# Patient Record
Sex: FEMALE | Race: WHITE | NOT HISPANIC OR LATINO | Employment: OTHER | URBAN - METROPOLITAN AREA
[De-identification: names, ages, dates, MRNs, and addresses within clinical notes are randomized per-mention and may not be internally consistent; named-entity substitution may affect disease eponyms.]

---

## 2017-01-09 ENCOUNTER — OFFICE VISIT (OUTPATIENT)
Dept: CARDIOLOGY | Facility: CLINIC | Age: 67
End: 2017-01-09

## 2017-01-09 VITALS
WEIGHT: 176.8 LBS | DIASTOLIC BLOOD PRESSURE: 72 MMHG | HEART RATE: 48 BPM | BODY MASS INDEX: 27.75 KG/M2 | SYSTOLIC BLOOD PRESSURE: 128 MMHG | HEIGHT: 67 IN

## 2017-01-09 DIAGNOSIS — I45.6 WPW (WOLFF-PARKINSON-WHITE SYNDROME): Primary | ICD-10-CM

## 2017-01-09 DIAGNOSIS — I48.0 PAROXYSMAL ATRIAL FIBRILLATION (HCC): ICD-10-CM

## 2017-01-09 DIAGNOSIS — R00.1 BRADYCARDIA: ICD-10-CM

## 2017-01-09 DIAGNOSIS — R07.9 CHEST PAIN, UNSPECIFIED TYPE: ICD-10-CM

## 2017-01-09 PROCEDURE — 99213 OFFICE O/P EST LOW 20 MIN: CPT | Performed by: INTERNAL MEDICINE

## 2017-01-09 PROCEDURE — 93000 ELECTROCARDIOGRAM COMPLETE: CPT | Performed by: INTERNAL MEDICINE

## 2017-01-09 PROCEDURE — 93270 REMOTE 30 DAY ECG REV/REPORT: CPT | Performed by: INTERNAL MEDICINE

## 2017-01-09 NOTE — MR AVS SNAPSHOT
Aleena KELI Nathaniel   1/9/2017 3:30 PM   Office Visit    Dept Phone:  [phone]   Encounter #:  [id number]    Provider:  Edvin Cook DO   Department:  [facility] [place] [facility]                Your Full Care Plan              Your Updated Medication List          This list is accurate as of: 1/9/17  4:44 PM.  Always use your most recent med list.                apixaban 5 MG tablet tablet   Commonly known as:  ELIQUIS   Take 1 tablet by mouth Every 12 (Twelve) Hours.       flecainide 50 MG tablet   Commonly known as:  TAMBOCOR   Take 1 tablet by mouth 2 (Two) Times a Day.       FLUoxetine 40 MG capsule   Commonly known as:  PROzac       hydroxychloroquine 200 MG tablet   Commonly known as:  PLAQUENIL       metoprolol tartrate 25 MG tablet   Commonly known as:  LOPRESSOR   Take 1 tablet by mouth Every 12 (Twelve) Hours.       omeprazole-sodium bicarbonate  MG per capsule   Commonly known as:  ZEGERID   Take 1 capsule by mouth Every Morning Before Breakfast.       timolol 0.25 % ophthalmic solution   Commonly known as:  BETIMOL       traZODone 50 MG tablet   Commonly known as:  DESYREL       vitamin D 38034 UNITS capsule capsule   Commonly known as:  ERGOCALCIFEROL               We Performed the Following     ECG 12 Lead       You Were Diagnosed With        Codes Comments    WPW (Elodia-Parkinson-White syndrome)    -  Primary ICD-10-CM: I45.6  ICD-9-CM: 426.7     Paroxysmal atrial fibrillation     ICD-10-CM: I48.0  ICD-9-CM: 427.31     Bradycardia     ICD-10-CM: R00.1  ICD-9-CM: 427.89     Chest pain, unspecified type     ICD-10-CM: R07.9  ICD-9-CM: 786.50       Instructions     None    Patient Instructions History      Upcoming Appointments     Visit Type Date Time Department    FOLLOW UP 1/9/2017  3:30 PM [facility]    FOLLOW UP 5/24/2017  9:45 AM [facility] Signup     [facility] allows you to send messages to your  "doctor, view your test results, renew your prescriptions, schedule appointments, and more. To sign up, go to [employer] and click on the Sign Up Now link in the New User? box. Enter your [employer] Activation Code exactly as it appears below along with the last four digits of your Social Security Number and your Date of Birth () to complete the sign-up process. If you do not sign up before the expiration date, you must request a new code.    [employer] Activation Code: [email or web address]  Expires: 2017  4:44 PM    If you have questions, you can email [email or web address] or call [phone] to talk to our [employer] staff. Remember, [employer] is NOT to be used for urgent needs. For medical emergencies, dial 911.               Other Info from Your Visit           Your Appointments     May 24, 2017  9:45 AM EDT   Follow Up with Dominic Wolff MD   [facility] [place] [facility] (--)    [address] [place] [address] Dr Esqueda [address] [place] [address]   [phone]           Arrive 15 minutes prior to appointment.              Allergies     Bactrim [Sulfamethoxazole-trimethoprim]      Erythromycin      Percocet [Oxycodone-acetaminophen]      Statins      Sulfa Antibiotics        Vital Signs     Blood Pressure Pulse Height Weight Last Menstrual Period Body Mass Index    128/72 (BP Location: Left arm, Patient Position: Sitting) 48 67\" (170.2 cm) 176 lb 12.8 oz (80.2 kg) (LMP Unknown) 27.69 kg/m2    Smoking Status                   Former Smoker           Problems and Diagnoses Noted     Atrial fibrillation (irregular heartbeat)    Bradycardia    Chest pain    WPW (Elodia-Parkinson-White syndrome)      Results     ECG 12 Lead               "

## 2017-01-09 NOTE — PROGRESS NOTES
Subjective:   Aleena Armstrong  1950  [phone]      01/09/2017    [facility] CARDIOLOGY    Lidia Mast MD  [address] [address]    REFERRING DOCTOR: Dr Dominic Wolff      Patient ID: Aleena Armstrong is a 66 y.o. female.    Chief Complaint:  AFIB/Flutter    Problem List:    1. Atrial flutter.  A) [facility] ED presentation, 10/23/2016, with rapid atrial  flutter converted after IV diltiazem.    B) Normal LVEF by catheterization, 10/24/2016.    C) Echocardiogram, 10/23/2016, revealing LVEF of 60%, mild MR, mild TR,  LV diastolic dysfunction, Dominic Wolff MD.    D)Recurrent symptomatic Afib.  E)Chadsvasc=3,No anticoagulation per Dr. Wolff secondary to history of  PUD  2. Chest pain.  A) Symptoms of chest discomfort with radiation to the neck and jaw,  10/23/2016.  B) Negative troponin x2.    C) Cardiac catheterization, 10/24/2016, Rubens Morejon MD, revealing  nonobstructive plaque involving a large obtuse marginal and the apical LAD  without evidence of hemodynamically significant CAD, acceptable FFR of  the OM (0.95) and of apical LAD (0.83). LVEF of 60%.    D) Medical therapy and risk factor modification.    3. History of WPW and remote RFA  4. Dyslipidemia- lipophilic statin intolerant  5. Morbid obesity -- Prior gastric bypass  6. Hypertension.    7. Gastric ulcer, diagnosed 08/2016, per EGD secondary to Plaquenil therapy for RA    Allergies   Allergen Reactions   • Bactrim [Sulfamethoxazole-Trimethoprim]    • Erythromycin    • Percocet [Oxycodone-Acetaminophen]    • Statins    • Sulfa Antibiotics        Current Outpatient Prescriptions:   •  apixaban (ELIQUIS) 5 MG tablet tablet, Take 1 tablet by mouth Every 12 (Twelve) Hours., Disp: 60 tablet, Rfl: 1  •  flecainide (TAMBOCOR) 50 MG tablet, Take 1 tablet by mouth 2 (Two) Times a Day., Disp: 60 tablet, Rfl: 3  •  FLUoxetine (PROzac) 40 MG capsule, Take 40 mg by mouth Daily., Disp: , Rfl:    •  hydroxychloroquine (PLAQUENIL) 200 MG tablet, Take 200 mg by mouth 2 (Two) Times a Day., Disp: , Rfl:   •  metoprolol tartrate (LOPRESSOR) 25 MG tablet, Take 1 tablet by mouth Every 12 (Twelve) Hours., Disp: 90 tablet, Rfl: 3  •  omeprazole-sodium bicarbonate (ZEGERID)  MG per capsule, Take 1 capsule by mouth Every Morning Before Breakfast., Disp: 30 capsule, Rfl: 3  •  timolol (BETIMOL) 0.25 % ophthalmic solution, Administer 1-2 drops into the left eye Daily., Disp: , Rfl:   •  traZODone (DESYREL) 50 MG tablet, Take 50 mg by mouth Every Night., Disp: , Rfl:   •  vitamin D (ERGOCALCIFEROL) 27775 UNITS capsule capsule, Take 50,000 Units by mouth 1 (One) Time Per Week. On Sundays, Disp: , Rfl:     History of Present Illness  Patient here today for follow-up visit after hospitalization for atrial fibrillation with rapid ventricular rates.  She was started on flecainide and metoprolol and blood thinners at that time.  She states she's been doing okay since that time in regards to no recurrence of any atrial arrhythmias as she can tell.  She does states she does get palpitations and feels very fatigued.  She also feels skipped beats.  She states skipped beats feels similar to when she had her WPW prior to her ablation.  Overall she states she's not feeling great special regards of having some chest pressure, feeling very fatigued and also occasional palpitations.  She states chest pressure similar to what she's had in the past however did have a heart catheterization October 2016 by Dr. Taylor and at that time had a large obtuse marginal and apical LAD without evidence of hemodynamically significant coronary disease with acceptable FFR R of the obtuse marginal with a normal ejection fraction.    No issues with shortness of breath, fevers, chills, night sweats, PND, orthopnea or palpitations. No recent ER visits or hospital stays.      The following portions of the patient's history were reviewed and updated as  "appropriate: allergies, current medications, past family history, past medical history, past social history, past surgical history and problem list.    ROS   14 point ROS negative except as outlined in problem list, HPI and other parts of the note.      ECG 12 Lead  Date/Time: 1/9/2017 4:22 PM  Performed by: SHAWANDA GOMEZ  Authorized by: SHAWANDA GOMEZ   Rhythm: sinus bradycardia  Ectopy: atrial premature contractions  Comments: SB HR 48 bpm with PACs noted. QTc and QRS ok 76 msec               Objective:       Vitals:    01/09/17 1538   BP: 128/72   BP Location: Left arm   Patient Position: Sitting   Pulse: (!) 48   Weight: 176 lb 12.8 oz (80.2 kg)   Height: 67\" (170.2 cm)       GENERAL: Well-developed, well-nourished patient in no acute distress.  HEENT: Normocephalic, atraumatic, PERRLA. Moist mucous membranes.  NECK: No JVD present at 30°. No carotid bruits auscultated.  LUNGS: Clear to auscultation.  CARDIOVASCULAR: Heart has a regular rate and rhythm. No murmurs, gallops or rubs noted.   ABDOMEN: Soft, nontender. Positive bowel sounds.  MUSCULOSKELETAL: No gross deformities. No clubbing, cyanosis, or lower extremity edema.  SKIN: Pink, warm  Neuro: Nonfocal exam. Gait intact  Ext: No edema or bruising    The patient's old records including ambulatory rhythm recordings (ECGs, Holter/event monitor) were reviewed and discussed.      Lab Review:   Results for orders placed or performed during the hospital encounter of 12/11/16   Comprehensive Metabolic Panel   Result Value Ref Range    Glucose 97 70 - 100 mg/dL    BUN 17 9 - 23 mg/dL    Creatinine 1.20 0.60 - 1.30 mg/dL    Sodium 138 132 - 146 mmol/L    Potassium 4.0 3.5 - 5.5 mmol/L    Chloride 106 99 - 109 mmol/L    CO2 26.0 20.0 - 31.0 mmol/L    Calcium 9.5 8.7 - 10.4 mg/dL    Total Protein 7.2 5.7 - 8.2 g/dL    Albumin 4.30 3.20 - 4.80 g/dL    ALT (SGPT) 13 7 - 40 U/L    AST (SGOT) 26 0 - 33 U/L    Alkaline Phosphatase 66 25 - 100 U/L    Total Bilirubin 0.3 " 0.3 - 1.2 mg/dL    eGFR Non African Amer 45 (L) >60 mL/min/1.73    Globulin 2.9 gm/dL    A/G Ratio 1.5 g/dL    BUN/Creatinine Ratio 14.2 7.0 - 25.0    Anion Gap 6.0 3.0 - 11.0 mmol/L   Magnesium   Result Value Ref Range    Magnesium 2.2 1.3 - 2.7 mg/dL   CBC Auto Differential   Result Value Ref Range    WBC 6.68 3.50 - 10.80 10*3/mm3    RBC 3.69 (L) 3.89 - 5.14 10*6/mm3    Hemoglobin 11.8 11.5 - 15.5 g/dL    Hematocrit 35.9 34.5 - 44.0 %    MCV 97.3 80.0 - 99.0 fL    MCH 32.0 (H) 27.0 - 31.0 pg    MCHC 32.9 32.0 - 36.0 g/dL    RDW 12.8 11.3 - 14.5 %    RDW-SD 45.6 37.0 - 54.0 fl    MPV 10.4 6.0 - 12.0 fL    Platelets 205 150 - 450 10*3/mm3    Neutrophil % 47.6 41.0 - 71.0 %    Lymphocyte % 40.4 24.0 - 44.0 %    Monocyte % 9.1 0.0 - 12.0 %    Eosinophil % 2.2 0.0 - 3.0 %    Basophil % 0.7 0.0 - 1.0 %    Immature Grans % 0.0 0.0 - 0.6 %    Neutrophils, Absolute 3.17 1.50 - 8.30 10*3/mm3    Lymphocytes, Absolute 2.70 0.60 - 4.80 10*3/mm3    Monocytes, Absolute 0.61 0.00 - 1.00 10*3/mm3    Eosinophils, Absolute 0.15 0.10 - 0.30 10*3/mm3    Basophils, Absolute 0.05 0.00 - 0.20 10*3/mm3    Immature Grans, Absolute 0.00 0.00 - 0.03 10*3/mm3   Protime-INR   Result Value Ref Range    Protime 10.6 9.6 - 11.5 Seconds    INR 0.97    aPTT   Result Value Ref Range    PTT <24.0 (L) 24.0 - 31.0 seconds   Basic Metabolic Panel   Result Value Ref Range    Glucose 94 70 - 100 mg/dL    BUN 15 9 - 23 mg/dL    Creatinine 1.10 0.60 - 1.30 mg/dL    Sodium 140 132 - 146 mmol/L    Potassium 3.8 3.5 - 5.5 mmol/L    Chloride 105 99 - 109 mmol/L    CO2 28.0 20.0 - 31.0 mmol/L    Calcium 9.4 8.7 - 10.4 mg/dL    eGFR Non African Amer 49 (L) >60 mL/min/1.73    BUN/Creatinine Ratio 13.6 7.0 - 25.0    Anion Gap 7.0 3.0 - 11.0 mmol/L   CBC Auto Differential   Result Value Ref Range    WBC 5.85 3.50 - 10.80 10*3/mm3    RBC 3.44 (L) 3.89 - 5.14 10*6/mm3    Hemoglobin 11.0 (L) 11.5 - 15.5 g/dL    Hematocrit 33.5 (L) 34.5 - 44.0 %    MCV 97.4 80.0 - 99.0  fL    MCH 32.0 (H) 27.0 - 31.0 pg    MCHC 32.8 32.0 - 36.0 g/dL    RDW 12.9 11.3 - 14.5 %    RDW-SD 45.3 37.0 - 54.0 fl    MPV 10.6 6.0 - 12.0 fL    Platelets 198 150 - 450 10*3/mm3    Neutrophil % 42.1 41.0 - 71.0 %    Lymphocyte % 47.4 (H) 24.0 - 44.0 %    Monocyte % 7.7 0.0 - 12.0 %    Eosinophil % 2.1 0.0 - 3.0 %    Basophil % 0.5 0.0 - 1.0 %    Immature Grans % 0.2 0.0 - 0.6 %    Neutrophils, Absolute 2.47 1.50 - 8.30 10*3/mm3    Lymphocytes, Absolute 2.77 0.60 - 4.80 10*3/mm3    Monocytes, Absolute 0.45 0.00 - 1.00 10*3/mm3    Eosinophils, Absolute 0.12 0.10 - 0.30 10*3/mm3    Basophils, Absolute 0.03 0.00 - 0.20 10*3/mm3    Immature Grans, Absolute 0.01 0.00 - 0.03 10*3/mm3   Protime-INR   Result Value Ref Range    Protime 11.2 9.6 - 11.5 Seconds    INR 1.03    Troponin   Result Value Ref Range    Troponin I <0.006 <=0.040 ng/mL   aPTT   Result Value Ref Range    PTT 38.5 (H) 24.0 - 31.0 seconds   TSH   Result Value Ref Range    TSH 3.744 0.350 - 5.350 mIU/mL   Lipid Panel   Result Value Ref Range    Total Cholesterol 204 (H) 0 - 200 mg/dL    Triglycerides 68 0 - 150 mg/dL    HDL Cholesterol 98 (H) 40 - 60 mg/dL    LDL Cholesterol  84 0 - 130 mg/dL   Hemoglobin A1c   Result Value Ref Range    Hemoglobin A1C 5.50 4.80 - 5.60 %   aPTT   Result Value Ref Range    PTT 51.8 (H) 24.0 - 31.0 seconds   POC Troponin, Rapid   Result Value Ref Range    Troponin I 0.00 0.00 - 0.60 ng/mL   Light Blue Top   Result Value Ref Range    Extra Tube hold for add-on    Green Top (Gel)   Result Value Ref Range    Extra Tube Hold for add-ons.    Lavender Top   Result Value Ref Range    Extra Tube hold for add-on    Gold Top - SST   Result Value Ref Range    Extra Tube Hold for add-ons.            Diagnosis:   1. WPW (Elodia-Parkinson-White syndrome) --> s/p ablation in the past  2. Paroxysmal atrial fibrillation  3. Bradycardia  4. Chest pain, unspecified type, CAD      Assessment & Plan:   1.  Paroxysmal atrial fibrillation /  Aflutter with sick sinus syndrome. She was hospitalized in December 2016 for recurrent atrial fibrillation with rapid ventricular rates.  Did on flecainide 50 mg twice a day Lopressor 25 mg twice a day and anticoagulation with eliquis 5 mg twice a day.  2.  Chronic symptomatic sinus bradycardia  3.  Recurrent chest pain with left heart catheterization in October 2016 by Dr. Morejon  4.  History of WPW status post ablation.   5. Morbid Obesity s/p gastric bypass surgery  6. Chronic anemia stable    Plan:  1.  Need to better determine if the atrial fibrillation or any atrial arrhythmias versus bradycardias to cause the patient continued symptoms of fatigue and palpitations.  We'll check a 30 day event monitor.  2.  Metoprolol to be stopped  3.  Important for the patient to follow-up with Dr. Wolff in the near future for recurrent chest pain and with recent left heart catheterization October 2016.  Normal ejection fraction at that time and no intervention done.  4.  Patient possibly may need a pacemaker to help with some bradycardia issues and to allow for better treatment of her paroxysmal atrial fibrillation.  However we'll wait for now and get a 30 day event monitor prior to final decision.  5.  Long term treatment of atrial fibrillation may need to be a pulmonary vein ablation/atrial flutter ablation however we'll continue with medical management for now.    CC: Dr Dominic Cook DO  01/09/17  4:16 PM      EMR Dragon/Transcription disclaimer:  Much of this encounter note is an electronic transcription/translation of spoken language to printed text. Electronic translation of spoken language may permit erroneous, or at times, nonsensical words or phrases to be inadvertently transcribed. Although I have reviewed the note for such errors, some may still exist.

## 2017-01-25 ENCOUNTER — OFFICE VISIT (OUTPATIENT)
Dept: CARDIOLOGY | Facility: CLINIC | Age: 67
End: 2017-01-25

## 2017-01-25 VITALS
HEIGHT: 67 IN | SYSTOLIC BLOOD PRESSURE: 122 MMHG | HEART RATE: 58 BPM | BODY MASS INDEX: 27.62 KG/M2 | DIASTOLIC BLOOD PRESSURE: 78 MMHG | WEIGHT: 176 LBS

## 2017-01-25 DIAGNOSIS — I25.10 CORONARY ARTERY DISEASE INVOLVING NATIVE CORONARY ARTERY OF NATIVE HEART WITHOUT ANGINA PECTORIS: Primary | ICD-10-CM

## 2017-01-25 DIAGNOSIS — I10 ESSENTIAL HYPERTENSION: ICD-10-CM

## 2017-01-25 DIAGNOSIS — E78.2 MIXED HYPERLIPIDEMIA: ICD-10-CM

## 2017-01-25 DIAGNOSIS — I48.0 PAROXYSMAL ATRIAL FIBRILLATION (HCC): ICD-10-CM

## 2017-01-25 PROCEDURE — 99213 OFFICE O/P EST LOW 20 MIN: CPT | Performed by: INTERNAL MEDICINE

## 2017-01-25 RX ORDER — FLECAINIDE ACETATE 50 MG/1
50 TABLET ORAL 2 TIMES DAILY
Qty: 180 TABLET | Refills: 3 | Status: SHIPPED | OUTPATIENT
Start: 2017-01-25 | End: 2017-05-04 | Stop reason: SDUPTHER

## 2017-01-25 NOTE — PROGRESS NOTES
[facility] at [facility]  Office Progress Note  Aleena Armstrong  1950  [phone]      Visit Date: 01/25/2017    PCP: Lidia Mast MD  [address] [facility] [address]  [facility] [address]    IDENTIFICATION: A 66 y.o. female     Chief Complaint   Patient presents with   • Follow-up     WPW/HTN       1. Atrial flutter.  A) [facility] ED presentation, 10/23/2016, with rapid atrial flutter converted after IV diltiazem.   B) Nl LVEF catheterization, 10/24/2016.   C) Echocardiogram, 10/23/2016, revealing LVEF of 60%, mild MR, mild TR, LV diastolic dysfunction, Dominic Wolff MD.   2. Chest pain.  A) Symptoms of chest discomfort with radiation to the neck and jaw, 10/23/2016.  B) Negative troponin x2.   C) Cardiac catheterization, 10/24/2016, Rubens Morejon MD, revealing nonobstructive plaque involving a large obtuse marginal and the apical LAD without evidence of hemodynamically significant CAD, acceptable FFR of the OM (0.95) and of apical LAD (0.83). LVEF of 60%.   D) Medical therapy and risk factor modification.   3. History of WPW.   4. Dyslipidemia- lipophilic statin intolerant  5. Morbid obesity.  Prior gastric bypass  6. Hypertension.   7. Gastric ulcer, diagnosed 08/2016, per EGD secondary to Plaquenil therapy.     Allergies  Allergies   Allergen Reactions   • Bactrim [Sulfamethoxazole-Trimethoprim]    • Erythromycin    • Percocet [Oxycodone-Acetaminophen]    • Statins    • Sulfa Antibiotics        Current Medications    Current Outpatient Prescriptions:   •  apixaban (ELIQUIS) 5 MG tablet tablet, Take 1 tablet by mouth Every 12 (Twelve) Hours., Disp: 60 tablet, Rfl: 1  •  flecainide (TAMBOCOR) 50 MG tablet, Take 1 tablet by mouth 2 (Two) Times a Day., Disp: 60 tablet, Rfl: 3  •  FLUoxetine (PROzac) 40 MG capsule, Take 40 mg by mouth Daily., Disp: , Rfl:   •  hydroxychloroquine (PLAQUENIL) 200 MG tablet, Take 200 mg by mouth 2 (Two) Times a Day., Disp: , Rfl:   •  omeprazole-sodium  "bicarbonate (ZEGERID)  MG per capsule, Take 1 capsule by mouth Every Morning Before Breakfast., Disp: 30 capsule, Rfl: 3  •  timolol (BETIMOL) 0.25 % ophthalmic solution, Administer 1-2 drops into the left eye Daily., Disp: , Rfl:   •  traZODone (DESYREL) 50 MG tablet, Take 50 mg by mouth Every Night., Disp: , Rfl:   •  vitamin D (ERGOCALCIFEROL) 39398 UNITS capsule capsule, Take 50,000 Units by mouth 1 (One) Time Per Week. On Sundays, Disp: , Rfl:       History of Present Illness     Pt denies any new chest pain, dyspnea, dyspnea on exertion, orthopnea, PND, palpitations, lower extremity edema.  Was intolerant to beta blockade w jody/hypotension.  Now wearing holter to delineate need for ppm w element of tachy/jody.    ROS:  All systems have been reviewed and are negative with the exception of those mentioned in the HPI.    OBJECTIVE:  Vitals:    01/25/17 1140   BP: 122/78   BP Location: Left arm   Patient Position: Sitting   Pulse: 58   Weight: 176 lb (79.8 kg)   Height: 67\" (170.2 cm)     Physical Exam   Constitutional: She appears well-developed and well-nourished.   Neck: Normal range of motion. Neck supple. No hepatojugular reflux and no JVD present. Carotid bruit is not present. No tracheal deviation present. No thyromegaly present.   Cardiovascular: Normal rate, regular rhythm, S1 normal, S2 normal, intact distal pulses and normal pulses.  PMI is not displaced.  Exam reveals no gallop, no distant heart sounds, no friction rub, no midsystolic click and no opening snap.    No murmur heard.  Pulses:       Radial pulses are 2+ on the right side, and 2+ on the left side.        Dorsalis pedis pulses are 2+ on the right side, and 2+ on the left side.        Posterior tibial pulses are 2+ on the right side, and 2+ on the left side.   Pulmonary/Chest: Effort normal and breath sounds normal. She has no wheezes. She has no rales.   Abdominal: Soft. Bowel sounds are normal. She exhibits no mass. There is no " tenderness. There is no guarding.       Diagnostic Data:  Procedures      ASSESSMENT:   Diagnosis Plan   1. Coronary artery disease involving native coronary artery of native heart without angina pectoris     2. Paroxysmal atrial fibrillation     3. Essential hypertension     4. Mixed hyperlipidemia         PLAN:  CAD - moderate nonobst w recent LHC.  Cont current RX    HL  Statin intolerant.  Rediscussed pcsk9 option    Paf/palps/ jody- ultimately may need ppm, per Joyce    Fu 6 month    Lidia Mast MD, thank you for referring Ms. Armstrong for evaluation.  I have forwarded my electronically generated recommendations to you for review.  Please do not hesitate to call with any questions.      Dominic Wolff MD, [facility]

## 2017-02-13 ENCOUNTER — OFFICE VISIT (OUTPATIENT)
Dept: CARDIOLOGY | Facility: CLINIC | Age: 67
End: 2017-02-13

## 2017-02-13 DIAGNOSIS — R00.2 PALPITATIONS: ICD-10-CM

## 2017-02-13 PROCEDURE — 93272 ECG/REVIEW INTERPRET ONLY: CPT | Performed by: INTERNAL MEDICINE

## 2017-03-07 ENCOUNTER — OFFICE VISIT (OUTPATIENT)
Dept: CARDIOLOGY | Facility: CLINIC | Age: 67
End: 2017-03-07

## 2017-03-07 DIAGNOSIS — I48.0 PAROXYSMAL ATRIAL FIBRILLATION (HCC): ICD-10-CM

## 2017-05-04 ENCOUNTER — OFFICE VISIT (OUTPATIENT)
Dept: CARDIOLOGY | Facility: CLINIC | Age: 67
End: 2017-05-04

## 2017-05-04 VITALS
BODY MASS INDEX: 28.03 KG/M2 | HEIGHT: 67 IN | WEIGHT: 178.6 LBS | SYSTOLIC BLOOD PRESSURE: 116 MMHG | HEART RATE: 61 BPM | DIASTOLIC BLOOD PRESSURE: 70 MMHG

## 2017-05-04 DIAGNOSIS — R00.1 BRADYCARDIA: ICD-10-CM

## 2017-05-04 DIAGNOSIS — I48.92 ATRIAL FLUTTER WITH RAPID VENTRICULAR RESPONSE (HCC): ICD-10-CM

## 2017-05-04 DIAGNOSIS — I48.0 PAROXYSMAL ATRIAL FIBRILLATION (HCC): ICD-10-CM

## 2017-05-04 DIAGNOSIS — R07.89 OTHER CHEST PAIN: ICD-10-CM

## 2017-05-04 DIAGNOSIS — I45.6 WPW (WOLFF-PARKINSON-WHITE SYNDROME): Primary | ICD-10-CM

## 2017-05-04 PROCEDURE — 99213 OFFICE O/P EST LOW 20 MIN: CPT | Performed by: INTERNAL MEDICINE

## 2017-05-04 PROCEDURE — 93000 ELECTROCARDIOGRAM COMPLETE: CPT | Performed by: INTERNAL MEDICINE

## 2017-05-04 RX ORDER — FLECAINIDE ACETATE 50 MG/1
50 TABLET ORAL 2 TIMES DAILY
Qty: 180 TABLET | Refills: 2 | Status: SHIPPED | OUTPATIENT
Start: 2017-05-04 | End: 2017-05-04 | Stop reason: SINTOL

## 2017-05-15 PROCEDURE — 93005 ELECTROCARDIOGRAM TRACING: CPT | Performed by: PHYSICIAN ASSISTANT

## 2017-05-18 ENCOUNTER — PREP FOR SURGERY (OUTPATIENT)
Dept: CARDIOLOGY | Facility: CLINIC | Age: 67
End: 2017-05-18

## 2017-05-18 DIAGNOSIS — I48.0 PAROXYSMAL ATRIAL FIBRILLATION (HCC): Primary | ICD-10-CM

## 2017-05-18 RX ORDER — ONDANSETRON 2 MG/ML
4 INJECTION INTRAMUSCULAR; INTRAVENOUS EVERY 6 HOURS PRN
Status: CANCELLED | OUTPATIENT
Start: 2017-05-18

## 2017-05-18 RX ORDER — SODIUM CHLORIDE 0.9 % (FLUSH) 0.9 %
1-10 SYRINGE (ML) INJECTION AS NEEDED
Status: CANCELLED | OUTPATIENT
Start: 2017-05-18

## 2017-05-23 ENCOUNTER — RESULTS ENCOUNTER (OUTPATIENT)
Dept: CARDIOLOGY | Facility: CLINIC | Age: 67
End: 2017-05-23

## 2017-05-23 DIAGNOSIS — I48.0 PAROXYSMAL ATRIAL FIBRILLATION (HCC): ICD-10-CM

## 2017-06-14 NOTE — NURSING NOTE
"PRE-PVA ASSESSMENT  Aleena Armstrong 1950   [address] [facility] [address]   [phone] (home)     Referring: Dr. Wolff  Information obtained from: [x] Medical record review  [x] Patient report  Scheduled for: PVA on 6/20/17 with Dr Cook    AFib Specific History:  AFib Type: paroxysmal  [id number] Score: 3  Contributing Factors: HTN, AGE >65 and Female   Anticoagulation:  Eliquis 5mg BID since Dec 2016  Cardioversion x 1 on 12/11/16,  [facility]  Failed AAD(s): flecainide  Prior Ablation: WPW ablation in 1995 at [facility]    Is Ms. Armstrong aware of her AFib? Yes   Onset: Oct 2016     Frequency: occasional short episodes, no sustained episodes since Dec 2016       Duration: few seconds up to 2 days   Exacerbations: none   Alleviations: none      Symptoms:   [x] Palpitations:     [] Chest Discomfort:    [] Dizziness:    [] Presyncope:    [] Lightheadedness:   [] Syncope:    [] Fatigue:    [x] Other: \"unbalanced\"    [] Short of Breath:     Last Echo(s):  [x] TTE Date: 10/23/16         [] DEEPAK Date: scheduled for 6/20/17       EF: 60%      EF:         LA: dilated, vol index 47.4   LA:         VHD? Mild MR & TR   VHD?        Diastolic dysfunction     Past medical History:   [] Diabetes  -NO      Hemoglobin A1C   Date Value Ref Range Status   12/11/2016 5.50 4.80 - 5.60 % Final   10/23/2016 5.50 4.00 - 6.00 % Final       [] HYPOthyroidism  [] HYPERthyroidism  -NO         TSH   Date Value Ref Range Status   12/11/2016 3.744 0.350 - 5.350 mIU/mL Final   10/23/2016 3.303 0.350 - 5.350 mIU/mL Final     [x] HTN        [] Tx? None currently        [] Controlled? Yes    [] Heart Failure -NO    [] CVA -NO                               [] TIA  -NO        [] Ischemic         [] Hemorrhagic         [] Nonischemic         [] Embolic        [] Diastolic    [] CAD -NO        [] MI  -NO          [x] Dyslipidemia    [x] Ischemic Evaluation       [] Stress Test:        [x] Heart Cath: 10/24/16: nonobstructive plaque involving of OM " and apical LAD, acceptable FFR of the OM (0.95) and of apical LAD (0.83). LVEF of 60%    [] Sleep Apnea Diagnosed -NO    [x] Sleep Apnea Suspected -YES, AM dry mouth & headaches & excessive daytime sleepiness        [x] Discussed with Ms. Armstrong - she is open to evaluation        [x] Referral to [facility] placed - they will call with appt        [] Declined by Ms. Armstrong     [x] Obesity, s/p gastric bypass 2002. BMI 27.8 5/7/17     [] Urologic History - incontinence       [] Urologic cancer surgery         [] Severe decrease in flow of urine stream        [] Recent unexplained gross hematuria       [] Hx urethral stricture     Other Pertinent PMH: atypical flutter, SSS, tachy/jody, anxiety & depression, peptic ulcer    Social / Lifestyle History:   [x]   Tobacco: Quit 1992                      [x] Former: 1 PPD x 27 yrs    [x]   Alcohol:          [x] Current: 5 glasses of wine per week(s)   [x]   Caffeine: 1 cups per day   []   Recreational Drugs: None   []   Stress Issues: denies   [x]   Exercise: walking, gardening    Summary of Patient Contact:  I spoke with Ms. Armstrong about her upcoming PVA.  She was well informed about the procedure from prior discussion with Dr Cook and from reading the provided literature.  I answered a few remaining questions.  We did discuss risks, anesthesia, bedrest, sheath removal, discharge criteria, and normal post-procedure expectations.  Ms. Armstrong verbalized understanding and she is ready to proceed.      Rosamaria Briseno, ELIJAHN, RN

## 2017-06-15 ENCOUNTER — TELEPHONE (OUTPATIENT)
Dept: CARDIOLOGY | Facility: CLINIC | Age: 67
End: 2017-06-15

## 2017-06-15 DIAGNOSIS — G47.19 EXCESSIVE DAYTIME SLEEPINESS: Primary | ICD-10-CM

## 2017-06-15 NOTE — TELEPHONE ENCOUNTER
I called Mrs. Armstorng about her upcoming PVA.  She reports symptoms suspicious for JESSICA, including waking up with a dry mouth and headache & excessive daytime sleepiness.  She also has a history of morbid obesity, s/p gastric bypass in 2002.  She is receptive to sleep apnea eval.  Referral placed.

## 2017-06-16 ENCOUNTER — HOSPITAL ENCOUNTER (EMERGENCY)
Facility: HOSPITAL | Age: 67
Discharge: HOME | End: 2017-06-16
Attending: EMERGENCY MEDICINE | Admitting: EMERGENCY MEDICINE

## 2017-06-16 ENCOUNTER — APPOINTMENT (OUTPATIENT)
Dept: GENERAL RADIOLOGY | Facility: HOSPITAL | Age: 67
End: 2017-06-16

## 2017-06-16 VITALS
OXYGEN SATURATION: 98 % | BODY MASS INDEX: 26.53 KG/M2 | TEMPERATURE: 98.2 F | WEIGHT: 169 LBS | DIASTOLIC BLOOD PRESSURE: 62 MMHG | RESPIRATION RATE: 16 BRPM | HEART RATE: 60 BPM | SYSTOLIC BLOOD PRESSURE: 131 MMHG | HEIGHT: 67 IN

## 2017-06-16 DIAGNOSIS — S90.31XA CONTUSION OF RIGHT FOOT, INITIAL ENCOUNTER: Primary | ICD-10-CM

## 2017-06-16 PROCEDURE — 73630 X-RAY EXAM OF FOOT: CPT

## 2017-06-16 PROCEDURE — 99283 EMERGENCY DEPT VISIT LOW MDM: CPT

## 2017-06-16 RX ORDER — GABAPENTIN 300 MG/1
300 CAPSULE ORAL 2 TIMES DAILY
COMMUNITY
End: 2017-07-26

## 2017-06-16 NOTE — ED PROVIDER NOTES
Subjective   HPI Comments: 66-year-old female complains of right foot pain and bruising.  The patient states that she stepped down yesterday when she got up and had pain in the lateral aspect of her right foot.  She recalls no injury.  She noted that it was bruised.  She continues to have some discomfort as she walks.  The patient has a history of A. fib and is on Eliquis.  PCP is Lidia Mast.  She is a nonsmoker.  No alcohol or drug use.    Patient is a 66 y.o. female presenting with lower extremity pain.   History provided by:  Patient  Lower Extremity Issue   Location:  Foot  Injury: no    Foot location:  R foot  Pain details:     Quality:  Dull    Radiates to:  Does not radiate    Severity:  Moderate    Onset quality:  Sudden    Duration: Onset yesterday.    Progression:  Unchanged  Chronicity:  New  Prior injury to area:  No  Relieved by:  Nothing  Worsened by:  Bearing weight  Associated symptoms: no back pain and no fever        Review of Systems   Constitutional: Negative for chills and fever.   HENT: Negative for congestion, ear pain, nosebleeds, rhinorrhea and sore throat.    Eyes: Negative for pain, discharge and visual disturbance.   Respiratory: Negative for shortness of breath and wheezing.    Cardiovascular: Negative for chest pain, palpitations and leg swelling.   Gastrointestinal: Negative for abdominal pain, blood in stool, diarrhea, nausea and vomiting.   Endocrine: Negative.    Genitourinary: Negative for dysuria, hematuria and urgency.   Musculoskeletal: Negative for back pain.        Right foot pain   Skin: Negative for pallor and rash.   Allergic/Immunologic: Negative for immunocompromised state.   Neurological: Negative for dizziness, speech difficulty, weakness and headaches.   Hematological: Negative for adenopathy. Bruises/bleeds easily (on Eliquis).   Psychiatric/Behavioral: Negative.        Past Medical History:   Diagnosis Date   • Anxiety and depression 12/11/2016   • Glaucoma of  left eye 12/11/2016   • Hyperlipidemia    • Hypertension    • Insomnia 12/11/2016   • Kidney disease    • Peptic ulcer - s/p gastric bypass surgery (7-8 years ago) 12/11/2016   • Rheumatoid arthritis 12/11/2016   • Vitamin D deficiency 12/11/2016   • Elodia-Parkinson-White (WPW) syndrome        Allergies   Allergen Reactions   • Bactrim [Sulfamethoxazole-Trimethoprim]    • Erythromycin    • Percocet [Oxycodone-Acetaminophen]    • Statins    • Sulfa Antibiotics        Past Surgical History:   Procedure Laterality Date   • CARDIAC CATHETERIZATION N/A 10/24/2016    Procedure: Left Heart Cath;  Surgeon: Rubens Morejon MD;  Location: [facility] CATH INVASIVE LOCATION;  Service:    • CHOLECYSTECTOMY     • GASTRIC BYPASS     • HYSTERECTOMY         Family History   Problem Relation Age of Onset   • Arrhythmia Mother    • Hypertension Mother    • Stroke Mother    • Lung cancer Father        Social History     Social History   • Marital status:      Spouse name: N/A   • Number of children: N/A   • Years of education: N/A     Social History Main Topics   • Smoking status: Former Smoker     Quit date: 1992   • Smokeless tobacco: Never Used   • Alcohol use 3.0 oz/week     5 Glasses of wine per week   • Drug use: No   • Sexual activity: Defer     Other Topics Concern   • None     Social History Narrative           Objective   Physical Exam   Constitutional: She is oriented to person, place, and time. She appears well-developed and well-nourished. No distress.   HENT:   Head: Normocephalic and atraumatic.   Nose: Nose normal.   Mouth/Throat: Oropharynx is clear and moist.   Eyes: EOM are normal. Pupils are equal, round, and reactive to light. Left eye exhibits no discharge. No scleral icterus.   Neck: Normal range of motion. Neck supple.   Cardiovascular: Normal rate, regular rhythm, normal heart sounds and intact distal pulses.    No murmur heard.  Pulmonary/Chest: Effort normal and breath sounds normal. No respiratory distress.  She has no wheezes. She has no rales. She exhibits no tenderness.   Abdominal: Soft. Bowel sounds are normal. There is no tenderness.   Musculoskeletal: Normal range of motion. She exhibits no edema or tenderness.   Mild to moderate tenderness on palpation over the dorsal lateral aspect of the right foot.  There is a small area of bruising in this region.  No hematoma.  No deformity.  No infection.  Normal pulses.   Neurological: She is alert and oriented to person, place, and time.   Skin: Skin is warm and dry. No rash noted. She is not diaphoretic.   Psychiatric: She has a normal mood and affect.   Nursing note and vitals reviewed.      Procedures         ED Course  ED Course    X-rays of the right foot show no evidence of acute fracture.  The patient appears to have a contusion to the right foot, likely exacerbated by Eliquis.  I will discharge her home in an orthopedic boot and encourage her to elevate the extremity and apply an ice bag off and on.              MDM    Final diagnoses:   Contusion of right foot, initial encounter            GENARO Curry  06/16/17 1627       GENARO Curry  06/16/17 1627

## 2017-06-19 ENCOUNTER — APPOINTMENT (OUTPATIENT)
Dept: PREADMISSION TESTING | Facility: HOSPITAL | Age: 67
End: 2017-06-19

## 2017-06-19 ENCOUNTER — HOSPITAL ENCOUNTER (OUTPATIENT)
Dept: CT IMAGING | Facility: HOSPITAL | Age: 67
Discharge: HOME | End: 2017-06-19
Attending: INTERNAL MEDICINE | Admitting: INTERNAL MEDICINE

## 2017-06-19 DIAGNOSIS — I48.0 PAROXYSMAL ATRIAL FIBRILLATION (HCC): ICD-10-CM

## 2017-06-19 LAB
ALBUMIN SERPL-MCNC: 3.8 G/DL (ref 3.2–4.8)
ALBUMIN/GLOB SERPL: 1.7 G/DL (ref 1.5–2.5)
ALP SERPL-CCNC: 64 U/L (ref 25–100)
ALT SERPL W P-5'-P-CCNC: 18 U/L (ref 7–40)
ANION GAP SERPL CALCULATED.3IONS-SCNC: 8 MMOL/L (ref 3–11)
AST SERPL-CCNC: 24 U/L (ref 0–33)
BILIRUB SERPL-MCNC: 0.3 MG/DL (ref 0.3–1.2)
BUN BLD-MCNC: 12 MG/DL (ref 9–23)
BUN/CREAT SERPL: 12 (ref 7–25)
CALCIUM SPEC-SCNC: 9.4 MG/DL (ref 8.7–10.4)
CHLORIDE SERPL-SCNC: 108 MMOL/L (ref 99–109)
CO2 SERPL-SCNC: 27 MMOL/L (ref 20–31)
CREAT BLD-MCNC: 1 MG/DL (ref 0.6–1.3)
DEPRECATED RDW RBC AUTO: 44.1 FL (ref 37–54)
ERYTHROCYTE [DISTWIDTH] IN BLOOD BY AUTOMATED COUNT: 12 % (ref 11.3–14.5)
GFR SERPL CREATININE-BSD FRML MDRD: 55 ML/MIN/1.73
GLOBULIN UR ELPH-MCNC: 2.3 GM/DL
GLUCOSE BLD-MCNC: 86 MG/DL (ref 70–100)
HCT VFR BLD AUTO: 32.5 % (ref 34.5–44)
HGB BLD-MCNC: 10.5 G/DL (ref 11.5–15.5)
INR PPP: 1.01
MAGNESIUM SERPL-MCNC: 2 MG/DL (ref 1.3–2.7)
MCH RBC QN AUTO: 32.6 PG (ref 27–31)
MCHC RBC AUTO-ENTMCNC: 32.3 G/DL (ref 32–36)
MCV RBC AUTO: 100.9 FL (ref 80–99)
PLATELET # BLD AUTO: 197 10*3/MM3 (ref 150–450)
PMV BLD AUTO: 9.9 FL (ref 6–12)
POTASSIUM BLD-SCNC: 4 MMOL/L (ref 3.5–5.5)
PROT SERPL-MCNC: 6.1 G/DL (ref 5.7–8.2)
PROTHROMBIN TIME: 11 SECONDS (ref 9.6–11.5)
RBC # BLD AUTO: 3.22 10*6/MM3 (ref 3.89–5.14)
SODIUM BLD-SCNC: 143 MMOL/L (ref 132–146)
WBC NRBC COR # BLD: 4.04 10*3/MM3 (ref 3.5–10.8)

## 2017-06-19 PROCEDURE — 85610 PROTHROMBIN TIME: CPT | Performed by: PHYSICIAN ASSISTANT

## 2017-06-19 PROCEDURE — 36415 COLL VENOUS BLD VENIPUNCTURE: CPT | Performed by: PHYSICIAN ASSISTANT

## 2017-06-19 PROCEDURE — 71275 CT ANGIOGRAPHY CHEST: CPT

## 2017-06-19 PROCEDURE — 85027 COMPLETE CBC AUTOMATED: CPT | Performed by: PHYSICIAN ASSISTANT

## 2017-06-19 PROCEDURE — 83735 ASSAY OF MAGNESIUM: CPT | Performed by: PHYSICIAN ASSISTANT

## 2017-06-19 PROCEDURE — 0 IOPAMIDOL PER 1 ML: Performed by: INTERNAL MEDICINE

## 2017-06-19 PROCEDURE — 80053 COMPREHEN METABOLIC PANEL: CPT | Performed by: PHYSICIAN ASSISTANT

## 2017-06-19 RX ORDER — OMEPRAZOLE 20 MG/1
20 CAPSULE, DELAYED RELEASE ORAL DAILY
COMMUNITY
End: 2018-02-19

## 2017-06-19 RX ORDER — CHOLECALCIFEROL (VITAMIN D3) 125 MCG
1 CAPSULE ORAL DAILY
COMMUNITY
End: 2019-02-04

## 2017-06-19 RX ORDER — LEVOCETIRIZINE DIHYDROCHLORIDE 5 MG/1
5 TABLET, FILM COATED ORAL EVERY EVENING
COMMUNITY
End: 2017-07-07

## 2017-06-19 RX ADMIN — IOPAMIDOL 80 ML: 755 INJECTION, SOLUTION INTRAVENOUS at 10:23

## 2017-06-19 NOTE — DISCHARGE INSTRUCTIONS
The following instructions given during Pre Admission Testing visit:    Do not eat or drink anything after MN except for sips of water with your a.m. Prescription meds unless otherwise instructed by your physician.    Glasses and jewelry may be worn, but dentures must be removed prior to cath/procedure.    Leave any items you consider valuable at home.    Family members may wait in [facility] waiting area during procedure.    Bring all medications in their original containers the day of procedure.    Bring photo ID and insurance cards on the day of procedure.    Need to make arrangements for transportation prior to discharge.    The following handouts were given:     Heart Cath pathway (if applicable)   Cardiac Cath booklet published by [facility]    OR appropriate [facility] procedure booklet    If applicable, pt instructed to bring CPAP mask and tubing the day of procedure.

## 2017-06-20 ENCOUNTER — HOSPITAL ENCOUNTER (OUTPATIENT)
Facility: HOSPITAL | Age: 67
Setting detail: OBSERVATION
Discharge: HOME | End: 2017-06-23
Attending: INTERNAL MEDICINE | Admitting: INTERNAL MEDICINE

## 2017-06-20 ENCOUNTER — ANESTHESIA EVENT (OUTPATIENT)
Dept: CARDIOLOGY | Facility: HOSPITAL | Age: 67
End: 2017-06-20

## 2017-06-20 ENCOUNTER — ANESTHESIA (OUTPATIENT)
Dept: CARDIOLOGY | Facility: HOSPITAL | Age: 67
End: 2017-06-20

## 2017-06-20 ENCOUNTER — APPOINTMENT (OUTPATIENT)
Dept: CARDIOLOGY | Facility: HOSPITAL | Age: 67
End: 2017-06-20

## 2017-06-20 DIAGNOSIS — I48.0 PAROXYSMAL ATRIAL FIBRILLATION (HCC): ICD-10-CM

## 2017-06-20 LAB
ACT BLD: 120 SECONDS (ref 82–152)
ACT BLD: 213 SECONDS (ref 82–152)
ACT BLD: 263 SECONDS (ref 82–152)
ACT BLD: 351 SECONDS (ref 82–152)
ACT BLD: 389 SECONDS (ref 82–152)
ACT BLD: 406 SECONDS (ref 82–152)
ACT BLD: 417 SECONDS (ref 82–152)
BH CV VAS BP RIGHT ARM: NORMAL MMHG
DEPRECATED RDW RBC AUTO: 45.3 FL (ref 37–54)
ERYTHROCYTE [DISTWIDTH] IN BLOOD BY AUTOMATED COUNT: 12.2 % (ref 11.3–14.5)
HCT VFR BLD AUTO: 27.3 % (ref 34.5–44)
HGB BLD-MCNC: 8.7 G/DL (ref 11.5–15.5)
LV EF 2D ECHO EST: 60 %
MCH RBC QN AUTO: 32.7 PG (ref 27–31)
MCHC RBC AUTO-ENTMCNC: 31.9 G/DL (ref 32–36)
MCV RBC AUTO: 102.6 FL (ref 80–99)
PLATELET # BLD AUTO: 165 10*3/MM3 (ref 150–450)
PMV BLD AUTO: 9.5 FL (ref 6–12)
RBC # BLD AUTO: 2.66 10*6/MM3 (ref 3.89–5.14)
WBC NRBC COR # BLD: 7.85 10*3/MM3 (ref 3.5–10.8)

## 2017-06-20 PROCEDURE — 93657 TX L/R ATRIAL FIB ADDL: CPT | Performed by: INTERNAL MEDICINE

## 2017-06-20 PROCEDURE — 85347 COAGULATION TIME ACTIVATED: CPT

## 2017-06-20 PROCEDURE — 25010000002 FUROSEMIDE PER 20 MG: Performed by: INTERNAL MEDICINE

## 2017-06-20 PROCEDURE — 25010000002 HEPARIN (PORCINE) PER 1000 UNITS: Performed by: INTERNAL MEDICINE

## 2017-06-20 PROCEDURE — 99152 MOD SED SAME PHYS/QHP 5/>YRS: CPT

## 2017-06-20 PROCEDURE — 25010000002 PROPOFOL 10 MG/ML EMULSION: Performed by: NURSE ANESTHETIST, CERTIFIED REGISTERED

## 2017-06-20 PROCEDURE — 93656 COMPRE EP EVAL ABLTJ ATR FIB: CPT | Performed by: INTERNAL MEDICINE

## 2017-06-20 PROCEDURE — G0378 HOSPITAL OBSERVATION PER HR: HCPCS

## 2017-06-20 PROCEDURE — 25010000002 KETOROLAC TROMETHAMINE PER 15 MG: Performed by: INTERNAL MEDICINE

## 2017-06-20 PROCEDURE — 93312 ECHO TRANSESOPHAGEAL: CPT | Performed by: INTERNAL MEDICINE

## 2017-06-20 PROCEDURE — 0 IOPAMIDOL PER 1 ML: Performed by: INTERNAL MEDICINE

## 2017-06-20 PROCEDURE — 25010000002 ONDANSETRON PER 1 MG: Performed by: PHYSICIAN ASSISTANT

## 2017-06-20 PROCEDURE — 63710000001 PROPAFENONE 150 MG TABLET: Performed by: INTERNAL MEDICINE

## 2017-06-20 PROCEDURE — 25010000002 MIDAZOLAM PER 1 MG: Performed by: INTERNAL MEDICINE

## 2017-06-20 PROCEDURE — 93613 INTRACARDIAC EPHYS 3D MAPG: CPT | Performed by: INTERNAL MEDICINE

## 2017-06-20 PROCEDURE — C1732 CATH, EP, DIAG/ABL, 3D/VECT: HCPCS | Performed by: INTERNAL MEDICINE

## 2017-06-20 PROCEDURE — C1730 CATH, EP, 19 OR FEW ELECT: HCPCS | Performed by: INTERNAL MEDICINE

## 2017-06-20 PROCEDURE — 93325 DOPPLER ECHO COLOR FLOW MAPG: CPT

## 2017-06-20 PROCEDURE — 93662 INTRACARDIAC ECG (ICE): CPT | Performed by: INTERNAL MEDICINE

## 2017-06-20 PROCEDURE — C1893 INTRO/SHEATH, FIXED,NON-PEEL: HCPCS | Performed by: INTERNAL MEDICINE

## 2017-06-20 PROCEDURE — 63710000001 TRAZODONE 50 MG TABLET: Performed by: INTERNAL MEDICINE

## 2017-06-20 PROCEDURE — 25010000002 MORPHINE SULFATE (PF) 2 MG/ML SOLUTION: Performed by: PHYSICIAN ASSISTANT

## 2017-06-20 PROCEDURE — A9270 NON-COVERED ITEM OR SERVICE: HCPCS | Performed by: INTERNAL MEDICINE

## 2017-06-20 PROCEDURE — 93312 ECHO TRANSESOPHAGEAL: CPT

## 2017-06-20 PROCEDURE — 63710000001 HYDROXYCHLOROQUINE 200 MG TABLET: Performed by: PHYSICIAN ASSISTANT

## 2017-06-20 PROCEDURE — 63710000001 GABAPENTIN 300 MG CAPSULE: Performed by: PHYSICIAN ASSISTANT

## 2017-06-20 PROCEDURE — 93623 PRGRMD STIMJ&PACG IV RX NFS: CPT | Performed by: INTERNAL MEDICINE

## 2017-06-20 PROCEDURE — C1759 CATH, INTRA ECHOCARDIOGRAPHY: HCPCS | Performed by: INTERNAL MEDICINE

## 2017-06-20 PROCEDURE — A9270 NON-COVERED ITEM OR SERVICE: HCPCS | Performed by: PHYSICIAN ASSISTANT

## 2017-06-20 PROCEDURE — 93325 DOPPLER ECHO COLOR FLOW MAPG: CPT | Performed by: INTERNAL MEDICINE

## 2017-06-20 PROCEDURE — 25010000002 NEOSTIGMINE 10 MG/10ML SOLUTION: Performed by: NURSE ANESTHETIST, CERTIFIED REGISTERED

## 2017-06-20 PROCEDURE — 86900 BLOOD TYPING SEROLOGIC ABO: CPT

## 2017-06-20 PROCEDURE — C1894 INTRO/SHEATH, NON-LASER: HCPCS | Performed by: INTERNAL MEDICINE

## 2017-06-20 PROCEDURE — 25010000002 FENTANYL CITRATE (PF) 100 MCG/2ML SOLUTION: Performed by: ANESTHESIOLOGY

## 2017-06-20 PROCEDURE — C1769 GUIDE WIRE: HCPCS | Performed by: INTERNAL MEDICINE

## 2017-06-20 PROCEDURE — 25010000002 PROTAMINE SULFATE PER 10 MG: Performed by: INTERNAL MEDICINE

## 2017-06-20 PROCEDURE — 25010000002 FENTANYL CITRATE (PF) 100 MCG/2ML SOLUTION: Performed by: NURSE ANESTHETIST, CERTIFIED REGISTERED

## 2017-06-20 PROCEDURE — 86901 BLOOD TYPING SEROLOGIC RH(D): CPT

## 2017-06-20 PROCEDURE — 63710000001 HYDROCODONE-ACETAMINOPHEN 5-325 MG TABLET: Performed by: INTERNAL MEDICINE

## 2017-06-20 PROCEDURE — 85027 COMPLETE CBC AUTOMATED: CPT | Performed by: INTERNAL MEDICINE

## 2017-06-20 PROCEDURE — 63710000001 FLUOXETINE 20 MG CAPSULE: Performed by: PHYSICIAN ASSISTANT

## 2017-06-20 RX ORDER — SODIUM CHLORIDE, SODIUM LACTATE, POTASSIUM CHLORIDE, CALCIUM CHLORIDE 600; 310; 30; 20 MG/100ML; MG/100ML; MG/100ML; MG/100ML
100 INJECTION, SOLUTION INTRAVENOUS CONTINUOUS
Status: DISCONTINUED | OUTPATIENT
Start: 2017-06-20 | End: 2017-06-23 | Stop reason: HOSPADM

## 2017-06-20 RX ORDER — MEPERIDINE HYDROCHLORIDE 25 MG/ML
12.5 INJECTION INTRAMUSCULAR; INTRAVENOUS; SUBCUTANEOUS
Status: DISCONTINUED | OUTPATIENT
Start: 2017-06-20 | End: 2017-06-20 | Stop reason: HOSPADM

## 2017-06-20 RX ORDER — FAMOTIDINE 20 MG/1
20 TABLET, FILM COATED ORAL ONCE
Status: DISCONTINUED | OUTPATIENT
Start: 2017-06-20 | End: 2017-06-20 | Stop reason: HOSPADM

## 2017-06-20 RX ORDER — FENTANYL CITRATE 50 UG/ML
50 INJECTION, SOLUTION INTRAMUSCULAR; INTRAVENOUS
Status: ACTIVE | OUTPATIENT
Start: 2017-06-20 | End: 2017-06-20

## 2017-06-20 RX ORDER — FUROSEMIDE 10 MG/ML
INJECTION INTRAMUSCULAR; INTRAVENOUS AS NEEDED
Status: DISCONTINUED | OUTPATIENT
Start: 2017-06-20 | End: 2017-06-20 | Stop reason: HOSPADM

## 2017-06-20 RX ORDER — LIDOCAINE HYDROCHLORIDE 10 MG/ML
0.5 INJECTION, SOLUTION EPIDURAL; INFILTRATION; INTRACAUDAL; PERINEURAL ONCE AS NEEDED
Status: DISCONTINUED | OUTPATIENT
Start: 2017-06-20 | End: 2017-06-20 | Stop reason: HOSPADM

## 2017-06-20 RX ORDER — ACETAMINOPHEN 500 MG
500 TABLET ORAL EVERY 6 HOURS PRN
Status: DISCONTINUED | OUTPATIENT
Start: 2017-06-20 | End: 2017-06-23 | Stop reason: HOSPADM

## 2017-06-20 RX ORDER — FLUMAZENIL 0.1 MG/ML
INJECTION INTRAVENOUS
Status: DISCONTINUED
Start: 2017-06-20 | End: 2017-06-20 | Stop reason: WASHOUT

## 2017-06-20 RX ORDER — FAMOTIDINE 10 MG/ML
20 INJECTION, SOLUTION INTRAVENOUS ONCE
Status: COMPLETED | OUTPATIENT
Start: 2017-06-20 | End: 2017-06-20

## 2017-06-20 RX ORDER — SODIUM CHLORIDE, SODIUM LACTATE, POTASSIUM CHLORIDE, CALCIUM CHLORIDE 600; 310; 30; 20 MG/100ML; MG/100ML; MG/100ML; MG/100ML
9 INJECTION, SOLUTION INTRAVENOUS CONTINUOUS
Status: DISCONTINUED | OUTPATIENT
Start: 2017-06-20 | End: 2017-06-22 | Stop reason: SDUPTHER

## 2017-06-20 RX ORDER — ROCURONIUM BROMIDE 10 MG/ML
INJECTION, SOLUTION INTRAVENOUS AS NEEDED
Status: DISCONTINUED | OUTPATIENT
Start: 2017-06-20 | End: 2017-06-20 | Stop reason: SURG

## 2017-06-20 RX ORDER — LIDOCAINE HYDROCHLORIDE 10 MG/ML
INJECTION, SOLUTION EPIDURAL; INFILTRATION; INTRACAUDAL; PERINEURAL AS NEEDED
Status: DISCONTINUED | OUTPATIENT
Start: 2017-06-20 | End: 2017-06-20 | Stop reason: SURG

## 2017-06-20 RX ORDER — TRAZODONE HYDROCHLORIDE 50 MG/1
50 TABLET ORAL NIGHTLY
Status: DISCONTINUED | OUTPATIENT
Start: 2017-06-20 | End: 2017-06-23 | Stop reason: HOSPADM

## 2017-06-20 RX ORDER — FLECAINIDE ACETATE 50 MG/1
50 TABLET ORAL EVERY 12 HOURS SCHEDULED
Status: DISCONTINUED | OUTPATIENT
Start: 2017-06-20 | End: 2017-06-20

## 2017-06-20 RX ORDER — MORPHINE SULFATE 2 MG/ML
2 INJECTION, SOLUTION INTRAMUSCULAR; INTRAVENOUS ONCE
Status: COMPLETED | OUTPATIENT
Start: 2017-06-20 | End: 2017-06-20

## 2017-06-20 RX ORDER — GABAPENTIN 300 MG/1
300 CAPSULE ORAL 2 TIMES DAILY
Status: DISCONTINUED | OUTPATIENT
Start: 2017-06-20 | End: 2017-06-23 | Stop reason: HOSPADM

## 2017-06-20 RX ORDER — SODIUM CHLORIDE 9 MG/ML
INJECTION, SOLUTION INTRAVENOUS CONTINUOUS PRN
Status: DISCONTINUED | OUTPATIENT
Start: 2017-06-20 | End: 2017-06-20 | Stop reason: SURG

## 2017-06-20 RX ORDER — NALOXONE HYDROCHLORIDE 0.4 MG/ML
INJECTION, SOLUTION INTRAMUSCULAR; INTRAVENOUS; SUBCUTANEOUS
Status: DISCONTINUED
Start: 2017-06-20 | End: 2017-06-20 | Stop reason: WASHOUT

## 2017-06-20 RX ORDER — PROPAFENONE HYDROCHLORIDE 150 MG/1
150 TABLET, COATED ORAL EVERY 8 HOURS SCHEDULED
Status: DISCONTINUED | OUTPATIENT
Start: 2017-06-20 | End: 2017-06-23 | Stop reason: HOSPADM

## 2017-06-20 RX ORDER — ONDANSETRON 2 MG/ML
4 INJECTION INTRAMUSCULAR; INTRAVENOUS EVERY 6 HOURS PRN
Status: DISCONTINUED | OUTPATIENT
Start: 2017-06-20 | End: 2017-06-20 | Stop reason: HOSPADM

## 2017-06-20 RX ORDER — SODIUM CHLORIDE 0.9 % (FLUSH) 0.9 %
1-10 SYRINGE (ML) INJECTION AS NEEDED
Status: DISCONTINUED | OUTPATIENT
Start: 2017-06-20 | End: 2017-06-20 | Stop reason: HOSPADM

## 2017-06-20 RX ORDER — ACETAMINOPHEN 500 MG
500 TABLET ORAL EVERY 6 HOURS PRN
COMMUNITY
End: 2021-11-23 | Stop reason: HOSPADM

## 2017-06-20 RX ORDER — PROPOFOL 10 MG/ML
VIAL (ML) INTRAVENOUS AS NEEDED
Status: DISCONTINUED | OUTPATIENT
Start: 2017-06-20 | End: 2017-06-20 | Stop reason: SURG

## 2017-06-20 RX ORDER — HYDROCODONE BITARTRATE AND ACETAMINOPHEN 5; 325 MG/1; MG/1
1 TABLET ORAL EVERY 4 HOURS PRN
Status: DISCONTINUED | OUTPATIENT
Start: 2017-06-20 | End: 2017-06-23 | Stop reason: HOSPADM

## 2017-06-20 RX ORDER — HEPARIN SODIUM 1000 [USP'U]/ML
INJECTION, SOLUTION INTRAVENOUS; SUBCUTANEOUS AS NEEDED
Status: DISCONTINUED | OUTPATIENT
Start: 2017-06-20 | End: 2017-06-20 | Stop reason: HOSPADM

## 2017-06-20 RX ORDER — HYDROCODONE BITARTRATE AND ACETAMINOPHEN 5; 325 MG/1; MG/1
2 TABLET ORAL EVERY 4 HOURS PRN
Status: DISCONTINUED | OUTPATIENT
Start: 2017-06-20 | End: 2017-06-23 | Stop reason: HOSPADM

## 2017-06-20 RX ORDER — SODIUM CHLORIDE 9 MG/ML
INJECTION, SOLUTION INTRAVENOUS CONTINUOUS PRN
Status: DISCONTINUED | OUTPATIENT
Start: 2017-06-20 | End: 2017-06-20 | Stop reason: HOSPADM

## 2017-06-20 RX ORDER — FLUOXETINE HYDROCHLORIDE 20 MG/1
40 CAPSULE ORAL DAILY
Status: DISCONTINUED | OUTPATIENT
Start: 2017-06-20 | End: 2017-06-20

## 2017-06-20 RX ORDER — MIDAZOLAM HYDROCHLORIDE 1 MG/ML
INJECTION INTRAMUSCULAR; INTRAVENOUS
Status: COMPLETED | OUTPATIENT
Start: 2017-06-20 | End: 2017-06-20

## 2017-06-20 RX ORDER — ONDANSETRON 2 MG/ML
4 INJECTION INTRAMUSCULAR; INTRAVENOUS ONCE AS NEEDED
Status: DISCONTINUED | OUTPATIENT
Start: 2017-06-20 | End: 2017-06-20 | Stop reason: HOSPADM

## 2017-06-20 RX ORDER — MIDAZOLAM HYDROCHLORIDE 1 MG/ML
INJECTION INTRAMUSCULAR; INTRAVENOUS
Status: DISCONTINUED
Start: 2017-06-20 | End: 2017-06-23 | Stop reason: HOSPADM

## 2017-06-20 RX ORDER — HYDROXYCHLOROQUINE SULFATE 200 MG/1
200 TABLET, FILM COATED ORAL 2 TIMES DAILY
Status: DISCONTINUED | OUTPATIENT
Start: 2017-06-20 | End: 2017-06-23 | Stop reason: HOSPADM

## 2017-06-20 RX ORDER — KETOROLAC TROMETHAMINE 15 MG/ML
15 INJECTION, SOLUTION INTRAMUSCULAR; INTRAVENOUS EVERY 12 HOURS
Status: COMPLETED | OUTPATIENT
Start: 2017-06-20 | End: 2017-06-21

## 2017-06-20 RX ORDER — LIDOCAINE HYDROCHLORIDE 10 MG/ML
INJECTION, SOLUTION INFILTRATION; PERINEURAL AS NEEDED
Status: DISCONTINUED | OUTPATIENT
Start: 2017-06-20 | End: 2017-06-20 | Stop reason: HOSPADM

## 2017-06-20 RX ORDER — FLUOXETINE HYDROCHLORIDE 20 MG/1
40 CAPSULE ORAL NIGHTLY
Status: DISCONTINUED | OUTPATIENT
Start: 2017-06-20 | End: 2017-06-23 | Stop reason: HOSPADM

## 2017-06-20 RX ORDER — PROTAMINE SULFATE 10 MG/ML
INJECTION, SOLUTION INTRAVENOUS AS NEEDED
Status: DISCONTINUED | OUTPATIENT
Start: 2017-06-20 | End: 2017-06-20 | Stop reason: HOSPADM

## 2017-06-20 RX ORDER — ACETAMINOPHEN 325 MG/1
650 TABLET ORAL EVERY 6 HOURS PRN
Status: DISCONTINUED | OUTPATIENT
Start: 2017-06-20 | End: 2017-06-23 | Stop reason: HOSPADM

## 2017-06-20 RX ORDER — FENTANYL CITRATE 50 UG/ML
INJECTION, SOLUTION INTRAMUSCULAR; INTRAVENOUS
Status: DISCONTINUED
Start: 2017-06-20 | End: 2017-06-20 | Stop reason: WASHOUT

## 2017-06-20 RX ORDER — ONDANSETRON 2 MG/ML
4 INJECTION INTRAMUSCULAR; INTRAVENOUS EVERY 6 HOURS PRN
Status: DISCONTINUED | OUTPATIENT
Start: 2017-06-20 | End: 2017-06-23 | Stop reason: HOSPADM

## 2017-06-20 RX ORDER — FENTANYL CITRATE 50 UG/ML
50 INJECTION, SOLUTION INTRAMUSCULAR; INTRAVENOUS
Status: DISCONTINUED | OUTPATIENT
Start: 2017-06-20 | End: 2017-06-20 | Stop reason: HOSPADM

## 2017-06-20 RX ORDER — GLYCOPYRROLATE 0.2 MG/ML
INJECTION INTRAMUSCULAR; INTRAVENOUS AS NEEDED
Status: DISCONTINUED | OUTPATIENT
Start: 2017-06-20 | End: 2017-06-20 | Stop reason: SURG

## 2017-06-20 RX ORDER — NEOSTIGMINE METHYLSULFATE 1 MG/ML
INJECTION, SOLUTION INTRAVENOUS AS NEEDED
Status: DISCONTINUED | OUTPATIENT
Start: 2017-06-20 | End: 2017-06-20 | Stop reason: SURG

## 2017-06-20 RX ORDER — PANTOPRAZOLE SODIUM 40 MG/1
40 TABLET, DELAYED RELEASE ORAL EVERY MORNING
Status: DISCONTINUED | OUTPATIENT
Start: 2017-06-20 | End: 2017-06-23 | Stop reason: HOSPADM

## 2017-06-20 RX ADMIN — PROPOFOL 160 MG: 10 INJECTION, EMULSION INTRAVENOUS at 10:57

## 2017-06-20 RX ADMIN — METHOHEXITAL SODIUM 30 MG: 500 INJECTION, POWDER, LYOPHILIZED, FOR SOLUTION INTRAMUSCULAR; INTRAVENOUS; RECTAL at 08:46

## 2017-06-20 RX ADMIN — EPHEDRINE SULFATE 10 MG: 50 INJECTION INTRAMUSCULAR; INTRAVENOUS; SUBCUTANEOUS at 11:43

## 2017-06-20 RX ADMIN — LIDOCAINE HYDROCHLORIDE 50 MG: 10 INJECTION, SOLUTION EPIDURAL; INFILTRATION; INTRACAUDAL; PERINEURAL at 10:57

## 2017-06-20 RX ADMIN — FENTANYL CITRATE 50 MCG: 50 INJECTION INTRAMUSCULAR; INTRAVENOUS at 16:30

## 2017-06-20 RX ADMIN — GABAPENTIN 300 MG: 300 CAPSULE ORAL at 17:53

## 2017-06-20 RX ADMIN — NEOSTIGMINE METHYLSULFATE 3 MG: 1 INJECTION, SOLUTION INTRAVENOUS at 15:34

## 2017-06-20 RX ADMIN — ROCURONIUM BROMIDE 40 MG: 10 INJECTION, SOLUTION INTRAVENOUS at 10:57

## 2017-06-20 RX ADMIN — HYDROCODONE BITARTRATE AND ACETAMINOPHEN 1 TABLET: 5; 325 TABLET ORAL at 21:28

## 2017-06-20 RX ADMIN — TRAZODONE HYDROCHLORIDE 50 MG: 50 TABLET ORAL at 21:28

## 2017-06-20 RX ADMIN — ONDANSETRON 4 MG: 2 INJECTION INTRAMUSCULAR; INTRAVENOUS at 15:34

## 2017-06-20 RX ADMIN — SODIUM CHLORIDE: 9 INJECTION, SOLUTION INTRAVENOUS at 10:42

## 2017-06-20 RX ADMIN — KETOROLAC TROMETHAMINE 15 MG: 15 INJECTION, SOLUTION INTRAMUSCULAR; INTRAVENOUS at 17:53

## 2017-06-20 RX ADMIN — METHOHEXITAL SODIUM 20 MG: 500 INJECTION, POWDER, LYOPHILIZED, FOR SOLUTION INTRAMUSCULAR; INTRAVENOUS; RECTAL at 08:59

## 2017-06-20 RX ADMIN — MIDAZOLAM HYDROCHLORIDE 2 MG: 1 INJECTION, SOLUTION INTRAMUSCULAR; INTRAVENOUS at 08:52

## 2017-06-20 RX ADMIN — METHOHEXITAL SODIUM 20 MG: 500 INJECTION, POWDER, LYOPHILIZED, FOR SOLUTION INTRAMUSCULAR; INTRAVENOUS; RECTAL at 08:53

## 2017-06-20 RX ADMIN — FLUOXETINE HYDROCHLORIDE 40 MG: 20 CAPSULE ORAL at 21:29

## 2017-06-20 RX ADMIN — HYDROXYCHLOROQUINE SULFATE 200 MG: 200 TABLET, FILM COATED ORAL at 18:25

## 2017-06-20 RX ADMIN — MORPHINE SULFATE 2 MG: 2 INJECTION, SOLUTION INTRAMUSCULAR; INTRAVENOUS at 18:49

## 2017-06-20 RX ADMIN — ROBINUL 0.4 MG: 0.2 INJECTION INTRAMUSCULAR; INTRAVENOUS at 15:34

## 2017-06-20 RX ADMIN — HYDROCODONE BITARTRATE AND ACETAMINOPHEN 1 TABLET: 5; 325 TABLET ORAL at 23:58

## 2017-06-20 RX ADMIN — SODIUM CHLORIDE, POTASSIUM CHLORIDE, SODIUM LACTATE AND CALCIUM CHLORIDE 100 ML/HR: 600; 310; 30; 20 INJECTION, SOLUTION INTRAVENOUS at 17:54

## 2017-06-20 RX ADMIN — PROPAFENONE HYDROCHLORIDE 150 MG: 150 TABLET, COATED ORAL at 21:28

## 2017-06-20 RX ADMIN — FENTANYL CITRATE 50 MCG: 50 INJECTION INTRAMUSCULAR; INTRAVENOUS at 16:20

## 2017-06-20 RX ADMIN — FAMOTIDINE 20 MG: 10 INJECTION, SOLUTION INTRAVENOUS at 10:13

## 2017-06-20 RX ADMIN — MIDAZOLAM HYDROCHLORIDE 2 MG: 1 INJECTION, SOLUTION INTRAMUSCULAR; INTRAVENOUS at 08:46

## 2017-06-20 RX ADMIN — FENTANYL CITRATE 50 MCG: 50 INJECTION INTRAMUSCULAR; INTRAVENOUS at 16:45

## 2017-06-20 NOTE — ANESTHESIA PREPROCEDURE EVALUATION
Anesthesia Evaluation     Patient summary reviewed and Nursing notes reviewed   NPO Solid Status: > 8 hours  NPO Liquid Status: > 8 hours     Airway   Mallampati: II  TM distance: >3 FB  Neck ROM: full  no difficulty expected  Dental    (+) upper dentures    Pulmonary - negative pulmonary ROS and normal exam   Cardiovascular - normal exam    (+) hypertension, dysrhythmias Atrial Fib, hyperlipidemia      Neuro/Psych  (+) headaches, psychiatric history Anxiety and Depression,    GI/Hepatic/Renal/Endo    (+)  GERD, PUD,     Musculoskeletal     Abdominal    Substance History      OB/GYN          Other   (+) arthritis                                     Anesthesia Plan    ASA 3     general     intravenous induction   Anesthetic plan and risks discussed with patient.    Plan discussed with CRNA.

## 2017-06-20 NOTE — PLAN OF CARE
Problem: Patient Care Overview (Adult)  Goal: Plan of Care Review    06/20/17 1816   Coping/Psychosocial Response Interventions   Plan Of Care Reviewed With patient   Patient Care Overview   Progress improving

## 2017-06-20 NOTE — H&P
Electrophysiology History & Physical    Aleena Armstrong  2502/1  [id number]  1950    DATE OF ADMISSION: 6/20/2017    Lidia Mast MD  Primary Cardiologist: Dominic Wolff MD       Chief complaint: aflutter ; Afib    History of Present Illness     Patient is a 66 year old female with history of WPW s/p RFA in the past, atypical appearing atrial flutter in Oct 2016, s/p gastric bypass 8 yrs ago and chest pain with a normal heart cath yrs ago here today for DEEPAK/PVA. Has been on Flecainide and Xarelto since her original diagnosis of aflutter, atypical / Afib in October 2016. She's been having issues with balance and typically has many side effects from medications. She went to ER recently for injured foot, but it has improved. No issues with CP, SOA, PND, edema, orthopnea, fevers, chills.       Past Medical History:   Diagnosis Date   • Anxiety and depression 12/11/2016   • GERD (gastroesophageal reflux disease)    • Glaucoma of left eye 12/11/2016   • H/O migraine     LAST 3  YEARS AGO    • History of MRSA infection     APPROX 8 YEARS, TREATED WITH ORAL ABX, NEGATIVE CULTURES FOLLOWING [place]    • Hyperlipidemia    • Hypertension     MEDS DC'D BY DR GOMEZ 1/2017   • Insomnia 12/11/2016   • Kidney disease    • Peptic ulcer - s/p gastric bypass surgery (7-8 years ago) 12/11/2016   • Rheumatoid arthritis 12/11/2016   • Spinal headache     FOLLOWING SPINAL FOR CHILDBIRTH    • Vitamin D deficiency 12/11/2016   • Wears dentures     UPPER PLATE    • Elodia-Parkinson-White (WPW) syndrome          Past Surgical History:   Procedure Laterality Date   • CARDIAC CATHETERIZATION N/A 10/24/2016    Procedure: Left Heart Cath;  Surgeon: Rubens Morejon MD;  Location: [facility] CATH INVASIVE LOCATION;  Service:    • CHOLECYSTECTOMY     • COLONOSCOPY  2007   • GASTRIC BYPASS     • HAMMER TOE REPAIR      LEFT--GARO IN PLACE    • HYSTERECTOMY         Prescriptions Prior to Admission   Medication Sig Dispense Refill Last Dose   •  acetaminophen (TYLENOL) 500 MG tablet Take 500 mg by mouth Every 6 (Six) Hours As Needed for Mild Pain (1-3).   Past Month at Unknown time   • apixaban (ELIQUIS) 5 MG tablet tablet Take 1 tablet by mouth Every 12 (Twelve) Hours. 180 tablet 3 6/19/2017 at 2230   • Cholecalciferol (VITAMIN D3) 2000 UNITS tablet Take 1 tablet by mouth Daily.   6/19/2017 at 0800   • FLUoxetine (PROzac) 40 MG capsule Take 40 mg by mouth Daily.   6/19/2017 at 2230   • gabapentin (NEURONTIN) 300 MG capsule Take 300 mg by mouth 2 (Two) Times a Day.   6/19/2017 at 2230   • hydroxychloroquine (PLAQUENIL) 200 MG tablet Take 200 mg by mouth 2 (Two) Times a Day.   6/19/2017 at 2230   • levocetirizine (XYZAL) 5 MG tablet Take 5 mg by mouth Every Evening.   6/19/2017 at 2230   • omeprazole (priLOSEC) 20 MG capsule Take 20 mg by mouth Daily.   6/19/2017 at 0800   • Probiotic Product (DIGESTIVE ADVANTAGE GUMMIES PO) Take 2 tablets by mouth Daily.   6/19/2017 at 0800   • timolol (BETIMOL) 0.25 % ophthalmic solution Administer 1-2 drops into the left eye Daily.   6/20/2017 at 0600   • traZODone (DESYREL) 50 MG tablet Take 50 mg by mouth Every Night.   6/19/2017 at 2230   • vitamin D (ERGOCALCIFEROL) 03303 UNITS capsule capsule Take 50,000 Units by mouth 2 (Two) Times a Week. On Sundays    6/18/2017 at 0800       Current Meds  No current facility-administered medications on file prior to encounter.      Current Outpatient Prescriptions on File Prior to Encounter   Medication Sig Dispense Refill   • apixaban (ELIQUIS) 5 MG tablet tablet Take 1 tablet by mouth Every 12 (Twelve) Hours. 180 tablet 3   • FLUoxetine (PROzac) 40 MG capsule Take 40 mg by mouth Daily.     • hydroxychloroquine (PLAQUENIL) 200 MG tablet Take 200 mg by mouth 2 (Two) Times a Day.     • timolol (BETIMOL) 0.25 % ophthalmic solution Administer 1-2 drops into the left eye Daily.     • traZODone (DESYREL) 50 MG tablet Take 50 mg by mouth Every Night.     • vitamin D (ERGOCALCIFEROL)  "94481 UNITS capsule capsule Take 50,000 Units by mouth 2 (Two) Times a Week. On Sundays            Social History     Social History   • Marital status:      Spouse name: N/A   • Number of children: N/A   • Years of education: N/A     Occupational History   • Not on file.     Social History Main Topics   • Smoking status: Former Smoker     Types: Cigarettes     Quit date: 1992   • Smokeless tobacco: Never Used   • Alcohol use 3.0 oz/week     5 Glasses of wine per week   • Drug use: No   • Sexual activity: Defer     Other Topics Concern   • Not on file     Social History Narrative       Family History   Problem Relation Age of Onset   • Arrhythmia Mother    • Hypertension Mother    • Stroke Mother    • Lung cancer Father            REVIEW OF SYSTEMS:   12 point ROS was performed and is negative except as outlined in HPI           Objective:     Vitals:    06/20/17 0630 06/20/17 0632   BP: 116/78 125/70   BP Location: Right arm Left arm   Patient Position: Lying Lying   Pulse: 55    Resp: 16    Temp: 97.3 °F (36.3 °C)    TempSrc: Temporal Artery     SpO2: 100%    Weight: 176 lb 2.4 oz (79.9 kg)    Height: 68\" (172.7 cm)      Body mass index is 26.78 kg/(m^2).  Flowsheet Rows         First Filed Value    Admission Height  68\" (172.7 cm) Documented at 06/20/2017 0630    Admission Weight  176 lb 2.4 oz (79.9 kg) Documented at 06/20/2017 0630          General Appearance:    Alert, cooperative, in no acute distress   Head:    Normocephalic, without obvious abnormality, atraumatic   Eyes:            Lids and lashes normal, conjunctivae and sclerae normal, no   icterus, no pallor, corneas clear, PERRLA   Ears:    Ears appear intact with no abnormalities noted   Throat:   No oral lesions, no thrush, oral mucosa moist   Neck:   No adenopathy, supple, trachea midline, no thyromegaly, no   carotid bruit, no JVD   Back:     No kyphosis present, no scoliosis present, no skin lesions, erythema or scars, no tenderness to " percussion or palpation, range of motion normal   Lungs:     Clear to auscultation,respirations regular, even and unlabored    Heart:    Regular rhythm and normal rate, normal S1 and S2, no murmur, no gallop, no rub, no click   Chest Wall:    No abnormalities observed   Abdomen:     Normal bowel sounds, no masses, no organomegaly, soft        non-tender, non-distended, no guarding, no rebound  tenderness   Extremities:   Moves all extremities well, no edema, no cyanosis, no redness   Pulses:   Pulses palpable and equal bilaterally. Normal radial, carotid, femoral, dorsalis pedis and posterior tibial pulses bilaterally. Normal abdominal aorta   Skin:   No bleeding, bruising or rash           Lab Review:      Results Review:     I reviewed the patient's new clinical results.      Results from last 7 days  Lab Units 06/19/17  0858   WBC 10*3/mm3 4.04   HEMOGLOBIN g/dL 10.5*   HEMATOCRIT % 32.5*   PLATELETS 10*3/mm3 197       Results from last 7 days  Lab Units 06/19/17  0858   SODIUM mmol/L 143   POTASSIUM mmol/L 4.0   CHLORIDE mmol/L 108   TOTAL CO2 mmol/L 27.0   BUN mg/dL 12   CREATININE mg/dL 1.00   CALCIUM mg/dL 9.4   BILIRUBIN mg/dL 0.3   ALK PHOS U/L 64   ALT (SGPT) U/L 18   AST (SGOT) U/L 24   GLUCOSE mg/dL 86       Results from last 7 days  Lab Units 06/19/17  0858   SODIUM mmol/L 143   POTASSIUM mmol/L 4.0   CHLORIDE mmol/L 108   TOTAL CO2 mmol/L 27.0   BUN mg/dL 12   CREATININE mg/dL 1.00   GLUCOSE mg/dL 86   CALCIUM mg/dL 9.4       Results from last 7 days  Lab Units 06/19/17  0858   INR  1.01       I personally viewed and interpreted the patient's EKG/Telemetry data    Assessment:    Principal Problem:    Atrial fibrillation  Active Problems:    Atrial flutter       Plan:       Symptomatic Atypical atrial flutter/Paroxysmal AF despite medical therapy with Flecainide. Patient will undergo DEEPAK and if no issues proceed with a PVA today. Risks, benefits, and alternatives have been discussed and patient wishes  to proceed. CHADS-VASC= 1, on Eliquis 5mg BID.       Scribed for Edvin Cook DO by Katharina Rogers PA-C. 6/20/2017  8:09 AM      GENARO Mason  [facility]  06/20/17  7:43 AM     I,Edvin Cook,  personally performed the services described in this documentation as scribed by the above named individual in my presence, and it is both accurate and complete.  6/20/2017  10:08 AM    Edvin Cook DO  10:08 AM  06/20/17

## 2017-06-20 NOTE — ANESTHESIA PROCEDURE NOTES
Airway  Urgency: elective    Airway not difficult    General Information and Staff    Patient location during procedure: OR  Anesthesiologist: JAMILAH MARINO  CRNA: DELTA MARX    Indications and Patient Condition  Indications for airway management: airway protection    Preoxygenated: yes  MILS not maintained throughout  Mask difficulty assessment: 1 - vent by mask    Final Airway Details  Final airway type: endotracheal airway      Successful airway: ETT  Cuffed: yes   Successful intubation technique: direct laryngoscopy  Endotracheal tube insertion site: oral  Blade: Janie  Blade size: #3  ETT size: 6.5 mm  Cormack-Lehane Classification: grade I - full view of glottis  Placement verified by: chest auscultation and capnometry   Measured from: lips  ETT to lips (cm): 20  Number of attempts at approach: 1    Additional Comments  Negative epigastric sounds, Breath sound equal bilaterally with symmetric chest rise and fall

## 2017-06-20 NOTE — ANESTHESIA POSTPROCEDURE EVALUATION
Patient: Aleena Armstrong    Procedure Summary     Date Anesthesia Start Anesthesia Stop Room / Location    06/20/17 1044  [facility] CATH[facility]EP [facility] / [facility] EP INVASIVE [facility]ATION       Procedure Diagnosis Provider Provider    PVA (N/A ) Paroxysmal atrial fibrillation  (afib) DO Sanchez Jaime MD          Anesthesia Type: general  Last vitals  /68 (06/20/17 1602)    Temp 98 °F (36.7 °C) (06/20/17 1602)    Pulse 77 (06/20/17 1602)   Resp 18 (06/20/17 1602)    SpO2 100 % (06/20/17 1602)      Post Anesthesia Care and Evaluation    Patient location during evaluation: PACU  Patient participation: complete - patient participated  Level of consciousness: awake and alert  Pain score: 0  Pain management: adequate  Airway patency: patent  Anesthetic complications: No anesthetic complications  PONV Status: none  Cardiovascular status: hemodynamically stable and acceptable  Respiratory status: nonlabored ventilation, acceptable and nasal cannula  Hydration status: acceptable

## 2017-06-21 ENCOUNTER — APPOINTMENT (OUTPATIENT)
Dept: CARDIOLOGY | Facility: HOSPITAL | Age: 67
End: 2017-06-21

## 2017-06-21 LAB
ABO GROUP BLD: NORMAL
ABO GROUP BLD: NORMAL
BH CV RIGHT GROIN PSA PROCEDURE SCRIPTING LRR: 1
BH CV VAS R ABI: 1.1
BLD GP AB SCN SERPL QL: NEGATIVE
DEPRECATED RDW RBC AUTO: 46 FL (ref 37–54)
DEPRECATED RDW RBC AUTO: 48.4 FL (ref 37–54)
DEPRECATED RDW RBC AUTO: 55.7 FL (ref 37–54)
DIST POP A PSV RIGHT: -60.2 CM/SEC
DIST SFA PSV RIGHT: -99 CM/SEC
ERYTHROCYTE [DISTWIDTH] IN BLOOD BY AUTOMATED COUNT: 12.2 % (ref 11.3–14.5)
ERYTHROCYTE [DISTWIDTH] IN BLOOD BY AUTOMATED COUNT: 12.7 % (ref 11.3–14.5)
ERYTHROCYTE [DISTWIDTH] IN BLOOD BY AUTOMATED COUNT: 15 % (ref 11.3–14.5)
HCT VFR BLD AUTO: 23.1 % (ref 34.5–44)
HCT VFR BLD AUTO: 23.4 % (ref 34.5–44)
HCT VFR BLD AUTO: 27.5 % (ref 34.5–44)
HGB BLD-MCNC: 7.2 G/DL (ref 11.5–15.5)
HGB BLD-MCNC: 7.3 G/DL (ref 11.5–15.5)
HGB BLD-MCNC: 8.6 G/DL (ref 11.5–15.5)
MCH RBC QN AUTO: 31.9 PG (ref 27–31)
MCH RBC QN AUTO: 32.3 PG (ref 27–31)
MCH RBC QN AUTO: 32.7 PG (ref 27–31)
MCHC RBC AUTO-ENTMCNC: 31.2 G/DL (ref 32–36)
MCHC RBC AUTO-ENTMCNC: 31.2 G/DL (ref 32–36)
MCHC RBC AUTO-ENTMCNC: 31.3 G/DL (ref 32–36)
MCV RBC AUTO: 101.9 FL (ref 80–99)
MCV RBC AUTO: 103.6 FL (ref 80–99)
MCV RBC AUTO: 104.9 FL (ref 80–99)
MID SFA PSV RIGHT: -110 CM/SEC
PLATELET # BLD AUTO: 152 10*3/MM3 (ref 150–450)
PLATELET # BLD AUTO: 153 10*3/MM3 (ref 150–450)
PLATELET # BLD AUTO: 156 10*3/MM3 (ref 150–450)
PMV BLD AUTO: 10 FL (ref 6–12)
PMV BLD AUTO: 9.7 FL (ref 6–12)
PMV BLD AUTO: 9.9 FL (ref 6–12)
PROX PFA PSV RIGHT: 73.9 CM/SEC
PROX POP A PSV RIGHT: 52.8 CM/SEC
PROX SFA PSV RIGHT: 110.8 CM/SEC
RBC # BLD AUTO: 2.23 10*6/MM3 (ref 3.89–5.14)
RBC # BLD AUTO: 2.23 10*6/MM3 (ref 3.89–5.14)
RBC # BLD AUTO: 2.7 10*6/MM3 (ref 3.89–5.14)
RH BLD: POSITIVE
RH BLD: POSITIVE
RIGHT CFA PROX SYS PSV: 75.4 CM/SEC
RIGHT GROIN CFA SYS: 74.6 CM/SEC
WBC NRBC COR # BLD: 6.05 10*3/MM3 (ref 3.5–10.8)
WBC NRBC COR # BLD: 6.45 10*3/MM3 (ref 3.5–10.8)
WBC NRBC COR # BLD: 7.34 10*3/MM3 (ref 3.5–10.8)

## 2017-06-21 PROCEDURE — 93010 ELECTROCARDIOGRAM REPORT: CPT | Performed by: INTERNAL MEDICINE

## 2017-06-21 PROCEDURE — 99225 PR SBSQ OBSERVATION CARE/DAY 25 MINUTES: CPT | Performed by: INTERNAL MEDICINE

## 2017-06-21 PROCEDURE — G0378 HOSPITAL OBSERVATION PER HR: HCPCS

## 2017-06-21 PROCEDURE — A9270 NON-COVERED ITEM OR SERVICE: HCPCS | Performed by: PHYSICIAN ASSISTANT

## 2017-06-21 PROCEDURE — 36430 TRANSFUSION BLD/BLD COMPNT: CPT

## 2017-06-21 PROCEDURE — 93926 LOWER EXTREMITY STUDY: CPT

## 2017-06-21 PROCEDURE — 63710000001 PANTOPRAZOLE 40 MG TABLET DELAYED-RELEASE: Performed by: PHYSICIAN ASSISTANT

## 2017-06-21 PROCEDURE — 86901 BLOOD TYPING SEROLOGIC RH(D): CPT | Performed by: INTERNAL MEDICINE

## 2017-06-21 PROCEDURE — P9016 RBC LEUKOCYTES REDUCED: HCPCS

## 2017-06-21 PROCEDURE — 93926 LOWER EXTREMITY STUDY: CPT | Performed by: INTERNAL MEDICINE

## 2017-06-21 PROCEDURE — 86900 BLOOD TYPING SEROLOGIC ABO: CPT

## 2017-06-21 PROCEDURE — 85027 COMPLETE CBC AUTOMATED: CPT | Performed by: INTERNAL MEDICINE

## 2017-06-21 PROCEDURE — 86920 COMPATIBILITY TEST SPIN: CPT

## 2017-06-21 PROCEDURE — 86900 BLOOD TYPING SEROLOGIC ABO: CPT | Performed by: INTERNAL MEDICINE

## 2017-06-21 PROCEDURE — 63710000001 GABAPENTIN 300 MG CAPSULE: Performed by: PHYSICIAN ASSISTANT

## 2017-06-21 PROCEDURE — 86850 RBC ANTIBODY SCREEN: CPT | Performed by: INTERNAL MEDICINE

## 2017-06-21 PROCEDURE — 93005 ELECTROCARDIOGRAM TRACING: CPT | Performed by: INTERNAL MEDICINE

## 2017-06-21 PROCEDURE — 25010000002 KETOROLAC TROMETHAMINE PER 15 MG: Performed by: INTERNAL MEDICINE

## 2017-06-21 PROCEDURE — 63710000001 HYDROCODONE-ACETAMINOPHEN 5-325 MG TABLET: Performed by: INTERNAL MEDICINE

## 2017-06-21 PROCEDURE — A9270 NON-COVERED ITEM OR SERVICE: HCPCS | Performed by: INTERNAL MEDICINE

## 2017-06-21 PROCEDURE — 63710000001 HYDROXYCHLOROQUINE 200 MG TABLET: Performed by: PHYSICIAN ASSISTANT

## 2017-06-21 RX ORDER — GLIMEPIRIDE 2 MG/1
1 TABLET ORAL DAILY
Status: DISCONTINUED | OUTPATIENT
Start: 2017-06-22 | End: 2017-06-23 | Stop reason: HOSPADM

## 2017-06-21 RX ORDER — SUCRALFATE 1 G/1
1 TABLET ORAL
Status: DISCONTINUED | OUTPATIENT
Start: 2017-06-21 | End: 2017-06-23 | Stop reason: HOSPADM

## 2017-06-21 RX ADMIN — HYDROXYCHLOROQUINE SULFATE 200 MG: 200 TABLET, FILM COATED ORAL at 09:01

## 2017-06-21 RX ADMIN — GABAPENTIN 300 MG: 300 CAPSULE ORAL at 08:59

## 2017-06-21 RX ADMIN — HYDROCODONE BITARTRATE AND ACETAMINOPHEN 1 TABLET: 5; 325 TABLET ORAL at 22:32

## 2017-06-21 RX ADMIN — KETOROLAC TROMETHAMINE 15 MG: 15 INJECTION, SOLUTION INTRAMUSCULAR; INTRAVENOUS at 05:47

## 2017-06-21 RX ADMIN — HYDROCODONE BITARTRATE AND ACETAMINOPHEN 1 TABLET: 5; 325 TABLET ORAL at 13:51

## 2017-06-21 RX ADMIN — HYDROCODONE BITARTRATE AND ACETAMINOPHEN 1 TABLET: 5; 325 TABLET ORAL at 07:47

## 2017-06-21 RX ADMIN — HYDROXYCHLOROQUINE SULFATE 200 MG: 200 TABLET, FILM COATED ORAL at 17:47

## 2017-06-21 RX ADMIN — SUCRALFATE 1 G: 1 TABLET ORAL at 22:29

## 2017-06-21 RX ADMIN — FLUOXETINE HYDROCHLORIDE 40 MG: 20 CAPSULE ORAL at 22:28

## 2017-06-21 RX ADMIN — SODIUM CHLORIDE, POTASSIUM CHLORIDE, SODIUM LACTATE AND CALCIUM CHLORIDE 100 ML/HR: 600; 310; 30; 20 INJECTION, SOLUTION INTRAVENOUS at 05:49

## 2017-06-21 RX ADMIN — TRAZODONE HYDROCHLORIDE 50 MG: 50 TABLET ORAL at 22:28

## 2017-06-21 RX ADMIN — PANTOPRAZOLE SODIUM 40 MG: 40 TABLET, DELAYED RELEASE ORAL at 05:47

## 2017-06-21 RX ADMIN — GABAPENTIN 300 MG: 300 CAPSULE ORAL at 17:47

## 2017-06-21 NOTE — PLAN OF CARE
Problem: Patient Care Overview (Adult)  Goal: Plan of Care Review  Outcome: Ongoing (interventions implemented as appropriate)    06/21/17 1645   Coping/Psychosocial Response Interventions   Plan Of Care Reviewed With patient;spouse   Patient Care Overview   Progress no change   Outcome Evaluation   Outcome Summary/Follow up Plan SBP has been low today - SR on monitor. Rhythmol held today due to hypotenion. Duplex of groin showed no pseudo or fistula. Hematoma still present on right groin & is tender. Lortab given PRN for pain. H/H 7.2/23 this AM - 1 unit PRBC given with plan to re-eval CBC after unit completed. H/H increased to 8.6/27.5 - notified Marilu Ferguson (on call), no new orders. CBC & BMP in AM. Home dose Timolol ordered per patient request. Does have some indigestion with eating - states she has a hiatal hernia. Carafate ordered.

## 2017-06-21 NOTE — PROGRESS NOTES
[facility] at [facility]  Cardiovascular Progress Note  Aleena Armstrong  N604/1  [id number]  1950    DATE OF ADMISSION: 6/20/2017  DATE OF FOLLOW UP:  6/21/17    Lidia Mast MD    Subjective:     Patient ID: Aleena Armstrong is a 66 y.o. female.    Chief Complaint: afib f/u     Allergies   Allergen Reactions   • Bactrim [Sulfamethoxazole-Trimethoprim] Other (See Comments)     MOUTH SORES   • Percocet [Oxycodone-Acetaminophen] Other (See Comments)     NIGHT TERRORS    • Statins Myalgia   • Sulfa Antibiotics Other (See Comments)     MOUTH SORES AND RASH    • Erythromycin Rash       Current Facility-Administered Medications:   •  acetaminophen (TYLENOL) tablet 500 mg, 500 mg, Oral, Q6H PRN, GENARO Guardado  •  acetaminophen (TYLENOL) tablet 650 mg, 650 mg, Oral, Q6H PRN, Edvin Cook DO  •  FLUoxetine (PROzac) capsule 40 mg, 40 mg, Oral, Nightly, GENARO Guardado, 40 mg at 06/20/17 2129  •  gabapentin (NEURONTIN) capsule 300 mg, 300 mg, Oral, BID, GENARO Guardado, 300 mg at 06/20/17 1753  •  HYDROcodone-acetaminophen (NORCO) 5-325 MG per tablet 1 tablet, 1 tablet, Oral, Q4H PRN, 1 tablet at 06/21/17 0747 **OR** HYDROcodone-acetaminophen (NORCO) 5-325 MG per tablet 2 tablet, 2 tablet, Oral, Q4H PRN, Edvin Cook DO, 1 tablet at 06/20/17 2358  •  hydroxychloroquine (PLAQUENIL) tablet 200 mg, 200 mg, Oral, BID, GENARO Guardado, 200 mg at 06/20/17 1825  •  lactated ringers infusion, 9 mL/hr, Intravenous, Continuous, Sanchez Leyva MD  •  lactated ringers infusion, 100 mL/hr, Intravenous, Continuous, Dar Atkins CRNA, Last Rate: 100 mL/hr at 06/21/17 0829, 100 mL/hr at 06/21/17 0829  •  midazolam (VERSED) 2 MG/2ML injection  - ADS Override Pull, , , ,   •  ondansetron (ZOFRAN) injection 4 mg, 4 mg, Intravenous, Q6H PRN, Edvin Cook DO  •  pantoprazole (PROTONIX) EC tablet 40 mg, 40 mg, Oral, QAM, GENARO Guardado, 40 mg at 06/21/17 0557  •  propafenone (RHTHYMOL)  tablet 150 mg, 150 mg, Oral, Q8H, Edvin Joyce, DO, Stopped at 06/21/17 0600  •  traZODone (DESYREL) tablet 50 mg, 50 mg, Oral, Nightly, Edvin Joyce, DO, 50 mg at 06/20/17 2128    History of Present Illness    Right groin hematoma last night that is now stable after a few hours of pressure. She is just complaining of some discomfort in that groin. Hypotensive overnight and received fluids. Still a little dizzy this AM but when i saw the patient her symptoms improving.   Stable overall on how she feels    ROS   14 point ROS negative except as outlined in problem list, HPI and other parts of the note.    Procedures       Objective:       Vitals:    06/20/17 2350 06/21/17 0100 06/21/17 0338 06/21/17 0700   BP: (!) 74/42 (!) 74/40 (!) 82/45 97/53   BP Location:   Right arm Right arm   Patient Position:   Lying Lying   Pulse: 67 73 74 72   Resp:   14 16   Temp:   97.1 °F (36.2 °C) 97.8 °F (36.6 °C)   TempSrc:   Temporal Artery  Oral   SpO2:       Weight:       Height:           Intake/Output Summary (Last 24 hours) at 06/21/17 0852  Last data filed at 06/21/17 0700   Gross per 24 hour   Intake             2700 ml   Output             2475 ml   Net              225 ml       GENERAL: Well-developed, well-nourished patient in no acute distress.  HEENT: Normocephalic, atraumatic, PERRLA. Moist mucous membranes.  NECK: No JVD present at 30°. No carotid bruits auscultated.  LUNGS: Clear to auscultation.  CARDIOVASCULAR: Heart has a regular rate and rhythm. No murmurs, gallops or rubs noted.   ABDOMEN: Soft, nontender. Positive bowel sounds.  MUSCULOSKELETAL: No gross deformities. No clubbing, cyanosis  EXT: pulses intact, no swelling  SKIN: Pink, warm  Neuro: Nonfocal exam. Gait intact  R groin with some ecchymosis noted but sig swelling noted. Pulses intact      The patient's old records including ambulatory rhythm recordings (ECGs, Holter/event monitor) were reviewed and discussed.      Lab Review:     Results from last  7 days  Lab Units 06/19/17  0858   SODIUM mmol/L 143   POTASSIUM mmol/L 4.0   CHLORIDE mmol/L 108   TOTAL CO2 mmol/L 27.0   BUN mg/dL 12   CREATININE mg/dL 1.00   GLUCOSE mg/dL 86   CALCIUM mg/dL 9.4           Results from last 7 days  Lab Units 06/21/17  0525 06/20/17  2109 06/19/17  0858   WBC 10*3/mm3 6.05 7.85 4.04   HEMOGLOBIN g/dL 7.2* 8.7* 10.5*   HEMATOCRIT % 23.1* 27.3* 32.5*   PLATELETS 10*3/mm3 156 165 197       Results from last 7 days  Lab Units 06/19/17  0858   INR  1.01           Results from last 7 days  Lab Units 06/19/17  0858   MAGNESIUM mg/dL 2.0       Assessment & Plan:     1. Symptomatic Atypical aflutter/PAF despite Flecainide   - patient is s/p RFA of pulmonary veins w/ linear line along left atrial roof.   - Propafenone 150mg TID for at least one month. Continue on NOAC for at least 3 months but will hold for now due to groin issues and bleed and restart in future. During the case on the right side there was a very tortuous vein with inability to pass a longer sheath through that area.  We therefore then decided to get new access and just put a short sheath into this access port  - patient had right groin hematoma last night. Stable this AM but on duplex U/S no AVF or PSA per the tech and normal arterial flow, NICOLAS.  Site actually looks okay with no major hematoma noted.. Hgb 10.3-->7.2 this AM and on recheck 7.3.   - I think it will be best to give the patient a unit or two of blood with her acute blood loss from the groin issue noted. Hb seems to be stable but low now and BP improved  - Monitor for atleast the next 24-48 hours    2. Acute Blood Loss Anemia secondary to Right Groin Hematoma with tough access  -- Rx as above.     3. Obesity s/p Gastric Bypass Sx         GENARO Mason  06/21/17  8:52 AM    I, Edvin Cook, have reviewed the note in full and agree with all aspects of the above including physical exam, assessment, labs and plan with changes made accordingly. Face to Face  Time was spent with the patient.    Edvin Cook DO  06/21/17  11:39 AM

## 2017-06-21 NOTE — PROGRESS NOTES
Discharge Planning Assessment  [facility]     Patient Name: Aleena Armstrong  MRN: [id number]  Today's Date: 6/21/2017    Admit Date: 6/20/2017          Discharge Needs Assessment       06/21/17 0915    Living Environment    Lives With spouse    Living Arrangements house    Home Accessibility no concerns    Stair Railings at Home none    Type of Financial/Environmental Concern none    Transportation Available car;family or friend will provide    Living Environment    Provides Primary Care For no one    Quality Of Family Relationships supportive    Able to Return to Prior Living Arrangements yes    Discharge Needs Assessment    Concerns To Be Addressed no discharge needs identified    Readmission Within The Last 30 Days no previous admission in last 30 days    Anticipated Changes Related to Illness none    Equipment Currently Used at Home none    Equipment Needed After Discharge cane, straight            Discharge Plan       06/21/17 0921    Case Management/Social Work Plan    Plan Home    Patient/Family In Agreement With Plan yes    Additional Comments Pt lives in a home with her  with no stairs.  Pt stated her bed and bath are on the same level in her home.  Pt stated she does not utilize any equipment but would love to get a cane and there has been an order put in for this.  Pt has no preference on a [employer] so [employer] will deliver the cane.  Pt stated she does not utilize any HH.  Pt stated she is completely independent with all ADL's.  Pt stated she does not anticipate needing anything post d/c.  SW will continue to follow for any d/c needs.  Stormy, [phone]        Discharge Placement     No information found                Demographic Summary       06/21/17 0913    Referral Information    Admission Type observation    Arrived From home or self-care    Referral Source admission list    Reason For Consult discharge planning    Contact Information    Permission Granted to Share Information With      [facility] Obt[facility] no    Primary Care Physician Information    Name Lidia Mast            Functional Status       06/21/17 0913    Functional Status Current    Ambulation 0-->independent    Transferring 0-->independent    Toileting 0-->independent    Bathing 0-->independent    Dressing 0-->independent    Eating 0-->independent    Communication 0-->understands/communicates without difficulty    Change in Functional Status Since Onset of Current Illness/Injury no    Functional Status Prior    Ambulation 0-->independent    Transferring 0-->independent    Toileting 0-->independent    Bathing 0-->independent    Dressing 0-->independent    Eating 0-->independent    Communication 0-->understands/communicates without difficulty    Activity Tolerance    Usual Activity Tolerance excellent    Current Activity Tolerance good    Cognitive/Perceptual/Developmental    Current Mental Status/Cognitive Functioning no deficits noted    Recent Changes in Mental Status/Cognitive Functioning no changes    Developmental Stage (Eriksson's Stages of Development) Stage 8 (65 years-death/Late Adulthood) Integrity vs. Despair            Psychosocial     None            Abuse/Neglect     None            Legal     None            Substance Abuse     None            Patient Forms     None          ART Burns

## 2017-06-21 NOTE — PLAN OF CARE
Problem: Patient Care Overview (Adult)  Goal: Plan of Care Review  Outcome: Ongoing (interventions implemented as appropriate)    06/21/17 0430   Coping/Psychosocial Response Interventions   Plan Of Care Reviewed With patient;spouse   Patient Care Overview   Progress progress toward functional goals as expected   Outcome Evaluation   Outcome Summary/Follow up Plan VSS, Pt develloped hematoma on R groin site manual pressure was applied for 20 minutes at a time on 3-4 different occassions         Problem: Arrhythmia/Dysrhythmia (Symptomatic) (Adult)  Goal: Signs and Symptoms of Listed Potential Problems Will be Absent or Manageable (Arrhythmia/Dysrhythmia)  Outcome: Ongoing (interventions implemented as appropriate)

## 2017-06-22 LAB
ABO + RH BLD: NORMAL
ANION GAP SERPL CALCULATED.3IONS-SCNC: 6 MMOL/L (ref 3–11)
BH BB BLOOD EXPIRATION DATE: NORMAL
BH BB BLOOD TYPE BARCODE: 6200
BH BB DISPENSE STATUS: NORMAL
BH BB PRODUCT CODE: NORMAL
BH BB UNIT NUMBER: NORMAL
BUN BLD-MCNC: 13 MG/DL (ref 9–23)
BUN/CREAT SERPL: 11.8 (ref 7–25)
CALCIUM SPEC-SCNC: 8.8 MG/DL (ref 8.7–10.4)
CHLORIDE SERPL-SCNC: 108 MMOL/L (ref 99–109)
CO2 SERPL-SCNC: 26 MMOL/L (ref 20–31)
CREAT BLD-MCNC: 1.1 MG/DL (ref 0.6–1.3)
CROSSMATCH INTERPRETATION: NORMAL
DEPRECATED RDW RBC AUTO: 54.5 FL (ref 37–54)
ERYTHROCYTE [DISTWIDTH] IN BLOOD BY AUTOMATED COUNT: 15 % (ref 11.3–14.5)
GFR SERPL CREATININE-BSD FRML MDRD: 49 ML/MIN/1.73
GLUCOSE BLD-MCNC: 90 MG/DL (ref 70–100)
HCT VFR BLD AUTO: 25.6 % (ref 34.5–44)
HCT VFR BLD AUTO: 26.8 % (ref 34.5–44)
HGB BLD-MCNC: 8 G/DL (ref 11.5–15.5)
HGB BLD-MCNC: 8.6 G/DL (ref 11.5–15.5)
MCH RBC QN AUTO: 31.3 PG (ref 27–31)
MCHC RBC AUTO-ENTMCNC: 31.3 G/DL (ref 32–36)
MCV RBC AUTO: 100 FL (ref 80–99)
PLATELET # BLD AUTO: 145 10*3/MM3 (ref 150–450)
PMV BLD AUTO: 10 FL (ref 6–12)
POTASSIUM BLD-SCNC: 3.9 MMOL/L (ref 3.5–5.5)
RBC # BLD AUTO: 2.56 10*6/MM3 (ref 3.89–5.14)
SODIUM BLD-SCNC: 140 MMOL/L (ref 132–146)
UNIT  ABO: NORMAL
UNIT  RH: NORMAL
WBC NRBC COR # BLD: 6.36 10*3/MM3 (ref 3.5–10.8)

## 2017-06-22 PROCEDURE — G0378 HOSPITAL OBSERVATION PER HR: HCPCS

## 2017-06-22 PROCEDURE — 85018 HEMOGLOBIN: CPT | Performed by: INTERNAL MEDICINE

## 2017-06-22 PROCEDURE — P9016 RBC LEUKOCYTES REDUCED: HCPCS

## 2017-06-22 PROCEDURE — 85014 HEMATOCRIT: CPT | Performed by: INTERNAL MEDICINE

## 2017-06-22 PROCEDURE — 80048 BASIC METABOLIC PNL TOTAL CA: CPT | Performed by: INTERNAL MEDICINE

## 2017-06-22 PROCEDURE — 85027 COMPLETE CBC AUTOMATED: CPT | Performed by: INTERNAL MEDICINE

## 2017-06-22 PROCEDURE — 99225 PR SBSQ OBSERVATION CARE/DAY 25 MINUTES: CPT | Performed by: INTERNAL MEDICINE

## 2017-06-22 PROCEDURE — 36430 TRANSFUSION BLD/BLD COMPNT: CPT

## 2017-06-22 PROCEDURE — 86900 BLOOD TYPING SEROLOGIC ABO: CPT

## 2017-06-22 RX ADMIN — SUCRALFATE 1 G: 1 TABLET ORAL at 11:09

## 2017-06-22 RX ADMIN — FLUOXETINE HYDROCHLORIDE 40 MG: 20 CAPSULE ORAL at 20:25

## 2017-06-22 RX ADMIN — PANTOPRAZOLE SODIUM 40 MG: 40 TABLET, DELAYED RELEASE ORAL at 07:24

## 2017-06-22 RX ADMIN — TRAZODONE HYDROCHLORIDE 50 MG: 50 TABLET ORAL at 20:25

## 2017-06-22 RX ADMIN — HYDROCODONE BITARTRATE AND ACETAMINOPHEN 1 TABLET: 5; 325 TABLET ORAL at 17:30

## 2017-06-22 RX ADMIN — TIMOLOL MALEATE 1 DROP: 2.5 SOLUTION/ DROPS OPHTHALMIC at 08:33

## 2017-06-22 RX ADMIN — HYDROXYCHLOROQUINE SULFATE 200 MG: 200 TABLET, FILM COATED ORAL at 08:33

## 2017-06-22 RX ADMIN — HYDROCODONE BITARTRATE AND ACETAMINOPHEN 1 TABLET: 5; 325 TABLET ORAL at 08:33

## 2017-06-22 RX ADMIN — HYDROCODONE BITARTRATE AND ACETAMINOPHEN 1 TABLET: 5; 325 TABLET ORAL at 13:00

## 2017-06-22 RX ADMIN — HYDROCODONE BITARTRATE AND ACETAMINOPHEN 1 TABLET: 5; 325 TABLET ORAL at 22:18

## 2017-06-22 RX ADMIN — GABAPENTIN 300 MG: 300 CAPSULE ORAL at 17:30

## 2017-06-22 RX ADMIN — GABAPENTIN 300 MG: 300 CAPSULE ORAL at 08:33

## 2017-06-22 RX ADMIN — SUCRALFATE 1 G: 1 TABLET ORAL at 20:25

## 2017-06-22 RX ADMIN — HYDROXYCHLOROQUINE SULFATE 200 MG: 200 TABLET, FILM COATED ORAL at 17:30

## 2017-06-22 RX ADMIN — SUCRALFATE 1 G: 1 TABLET ORAL at 17:30

## 2017-06-22 RX ADMIN — SUCRALFATE 1 G: 1 TABLET ORAL at 07:24

## 2017-06-22 NOTE — PLAN OF CARE
Problem: Patient Care Overview (Adult)  Goal: Plan of Care Review  Outcome: Ongoing (interventions implemented as appropriate)    06/22/17 0532   Coping/Psychosocial Response Interventions   Plan Of Care Reviewed With patient   Patient Care Overview   Progress progress toward functional goals as expected   Outcome Evaluation   Outcome Summary/Follow up Plan BP remains low, Rhythmol held again this shift. R groin tender.         Problem: Arrhythmia/Dysrhythmia (Symptomatic) (Adult)  Goal: Signs and Symptoms of Listed Potential Problems Will be Absent or Manageable (Arrhythmia/Dysrhythmia)  Outcome: Ongoing (interventions implemented as appropriate)

## 2017-06-22 NOTE — PLAN OF CARE
Problem: Patient Care Overview (Adult)  Goal: Plan of Care Review  Outcome: Ongoing (interventions implemented as appropriate)    06/22/17 1612   Coping/Psychosocial Response Interventions   Plan Of Care Reviewed With patient;spouse   Patient Care Overview   Progress improving   Outcome Evaluation   Outcome Summary/Follow up Plan VSS, BP has improved today. Still c/o pain in right groin - Lortab PRN helping. Received 1 more unit of PRBC today - H/H improved (was 8/25.6 this AM, 8.6/26.8 this afternoon). Rhythmol held today due to hypotension (patient not comfortable taking). Will recheck labs in AM.

## 2017-06-22 NOTE — PROGRESS NOTES
[facility] at [facility]  Cardiovascular Consultation Note  Aleena Armstrong  N604/1  [id number]  1950    DATE OF ADMISSION: 6/20/2017  DATE OF FOLLOW UP:    Lidia Mast MD    Subjective:     Patient ID: Aleena Armstrong is a 66 y.o. female.    Chief Complaint: Afib s/p PVA with Right Groin hematoma       Allergies   Allergen Reactions   • Bactrim [Sulfamethoxazole-Trimethoprim] Other (See Comments)     MOUTH SORES   • Percocet [Oxycodone-Acetaminophen] Other (See Comments)     NIGHT TERRORS    • Statins Myalgia   • Sulfa Antibiotics Other (See Comments)     MOUTH SORES AND RASH    • Erythromycin Rash       Current Facility-Administered Medications:   •  acetaminophen (TYLENOL) tablet 500 mg, 500 mg, Oral, Q6H PRN, GENARO Guardado  •  acetaminophen (TYLENOL) tablet 650 mg, 650 mg, Oral, Q6H PRN, Edvin Cook DO  •  FLUoxetine (PROzac) capsule 40 mg, 40 mg, Oral, Nightly, GENARO Guardado, 40 mg at 06/21/17 2228  •  gabapentin (NEURONTIN) capsule 300 mg, 300 mg, Oral, BID, GENARO Guardado, 300 mg at 06/21/17 1747  •  HYDROcodone-acetaminophen (NORCO) 5-325 MG per tablet 1 tablet, 1 tablet, Oral, Q4H PRN, 1 tablet at 06/21/17 2232 **OR** HYDROcodone-acetaminophen (NORCO) 5-325 MG per tablet 2 tablet, 2 tablet, Oral, Q4H PRN, Edvin Cook DO, 1 tablet at 06/20/17 2358  •  hydroxychloroquine (PLAQUENIL) tablet 200 mg, 200 mg, Oral, BID, GENARO Guardado, 200 mg at 06/21/17 1747  •  lactated ringers infusion, 9 mL/hr, Intravenous, Continuous, Sanchez Leyva MD  •  lactated ringers infusion, 100 mL/hr, Intravenous, Continuous, Dar Atkins CRNA, Stopped at 06/21/17 1427  •  midazolam (VERSED) 2 MG/2ML injection  - ADS Override Pull, , , ,   •  ondansetron (ZOFRAN) injection 4 mg, 4 mg, Intravenous, Q6H PRN, Edvin Cook, DO  •  pantoprazole (PROTONIX) EC tablet 40 mg, 40 mg, Oral, QAM, GENARO Guardado, 40 mg at 06/22/17 0724  •  propafenone (RHTHYMOL) tablet 150 mg, 150  mg, Oral, Q8H, Edvin Joyce, DO, Stopped at 06/21/17 0600  •  sucralfate (CARAFATE) tablet 1 g, 1 g, Oral, 4x Daily AC & at Bedtime, Edvin Joyce, DO, 1 g at 06/22/17 0724  •  timolol (TIMOPTIC) 0.25 % ophthalmic solution 1 drop, 1 drop, Left Eye, Daily, Edvin Joyce, DO  •  traZODone (DESYREL) tablet 50 mg, 50 mg, Oral, Nightly, Edvin Joyce, DO, 50 mg at 06/21/17 2228    History of Present Illness  Patient doing ok. Still with pain but stable.     The following portions of the patient's history were reviewed and updated as appropriate: allergies, current medications, past family history, past medical history, past social history, past surgical history and problem list.    ROS   14 point ROS negative except as outlined in problem list, HPI and other parts of the note.    Procedures       Objective:       Vitals:    06/21/17 1744 06/21/17 1950 06/22/17 0540 06/22/17 0700   BP: 108/52 91/51 103/61 103/62   BP Location:  Right arm Right arm Right arm   Patient Position:  Lying Lying Lying   Pulse:  80 70 69   Resp:  18 18 16   Temp:  98.6 °F (37 °C) 97.8 °F (36.6 °C) 98.4 °F (36.9 °C)   TempSrc:  Oral Oral Oral   SpO2:       Weight:       Height:           Intake/Output Summary (Last 24 hours) at 06/22/17 0822  Last data filed at 06/21/17 1700   Gross per 24 hour   Intake           882.92 ml   Output              150 ml   Net           732.92 ml       GENERAL: Well-developed, well-nourished patient in no acute distress.  HEENT: Normocephalic, atraumatic, PERRLA. Moist mucous membranes.  NECK: No JVD present at 30°. No carotid bruits auscultated.  LUNGS: Clear to auscultation.  CARDIOVASCULAR: Heart has a regular rate and rhythm. No murmurs, gallops or rubs noted.   ABDOMEN: Soft, nontender. Positive bowel sounds.  MUSCULOSKELETAL: No gross deformities. No clubbing, cyanosis  EXT: pulses intact, no swelling  SKIN: Pink, warm  Neuro: Nonfocal exam. Gait intact  R groin with some Ecchymosis noted but no  significant actual hematoma seen on the exterior.    The patient's old records including ambulatory rhythm recordings (ECGs, Holter/event monitor) were reviewed and discussed.      Lab Review:     Results from last 7 days  Lab Units 06/22/17  0605 06/19/17  0858   SODIUM mmol/L 140 143   POTASSIUM mmol/L 3.9 4.0   CHLORIDE mmol/L 108 108   TOTAL CO2 mmol/L 26.0 27.0   BUN mg/dL 13 12   CREATININE mg/dL 1.10 1.00   GLUCOSE mg/dL 90 86   CALCIUM mg/dL 8.8 9.4           Results from last 7 days  Lab Units 06/22/17  0605 06/21/17  1801 06/21/17  1001   WBC 10*3/mm3 6.36 7.34 6.45   HEMOGLOBIN g/dL 8.0* 8.6* 7.3*   HEMATOCRIT % 25.6* 27.5* 23.4*   PLATELETS 10*3/mm3 145* 153 152       Results from last 7 days  Lab Units 06/19/17  0858   INR  1.01           Results from last 7 days  Lab Units 06/19/17  0858   MAGNESIUM mg/dL 2.0         Tele: NSR            Assessment & Plan:   1. Symptomatic Atypical aflutter/PAF despite Flecainide   - patient is s/p RFA of pulmonary veins w/ linear line along left atrial roof.   - Propafenone 150mg TID for at least one month. Continue on NOAC for at least 3 months but will hold for now due to groin issues and bleed and restart in future. During the case on the right side there was a very tortuous vein with inability to pass a longer sheath through that area. We therefore then decided to get new access and just put a short sheath into this access port  - patient had right groin hematoma. Stable this AM but on duplex U/S no AVF or PSA per the tech and normal arterial flow, NICOLAS. Site actually looks okay with no major hematoma noted.. Hgb 10.3-->7.2--> 7.3 then 8.3 after one unit. Stable at 8.0 today  - I think it will be best to give the patient one more unit  of blood with her acute blood loss from the groin issue noted. (this will make a total of 2 units of pRBC)  - Monitor for atleast the next 24 hrs     2. Acute Blood Loss Anemia secondary to Right Groin Hematoma with tough access  --  Rx as above.      3. Obesity s/p Gastric Bypass Sx           Edvin Cook,   06/22/17  8:22 AM        EMR Dragon/Transcription disclaimer:  Much of this encounter note is an electronic transcription/translation of spoken language to printed text. Electronic translation of spoken language may permit erroneous, or at times, nonsensical words or phrases to be inadvertently transcribed. Although I have reviewed the note for such errors, some may still exist.

## 2017-06-23 VITALS
BODY MASS INDEX: 26.7 KG/M2 | DIASTOLIC BLOOD PRESSURE: 87 MMHG | HEIGHT: 68 IN | TEMPERATURE: 97.6 F | HEART RATE: 65 BPM | RESPIRATION RATE: 16 BRPM | OXYGEN SATURATION: 94 % | SYSTOLIC BLOOD PRESSURE: 122 MMHG | WEIGHT: 176.15 LBS

## 2017-06-23 PROBLEM — S30.1XXA HEMATOMA OF GROIN: Status: ACTIVE | Noted: 2017-06-23

## 2017-06-23 LAB
ABO + RH BLD: NORMAL
ANION GAP SERPL CALCULATED.3IONS-SCNC: 6 MMOL/L (ref 3–11)
BASOPHILS # BLD AUTO: 0.01 10*3/MM3 (ref 0–0.2)
BASOPHILS NFR BLD AUTO: 0.2 % (ref 0–1)
BH BB BLOOD EXPIRATION DATE: NORMAL
BH BB BLOOD TYPE BARCODE: 6200
BH BB DISPENSE STATUS: NORMAL
BH BB PRODUCT CODE: NORMAL
BH BB UNIT NUMBER: NORMAL
BUN BLD-MCNC: 12 MG/DL (ref 9–23)
BUN/CREAT SERPL: 12 (ref 7–25)
CALCIUM SPEC-SCNC: 8.9 MG/DL (ref 8.7–10.4)
CHLORIDE SERPL-SCNC: 110 MMOL/L (ref 99–109)
CO2 SERPL-SCNC: 25 MMOL/L (ref 20–31)
CREAT BLD-MCNC: 1 MG/DL (ref 0.6–1.3)
CROSSMATCH INTERPRETATION: NORMAL
DEPRECATED RDW RBC AUTO: 53.3 FL (ref 37–54)
EOSINOPHIL # BLD AUTO: 0.1 10*3/MM3 (ref 0.1–0.3)
EOSINOPHIL NFR BLD AUTO: 1.6 % (ref 0–3)
ERYTHROCYTE [DISTWIDTH] IN BLOOD BY AUTOMATED COUNT: 14.9 % (ref 11.3–14.5)
GFR SERPL CREATININE-BSD FRML MDRD: 55 ML/MIN/1.73
GLUCOSE BLD-MCNC: 82 MG/DL (ref 70–100)
HCT VFR BLD AUTO: 26.2 % (ref 34.5–44)
HGB BLD-MCNC: 8.4 G/DL (ref 11.5–15.5)
IMM GRANULOCYTES # BLD: 0.01 10*3/MM3 (ref 0–0.03)
IMM GRANULOCYTES NFR BLD: 0.2 % (ref 0–0.6)
LYMPHOCYTES # BLD AUTO: 1.67 10*3/MM3 (ref 0.6–4.8)
LYMPHOCYTES NFR BLD AUTO: 27.1 % (ref 24–44)
MCH RBC QN AUTO: 31.7 PG (ref 27–31)
MCHC RBC AUTO-ENTMCNC: 32.1 G/DL (ref 32–36)
MCV RBC AUTO: 98.9 FL (ref 80–99)
MONOCYTES # BLD AUTO: 0.55 10*3/MM3 (ref 0–1)
MONOCYTES NFR BLD AUTO: 8.9 % (ref 0–12)
NEUTROPHILS # BLD AUTO: 3.82 10*3/MM3 (ref 1.5–8.3)
NEUTROPHILS NFR BLD AUTO: 62 % (ref 41–71)
PLATELET # BLD AUTO: 143 10*3/MM3 (ref 150–450)
PMV BLD AUTO: 10.2 FL (ref 6–12)
POTASSIUM BLD-SCNC: 4.4 MMOL/L (ref 3.5–5.5)
RBC # BLD AUTO: 2.65 10*6/MM3 (ref 3.89–5.14)
SODIUM BLD-SCNC: 141 MMOL/L (ref 132–146)
UNIT  ABO: NORMAL
UNIT  RH: NORMAL
WBC NRBC COR # BLD: 6.16 10*3/MM3 (ref 3.5–10.8)

## 2017-06-23 PROCEDURE — 85025 COMPLETE CBC W/AUTO DIFF WBC: CPT | Performed by: INTERNAL MEDICINE

## 2017-06-23 PROCEDURE — 80048 BASIC METABOLIC PNL TOTAL CA: CPT | Performed by: INTERNAL MEDICINE

## 2017-06-23 PROCEDURE — 93005 ELECTROCARDIOGRAM TRACING: CPT | Performed by: PHYSICIAN ASSISTANT

## 2017-06-23 PROCEDURE — 93010 ELECTROCARDIOGRAM REPORT: CPT | Performed by: INTERNAL MEDICINE

## 2017-06-23 PROCEDURE — 99217 PR OBSERVATION CARE DISCHARGE MANAGEMENT: CPT | Performed by: PHYSICIAN ASSISTANT

## 2017-06-23 PROCEDURE — G0378 HOSPITAL OBSERVATION PER HR: HCPCS

## 2017-06-23 RX ORDER — HYDROCODONE BITARTRATE AND ACETAMINOPHEN 5; 325 MG/1; MG/1
1 TABLET ORAL EVERY 6 HOURS PRN
Qty: 10 TABLET | Refills: 0 | Status: SHIPPED | OUTPATIENT
Start: 2017-06-23 | End: 2017-06-30

## 2017-06-23 RX ORDER — PROPAFENONE HYDROCHLORIDE 150 MG/1
150 TABLET, COATED ORAL EVERY 8 HOURS SCHEDULED
Qty: 90 TABLET | Refills: 3 | Status: CANCELLED | OUTPATIENT
Start: 2017-06-23

## 2017-06-23 RX ORDER — SUCRALFATE 1 G/1
1 TABLET ORAL
Qty: 30 TABLET | Refills: 0 | Status: SHIPPED | OUTPATIENT
Start: 2017-06-23 | End: 2017-07-07

## 2017-06-23 RX ADMIN — PANTOPRAZOLE SODIUM 40 MG: 40 TABLET, DELAYED RELEASE ORAL at 06:40

## 2017-06-23 RX ADMIN — HYDROCODONE BITARTRATE AND ACETAMINOPHEN 1 TABLET: 5; 325 TABLET ORAL at 08:30

## 2017-06-23 RX ADMIN — GABAPENTIN 300 MG: 300 CAPSULE ORAL at 08:30

## 2017-06-23 RX ADMIN — TIMOLOL MALEATE 1 DROP: 2.5 SOLUTION/ DROPS OPHTHALMIC at 08:31

## 2017-06-23 RX ADMIN — HYDROCODONE BITARTRATE AND ACETAMINOPHEN 1 TABLET: 5; 325 TABLET ORAL at 12:32

## 2017-06-23 RX ADMIN — SUCRALFATE 1 G: 1 TABLET ORAL at 11:41

## 2017-06-23 RX ADMIN — HYDROXYCHLOROQUINE SULFATE 200 MG: 200 TABLET, FILM COATED ORAL at 08:32

## 2017-06-23 RX ADMIN — SUCRALFATE 1 G: 1 TABLET ORAL at 06:40

## 2017-06-23 NOTE — DISCHARGE INSTRUCTIONS
NO HEAVY LIFTING (10 LBS OR GREATER) FOR AT LEAST ONE WEEK.    PLEASE HAVE AN EKG ON Monday 6/26/17 and follow-up on Wednesday with Dr. Cook to evaluate hematoma and recheck CBC.

## 2017-06-23 NOTE — PLAN OF CARE
Problem: Patient Care Overview (Adult)  Goal: Plan of Care Review  Outcome: Ongoing (interventions implemented as appropriate)    06/23/17 0434   Coping/Psychosocial Response Interventions   Plan Of Care Reviewed With patient   Patient Care Overview   Progress improving   Outcome Evaluation   Outcome Summary/Follow up Plan VSS, Rhythmol held again this shift. Appeared to have slept most of shift.         Problem: Pain, Acute (Adult)  Goal: Identify Related Risk Factors and Signs and Symptoms  Outcome: Ongoing (interventions implemented as appropriate)  Goal: Acceptable Pain Control/Comfort Level  Outcome: Ongoing (interventions implemented as appropriate)

## 2017-06-23 NOTE — PROGRESS NOTES
[facility]  Cardiovascular Consultation Note  Aleena Armstrong  [id number]  [id number]  1950    DATE OF ADMISSION: 6/20/2017  DATE OF FOLLOW UP:  6/23/2017    Lidia Mast MD    Subjective:     Patient ID: Aleena Armstrong is a 66 y.o. female.    Chief Complaint: Afib      Allergies   Allergen Reactions   • Bactrim [Sulfamethoxazole-Trimethoprim] Other (See Comments)     MOUTH SORES   • Percocet [Oxycodone-Acetaminophen] Other (See Comments)     NIGHT TERRORS    • Statins Myalgia   • Sulfa Antibiotics Other (See Comments)     MOUTH SORES AND RASH    • Erythromycin Rash       Current Facility-Administered Medications:   •  acetaminophen (TYLENOL) tablet 500 mg, 500 mg, Oral, Q6H PRN, GENARO Guardado  •  acetaminophen (TYLENOL) tablet 650 mg, 650 mg, Oral, Q6H PRN, Edvin Cook, DO  •  FLUoxetine (PROzac) capsule 40 mg, 40 mg, Oral, Nightly, GENARO Guardado, 40 mg at 06/22/17 2025  •  gabapentin (NEURONTIN) capsule 300 mg, 300 mg, Oral, BID, GENARO Guardado, 300 mg at 06/23/17 0830  •  HYDROcodone-acetaminophen (NORCO) 5-325 MG per tablet 1 tablet, 1 tablet, Oral, Q4H PRN, 1 tablet at 06/23/17 0830 **OR** HYDROcodone-acetaminophen (NORCO) 5-325 MG per tablet 2 tablet, 2 tablet, Oral, Q4H PRN, Edvin Cook, , 1 tablet at 06/20/17 2358  •  hydroxychloroquine (PLAQUENIL) tablet 200 mg, 200 mg, Oral, BID, GENARO Guardado, 200 mg at 06/23/17 0832  •  lactated ringers infusion, 100 mL/hr, Intravenous, Continuous, Dar Atkins CRNA, Stopped at 06/21/17 1427  •  midazolam (VERSED) 2 MG/2ML injection  - ADS Override Pull, , , ,   •  ondansetron (ZOFRAN) injection 4 mg, 4 mg, Intravenous, Q6H PRN, Edvin Cook, DO  •  pantoprazole (PROTONIX) EC tablet 40 mg, 40 mg, Oral, QAM, GENARO Guardado, 40 mg at 06/23/17 0640  •  propafenone (RHTHYMOL) tablet 150 mg, 150 mg, Oral, Q8H, Edvin Cook DO, Stopped at 06/21/17 0600  •  sucralfate (CARAFATE) tablet 1 g, 1 g, Oral, 4x  Daily AC & at Bedtime, Edvin Joyce, DO, 1 g at 06/23/17 0640  •  timolol (TIMOPTIC) 0.25 % ophthalmic solution 1 drop, 1 drop, Left Eye, Daily, Edvin Joyce, DO, 1 drop at 06/23/17 0831  •  traZODone (DESYREL) tablet 50 mg, 50 mg, Oral, Nightly, Edvin Joyce, DO, 50 mg at 06/22/17 2025    History of Present Illness  Doing ok today. Feeling a lot better. Got a liter of fluid yesterday. No issues with swallowing    The following portions of the patient's history were reviewed and updated as appropriate: allergies, current medications, past family history, past medical history, past social history, past surgical history and problem list.    ROS   14 point ROS negative except as outlined in problem list, HPI and other parts of the note.    Procedures       Objective:       Vitals:    06/22/17 1500 06/22/17 1920 06/23/17 0354 06/23/17 0700   BP: 96/47 90/48 113/56 125/85   BP Location: Right arm Right arm Right arm Right arm   Patient Position: Lying Lying Lying Lying   Pulse: 74 75 67 71   Resp: 16 14 12 16   Temp: 98.7 °F (37.1 °C) 97.7 °F (36.5 °C) 96.2 °F (35.7 °C) 97.8 °F (36.6 °C)   TempSrc: Oral Temporal Artery  Temporal Artery  Oral   SpO2:       Weight:       Height:           Intake/Output Summary (Last 24 hours) at 06/23/17 1050  Last data filed at 06/23/17 0900   Gross per 24 hour   Intake           653.33 ml   Output             1800 ml   Net         -1146.67 ml       GENERAL: Well-developed, well-nourished patient in no acute distress.  HEENT: Normocephalic, atraumatic, PERRLA. Moist mucous membranes.  NECK: No JVD present at 30°. No carotid bruits auscultated.  LUNGS: Clear to auscultation.  CARDIOVASCULAR: Heart has a regular rate and rhythm. No murmurs, gallops or rubs noted.   ABDOMEN: Soft, nontender. Positive bowel sounds.  MUSCULOSKELETAL: No gross deformities. No clubbing, cyanosis  EXT: pulses intact, no swelling  SKIN: Pink, warm  Neuro: Nonfocal exam. Gait intact  R groin with ecchymosis  noted. No sig swelling. No bruit    The patient's old records including ambulatory rhythm recordings (ECGs, Holter/event monitor) were reviewed and discussed.      Lab Review:     Results from last 7 days  Lab Units 06/23/17  0449 06/22/17  0605 06/19/17  0858   SODIUM mmol/L 141 140 143   POTASSIUM mmol/L 4.4 3.9 4.0   CHLORIDE mmol/L 110* 108 108   TOTAL CO2 mmol/L 25.0 26.0 27.0   BUN mg/dL 12 13 12   CREATININE mg/dL 1.00 1.10 1.00   GLUCOSE mg/dL 82 90 86   CALCIUM mg/dL 8.9 8.8 9.4           Results from last 7 days  Lab Units 06/23/17  0449 06/22/17  1257 06/22/17  0605 06/21/17  1801   WBC 10*3/mm3 6.16  --  6.36 7.34   HEMOGLOBIN g/dL 8.4* 8.6* 8.0* 8.6*   HEMATOCRIT % 26.2* 26.8* 25.6* 27.5*   PLATELETS 10*3/mm3 143*  --  145* 153       Results from last 7 days  Lab Units 06/19/17  0858   INR  1.01           Results from last 7 days  Lab Units 06/19/17  0858   MAGNESIUM mg/dL 2.0         Tele; NSR            Assessment & Plan:   1. Symptomatic Atypical aflutter/PAF despite Flecainide   - patient is s/p RFA of pulmonary veins w/ linear line along left atrial roof.   - Propafenone 150mg TID for at least one month. Continue on NOAC for at least 3 months but will hold for now due to groin issues and bleed and restart in future. During the case on the right side there was a very tortuous vein with inability to pass a longer sheath through that area. We therefore then decided to get new access and just put a short sheath into this access port  - patient had right groin hematoma. Stable this AM but on duplex U/S no AVF or PSA per the tech and normal arterial flow, NICOLAS. Site actually looks okay with no major hematoma noted.. Hgb 10.3-->7.2--> 7.3 then 8.3 and now 8.6after two total units.   - Patient doing a lot better after 2 units of blood.  Hemoglobin noted stable.  Ambulating with no major issues.  - Has not tolerated flecainide and now currently is not taking Rythmol due to fear of side effects.  She wants to  see how she does with no antiarrhythmic drugs or AV bryan agents and states it made her feel bad in the past as well.  - Hold Eliquis for now and patient come back to the office with a CBC on Wednesday and if stable that at that time would restart full anticoagulation.    2. Acute Blood Loss Anemia secondary to Right Groin Hematoma with tough access  -- Rx as above.       3. Obesity s/p Gastric Bypass Sx       Ok To DC home with close observation.  She will come into the office on Wednesday to have a right groin looked at and also to re-monitor her CBC.    Edvin Cook, DO  06/23/17  10:50 AM        EMR Dragon/Transcription disclaimer:  Much of this encounter note is an electronic transcription/translation of spoken language to printed text. Electronic translation of spoken language may permit erroneous, or at times, nonsensical words or phrases to be inadvertently transcribed. Although I have reviewed the note for such errors, some may still exist.

## 2017-06-23 NOTE — DISCHARGE SUMMARY
PCP:Dr. Lidia Mast  Referring MD:Dr. Dominic Wolff  Date of Admit:  6/20/2017  Date of Discharge:  6/23/2017    Discharge Diagnosis:  Hospital Problem List     * (Principal)Atrial fibrillation    Overview Addendum 6/23/2017  9:15 AM by GENARO Rossi     1. Atrial flutter, atypical in nature  A) [facility] ED presentation, 10/23/2016, with rapid atrial flutter converted after IV diltiazem.    B) Normal LVEF by catheterization, 10/24/2016.    C) Echocardiogram, 10/23/2016, revealing LVEF of 60%, mild MR, mild TR, LV diastolic dysfunction, LA enlarged (vol index 47.4) Dominic Wolff MD.   D)Recurrent symptomatic Afib.  E)Chadsvasc=3  F)PVA 6/20/17         Hematoma of groin    Overview Addendum 6/23/2017  9:14 AM by GENARO Rossi     · Post PVA right groin Hematoma  · Post op anemia, now stable.   · No AVF or PSA by Ultrasound         Atrial flutter    Overview Addendum 10/23/2016  1:25 PM by GENARO Mera     A. 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 Has been on Flecainide and Xarelto since her original diagnosis of aflutter, atypical / Afib in October 2016. She's been having issues with balance and typically has many side effects from medications. She went to ER recently for injured foot, but it has improved. No issues with CP, SOA, PND, edema, orthopnea, fevers, chills.     Hospital Course:   The patient was admitted and underwent successful EP study and PVA by Dr. Henderson on 06/20/2017.  During the study there was a very tortuous vein in the right groin acess site with inability to pass longer sheath to that area and therefore a second sheath was  required.  On the following day she developed a right groin hematoma which was treated with compression.  A ultrasound of the area revealed no evidence of AV fistula or pseudoaneurysm.  Her hemoglobin dropped from 10.3-7.2 and the patient received a blood transfusion of 2 units of packed red blood cells..  Her hemoglobin improved to 8.0 and now hemoglobin is 8.4.  Today she states she's been able to walk some without any dizziness or weakness that she feels good and she requests to be discharged home.  She does state she has some mild discomfort when swallowing food but denies any recent nausea or vomiting. This has improved and only occurred in the first couple days.  She denies any chest pain.  She is maintaining normal sinus rhythm.  Her blood pressure has been stable.  She'll be considered for discharge home.      Procedures Performed:  FINAL IMPRESSIONS:  1. Successful radiofrequency ablation and isolation of all 4 pulmonary veins with a linear line along the left atrial roof with bidirectional block.   2. Successful transseptal catheterization to the left atrium X2  3. Successful intracardiac ultrasound monitoring  4. Successful esophageal temperature monitoring   RECOMMENDATIONS:  1. The patient will be monitored on telemetry  2. If stable, the patient will be discharged to home in the AM  3. Continue on NOAC for anticoagulation and start on Propafenone 150 mg TID for 1 month and consider stopping at that time and keep on NOAC for the full 3 mths   4. Follow up with Afib clinic in one month and with me in 10-12 weeks   Edvin Cook DO  06/20/17  3:42 PM    Pertinent Test Results:     Vital Signs  Temp:  [96.2 °F (35.7 °C)-99.2 °F (37.3 °C)] 97.8 °F (36.6 °C)  Heart Rate:  [61-75] 71  Resp:  [12-18] 16  BP: ()/(47-85) 125/85  Physical Exam:  Lungs: CTA  CV: Normal S1 and S2 without murmur or Gallop  Ext: No edema. Right groin stable, ecchymosis but soft , no hematoma     Discharge Disposition:Stable      Discharge Medications:  Order on Discharge  propafenone (RHTHYMOL) 150 MG tablet TID  sucralfate (CARAFATE) 1 G tablet QID  Resume on Discharge  acetaminophen (TYLENOL) 500 MG tablet  apixaban (ELIQUIS) 5 MG tablet tablet BID  Cholecalciferol (VITAMIN D3) 2000 UNITS tablet  FLUoxetine (PROzac) 40 MG capsule  gabapentin (NEURONTIN) 300 MG capsule  hydroxychloroquine (PLAQUENIL) 200 MG tablet  levocetirizine (XYZAL) 5 MG tablet  omeprazole (priLOSEC) 20 MG capsule  Probiotic Product (DIGESTIVE ADVANTAGE GUMMIES PO)  timolol (BETIMOL) 0.25 % ophthalmic solution  traZODone (DESYREL) 50 MG tablet  vitamin D (ERGOCALCIFEROL) 16810 UNITS capsule capsule        Discharge Diet: Cardiac    Activity at Discharge: No heavy straining or lifting over ten pound for one week    Follow-up Appointments:  Future Appointments  Date Time Provider Department Center   7/26/2017 10:15 AM Dominic Wolff MD [facility] None       Test Results Pending at Discharge:EKG on Monday.     GENARO West  06/23/17  9:15 AM    IEdvin, have reviewed the note in full and agree with all aspects of the above including physical exam, assessment, labs and plan with changes made accordingly. Face to Face Time was spent with the patient.    Edvin Cook DO  06/23/17  10:50 AM

## 2017-06-26 ENCOUNTER — CLINICAL SUPPORT (OUTPATIENT)
Dept: CARDIOLOGY | Facility: CLINIC | Age: 67
End: 2017-06-26

## 2017-06-26 DIAGNOSIS — I48.0 PAROXYSMAL ATRIAL FIBRILLATION (HCC): Primary | ICD-10-CM

## 2017-06-26 PROCEDURE — 93000 ELECTROCARDIOGRAM COMPLETE: CPT | Performed by: INTERNAL MEDICINE

## 2017-06-28 ENCOUNTER — TELEPHONE (OUTPATIENT)
Dept: CARDIOLOGY | Facility: CLINIC | Age: 67
End: 2017-06-28

## 2017-06-28 ENCOUNTER — OFFICE VISIT (OUTPATIENT)
Dept: CARDIOLOGY | Facility: CLINIC | Age: 67
End: 2017-06-28

## 2017-06-28 DIAGNOSIS — S30.1XXD HEMATOMA OF GROIN, SUBSEQUENT ENCOUNTER: Primary | ICD-10-CM

## 2017-06-28 PROCEDURE — 99024 POSTOP FOLLOW-UP VISIT: CPT | Performed by: INTERNAL MEDICINE

## 2017-06-28 NOTE — PROGRESS NOTES
WOUND CHECK    2017    Aleena Armstrong, : 1950    WOUND CHECK    B/P: (Sitting)  (Standing)    Pulse:     Patient has fever: [] Temperature if indicated: No fevers noted per patient.    Wound Location: right and left groins    Dressing Removed []   Groins were already open to air on arrival     Old Dressing Appearance:  Clean, dry []                 Old, bloody drainage []                            Moist, serous drainage []                Moist, thick yellow/green drainage []       Wound Appearance: Redness []   none               Drainage []   none               Culture obtained []   no     Color:      Consistency:      Amount:          Gloves used, wound cleansed with sterile 4x4 and peroxide []       MD notified [] MD orders:     Antibiotic started []  If checked, type   Other: No bruit auscultated in the right or left groin. Both groins and thighs are very ecchymotic. I instructed the patient not to lift anything over 10 lbs for atleast one more week.    Appointment for follow-up scheduled for 3 months post procedure []    Future Appointments  Date Time Provider Department Center   2017 10:00 AM YULI Salcedo [facility] K[facility]IK KARTHIK   2017 10:15 AM MD SHANNON Ann [facility] JIGNESH None           Dedra Dudley RN, 17      MD Signature:______________________________ Completed By/Date:

## 2017-06-29 DIAGNOSIS — T14.8XXA HEMATOMA: Primary | ICD-10-CM

## 2017-06-29 LAB
ERYTHROCYTE [DISTWIDTH] IN BLOOD BY AUTOMATED COUNT: 13.5 % (ref 11.3–14.5)
HCT VFR BLD AUTO: 28.5 % (ref 34.5–44)
HGB BLD-MCNC: 9 G/DL (ref 11.5–15.5)
MCH RBC QN AUTO: 31.3 PG (ref 27–31)
MCHC RBC AUTO-ENTMCNC: 31.6 G/DL (ref 32–36)
MCV RBC AUTO: 99 FL (ref 80–99)
PLATELET # BLD AUTO: 255 10*3/MM3 (ref 150–450)
RBC # BLD AUTO: 2.88 10*6/MM3 (ref 3.89–5.14)
WBC # BLD AUTO: 4.76 10*3/MM3 (ref 3.5–10.8)

## 2017-06-29 NOTE — TELEPHONE ENCOUNTER
Dr. Cook wants the patient to restart Eliquis on Sunday. I called and notified the patient. I told her to call if she has any issues.

## 2017-07-04 ENCOUNTER — RESULTS ENCOUNTER (OUTPATIENT)
Dept: CARDIOLOGY | Facility: CLINIC | Age: 67
End: 2017-07-04

## 2017-07-04 DIAGNOSIS — T14.8XXA HEMATOMA: ICD-10-CM

## 2017-07-07 ENCOUNTER — PROCEDURE VISIT (OUTPATIENT)
Dept: CARDIOLOGY | Facility: HOSPITAL | Age: 67
End: 2017-07-07

## 2017-07-07 ENCOUNTER — APPOINTMENT (OUTPATIENT)
Dept: CARDIOLOGY | Facility: HOSPITAL | Age: 67
End: 2017-07-07

## 2017-07-07 ENCOUNTER — OFFICE VISIT (OUTPATIENT)
Dept: CARDIOLOGY | Facility: HOSPITAL | Age: 67
End: 2017-07-07

## 2017-07-07 VITALS
HEART RATE: 58 BPM | SYSTOLIC BLOOD PRESSURE: 109 MMHG | WEIGHT: 174.8 LBS | OXYGEN SATURATION: 99 % | TEMPERATURE: 98.4 F | RESPIRATION RATE: 18 BRPM | BODY MASS INDEX: 27.44 KG/M2 | HEIGHT: 67 IN | DIASTOLIC BLOOD PRESSURE: 58 MMHG

## 2017-07-07 DIAGNOSIS — N30.01 ACUTE CYSTITIS WITH HEMATURIA: Primary | ICD-10-CM

## 2017-07-07 DIAGNOSIS — R19.7 DIARRHEA, UNSPECIFIED TYPE: ICD-10-CM

## 2017-07-07 DIAGNOSIS — S30.1XXD HEMATOMA OF GROIN, SUBSEQUENT ENCOUNTER: ICD-10-CM

## 2017-07-07 DIAGNOSIS — I45.6 WPW (WOLFF-PARKINSON-WHITE SYNDROME): ICD-10-CM

## 2017-07-07 DIAGNOSIS — I48.0 PAROXYSMAL ATRIAL FIBRILLATION (HCC): ICD-10-CM

## 2017-07-07 DIAGNOSIS — I48.92 ATRIAL FLUTTER, UNSPECIFIED TYPE (HCC): Primary | ICD-10-CM

## 2017-07-07 DIAGNOSIS — R30.0 DYSURIA: ICD-10-CM

## 2017-07-07 PROCEDURE — 99204 OFFICE O/P NEW MOD 45 MIN: CPT | Performed by: NURSE PRACTITIONER

## 2017-07-07 PROCEDURE — 93005 ELECTROCARDIOGRAM TRACING: CPT

## 2017-07-07 PROCEDURE — 93010 ELECTROCARDIOGRAM REPORT: CPT | Performed by: INTERNAL MEDICINE

## 2017-07-07 RX ORDER — CETIRIZINE HYDROCHLORIDE 10 MG/1
10 TABLET ORAL DAILY
COMMUNITY
End: 2018-05-09

## 2017-07-07 RX ORDER — HYDROCODONE BITARTRATE AND ACETAMINOPHEN 5; 325 MG/1; MG/1
1 TABLET ORAL EVERY 6 HOURS PRN
COMMUNITY
End: 2018-02-19 | Stop reason: ALTCHOICE

## 2017-07-07 RX ORDER — CIPROFLOXACIN 250 MG/1
250 TABLET, FILM COATED ORAL 2 TIMES DAILY
Qty: 10 TABLET | Refills: 0 | Status: SHIPPED | OUTPATIENT
Start: 2017-07-07 | End: 2017-07-12

## 2017-07-07 NOTE — PROGRESS NOTES
Results for orders placed or performed in visit on 07/07/17   Magnesium   Result Value Ref Range    Magnesium 2.1 1.3 - 2.7 mg/dL   CBC (No Diff)   Result Value Ref Range    WBC 7.15 3.50 - 10.80 10*3/mm3    RBC 3.36 (L) 3.89 - 5.14 10*6/mm3    Hemoglobin 10.5 (L) 11.5 - 15.5 g/dL    Hematocrit 33.0 (L) 34.5 - 44.0 %    MCV 98.2 80.0 - 99.0 fL    MCH 31.3 (H) 27.0 - 31.0 pg    MCHC 31.8 (L) 32.0 - 36.0 g/dL    RDW 13.6 11.3 - 14.5 %    RDW-SD 48.5 37.0 - 54.0 fl    MPV 10.3 6.0 - 12.0 fL    Platelets 302 150 - 450 10*3/mm3   Basic Metabolic Panel   Result Value Ref Range    Glucose 108 (H) 70 - 100 mg/dL    BUN 15 9 - 23 mg/dL    Creatinine 1.20 0.60 - 1.30 mg/dL    Sodium 138 132 - 146 mmol/L    Potassium 4.3 3.5 - 5.5 mmol/L    Chloride 105 99 - 109 mmol/L    CO2 28.0 20.0 - 31.0 mmol/L    Calcium 9.6 8.7 - 10.4 mg/dL    eGFR Non African Amer 45 (L) >60 mL/min/1.73    BUN/Creatinine Ratio 12.5 7.0 - 25.0    Anion Gap 5.0 3.0 - 11.0 mmol/L   Urinalysis   Result Value Ref Range    Color, UA Yellow Yellow, Straw    Appearance, UA Cloudy (A) Clear    pH, UA 5.5 5.0 - 8.0    Specific Gravity, UA 1.016 1.001 - 1.030    Glucose, UA Negative Negative    Ketones, UA Negative Negative    Bilirubin, UA Negative Negative    Blood, UA Small (1+) (A) Negative    Protein, UA Trace (A) Negative    Leuk Esterase, UA Moderate (2+) (A) Negative    Nitrite, UA Negative Negative    Urobilinogen, UA 0.2 E.U./dL 0.2 - 1.0 E.U./dL   Urinalysis, Microscopic Only   Result Value Ref Range    RBC, UA 0-2 None Seen, 0-2 /HPF    WBC, UA Too Numerous to Count (A) None Seen /HPF    Bacteria, UA 4+ (A) None Seen, Trace /HPF    Squamous Epithelial Cells, UA 0-2 None Seen, 0-2 /HPF    Hyaline Casts, UA 7-12 0 - 6 /LPF    Methodology Automated Microscopy      Culture pending.      Pt will start Cipro 250 mg BID for 5 days    Reviewed labs with pt and agrees with POC

## 2017-07-07 NOTE — PROGRESS NOTES
[facility]  Heart and Valve Center      Encounter Date:07/07/2017     Aleena CORONA formerly [facility] [id number]  [phone]    1950    Lidia Mast MD    Aleena Armstrong is a 66 y.o. female.      Subjective:     Chief Complaint:  Atrial Fibrillation (s/p PVA/flutter ablation.  hematoma of groin)       HPI     Patient is a 66 year old female with history of WPW s/p RFA in the past, atypical appearing atrial flutter in Oct 2016, s/p gastric bypass 8 yrs ago and chest pain with a normal heart cath. History of  Flecainide and Xarelto since her original diagnosis of aflutter, atypical / Afib in October 2016. She's been having issues with balance and has many side effects due to medications.The patient was admitted and underwent successful EP study and PVA by Dr. Henderson on 06/20/2017. During the study there was a very tortuous vein in the right groin acess site with inability to pass longer sheath to that area and therefore a second sheath was required. On the following day she developed a right groin hematoma which was treated with compression. A ultrasound of the area revealed no evidence of AV fistula or pseudoaneurysm. Her hemoglobin dropped from 10.3-7.2 and the patient received a blood transfusion of 2 units of packed red blood cells.    Pt presents today for evaluation.  She states he is fatigued, but slowing improving.  Continued to have left leg and hip soreness.  Bruising is soft.  Denies CP, pressure, palpitations, dyspnea, dizziness, syncope, Dyspepsia, dysphagia.  Has noted frequent loose stools since ablation.  Reports dysuria.  Denies fevers or chills.    Patient Active Problem List   Diagnosis   • WPW (Elodia-Parkinson-White syndrome)   • Chest pain   • Morbid obesity   • Dyslipidemia   • Essential hypertension   • Atrial flutter   • Atrial fibrillation   • Acute kidney injury   • Insomnia   • Anxiety and depression   • Vitamin D deficiency   • Rheumatoid arthritis vs.  psoriatic arthritis; takes Plaquenil and f/u with Rheumatology   • Glaucoma of left eye   • Peptic ulcer    • Bradycardia   • Paroxysmal atrial fibrillation   • Hematoma of groin         Past Surgical History:   Procedure Laterality Date   • CARDIAC CATHETERIZATION N/A 10/24/2016    Procedure: Left Heart Cath;  Surgeon: Rubens Morejon MD;  Location:  [facility];  Service:    • CARDIAC ELECTROPHYSIOLOGY PROCEDURE N/A 6/20/2017    Procedure: PVA;  Surgeon: Edivn Cook DO;  Location:  [facility] EP INVASIVE [facility];  Service:    • CHOLECYSTECTOMY     • COLONOSCOPY  2007   • GASTRIC BYPASS     • HAMMER TOE REPAIR      LEFT--GARO IN PLACE    • HYSTERECTOMY         Allergies   Allergen Reactions   • Bactrim [Sulfamethoxazole-Trimethoprim] Other (See Comments)     MOUTH SORES   • Percocet [Oxycodone-Acetaminophen] Other (See Comments)     NIGHT TERRORS    • Statins Myalgia   • Sulfa Antibiotics Other (See Comments)     MOUTH SORES AND RASH    • Erythromycin Rash         Current Outpatient Prescriptions:   •  acetaminophen (TYLENOL) 500 MG tablet, Take 500 mg by mouth Every 6 (Six) Hours As Needed for Mild Pain (1-3)., Disp: , Rfl:   •  apixaban (ELIQUIS) 5 MG tablet tablet, Take 5 mg by mouth 2 (Two) Times a Day., Disp: , Rfl:   •  cetirizine (zyrTEC) 10 MG tablet, Take 10 mg by mouth Daily., Disp: , Rfl:   •  Cholecalciferol (VITAMIN D3) 2000 UNITS tablet, Take 1 tablet by mouth Daily., Disp: , Rfl:   •  FLUoxetine (PROzac) 40 MG capsule, Take 40 mg by mouth Daily., Disp: , Rfl:   •  gabapentin (NEURONTIN) 300 MG capsule, Take 300 mg by mouth 2 (Two) Times a Day., Disp: , Rfl:   •  HYDROcodone-acetaminophen (NORCO) 5-325 MG per tablet, Take 1 tablet by mouth Every 6 (Six) Hours As Needed., Disp: , Rfl:   •  hydroxychloroquine (PLAQUENIL) 200 MG tablet, Take 200 mg by mouth 2 (Two) Times a Day., Disp: , Rfl:   •  omeprazole (priLOSEC) 20 MG capsule, Take 20 mg by mouth Daily., Disp: , Rfl:   •  Probiotic  Product (DIGESTIVE ADVANTAGE GUMMIES PO), Take 2 tablets by mouth Daily., Disp: , Rfl:   •  sucralfate (CARAFATE) 1 G tablet, Take 1 tablet by mouth 4 (Four) Times a Day Before Meals & at Bedtime., Disp: 30 tablet, Rfl: 0  •  timolol (BETIMOL) 0.25 % ophthalmic solution, Administer 1-2 drops into the left eye Daily., Disp: , Rfl:   •  traZODone (DESYREL) 50 MG tablet, Take 50 mg by mouth Every Night., Disp: , Rfl:   •  vitamin D (ERGOCALCIFEROL) 86039 UNITS capsule capsule, Take 50,000 Units by mouth 2 (Two) Times a Week. On Sundays , Disp: , Rfl:     The following portions of the patient's history were reviewed and updated as appropriate: allergies, current medications, past family history, past medical history, past social history, past surgical history and problem list.    Review of Systems   Constitution: Positive for weakness, malaise/fatigue and weight gain. Negative for chills, decreased appetite, diaphoresis, fever, night sweats and weight loss.   HENT: Positive for congestion (post nasal drip) and headaches. Negative for nosebleeds.    Eyes: Negative for blurred vision, visual disturbance and visual halos.   Cardiovascular: Positive for dyspnea on exertion (improving) and leg swelling. Negative for chest pain, claudication, cyanosis, irregular heartbeat, near-syncope, orthopnea, palpitations, paroxysmal nocturnal dyspnea and syncope.   Respiratory: Negative for cough, hemoptysis, shortness of breath, sleep disturbances due to breathing, snoring, sputum production and wheezing.    Endocrine: Positive for polydipsia. Negative for cold intolerance, heat intolerance, polyphagia and polyuria.   Hematologic/Lymphatic: Bruises/bleeds easily.   Skin: Positive for itching. Negative for dry skin and rash.   Musculoskeletal: Positive for joint pain. Negative for falls, joint swelling, muscle weakness and myalgias.   Gastrointestinal: Negative for bloating, abdominal pain, constipation, diarrhea, dysphagia, heartburn,  "melena, nausea and vomiting.   Genitourinary: Positive for dysuria and frequency. Negative for flank pain, hematuria and nocturia.   Neurological: Positive for excessive daytime sleepiness and light-headedness. Negative for difficulty with concentration, dizziness and loss of balance.   Psychiatric/Behavioral: Positive for depression. Negative for altered mental status. The patient is not nervous/anxious.    Allergic/Immunologic: Negative for environmental allergies.       Objective:     Vitals:    07/07/17 1010 07/07/17 1014 07/07/17 1015   BP: 122/56 131/69 109/58   BP Location: Right arm Left arm Left arm   Patient Position: Sitting Sitting Standing   Pulse: 59  58   Resp: 18     Temp: 98.4 °F (36.9 °C)     TempSrc: Temporal Artery      SpO2: 99%     Weight: 174 lb 12.8 oz (79.3 kg)     Height: 67\" (170.2 cm)           Physical Exam   Constitutional: She is oriented to person, place, and time. She appears well-developed and well-nourished. No distress.   HENT:   Head: Normocephalic and atraumatic.   Mouth/Throat: Oropharynx is clear and moist.   Eyes: Conjunctivae are normal. Pupils are equal, round, and reactive to light. No scleral icterus.   Neck: No hepatojugular reflux and no JVD present. Carotid bruit is not present. No tracheal deviation present. No thyromegaly present.   Cardiovascular: Normal rate, regular rhythm, normal heart sounds and intact distal pulses.  Exam reveals no friction rub.    No murmur heard.  Pulmonary/Chest: Effort normal and breath sounds normal.   Abdominal: Soft. Bowel sounds are normal. She exhibits no distension. There is no tenderness.   Musculoskeletal: She exhibits no edema.   Lymphadenopathy:     She has no cervical adenopathy.   Neurological: She is alert and oriented to person, place, and time.   Skin: Skin is warm, dry and intact. No rash noted. No cyanosis or erythema. No pallor.   Right groin ecchymosis noted down anterior 5 [address] peritoneal area.  Soft.  Yellow " and purple and discoloration.  No bruit noted.   Psychiatric: She has a normal mood and affect. Her behavior is normal. Thought content normal.   Vitals reviewed.      Lab and Diagnostic Review:    Assessment and Plan:         1. Atrial flutter, unspecified type    - ECG 12 Lead; Sinus rhythm with PAC, 61 bpm    - Basic Metabolic Panel  - CBC (No Diff)    Discussed post ablation procedure expectations and management.  Discussed the role the heart and valve Center when to call  2. Paroxysmal atrial fibrillation      3. Dysuria    - Urinalysis With / Microscopic If Indicated  - Urine Culture    4. Hematoma of groin, subsequent encounter      5. Diarrhea, unspecified type    - Magnesium  Discontinue Carafate.    Lopid scheduled with Dr. Wolff in 2 weeks.  Patient will follow-up with Dr. Cook as scheduled.  Patient will follow-up in the heart and valve Center as needed or as determined by cardiology    *Please note that portions of this note were completed with a voice recognition program. Efforts were made to edit the dictations, but occasionally words are mistranscribed.

## 2017-07-26 ENCOUNTER — OFFICE VISIT (OUTPATIENT)
Dept: CARDIOLOGY | Facility: CLINIC | Age: 67
End: 2017-07-26

## 2017-07-26 VITALS
WEIGHT: 173.7 LBS | SYSTOLIC BLOOD PRESSURE: 128 MMHG | HEIGHT: 67 IN | DIASTOLIC BLOOD PRESSURE: 78 MMHG | BODY MASS INDEX: 27.26 KG/M2 | HEART RATE: 60 BPM

## 2017-07-26 DIAGNOSIS — I48.0 PAROXYSMAL ATRIAL FIBRILLATION (HCC): ICD-10-CM

## 2017-07-26 DIAGNOSIS — R00.1 BRADYCARDIA: ICD-10-CM

## 2017-07-26 DIAGNOSIS — I45.6 WPW (WOLFF-PARKINSON-WHITE SYNDROME): ICD-10-CM

## 2017-07-26 PROCEDURE — 99214 OFFICE O/P EST MOD 30 MIN: CPT | Performed by: INTERNAL MEDICINE

## 2017-07-26 NOTE — PROGRESS NOTES
[facility] at [facility]  Office Progress Note  Aleena Armstrong  1950  [phone]      Visit Date: 01/25/2017    PCP: Lidia Mast MD  [address]  [facility] [address]    IDENTIFICATION: A 66 y.o. female     Chief Complaint   Patient presents with   • Coronary Artery Disease       1. Atrial flutter/fib  A) [facility] ED presentation, 10/23/2016, with rapid atrial flutter converted after IV diltiazem.   B) Nl LVEF catheterization, 10/24/2016.   C) Echocardiogram, 10/23/2016, revealing LVEF of 60%, mild MR, mild TR, LV diastolic dysfunction, Dominic Wolff MD.   D) 6/20/17 PVA Joyce-R groin bleed requiring 2UPRBC  2. Chest pain.  A) Cardiac catheterization, 10/24/2016, Rubens Morejon MD, revealing nonobstructive plaque involving a large obtuse marginal and the apical LAD without evidence of hemodynamically significant CAD, acceptable FFR of the OM (0.95) and of apical LAD (0.83). LVEF of 60%.   D) Medical therapy and risk factor modification.   3. History of WPW.   4. Dyslipidemia- lipophilic statin intolerant  5. Morbid obesity.  Prior gastric bypass 2008  6. Hypertension.   7. Gastric ulcer, diagnosed 08/2016, per EGD secondary to Plaquenil therapy.     Allergies  Allergies   Allergen Reactions   • Bactrim [Sulfamethoxazole-Trimethoprim] Other (See Comments)     MOUTH SORES   • Percocet [Oxycodone-Acetaminophen] Other (See Comments)     NIGHT TERRORS    • Statins Myalgia   • Sulfa Antibiotics Other (See Comments)     MOUTH SORES AND RASH    • Erythromycin Rash       Current Medications    Current Outpatient Prescriptions:   •  acetaminophen (TYLENOL) 500 MG tablet, Take 500 mg by mouth Every 6 (Six) Hours As Needed for Mild Pain (1-3)., Disp: , Rfl:   •  apixaban (ELIQUIS) 5 MG tablet tablet, Take 5 mg by mouth 2 (Two) Times a Day., Disp: , Rfl:   •  cetirizine (zyrTEC) 10 MG tablet, Take 10 mg by mouth Daily., Disp: , Rfl:   •  Cholecalciferol (VITAMIN D3) 2000  "UNITS tablet, Take 1 tablet by mouth Daily., Disp: , Rfl:   •  FLUoxetine (PROzac) 40 MG capsule, Take 40 mg by mouth Daily., Disp: , Rfl:   •  HYDROcodone-acetaminophen (NORCO) 5-325 MG per tablet, Take 1 tablet by mouth Every 6 (Six) Hours As Needed., Disp: , Rfl:   •  hydroxychloroquine (PLAQUENIL) 200 MG tablet, Take 200 mg by mouth 2 (Two) Times a Day., Disp: , Rfl:   •  omeprazole (priLOSEC) 20 MG capsule, Take 20 mg by mouth Daily., Disp: , Rfl:   •  Probiotic Product (DIGESTIVE ADVANTAGE GUMMIES PO), Take 2 tablets by mouth Daily., Disp: , Rfl:   •  timolol (BETIMOL) 0.25 % ophthalmic solution, Administer 1-2 drops into the left eye Daily., Disp: , Rfl:   •  traZODone (DESYREL) 50 MG tablet, Take 50 mg by mouth Every Night., Disp: , Rfl:   •  vitamin D (ERGOCALCIFEROL) 04002 UNITS capsule capsule, Take 50,000 Units by mouth 2 (Two) Times a Week. On Sundays , Disp: , Rfl:       History of Present Illness     Pt denies any new chest pain, dyspnea, dyspnea on exertion, orthopnea, PND, palpitations, lower extremity edema.  Post PVA improved sxs without sustained episodes.    ROS:  All systems have been reviewed and are negative with the exception of those mentioned in the HPI.    OBJECTIVE:  Vitals:    07/26/17 1046   BP: 128/78   BP Location: Left arm   Patient Position: Sitting   Pulse: 60   Weight: 173 lb 11.2 oz (78.8 kg)   Height: 67\" (170.2 cm)     Physical Exam   Constitutional: She appears well-developed and well-nourished.   Neck: Normal range of motion. Neck supple. No hepatojugular reflux and no JVD present. Carotid bruit is not present. No tracheal deviation present. No thyromegaly present.   Cardiovascular: Normal rate, regular rhythm, S1 normal, S2 normal, intact distal pulses and normal pulses.  PMI is not displaced.  Exam reveals no gallop, no distant heart sounds, no friction rub, no midsystolic click and no opening snap.    No murmur heard.  Pulses:       Radial pulses are 2+ on the right side, " and 2+ on the left side.        Dorsalis pedis pulses are 2+ on the right side, and 2+ on the left side.        Posterior tibial pulses are 2+ on the right side, and 2+ on the left side.   Pulmonary/Chest: Effort normal and breath sounds normal. She has no wheezes. She has no rales.   Abdominal: Soft. Bowel sounds are normal. She exhibits no mass. There is no tenderness. There is no guarding.       Diagnostic Data:  Procedures      ASSESSMENT:   Diagnosis Plan   1. WPW (Elodia-Parkinson-White syndrome)  Cardiac Event Monitor   2. Paroxysmal atrial fibrillation  Cardiac Event Monitor   3. Bradycardia  Cardiac Event Monitor       PLAN:  CAD - moderate nonobst w recent LHC.  Cont current RX    HL  Statin intolerant.  Rediscussed pcsk9 option    Paf/palps/ jody- post PVA with improvement.    Fu 6 month    Lidia Mast MD, thank you for referring Ms. Armstrong for evaluation.  I have forwarded my electronically generated recommendations to you for review.  Please do not hesitate to call with any questions.      Dominic Wolff MD, FACC

## 2017-07-27 LAB
APPEARANCE UR: ABNORMAL
BACTERIA #/AREA URNS HPF: ABNORMAL /HPF
BILIRUB UR QL STRIP: NEGATIVE
BUN SERPL-MCNC: 15 MG/DL (ref 9–23)
BUN/CREAT SERPL: 12.5 (ref 7–25)
CALCIUM SERPL-MCNC: 9.6 MG/DL (ref 8.7–10.4)
CASTS URNS MICRO: ABNORMAL
CASTS URNS QL MICRO: PRESENT /HPF
CHLORIDE SERPL-SCNC: 105 MMOL/L (ref 99–109)
CO2 SERPL-SCNC: 28 MMOL/L (ref 20–31)
COLOR UR: YELLOW
CREAT SERPL-MCNC: 1.2 MG/DL (ref 0.6–1.3)
EPI CELLS #/AREA URNS HPF: ABNORMAL /HPF
ERYTHROCYTE [DISTWIDTH] IN BLOOD BY AUTOMATED COUNT: 13.6 % (ref 11.3–14.5)
GLUCOSE SERPL-MCNC: 108 MG/DL (ref 70–100)
GLUCOSE UR QL: NEGATIVE MG/DL
HCT VFR BLD AUTO: 33 % (ref 34.5–44)
HGB BLD-MCNC: 10.5 G/DL (ref 11.5–15.5)
HGB UR QL STRIP: ABNORMAL
KETONES UR QL STRIP: NEGATIVE MG/DL
LEUKOCYTE ESTERASE UR QL STRIP: ABNORMAL
MAGNESIUM SERPL-MCNC: 2.1 MG/DL (ref 1.3–2.7)
MCH RBC QN AUTO: 31.3 PG (ref 27–31)
MCHC RBC AUTO-ENTMCNC: 31.8 G/DL (ref 32–36)
MCV RBC AUTO: 98.2 FL (ref 80–99)
MICRO URNS: ABNORMAL
NITRITE UR QL STRIP: NEGATIVE
PH UR STRIP: 5.5 [PH] (ref 4.5–8)
PLATELET # BLD AUTO: 302 10E3/MM3 (ref 150–450)
POTASSIUM SERPL-SCNC: 4.3 MMOL/L (ref 3.5–5.5)
PROT UR QL STRIP: ABNORMAL MG/DL
RBC # BLD AUTO: 3.36 10E6/MM3 (ref 3.89–5.14)
RBC #/AREA URNS HPF: ABNORMAL /HPF
SODIUM SERPL-SCNC: 138 MMOL/L (ref 132–146)
SP GR UR: 1.02 (ref 1–1.03)
UROBILINOGEN UR STRIP-MCNC: 0.2 MG/DL (ref 0.2–1)
WBC # BLD AUTO: 7.15 10E3/MM3 (ref 3.5–10.8)
WBC #/AREA URNS HPF: ABNORMAL /HPF

## 2017-09-25 ENCOUNTER — OFFICE VISIT (OUTPATIENT)
Dept: CARDIOLOGY | Facility: CLINIC | Age: 67
End: 2017-09-25

## 2017-09-25 VITALS
BODY MASS INDEX: 26.55 KG/M2 | DIASTOLIC BLOOD PRESSURE: 74 MMHG | SYSTOLIC BLOOD PRESSURE: 122 MMHG | WEIGHT: 165.2 LBS | HEART RATE: 58 BPM | HEIGHT: 66 IN

## 2017-09-25 DIAGNOSIS — I48.92 ATRIAL FLUTTER, UNSPECIFIED TYPE (HCC): ICD-10-CM

## 2017-09-25 DIAGNOSIS — I48.0 PAROXYSMAL ATRIAL FIBRILLATION (HCC): Primary | ICD-10-CM

## 2017-09-25 PROCEDURE — 99213 OFFICE O/P EST LOW 20 MIN: CPT | Performed by: INTERNAL MEDICINE

## 2017-09-25 PROCEDURE — 93000 ELECTROCARDIOGRAM COMPLETE: CPT | Performed by: PHYSICIAN ASSISTANT

## 2017-09-25 NOTE — PROGRESS NOTES
Subjective:   Aleena Armstrong  1950  [phone]      09/25/2017    [facility] CARDIOLOGY    Lidia Mast MD  [address] [address]    REFERRING DOCTOR: Dominic Wolff MD       Patient ID: Aleena Armstrong is a 66 y.o. female.    Chief Complaint:   Chief Complaint   Patient presents with   • Follow-up     Problem List:    1. Atrial flutter/fib  A) [facility] ED presentation, 10/23/2016, with rapid atrial flutter converted after IV diltiazem.   B) Nl LVEF catheterization, 10/24/2016.   C) Echocardiogram, 10/23/2016, revealing LVEF of 60%, mild MR, mild TR, LV diastolic dysfunction, Dominic Wolff MD.   D) 6/20/17 PVA Joyce-R groin bleed requiring 2UPRBC  2. Chest pain.  A) Cardiac catheterization, 10/24/2016, Rubens Morejon MD, revealing nonobstructive plaque involving a large obtuse marginal and the apical LAD without evidence of hemodynamically significant CAD, acceptable FFR of the OM (0.95) and of apical LAD (0.83). LVEF of 60%.   D) Medical therapy and risk factor modification.   3. History of WPW.   4. Dyslipidemia- lipophilic statin intolerant  5. Morbid obesity.  Prior gastric bypass 2008  6. Hypertension.   7. Gastric ulcer, diagnosed 08/2016, per EGD secondary to Plaquenil therapy.    Allergies   Allergen Reactions   • Bactrim [Sulfamethoxazole-Trimethoprim] Other (See Comments)     MOUTH SORES   • Percocet [Oxycodone-Acetaminophen] Other (See Comments)     NIGHT TERRORS    • Statins Myalgia   • Sulfa Antibiotics Other (See Comments)     MOUTH SORES AND RASH    • Erythromycin Rash       Current Outpatient Prescriptions:   •  acetaminophen (TYLENOL) 500 MG tablet, Take 500 mg by mouth Every 6 (Six) Hours As Needed for Mild Pain (1-3)., Disp: , Rfl:   •  apixaban (ELIQUIS) 5 MG tablet tablet, Take 5 mg by mouth 2 (Two) Times a Day., Disp: , Rfl:   •  cetirizine (zyrTEC) 10 MG tablet, Take 10 mg by mouth Daily., Disp: , Rfl:   •   Cholecalciferol (VITAMIN D3) 2000 UNITS tablet, Take 1 tablet by mouth Daily., Disp: , Rfl:   •  FLUoxetine (PROzac) 40 MG capsule, Take 40 mg by mouth Daily., Disp: , Rfl:   •  HYDROcodone-acetaminophen (NORCO) 5-325 MG per tablet, Take 1 tablet by mouth Every 6 (Six) Hours As Needed., Disp: , Rfl:   •  hydroxychloroquine (PLAQUENIL) 200 MG tablet, Take 200 mg by mouth 2 (Two) Times a Day., Disp: , Rfl:   •  omeprazole (priLOSEC) 20 MG capsule, Take 20 mg by mouth Daily., Disp: , Rfl:   •  Probiotic Product (DIGESTIVE ADVANTAGE GUMMIES PO), Take 2 tablets by mouth Daily., Disp: , Rfl:   •  timolol (BETIMOL) 0.25 % ophthalmic solution, Administer 1-2 drops into the left eye Daily., Disp: , Rfl:   •  traZODone (DESYREL) 50 MG tablet, Take 50 mg by mouth Every Night., Disp: , Rfl:   •  vitamin D (ERGOCALCIFEROL) 03249 UNITS capsule capsule, Take 50,000 Units by mouth 2 (Two) Times a Week. On Sundays , Disp: , Rfl:     History of Present Illness    Patient presents for follow-up of her afib/aflutter s/p PVA in June. She has only had one brief episode lasting about 5 minutes several weeks ago. Off all AADs. No issues with chest pain, shortness of breath, fevers, chills, night sweats, PND, orthopnea or palpitations. No recent ER visits or hospital stays.      The following portions of the patient's history were reviewed and updated as appropriate: allergies, current medications, past family history, past medical history, past social history, past surgical history and problem list.    ROS   14 point ROS negative except as outlined in problem list, HPI and other parts of the note.      ECG 12 Lead  Date/Time: 9/25/2017 12:11 PM  Performed by: CASIMIRO AVINA  Authorized by: CASIMIRO AVINA   Rhythm: sinus rhythm  Rate: bradycardic  BPM: 58  Conduction: conduction normal  ST Segments: ST segments normal  T Waves: T waves normal  QRS axis: normal  Other: no other findings  Clinical impression: normal ECG              "  Objective:       Vitals:    09/25/17 1133   BP: 122/74   BP Location: Left arm   Patient Position: Sitting   Pulse: 58   Weight: 165 lb 3.2 oz (74.9 kg)   Height: 66\" (167.6 cm)       GENERAL: Well-developed, well-nourished patient in no acute distress.  NECK: No JVD present at 30°. No carotid bruits auscultated.  LUNGS: Clear to auscultation.  CARDIOVASCULAR: Heart has a regular rate and rhythm. No murmurs, gallops or rubs noted.   SKIN: Pink, warm  Neuro: Nonfocal exam. Gait intact  Ext: No edema or bruising    The patient's old records including ambulatory rhythm recordings (ECGs, Holter/event monitor) were reviewed and discussed.      Lab Review:           Diagnosis:   1. Paroxysmal atrial fibrillation    2. Atrial flutter, unspecified type    Assessment & Plan:     1. PAF/flutter s/p PVA 6/2017. Patient doing very well off AADs with only one brief episode. She does need stimulator implanted in her back and will need to be off NOAC for 2 weeks. I have asked that she wait until December as that will be 6 months out from PVA. She is aware when she goes out of rhythm. Continue Eliquis 5mg BID.     2. FOllow-up in 4 months or sooner PRN.        Scribed for Edvin Cook DO by Katharina Rogers PA-C. 9/25/2017  11:54 AM      GENARO Mason  09/25/17  11:54 AM      EMR Dragon/Transcription disclaimer:  Much of this encounter note is an electronic transcription/translation of spoken language to printed text. Electronic translation of spoken language may permit erroneous, or at times, nonsensical words or phrases to be inadvertently transcribed. Although I have reviewed the note for such errors, some may still exist.  "

## 2018-02-19 ENCOUNTER — OFFICE VISIT (OUTPATIENT)
Dept: CARDIOLOGY | Facility: CLINIC | Age: 68
End: 2018-02-19

## 2018-02-19 VITALS
HEIGHT: 67 IN | HEART RATE: 68 BPM | SYSTOLIC BLOOD PRESSURE: 122 MMHG | DIASTOLIC BLOOD PRESSURE: 80 MMHG | WEIGHT: 158.4 LBS | BODY MASS INDEX: 24.86 KG/M2

## 2018-02-19 DIAGNOSIS — I48.0 PAROXYSMAL ATRIAL FIBRILLATION (HCC): Primary | ICD-10-CM

## 2018-02-19 DIAGNOSIS — I48.92 ATRIAL FLUTTER WITH RAPID VENTRICULAR RESPONSE (HCC): ICD-10-CM

## 2018-02-19 PROCEDURE — 99213 OFFICE O/P EST LOW 20 MIN: CPT | Performed by: INTERNAL MEDICINE

## 2018-04-19 ENCOUNTER — TRANSCRIBE ORDERS (OUTPATIENT)
Dept: ADMINISTRATIVE | Facility: HOSPITAL | Age: 68
End: 2018-04-19

## 2018-04-19 DIAGNOSIS — R10.9 ABDOMINAL PAIN, UNSPECIFIED ABDOMINAL LOCATION: Primary | ICD-10-CM

## 2018-04-26 ENCOUNTER — HOSPITAL ENCOUNTER (OUTPATIENT)
Dept: ULTRASOUND IMAGING | Facility: HOSPITAL | Age: 68
Discharge: HOME | End: 2018-04-26
Attending: INTERNAL MEDICINE | Admitting: INTERNAL MEDICINE

## 2018-04-26 DIAGNOSIS — R10.9 ABDOMINAL PAIN, UNSPECIFIED ABDOMINAL LOCATION: ICD-10-CM

## 2018-04-26 PROCEDURE — 76700 US EXAM ABDOM COMPLETE: CPT

## 2018-05-09 ENCOUNTER — OFFICE VISIT (OUTPATIENT)
Dept: CARDIOLOGY | Facility: CLINIC | Age: 68
End: 2018-05-09

## 2018-05-09 VITALS
HEART RATE: 63 BPM | BODY MASS INDEX: 24.74 KG/M2 | SYSTOLIC BLOOD PRESSURE: 124 MMHG | WEIGHT: 157.6 LBS | HEIGHT: 67 IN | DIASTOLIC BLOOD PRESSURE: 73 MMHG

## 2018-05-09 DIAGNOSIS — I25.10 CORONARY ARTERY DISEASE INVOLVING NATIVE CORONARY ARTERY OF NATIVE HEART WITHOUT ANGINA PECTORIS: Primary | ICD-10-CM

## 2018-05-09 DIAGNOSIS — E78.2 MIXED HYPERLIPIDEMIA: ICD-10-CM

## 2018-05-09 DIAGNOSIS — R00.2 PALPITATIONS: ICD-10-CM

## 2018-05-09 PROCEDURE — 99213 OFFICE O/P EST LOW 20 MIN: CPT | Performed by: INTERNAL MEDICINE

## 2018-05-09 RX ORDER — ASPIRIN 81 MG/1
81 TABLET ORAL DAILY
COMMUNITY
End: 2021-05-12

## 2018-05-09 NOTE — PROGRESS NOTES
[facility]  Office Progress Note  Aleena Armstrong  1950        Visit Date: 5/9/2018      PCP: Aniyah Goldberg MD  [address] [address]    IDENTIFICATION: A 67 y.o. female     Chief Complaint   Patient presents with   • Elodia-Parkinson-White Syndrome       1. Atrial flutter.  A) [facility] ED presentation, 10/23/2016, with rapid atrial flutter converted after IV diltiazem.   B) Nl LVEF catheterization, 10/24/2016.   C) Echocardiogram, 10/23/2016, revealing LVEF of 60%, mild MR, mild TR, LV diastolic dysfunction, Dominic Wolff MD.   2. Chest pain.  A) Symptoms of chest discomfort with radiation to the neck and jaw, 10/23/2016.  B) Negative troponin x2.   C) Cardiac catheterization, 10/24/2016, Rubens Morejon MD, revealing nonobstructive plaque involving a large obtuse marginal and the apical LAD without evidence of hemodynamically significant CAD, acceptable FFR of the OM (0.95) and of apical LAD (0.83). LVEF of 60%.   D) Medical therapy and risk factor modification.   3. History of WPW.   4. Dyslipidemia- lipophilic statin intolerant  5. Morbid obesity.  Prior gastric bypass  6. Hypertension.   7. Gastric ulcer, diagnosed 08/2016, per EGD secondary to Plaquenil therapy.     Allergies  Allergies   Allergen Reactions   • Bactrim [Sulfamethoxazole-Trimethoprim] Other (See Comments)     MOUTH SORES   • Percocet [Oxycodone-Acetaminophen] Other (See Comments)     NIGHT TERRORS    • Statins Myalgia   • Sulfa Antibiotics Other (See Comments)     MOUTH SORES AND RASH    • Erythromycin Rash       Current Medications    Current Outpatient Prescriptions:   •  acetaminophen (TYLENOL) 500 MG tablet, Take 500 mg by mouth Every 6 (Six) Hours As Needed for Mild Pain (1-3)., Disp: , Rfl:   •  aspirin 81 MG EC tablet, Take 81 mg by mouth Daily., Disp: , Rfl:   •  Cholecalciferol (VITAMIN D3) 2000 UNITS tablet, Take 1 tablet by mouth Daily., Disp: , Rfl:   •   "Cyanocobalamin (B-12 COMPLIANCE INJECTION) 1000 MCG/ML kit, Inject  as directed 1 (One) Time Per Week. THAN MONTHLY, Disp: , Rfl:   •  FLUoxetine (PROzac) 40 MG capsule, Take 40 mg by mouth Daily., Disp: , Rfl:   •  hydroxychloroquine (PLAQUENIL) 200 MG tablet, Take 200 mg by mouth 2 (Two) Times a Day., Disp: , Rfl:   •  timolol (BETIMOL) 0.25 % ophthalmic solution, Administer 1-2 drops into the left eye Daily., Disp: , Rfl:   •  traZODone (DESYREL) 50 MG tablet, Take 50 mg by mouth Every Night., Disp: , Rfl:       History of Present Illness     Pt denies any new chest pain, dyspnea, dyspnea on exertion, orthopnea, PND, palpitations, lower extremity edema.  Was intolerant to beta blockade w jody/hypotension. DC of NOAC per Dr Cook 3 months ago.    ROS:  All systems have been reviewed and are negative with the exception of those mentioned in the HPI.    OBJECTIVE:  Vitals:    05/09/18 1052   BP: 124/73   BP Location: Right arm   Patient Position: Sitting   Pulse: 63   Weight: 71.5 kg (157 lb 9.6 oz)   Height: 170.2 cm (67\")     Physical Exam   Constitutional: She appears well-developed and well-nourished.   Neck: Normal range of motion. Neck supple. No hepatojugular reflux and no JVD present. Carotid bruit is not present. No tracheal deviation present. No thyromegaly present.   Cardiovascular: Normal rate, regular rhythm, S1 normal, S2 normal, intact distal pulses and normal pulses.  PMI is not displaced.  Exam reveals no gallop, no distant heart sounds, no friction rub, no midsystolic click and no opening snap.    No murmur heard.  Pulses:       Radial pulses are 2+ on the right side, and 2+ on the left side.        Dorsalis pedis pulses are 2+ on the right side, and 2+ on the left side.        Posterior tibial pulses are 2+ on the right side, and 2+ on the left side.   Pulmonary/Chest: Effort normal and breath sounds normal. She has no wheezes. She has no rales.   Abdominal: Soft. Bowel sounds are normal. She " exhibits no mass. There is no tenderness. There is no guarding.       Diagnostic Data:  Procedures      ASSESSMENT:   Diagnosis Plan   1. Coronary artery disease involving native coronary artery of native heart without angina pectoris     2. Mixed hyperlipidemia     3. Palpitations         PLAN:  CAD - moderate nonobst w recent LHC.  Cont current RX    HL  Lipophilid Statin intolerant.  Rediscussed pcsk9 option a/o hydrophilic statin    Paf/palps/ jody- ultimately may need ppm, per Joyce Rendon 6 month    Aniyah Goldberg MD, thank you for referring Ms. Armstrong for evaluation.  I have forwarded my electronically generated recommendations to you for review.  Please do not hesitate to call with any questions.      Dominic Wolff MD, FACC

## 2018-08-27 ENCOUNTER — OFFICE VISIT (OUTPATIENT)
Dept: CARDIOLOGY | Facility: CLINIC | Age: 68
End: 2018-08-27

## 2018-08-27 VITALS
WEIGHT: 144.2 LBS | HEART RATE: 84 BPM | DIASTOLIC BLOOD PRESSURE: 74 MMHG | HEIGHT: 67 IN | BODY MASS INDEX: 22.63 KG/M2 | SYSTOLIC BLOOD PRESSURE: 108 MMHG

## 2018-08-27 DIAGNOSIS — I48.0 PAROXYSMAL ATRIAL FIBRILLATION (HCC): ICD-10-CM

## 2018-08-27 DIAGNOSIS — I48.92 ATRIAL FLUTTER, UNSPECIFIED TYPE (HCC): ICD-10-CM

## 2018-08-27 DIAGNOSIS — I45.6 WPW (WOLFF-PARKINSON-WHITE SYNDROME): Primary | ICD-10-CM

## 2018-08-27 PROCEDURE — 99213 OFFICE O/P EST LOW 20 MIN: CPT | Performed by: NURSE PRACTITIONER

## 2018-08-27 RX ORDER — DEXLANSOPRAZOLE 60 MG/1
60 CAPSULE, DELAYED RELEASE ORAL DAILY
COMMUNITY
End: 2022-05-19 | Stop reason: ALTCHOICE

## 2018-08-27 RX ORDER — ESCITALOPRAM OXALATE 20 MG/1
20 TABLET ORAL DAILY
COMMUNITY
End: 2020-11-11
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
## 2019-02-04 ENCOUNTER — HOSPITAL ENCOUNTER (OUTPATIENT)
Dept: GENERAL RADIOLOGY | Facility: HOSPITAL | Age: 69
Discharge: HOME | End: 2019-02-04
Admitting: ORTHOPAEDIC SURGERY

## 2019-02-04 ENCOUNTER — APPOINTMENT (OUTPATIENT)
Dept: PREADMISSION TESTING | Facility: HOSPITAL | Age: 69
End: 2019-02-04

## 2019-02-04 VITALS — HEIGHT: 67 IN | WEIGHT: 168.87 LBS | BODY MASS INDEX: 26.51 KG/M2

## 2019-02-04 LAB
ALBUMIN SERPL-MCNC: 3.58 G/DL (ref 3.2–4.8)
ALBUMIN/GLOB SERPL: 1.9 G/DL (ref 1.5–2.5)
ALP SERPL-CCNC: 81 U/L (ref 25–100)
ALT SERPL W P-5'-P-CCNC: 20 U/L (ref 7–40)
ANION GAP SERPL CALCULATED.3IONS-SCNC: 4 MMOL/L (ref 3–11)
APTT PPP: 26.8 SECONDS (ref 24–37)
AST SERPL-CCNC: 26 U/L (ref 0–33)
BACTERIA UR QL AUTO: NORMAL /HPF
BASOPHILS # BLD AUTO: 0.02 10*3/MM3 (ref 0–0.2)
BASOPHILS NFR BLD AUTO: 0.4 % (ref 0–1)
BILIRUB SERPL-MCNC: 0.4 MG/DL (ref 0.3–1.2)
BILIRUB UR QL STRIP: NEGATIVE
BUN BLD-MCNC: 16 MG/DL (ref 9–23)
BUN/CREAT SERPL: 16.3 (ref 7–25)
CALCIUM SPEC-SCNC: 8.7 MG/DL (ref 8.7–10.4)
CHLORIDE SERPL-SCNC: 105 MMOL/L (ref 99–109)
CLARITY UR: CLEAR
CO2 SERPL-SCNC: 30 MMOL/L (ref 20–31)
COLOR UR: YELLOW
CREAT BLD-MCNC: 0.98 MG/DL (ref 0.6–1.3)
DEPRECATED RDW RBC AUTO: 45.3 FL (ref 37–54)
EOSINOPHIL # BLD AUTO: 0.06 10*3/MM3 (ref 0–0.3)
EOSINOPHIL NFR BLD AUTO: 1.1 % (ref 0–3)
ERYTHROCYTE [DISTWIDTH] IN BLOOD BY AUTOMATED COUNT: 12.6 % (ref 11.3–14.5)
GFR SERPL CREATININE-BSD FRML MDRD: 56 ML/MIN/1.73
GLOBULIN UR ELPH-MCNC: 1.9 GM/DL
GLUCOSE BLD-MCNC: 86 MG/DL (ref 70–100)
GLUCOSE UR STRIP-MCNC: NEGATIVE MG/DL
HCT VFR BLD AUTO: 30.4 % (ref 34.5–44)
HGB BLD-MCNC: 9.9 G/DL (ref 11.5–15.5)
HGB UR QL STRIP.AUTO: NEGATIVE
HYALINE CASTS UR QL AUTO: NORMAL /LPF
IMM GRANULOCYTES # BLD AUTO: 0.01 10*3/MM3 (ref 0–0.03)
IMM GRANULOCYTES NFR BLD AUTO: 0.2 % (ref 0–0.6)
INR PPP: 0.94 (ref 0.85–1.16)
KETONES UR QL STRIP: NEGATIVE
LEUKOCYTE ESTERASE UR QL STRIP.AUTO: NEGATIVE
LYMPHOCYTES # BLD AUTO: 1.07 10*3/MM3 (ref 0.6–4.8)
LYMPHOCYTES NFR BLD AUTO: 20.3 % (ref 24–44)
MCH RBC QN AUTO: 32.6 PG (ref 27–31)
MCHC RBC AUTO-ENTMCNC: 32.6 G/DL (ref 32–36)
MCV RBC AUTO: 100 FL (ref 80–99)
MONOCYTES # BLD AUTO: 0.51 10*3/MM3 (ref 0–1)
MONOCYTES NFR BLD AUTO: 9.7 % (ref 0–12)
NEUTROPHILS # BLD AUTO: 3.61 10*3/MM3 (ref 1.5–8.3)
NEUTROPHILS NFR BLD AUTO: 68.5 % (ref 41–71)
NITRITE UR QL STRIP: NEGATIVE
PH UR STRIP.AUTO: <=5 [PH] (ref 5–8)
PLATELET # BLD AUTO: 215 10*3/MM3 (ref 150–450)
PMV BLD AUTO: 9.7 FL (ref 6–12)
POTASSIUM BLD-SCNC: 3.7 MMOL/L (ref 3.5–5.5)
PROT SERPL-MCNC: 5.5 G/DL (ref 5.7–8.2)
PROT UR QL STRIP: NEGATIVE
PROTHROMBIN TIME: 12.1 SECONDS (ref 11.2–14.3)
RBC # BLD AUTO: 3.04 10*6/MM3 (ref 3.89–5.14)
RBC # UR: NORMAL /HPF
REF LAB TEST METHOD: NORMAL
SODIUM BLD-SCNC: 139 MMOL/L (ref 132–146)
SP GR UR STRIP: 1.02 (ref 1–1.03)
SQUAMOUS #/AREA URNS HPF: NORMAL /HPF
UROBILINOGEN UR QL STRIP: NORMAL
WBC NRBC COR # BLD: 5.27 10*3/MM3 (ref 3.5–10.8)
WBC UR QL AUTO: NORMAL /HPF

## 2019-02-04 PROCEDURE — 71046 X-RAY EXAM CHEST 2 VIEWS: CPT

## 2019-02-04 PROCEDURE — 36415 COLL VENOUS BLD VENIPUNCTURE: CPT

## 2019-02-04 PROCEDURE — 85025 COMPLETE CBC W/AUTO DIFF WBC: CPT | Performed by: ORTHOPAEDIC SURGERY

## 2019-02-04 PROCEDURE — 81001 URINALYSIS AUTO W/SCOPE: CPT | Performed by: ORTHOPAEDIC SURGERY

## 2019-02-04 PROCEDURE — 80053 COMPREHEN METABOLIC PANEL: CPT | Performed by: ORTHOPAEDIC SURGERY

## 2019-02-04 PROCEDURE — 93005 ELECTROCARDIOGRAM TRACING: CPT

## 2019-02-04 PROCEDURE — 93010 ELECTROCARDIOGRAM REPORT: CPT | Performed by: INTERNAL MEDICINE

## 2019-02-04 PROCEDURE — 85610 PROTHROMBIN TIME: CPT | Performed by: ORTHOPAEDIC SURGERY

## 2019-02-04 PROCEDURE — 85730 THROMBOPLASTIN TIME PARTIAL: CPT | Performed by: ORTHOPAEDIC SURGERY

## 2019-02-04 RX ORDER — HYDROCODONE BITARTRATE AND ACETAMINOPHEN 7.5; 325 MG/1; MG/1
1 TABLET ORAL EVERY 4 HOURS PRN
COMMUNITY
End: 2019-02-10 | Stop reason: HOSPADM

## 2019-02-04 RX ORDER — LANOLIN ALCOHOL/MO/W.PET/CERES
5000 CREAM (GRAM) TOPICAL DAILY
COMMUNITY
End: 2021-05-12

## 2019-02-04 RX ORDER — PROMETHAZINE HYDROCHLORIDE 25 MG/1
25 TABLET ORAL EVERY 6 HOURS PRN
Status: ON HOLD | COMMUNITY
End: 2021-11-23 | Stop reason: SDUPTHER

## 2019-02-04 NOTE — DISCHARGE INSTRUCTIONS
The following information and instructions were given:    Nothing to eat or drink after midnight except sips of water with routine prescribed medication (except blood thinners, certain blood pressure medications, diabetes medications, or weight reducing medications) unless otherwise instructed by your physician.  Do not eat, drink, smoke or chew gum after midnight the night before surgery. This also includes no mints.    DO NOT shave for two days before your procedure.  Do not wear makeup.      DO NOT wear fingernail polish (gel/regular) and/or acrylic/artificial nails on the day of surgery.   If a patient had recent manicure and would rather not remove polish or artificial nails, then the minimum requirement is that the polish/artificial nails must be removed from the middle finger on each hand.      If patient is having surgery/procedure on an upper extremity, then the patient was instructed that fingernail polish/artificial fingernails must be removed for surgery.  NO EXCEPTIONS.      If patient is having surgery/procedure on a lower extremity, then the patient was instructed that toenail polish on both extremities must be removed for surgery.  NO EXCEPTIONS.    Remove all jewelry (advised to go to jeweler if unable to remove).  Jewelry, especially rings, can no longer be taped for surgery.    Leave anything you consider valuable at home.    Leave your suitcase in the car until after your surgery.    Bring the following with you (if applicable)       -picture ID and insurance cards       -Co-pay/deductible required by insurance       -Medications in the original bottles (not a list) including all over-the-counter  medications if not brought to PAT       -Copy of advance directive, living will or power of  documents if not  brought to PAT       -CPAP or BIPAP mask and tubing (do not bring machine)       -Skin prep instructions sheet       -PAT Pass    Education booklet, brochure, handout or binder given to  patient.      When applicable, an ERAS booklet was given to patient.    Pain Control After Surgery handout given to patient.    Respirex use (handout given to patient) and pneumonia prevention.    Signs and Symptoms of infection discussed.    DVT Prevention education given.  Stressing the importance of ambulation.    Patient to apply Chlorhexadine wipes to surgical area (as instructed) the night before procedure and the AM of procedure.    When applicable patients with ERAS orders were instructed to drink 20 ounces of Gatorade or G2 for diabetics (or until full) the morning of surgery.  The Gatorade or G2 must be consumed at least 1 hour before arrival time on the day of surgery .  No RED Gatorade or G2.  Appropriate ERAS handout and/or booklet given to patient during PAT visit.        Verified with patient that they received a prescription for Bactroban and Chlorhexidine shower from the office.  Reinforced with them to fill the prescription if they haven't already.  Verbal and written instructions given regarding proper use of the Bactroban and Chlorhexidine to patient and/or famlily during PAT visit. Patient/family also instructed to complete checklist and return it to Pre-op on the day of surgery.  Patient and/or family verbalized understanding.

## 2019-02-04 NOTE — PAT
Patient given a prescription for Benzol Peroxide 5% wash during PAT visit.  Instructed them to fill the prescription or  from their pharmacy if was submitted electronically by the surgeon's office.  Verbal and written instructions given regarding proper use of the Benzoyl Peroxide wash given to patient and/or famlily during PAT visit. Patient/family also instructed to complete checklist and return it to Pre-op on the day of surgery.  Patient and/or family verbalized understanding.      Patient instructed to drink 20 ounces (or until full) of Gatorade or 20 ounces of G2 (if diabetic) and complete 3 hours before your surgery start time. (NO RED Gatorade or G2)    Patient verbalized understanding.      PATIENT SENT TO CXR FOLLOWING PAT APPT.    DR. DALTON TO PUT CLEARANCE NOTE IN Epic FOR SURGERY.

## 2019-02-05 ENCOUNTER — TELEPHONE (OUTPATIENT)
Dept: CARDIOLOGY | Facility: CLINIC | Age: 69
End: 2019-02-05

## 2019-02-07 ENCOUNTER — TELEPHONE (OUTPATIENT)
Dept: CARDIOLOGY | Facility: CLINIC | Age: 69
End: 2019-02-07

## 2019-02-07 ENCOUNTER — ANESTHESIA EVENT (OUTPATIENT)
Dept: PERIOP | Facility: HOSPITAL | Age: 69
End: 2019-02-07

## 2019-02-07 RX ORDER — FAMOTIDINE 10 MG/ML
20 INJECTION, SOLUTION INTRAVENOUS ONCE
Status: CANCELLED | OUTPATIENT
Start: 2019-02-07 | End: 2019-02-07

## 2019-02-08 ENCOUNTER — ANESTHESIA (OUTPATIENT)
Dept: PERIOP | Facility: HOSPITAL | Age: 69
End: 2019-02-08

## 2019-02-08 ENCOUNTER — HOSPITAL ENCOUNTER (OUTPATIENT)
Facility: HOSPITAL | Age: 69
Discharge: HOME | End: 2019-02-10
Attending: ORTHOPAEDIC SURGERY | Admitting: ORTHOPAEDIC SURGERY

## 2019-02-08 ENCOUNTER — APPOINTMENT (OUTPATIENT)
Dept: GENERAL RADIOLOGY | Facility: HOSPITAL | Age: 69
End: 2019-02-08

## 2019-02-08 DIAGNOSIS — Z74.09 IMPAIRED MOBILITY AND ADLS: ICD-10-CM

## 2019-02-08 DIAGNOSIS — Z74.09 IMPAIRED FUNCTIONAL MOBILITY, BALANCE, GAIT, AND ENDURANCE: Primary | ICD-10-CM

## 2019-02-08 DIAGNOSIS — Z78.9 IMPAIRED MOBILITY AND ADLS: ICD-10-CM

## 2019-02-08 PROBLEM — S42.209A PROXIMAL HUMERUS FRACTURE: Status: ACTIVE | Noted: 2019-02-08

## 2019-02-08 PROBLEM — Z87.81 S/P ORIF (OPEN REDUCTION INTERNAL FIXATION) FRACTURE: Status: ACTIVE | Noted: 2019-02-08

## 2019-02-08 PROBLEM — G89.29 CHRONIC PAIN: Status: ACTIVE | Noted: 2019-02-08

## 2019-02-08 PROBLEM — D64.9 ANEMIA: Status: ACTIVE | Noted: 2019-02-08

## 2019-02-08 PROBLEM — Z98.890 S/P ORIF (OPEN REDUCTION INTERNAL FIXATION) FRACTURE: Status: ACTIVE | Noted: 2019-02-08

## 2019-02-08 PROCEDURE — 25010000002 ROPIVACINE HCL-NACL: Performed by: ANESTHESIOLOGY

## 2019-02-08 PROCEDURE — 63710000001 ASPIRIN 81 MG TABLET DELAYED-RELEASE: Performed by: ORTHOPAEDIC SURGERY

## 2019-02-08 PROCEDURE — 63710000001 TRAZODONE 50 MG TABLET: Performed by: ORTHOPAEDIC SURGERY

## 2019-02-08 PROCEDURE — G0378 HOSPITAL OBSERVATION PER HR: HCPCS

## 2019-02-08 PROCEDURE — A9270 NON-COVERED ITEM OR SERVICE: HCPCS | Performed by: ORTHOPAEDIC SURGERY

## 2019-02-08 PROCEDURE — C1713 ANCHOR/SCREW BN/BN,TIS/BN: HCPCS | Performed by: ORTHOPAEDIC SURGERY

## 2019-02-08 PROCEDURE — 25010000002 FENTANYL CITRATE (PF) 100 MCG/2ML SOLUTION: Performed by: ANESTHESIOLOGY

## 2019-02-08 PROCEDURE — 25010000003 CEFAZOLIN IN DEXTROSE 2-4 GM/100ML-% SOLUTION: Performed by: ORTHOPAEDIC SURGERY

## 2019-02-08 PROCEDURE — 63710000001 FAMOTIDINE 20 MG TABLET: Performed by: ANESTHESIOLOGY

## 2019-02-08 PROCEDURE — 25010000002 PROPOFOL 10 MG/ML EMULSION: Performed by: NURSE ANESTHETIST, CERTIFIED REGISTERED

## 2019-02-08 PROCEDURE — 25010000002 HYDROMORPHONE PER 4 MG: Performed by: ORTHOPAEDIC SURGERY

## 2019-02-08 PROCEDURE — 63710000001 PREGABALIN 75 MG CAPSULE: Performed by: ORTHOPAEDIC SURGERY

## 2019-02-08 PROCEDURE — 63710000001 POVIDONE-IODINE 10 % SOLUTION 30 ML BOTTLE: Performed by: ORTHOPAEDIC SURGERY

## 2019-02-08 PROCEDURE — A9270 NON-COVERED ITEM OR SERVICE: HCPCS | Performed by: ANESTHESIOLOGY

## 2019-02-08 PROCEDURE — 63710000001 PANTOPRAZOLE 40 MG TABLET DELAYED-RELEASE: Performed by: ORTHOPAEDIC SURGERY

## 2019-02-08 PROCEDURE — 25010000002 ONDANSETRON PER 1 MG: Performed by: NURSE ANESTHETIST, CERTIFIED REGISTERED

## 2019-02-08 PROCEDURE — 94799 UNLISTED PULMONARY SVC/PX: CPT

## 2019-02-08 PROCEDURE — 63710000001 VITAMIN B-12 1000 MCG TABLET: Performed by: ORTHOPAEDIC SURGERY

## 2019-02-08 PROCEDURE — 76000 FLUOROSCOPY <1 HR PHYS/QHP: CPT

## 2019-02-08 PROCEDURE — 25010000002 VANCOMYCIN 1 G RECONSTITUTED SOLUTION: Performed by: ORTHOPAEDIC SURGERY

## 2019-02-08 PROCEDURE — 25010000002 NEOSTIGMINE 10 MG/10ML SOLUTION: Performed by: NURSE ANESTHETIST, CERTIFIED REGISTERED

## 2019-02-08 PROCEDURE — 63710000001 HYDROXYCHLOROQUINE 200 MG TABLET: Performed by: ORTHOPAEDIC SURGERY

## 2019-02-08 PROCEDURE — 63710000001 ACETAMINOPHEN 500 MG TABLET: Performed by: ORTHOPAEDIC SURGERY

## 2019-02-08 PROCEDURE — 25010000002 DEXAMETHASONE PER 1 MG: Performed by: NURSE ANESTHETIST, CERTIFIED REGISTERED

## 2019-02-08 PROCEDURE — 63710000001 ESCITALOPRAM 10 MG TABLET: Performed by: ORTHOPAEDIC SURGERY

## 2019-02-08 DEVICE — LOCKING CAP, 3.5T
Type: IMPLANTABLE DEVICE | Site: HUMERUS | Status: FUNCTIONAL
Brand: LOCKING CAP

## 2019-02-08 DEVICE — SUT FW #2 W/TPR NDL 1/2 CIR 38IN 97CM 26.5MM BLU: Type: IMPLANTABLE DEVICE | Site: HUMERUS | Status: FUNCTIONAL

## 2019-02-08 DEVICE — CANNULATED SCREW, Ø3.5MM X L45MM
Type: IMPLANTABLE DEVICE | Site: HUMERUS | Status: FUNCTIONAL
Brand: CANNULATED SCREW, 3.5MM

## 2019-02-08 DEVICE — CANNULATED SCREW, Ø3.5MM X L39MM
Type: IMPLANTABLE DEVICE | Site: HUMERUS | Status: FUNCTIONAL
Brand: CANNULATED SCREW, 3.5MM

## 2019-02-08 DEVICE — PUTTY BONE NORIAN FAST SET DRL 10CC STRL: Type: IMPLANTABLE DEVICE | Site: HUMERUS | Status: FUNCTIONAL

## 2019-02-08 DEVICE — K-WIRE, Ø1.6 X L150MM
Type: IMPLANTABLE DEVICE | Site: HUMERUS | Status: FUNCTIONAL
Brand: K-WIRE, Ø1.6MM X L150MM

## 2019-02-08 DEVICE — CORTICAL SCREW, Ø3.5MM X L26MM
Type: IMPLANTABLE DEVICE | Site: HUMERUS | Status: FUNCTIONAL
Brand: CORTICAL SCREW, 3.5MM

## 2019-02-08 DEVICE — PROXIMAL HUMERUS PLATE, 3 HOLE RIGHT
Type: IMPLANTABLE DEVICE | Site: HUMERUS | Status: FUNCTIONAL
Brand: PROXIMAL HUMERUS PLATE

## 2019-02-08 DEVICE — CANNULATED SCREW, Ø3.5MM X L42MM
Type: IMPLANTABLE DEVICE | Site: HUMERUS | Status: FUNCTIONAL
Brand: CANNULATED SCREW, 3.5MM

## 2019-02-08 DEVICE — CORTICAL SCREW, Ø3.5MM X L28MM
Type: IMPLANTABLE DEVICE | Site: HUMERUS | Status: FUNCTIONAL
Brand: CORTICAL SCREW, 3.5MM

## 2019-02-08 RX ORDER — ESCITALOPRAM OXALATE 10 MG/1
10 TABLET ORAL DAILY
Status: DISCONTINUED | OUTPATIENT
Start: 2019-02-08 | End: 2019-02-10 | Stop reason: HOSPADM

## 2019-02-08 RX ORDER — ONDANSETRON 2 MG/ML
4 INJECTION INTRAMUSCULAR; INTRAVENOUS ONCE AS NEEDED
Status: DISCONTINUED | OUTPATIENT
Start: 2019-02-08 | End: 2019-02-08 | Stop reason: HOSPADM

## 2019-02-08 RX ORDER — LIDOCAINE HYDROCHLORIDE 10 MG/ML
INJECTION, SOLUTION EPIDURAL; INFILTRATION; INTRACAUDAL; PERINEURAL AS NEEDED
Status: DISCONTINUED | OUTPATIENT
Start: 2019-02-08 | End: 2019-02-08 | Stop reason: SURG

## 2019-02-08 RX ORDER — CEFAZOLIN SODIUM 2 G/100ML
2 INJECTION, SOLUTION INTRAVENOUS EVERY 8 HOURS
Status: COMPLETED | OUTPATIENT
Start: 2019-02-08 | End: 2019-02-09

## 2019-02-08 RX ORDER — MAGNESIUM HYDROXIDE 1200 MG/15ML
LIQUID ORAL AS NEEDED
Status: DISCONTINUED | OUTPATIENT
Start: 2019-02-08 | End: 2019-02-08 | Stop reason: HOSPADM

## 2019-02-08 RX ORDER — ACETAMINOPHEN 500 MG
1000 TABLET ORAL ONCE
Status: COMPLETED | OUTPATIENT
Start: 2019-02-08 | End: 2019-02-08

## 2019-02-08 RX ORDER — FENTANYL CITRATE 50 UG/ML
INJECTION, SOLUTION INTRAMUSCULAR; INTRAVENOUS
Status: COMPLETED | OUTPATIENT
Start: 2019-02-08 | End: 2019-02-08

## 2019-02-08 RX ORDER — GLYCOPYRROLATE 0.2 MG/ML
INJECTION INTRAMUSCULAR; INTRAVENOUS AS NEEDED
Status: DISCONTINUED | OUTPATIENT
Start: 2019-02-08 | End: 2019-02-08 | Stop reason: SURG

## 2019-02-08 RX ORDER — GLIMEPIRIDE 2 MG/1
1 TABLET ORAL EVERY 24 HOURS
Status: DISCONTINUED | OUTPATIENT
Start: 2019-02-08 | End: 2019-02-10 | Stop reason: HOSPADM

## 2019-02-08 RX ORDER — LABETALOL HYDROCHLORIDE 5 MG/ML
5 INJECTION, SOLUTION INTRAVENOUS
Status: DISCONTINUED | OUTPATIENT
Start: 2019-02-08 | End: 2019-02-08 | Stop reason: HOSPADM

## 2019-02-08 RX ORDER — GLIMEPIRIDE 2 MG/1
1 TABLET ORAL DAILY
Status: DISCONTINUED | OUTPATIENT
Start: 2019-02-08 | End: 2019-02-08

## 2019-02-08 RX ORDER — ONDANSETRON 2 MG/ML
4 INJECTION INTRAMUSCULAR; INTRAVENOUS EVERY 6 HOURS PRN
Status: DISCONTINUED | OUTPATIENT
Start: 2019-02-08 | End: 2019-02-10 | Stop reason: HOSPADM

## 2019-02-08 RX ORDER — HYDROXYCHLOROQUINE SULFATE 200 MG/1
200 TABLET, FILM COATED ORAL EVERY 12 HOURS SCHEDULED
Status: DISCONTINUED | OUTPATIENT
Start: 2019-02-08 | End: 2019-02-10 | Stop reason: HOSPADM

## 2019-02-08 RX ORDER — VANCOMYCIN HYDROCHLORIDE 1 G/20ML
INJECTION, POWDER, LYOPHILIZED, FOR SOLUTION INTRAVENOUS AS NEEDED
Status: DISCONTINUED | OUTPATIENT
Start: 2019-02-08 | End: 2019-02-08 | Stop reason: HOSPADM

## 2019-02-08 RX ORDER — LIDOCAINE HYDROCHLORIDE 10 MG/ML
0.5 INJECTION, SOLUTION EPIDURAL; INFILTRATION; INTRACAUDAL; PERINEURAL ONCE AS NEEDED
Status: COMPLETED | OUTPATIENT
Start: 2019-02-08 | End: 2019-02-08

## 2019-02-08 RX ORDER — HYDROMORPHONE HYDROCHLORIDE 2 MG/1
2 TABLET ORAL EVERY 6 HOURS PRN
COMMUNITY
End: 2019-05-22

## 2019-02-08 RX ORDER — BUPIVACAINE HYDROCHLORIDE 2.5 MG/ML
INJECTION, SOLUTION EPIDURAL; INFILTRATION; INTRACAUDAL
Status: COMPLETED | OUTPATIENT
Start: 2019-02-08 | End: 2019-02-08

## 2019-02-08 RX ORDER — LANOLIN ALCOHOL/MO/W.PET/CERES
1000 CREAM (GRAM) TOPICAL DAILY
Status: DISCONTINUED | OUTPATIENT
Start: 2019-02-08 | End: 2019-02-10 | Stop reason: HOSPADM

## 2019-02-08 RX ORDER — FENTANYL CITRATE 50 UG/ML
50 INJECTION, SOLUTION INTRAMUSCULAR; INTRAVENOUS
Status: DISCONTINUED | OUTPATIENT
Start: 2019-02-08 | End: 2019-02-08 | Stop reason: HOSPADM

## 2019-02-08 RX ORDER — FAMOTIDINE 20 MG/1
20 TABLET, FILM COATED ORAL ONCE
Status: COMPLETED | OUTPATIENT
Start: 2019-02-08 | End: 2019-02-08

## 2019-02-08 RX ORDER — TRAZODONE HYDROCHLORIDE 50 MG/1
50 TABLET ORAL NIGHTLY
Status: DISCONTINUED | OUTPATIENT
Start: 2019-02-08 | End: 2019-02-10 | Stop reason: HOSPADM

## 2019-02-08 RX ORDER — PREGABALIN 75 MG/1
75 CAPSULE ORAL ONCE
Status: COMPLETED | OUTPATIENT
Start: 2019-02-08 | End: 2019-02-08

## 2019-02-08 RX ORDER — LABETALOL HYDROCHLORIDE 5 MG/ML
10 INJECTION, SOLUTION INTRAVENOUS EVERY 4 HOURS PRN
Status: DISCONTINUED | OUTPATIENT
Start: 2019-02-08 | End: 2019-02-10 | Stop reason: HOSPADM

## 2019-02-08 RX ORDER — EPHEDRINE SULFATE 50 MG/ML
5 INJECTION, SOLUTION INTRAVENOUS ONCE AS NEEDED
Status: DISCONTINUED | OUTPATIENT
Start: 2019-02-08 | End: 2019-02-08 | Stop reason: HOSPADM

## 2019-02-08 RX ORDER — DOCUSATE SODIUM 100 MG/1
100 CAPSULE, LIQUID FILLED ORAL 2 TIMES DAILY PRN
Status: DISCONTINUED | OUTPATIENT
Start: 2019-02-08 | End: 2019-02-10 | Stop reason: HOSPADM

## 2019-02-08 RX ORDER — PROPOFOL 10 MG/ML
VIAL (ML) INTRAVENOUS AS NEEDED
Status: DISCONTINUED | OUTPATIENT
Start: 2019-02-08 | End: 2019-02-08 | Stop reason: SURG

## 2019-02-08 RX ORDER — SODIUM CHLORIDE, SODIUM LACTATE, POTASSIUM CHLORIDE, CALCIUM CHLORIDE 600; 310; 30; 20 MG/100ML; MG/100ML; MG/100ML; MG/100ML
9 INJECTION, SOLUTION INTRAVENOUS CONTINUOUS
Status: DISCONTINUED | OUTPATIENT
Start: 2019-02-08 | End: 2019-02-08

## 2019-02-08 RX ORDER — BISACODYL 5 MG/1
10 TABLET, DELAYED RELEASE ORAL DAILY PRN
Status: DISCONTINUED | OUTPATIENT
Start: 2019-02-08 | End: 2019-02-10 | Stop reason: HOSPADM

## 2019-02-08 RX ORDER — NALOXONE HCL 0.4 MG/ML
0.1 VIAL (ML) INJECTION
Status: DISCONTINUED | OUTPATIENT
Start: 2019-02-08 | End: 2019-02-10 | Stop reason: HOSPADM

## 2019-02-08 RX ORDER — ATRACURIUM BESYLATE 10 MG/ML
INJECTION, SOLUTION INTRAVENOUS AS NEEDED
Status: DISCONTINUED | OUTPATIENT
Start: 2019-02-08 | End: 2019-02-08 | Stop reason: SURG

## 2019-02-08 RX ORDER — DEXAMETHASONE SODIUM PHOSPHATE 4 MG/ML
INJECTION, SOLUTION INTRA-ARTICULAR; INTRALESIONAL; INTRAMUSCULAR; INTRAVENOUS; SOFT TISSUE AS NEEDED
Status: DISCONTINUED | OUTPATIENT
Start: 2019-02-08 | End: 2019-02-08 | Stop reason: SURG

## 2019-02-08 RX ORDER — SODIUM CHLORIDE 0.9 % (FLUSH) 0.9 %
3 SYRINGE (ML) INJECTION EVERY 12 HOURS SCHEDULED
Status: DISCONTINUED | OUTPATIENT
Start: 2019-02-08 | End: 2019-02-08 | Stop reason: HOSPADM

## 2019-02-08 RX ORDER — SODIUM CHLORIDE 0.9 % (FLUSH) 0.9 %
3-10 SYRINGE (ML) INJECTION AS NEEDED
Status: DISCONTINUED | OUTPATIENT
Start: 2019-02-08 | End: 2019-02-08 | Stop reason: HOSPADM

## 2019-02-08 RX ORDER — ONDANSETRON 4 MG/1
4 TABLET, FILM COATED ORAL EVERY 6 HOURS PRN
Status: DISCONTINUED | OUTPATIENT
Start: 2019-02-08 | End: 2019-02-10 | Stop reason: HOSPADM

## 2019-02-08 RX ORDER — SODIUM CHLORIDE, SODIUM LACTATE, POTASSIUM CHLORIDE, CALCIUM CHLORIDE 600; 310; 30; 20 MG/100ML; MG/100ML; MG/100ML; MG/100ML
100 INJECTION, SOLUTION INTRAVENOUS CONTINUOUS
Status: DISCONTINUED | OUTPATIENT
Start: 2019-02-08 | End: 2019-02-10 | Stop reason: HOSPADM

## 2019-02-08 RX ORDER — ONDANSETRON 2 MG/ML
INJECTION INTRAMUSCULAR; INTRAVENOUS AS NEEDED
Status: DISCONTINUED | OUTPATIENT
Start: 2019-02-08 | End: 2019-02-08 | Stop reason: SURG

## 2019-02-08 RX ORDER — SODIUM CHLORIDE 450 MG/100ML
50 INJECTION, SOLUTION INTRAVENOUS CONTINUOUS
Status: DISCONTINUED | OUTPATIENT
Start: 2019-02-08 | End: 2019-02-10 | Stop reason: HOSPADM

## 2019-02-08 RX ORDER — CEFAZOLIN SODIUM 2 G/100ML
2 INJECTION, SOLUTION INTRAVENOUS ONCE
Status: COMPLETED | OUTPATIENT
Start: 2019-02-08 | End: 2019-02-08

## 2019-02-08 RX ORDER — NEOSTIGMINE METHYLSULFATE 1 MG/ML
INJECTION, SOLUTION INTRAVENOUS AS NEEDED
Status: DISCONTINUED | OUTPATIENT
Start: 2019-02-08 | End: 2019-02-08 | Stop reason: SURG

## 2019-02-08 RX ORDER — MEPERIDINE HYDROCHLORIDE 25 MG/ML
12.5 INJECTION INTRAMUSCULAR; INTRAVENOUS; SUBCUTANEOUS
Status: DISCONTINUED | OUTPATIENT
Start: 2019-02-08 | End: 2019-02-08 | Stop reason: HOSPADM

## 2019-02-08 RX ORDER — NALOXONE HCL 0.4 MG/ML
0.4 VIAL (ML) INJECTION AS NEEDED
Status: DISCONTINUED | OUTPATIENT
Start: 2019-02-08 | End: 2019-02-08 | Stop reason: HOSPADM

## 2019-02-08 RX ORDER — ASPIRIN 81 MG/1
81 TABLET ORAL DAILY
Status: DISCONTINUED | OUTPATIENT
Start: 2019-02-08 | End: 2019-02-10 | Stop reason: HOSPADM

## 2019-02-08 RX ORDER — PANTOPRAZOLE SODIUM 40 MG/1
40 TABLET, DELAYED RELEASE ORAL
Status: DISCONTINUED | OUTPATIENT
Start: 2019-02-08 | End: 2019-02-10 | Stop reason: HOSPADM

## 2019-02-08 RX ORDER — HYDROMORPHONE HYDROCHLORIDE 2 MG/1
2 TABLET ORAL EVERY 4 HOURS PRN
Status: DISCONTINUED | OUTPATIENT
Start: 2019-02-08 | End: 2019-02-10 | Stop reason: HOSPADM

## 2019-02-08 RX ORDER — HYDROMORPHONE HYDROCHLORIDE 1 MG/ML
0.5 INJECTION, SOLUTION INTRAMUSCULAR; INTRAVENOUS; SUBCUTANEOUS
Status: DISCONTINUED | OUTPATIENT
Start: 2019-02-08 | End: 2019-02-10 | Stop reason: HOSPADM

## 2019-02-08 RX ADMIN — ROPIVACAINE HYDROCHLORIDE 6 ML/HR: 5 INJECTION, SOLUTION EPIDURAL; INFILTRATION; PERINEURAL at 10:32

## 2019-02-08 RX ADMIN — NEOSTIGMINE METHYLSULFATE 3 MG: 1 INJECTION, SOLUTION INTRAVENOUS at 09:57

## 2019-02-08 RX ADMIN — ACETAMINOPHEN 1000 MG: 500 TABLET ORAL at 06:36

## 2019-02-08 RX ADMIN — ESCITALOPRAM OXALATE 10 MG: 10 TABLET ORAL at 14:09

## 2019-02-08 RX ADMIN — HYDROXYCHLOROQUINE SULFATE 200 MG: 200 TABLET, FILM COATED ORAL at 16:45

## 2019-02-08 RX ADMIN — PROPOFOL 150 MG: 10 INJECTION, EMULSION INTRAVENOUS at 07:42

## 2019-02-08 RX ADMIN — HYDROXYCHLOROQUINE SULFATE 200 MG: 200 TABLET, FILM COATED ORAL at 20:58

## 2019-02-08 RX ADMIN — PREGABALIN 75 MG: 75 CAPSULE ORAL at 06:36

## 2019-02-08 RX ADMIN — PANTOPRAZOLE SODIUM 40 MG: 40 TABLET, DELAYED RELEASE ORAL at 16:45

## 2019-02-08 RX ADMIN — SODIUM CHLORIDE, POTASSIUM CHLORIDE, SODIUM LACTATE AND CALCIUM CHLORIDE 9 ML/HR: 600; 310; 30; 20 INJECTION, SOLUTION INTRAVENOUS at 06:30

## 2019-02-08 RX ADMIN — LIDOCAINE HYDROCHLORIDE 30 MG: 10 INJECTION, SOLUTION EPIDURAL; INFILTRATION; INTRACAUDAL; PERINEURAL at 07:42

## 2019-02-08 RX ADMIN — ONDANSETRON 4 MG: 2 INJECTION INTRAMUSCULAR; INTRAVENOUS at 09:56

## 2019-02-08 RX ADMIN — FAMOTIDINE 20 MG: 20 TABLET, FILM COATED ORAL at 06:36

## 2019-02-08 RX ADMIN — CEFAZOLIN SODIUM 2 G: 2 INJECTION, SOLUTION INTRAVENOUS at 16:45

## 2019-02-08 RX ADMIN — TRAZODONE HYDROCHLORIDE 50 MG: 50 TABLET ORAL at 20:58

## 2019-02-08 RX ADMIN — CEFAZOLIN SODIUM 2 G: 2 INJECTION, SOLUTION INTRAVENOUS at 07:38

## 2019-02-08 RX ADMIN — CYANOCOBALAMIN TAB 1000 MCG 1000 MCG: 1000 TAB at 14:09

## 2019-02-08 RX ADMIN — BUPIVACAINE HYDROCHLORIDE 15 ML: 2.5 INJECTION, SOLUTION EPIDURAL; INFILTRATION; INTRACAUDAL; PERINEURAL at 07:09

## 2019-02-08 RX ADMIN — DEXAMETHASONE SODIUM PHOSPHATE 8 MG: 4 INJECTION, SOLUTION INTRAMUSCULAR; INTRAVENOUS at 07:42

## 2019-02-08 RX ADMIN — EPHEDRINE SULFATE 10 MG: 50 INJECTION INTRAMUSCULAR; INTRAVENOUS; SUBCUTANEOUS at 08:01

## 2019-02-08 RX ADMIN — LIDOCAINE HYDROCHLORIDE 0.5 ML: 10 INJECTION, SOLUTION EPIDURAL; INFILTRATION; INTRACAUDAL; PERINEURAL at 06:30

## 2019-02-08 RX ADMIN — GLYCOPYRROLATE 0.4 MG: 0.2 INJECTION, SOLUTION INTRAMUSCULAR; INTRAVENOUS at 09:57

## 2019-02-08 RX ADMIN — ATRACURIUM BESYLATE 40 MG: 10 INJECTION, SOLUTION INTRAVENOUS at 07:42

## 2019-02-08 RX ADMIN — EPHEDRINE SULFATE 10 MG: 50 INJECTION INTRAMUSCULAR; INTRAVENOUS; SUBCUTANEOUS at 08:30

## 2019-02-08 RX ADMIN — FENTANYL CITRATE 100 MCG: 50 INJECTION, SOLUTION INTRAMUSCULAR; INTRAVENOUS at 07:09

## 2019-02-08 RX ADMIN — HYDROMORPHONE HYDROCHLORIDE 0.5 MG: 1 INJECTION, SOLUTION INTRAMUSCULAR; INTRAVENOUS; SUBCUTANEOUS at 20:56

## 2019-02-08 RX ADMIN — ASPIRIN 81 MG: 81 TABLET, COATED ORAL at 14:09
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
## 2019-02-08 NOTE — NURSING NOTE
Acute Pain Service:  Peripheral nerve catheter and disposable infusion device teaching completed with patient.  Discussion, handout and bracelet provided with [facility] on call central phone number.  Instructed to call with any questions or concerns.  Patient verbalized understanding.  Service will continue to follow until catheter DC'd.  Please contact patient at [phone] if needed.
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
## 2019-02-08 NOTE — H&P
Patient Name: Aleena Armstrong  MRN: [id number]  : 1950  DOS: 2019    Attending: Yonatan Galdamez MD    Primary Care Provider: Aniyah Goldberg MD      Chief complaint:  Right shoulder fracture    Subjective   Patient is a 68 y.o. female presented for ORIF right proximal humerus fracture by Dr. Galdamez.    She underwent surgery under [facility]. She tolerated surgery well and is admitted for further medical management. She had a slip and fall at home last Wednesday and fractured the right shoulder. She denies LOC.    When seen in PACU she is doing well. She denies pain. She denies nausea, shortness of breath or chest pain. No hx of DVT or PE.    She has history of HTN, HLD, and afib. She is followed by Dr. Mon, cardiology, and had preop cardiac clearance.  She has history of RA and is followed by Dr. Florez, rheumatology.     ( Above is noted, agree. Seen in her room after surgery, doing very well. Good pain control with block, no f/c/n/vom/sob. )wy    Allergies:  Allergies   Allergen Reactions   • Bactrim [Sulfamethoxazole-Trimethoprim] Other (See Comments)     MOUTH SORES   • Percocet [Oxycodone-Acetaminophen] Other (See Comments)     NIGHT TERRORS    • Statins Myalgia     MYALGIA    • Sulfa Antibiotics Other (See Comments)     MOUTH SORES AND RASH    • Erythromycin Rash     RASH        Meds:  Medications Prior to Admission   Medication Sig Dispense Refill Last Dose   • acetaminophen (TYLENOL) 500 MG tablet Take 500 mg by mouth Every 6 (Six) Hours As Needed for Mild Pain (1-3).   Past Month at Unknown time   • aspirin 81 MG EC tablet Take 81 mg by mouth Daily.   2019 at 2200   • Cyanocobalamin (B-12 COMPLIANCE INJECTION) 1000 MCG/ML kit Inject  as directed Every 30 (Thirty) Days. THAN MONTHLY -LAST DOSE 19   • dexlansoprazole (DEXILANT) 30 MG capsule Take 60 mg by mouth Daily.   2019 at 2200   • escitalopram (LEXAPRO) 20 MG tablet Take 20 mg by mouth Daily.   2019 at 2200    • HYDROcodone-acetaminophen (NORCO) 7.5-325 MG per tablet Take 1 tablet by mouth Every 4 (Four) Hours As Needed for Moderate Pain .   2/3/2019   • HYDROmorphone (DILAUDID) 2 MG tablet Take 2 mg by mouth Every 6 (Six) Hours As Needed for Moderate Pain .   2/7/2019 at 2200   • hydroxychloroquine (PLAQUENIL) 200 MG tablet Take 200 mg by mouth 2 (Two) Times a Day.   2/7/2019 at 2200   • ondansetron (ZOFRAN) 4 MG tablet Take 1 tablet by mouth Every 8 (Eight) Hours As Needed for post op nausea 30 tablet 0 2/7/2019 at 2200   • promethazine (PHENERGAN) 25 MG tablet Take 25 mg by mouth Every 6 (Six) Hours As Needed for Nausea or Vomiting.   Past Week at Unknown time   • timolol (BETIMOL) 0.25 % ophthalmic solution Administer 1-2 drops into the left eye Daily.   2/7/2019 at 0800   • traZODone (DESYREL) 50 MG tablet Take 50 mg by mouth Every Night.   2/7/2019 at 2200   • vitamin B-12 (CYANOCOBALAMIN) 1000 MCG tablet Take 5,000 mcg by mouth Daily.   2/7/2019 at 2200       History:   Past Medical History:   Diagnosis Date   • A-fib (CMS/[facility])     HAD ABLATION FOR THIS IN 2017   • Anxiety and depression 12/11/2016   • GERD (gastroesophageal reflux disease)    • Glaucoma of left eye 12/11/2016   • H/O migraine     LAST 3  YEARS AGO    • History of MRSA infection     APPROX 8 YEARS, TREATED WITH ORAL ABX, NEGATIVE CULTURES FOLLOWING TX    • History of transfusion    • Hyperlipidemia    • Hypertension     MEDS DC'D BY DR GOMEZ 1/2017   • Insomnia 12/11/2016   • Kidney disease    • Peptic ulcer - s/p gastric bypass surgery (7-8 years ago) 12/11/2016   • Rheumatoid arthritis (CMS/[facility]) 12/11/2016   • Spinal headache     FOLLOWING SPINAL FOR CHILDBIRTH    • Vitamin D deficiency 12/11/2016   • Wears dentures     UPPER PLATE    • Elodia-Parkinson-White (WPW) syndrome     1990s     Past Surgical History:   Procedure Laterality Date   • CARDIAC CATHETERIZATION N/A 10/24/2016    Procedure: Left Heart Cath;  Surgeon: Rubens Morejon MD;   Location:  [facility];  Service:    • CARDIAC ELECTROPHYSIOLOGY PROCEDURE N/A 2017    Procedure: PVA;  Surgeon: Edvin Cook DO;  Location:  [facility] EP INVASIVE LOCATION;  Service:    • CHOLECYSTECTOMY     • COLONOSCOPY     • GASTRIC BYPASS     • HAMMER TOE REPAIR      LEFT--GARO IN PLACE    • HYSTERECTOMY     • WRIST FRACTURE SURGERY Right      Family History   Problem Relation Age of Onset   • Arrhythmia Mother    • Hypertension Mother    • Stroke Mother    • Lung cancer Father      Social History     Tobacco Use   • Smoking status: Former Smoker     Types: Cigarettes     Last attempt to quit:      Years since quittin.1   • Smokeless tobacco: Never Used   Substance Use Topics   • Alcohol use: Yes     Alcohol/week: 3.0 oz     Types: 5 Glasses of wine per week     Comment: RARE   • Drug use: No   She is  with 2 children. She is a retired RN.    Review of Systems  All systems were reviewed and negative except for:  Gastrointestinal: positive for  constipation    Vital Signs  Visit Vitals  /58   Pulse 81   Temp 97 °F (36.1 °C)   Resp 16   LMP  (LMP Unknown)   SpO2 93%       Physical Exam:    General Appearance:    Alert, cooperative, in no acute distress   Head:    Normocephalic, without obvious abnormality, atraumatic   Eyes:            Lids and lashes normal, conjunctivae and sclerae normal, no   icterus, no pallor, corneas clear   Ears:    Ears appear intact with no abnormalities noted   Neck:   No adenopathy, supple, trachea midline, no thyromegaly   Lungs:     Clear to auscultation,respirations regular, even and                   unlabored    Heart:    Regular rhythm and normal rate, normal S1 and S2, no            murmur, no gallop   Abdomen:     Normal bowel sounds, no masses, no organomegaly, soft        non-tender, non-distended, no guarding, no rebound                 tenderness   Genitalia:    Deferred   Extremities:   RUE sling, nerve block present.  Aquacel CDI. Good movement right digits.   Pulses:   Pulses palpable and equal bilaterally   Skin:   No bleeding, bruising or rash   Neurologic:   Cranial nerves 2 - 12 grossly intact, sensation intact (Right hand with numbness due to nerve block)      I reviewed the patient's new clinical results.       Results from last 7 days   Lab Units 02/04/19  1111   WBC 10*3/mm3 5.27   HEMOGLOBIN g/dL 9.9*   HEMATOCRIT % 30.4*   PLATELETS 10*3/mm3 215     Results from last 7 days   Lab Units 02/04/19  1111   SODIUM mmol/L 139   POTASSIUM mmol/L 3.7   CHLORIDE mmol/L 105   CO2 mmol/L 30.0   BUN mg/dL 16   CREATININE mg/dL 0.98   CALCIUM mg/dL 8.7   BILIRUBIN mg/dL 0.4   ALK PHOS U/L 81   ALT (SGPT) U/L 20   AST (SGOT) U/L 26   GLUCOSE mg/dL 86     Lab Results   Component Value Date    HGBA1C 5.50 12/11/2016       Assessment and Plan:     S/P ORIF right proximal humerus fracture    Proximal humerus fracture    HO Atrial fibrillation , hx of WPW, ablation.    Rheumatoid arthritis (CMS/HCC)    Anemia      Plan  1. PT/OT- DELMA Pereira  2. Pain control-prns, interscalene block  3. IS-encourage  [facility]  5. Bowel regimen  6. Resume home medications as appropriate  7. Monitor post-op labs  8. DC planning for home, likely tomorrow    RA  - Continue home plaquenil       YULI Priest  02/08/19  1:17 PM     I have personally performed the evaluation on this patient. My history is consistent  with HPI obtained. My exam findings are listed above. I have personally reviewed and discussed the above formulated treatment plan with pt and MARCUS. YULI.wy.

## 2019-02-08 NOTE — PLAN OF CARE
Problem: Pain, Acute (Adult)  Goal: Acceptable Pain Control/Comfort Level  Outcome: Ongoing (interventions implemented as appropriate)   02/08/19 1722   Pain, Acute (Adult)   Acceptable Pain Control/Comfort Level making progress toward outcome

## 2019-02-08 NOTE — PLAN OF CARE
Problem: Patient Care Overview  Goal: Plan of Care Review  Outcome: Ongoing (interventions implemented as appropriate)   02/08/19 8987   Coping/Psychosocial   Plan of Care Reviewed With patient   Plan of Care Review   Progress improving   OTHER   Outcome Summary pt has arm in sling elevated on pillow arrow nerve pump at 6 pt denies pain surgical site CDI slight tingling of right thumb voids well will monitor

## 2019-02-08 NOTE — ANESTHESIA PROCEDURE NOTES
Airway  Urgency: elective    Airway not difficult    General Information and Staff    Patient location during procedure: OR  CRNA: Russell Godoy CRNA    Indications and Patient Condition  Indications for airway management: airway protection    Preoxygenated: yes  MILS not maintained throughout  Mask difficulty assessment: 1 - vent by mask    Final Airway Details  Final airway type: endotracheal airway      Successful airway: ETT  Cuffed: yes   Successful intubation technique: direct laryngoscopy  Endotracheal tube insertion site: oral  Blade: Calles  Blade size: 2  ETT size (mm): 7.0  Cormack-Lehane Classification: grade I - full view of glottis  Placement verified by: chest auscultation and capnometry   Cuff volume (mL): 5  Measured from: lips  ETT to lips (cm): 20  Number of attempts at approach: 1    Additional Comments  Negative epigastric sounds, Breath sound equal bilaterally with symmetric chest rise and fall

## 2019-02-08 NOTE — OP NOTE
Operative Report ORIF Proximal Humerus Fracture    Preoperative Diagnosis:right proximal humerus fracture, Neer 3 part    Postoperative Diagnosis: Same    Procedure: ORIF right proximal humerus fracture    Surgeon: Dr. Yonatan Galdamez    Assistant: Netta Luu PA-C\  ** Please note the physician assistant was medically necessary to assist with positioning retraction, arm positioning, care of soft tissues and closure    EBL:   150 cc    Anesthesia: GETA with interscalene brachial plexus block    Implants: [place] Device Soluations 3 hole locking proximal humerus plate  10 cc of Synthes Norian putty    Indications:Patient  is a 67 yo female  who suffered a right proximal humerus fracture.  Risks and benefits of operative versus nonoperative management discussed.  Patient elected to proceed with surgery. Informed consent obtained.       Description: Patient was met in the preoperative holding area and confirmed by name, medical record number, .  The operative site was correctly identified. Patient was then met by anesthesia and cleared for surgery.  An interscalene brachial plexus block was then performed.  Patient was then brought to the operating room suite and and placed supine on the OR table.  Patient underwent smooth induction with GETA.  Patient then positioned in the beach chair position. Patient’s right shoulder and right upper extremity prepped and draped in sterile fashion. Preoperative prophylactic antibiotic given with 2 g of Ancef.  A timeout was then observed    An approx 12cm skin incision was made extending from the anterolateral acromion distall toward the deltoid insertion.  The raphe between the anterior and middle deltoid was split and opened.  Deep retractors were then placed. The anterior branch of the axillary nerve was dissected free and a vessel loop placed around it.  The nerve was visualized throughout the case and at closure checked to ensure it was safe and free from injury.    Fracture was then debrided of hematoma.   Traction stitches were then placed in the rotator cuff.   A reduction maneuver was then performed the fracture was pinned in position.  Synthes norian putty was placed in the void in the fracture site after reduction was obtained,  and a [place] Device Soluations 3 hole locking proximal humerus plate was applied.  Wires were used to provisionally affix the plate.  Position of the plate and the fracture reduction was confirmed with C arm.  A series of locking and nonlocking screws were then placed.  Position of the screws were confirmed with C arm making sure that no intra-articular penetration occurred.  The traction stitched were then placed through the plate and tied.     We then irrigated with sterile saline. The deltoid was closed with 2 running locking 0-vicryl sutures.  We then closed the dermal layer with 2-0 vicryl and the skin with interrupted 3-0 nylon.  A sterile dressing was applied.      Patient tolerated the procedure well.  He was extubated and placed in a sling.  At the end of the case all sponge and instrument counts were correct x 2.          Plan:  Patient will be admitted for postoperative monitoring. They will remain in the sling until he is seen back in clinic at approx 10 days postop. They can come out of the sling for elbow wrist and hand ROM.     They will start pendulum exercises at  first postop visit.         Yonatan Galdamez MD, MS

## 2019-02-08 NOTE — ANESTHESIA PROCEDURE NOTES
Peripheral Block    Pre-sedation assessment completed: 2/8/2019 6:53 AM    Patient location during procedure: pre-op  Start time: 2/8/2019 6:53 AM  Stop time: 2/8/2019 7:15 AM  Reason for block: at surgeon's request and post-op pain management  Performed by  Anesthesiologist: Martínez Elizabeth MD  CRNA: Russell Godoy, CRNA  Assisted by: Leda Ervin RN  Preanesthetic Checklist  Completed: patient identified, site marked, surgical consent, pre-op evaluation, timeout performed, IV checked, risks and benefits discussed and monitors and equipment checked  Prep:  Pt Position: left lateral decubitus  Sterile barriers:cap, gloves, mask and sterile barriers  Prep: ChloraPrep  Patient monitoring: blood pressure monitoring, continuous pulse oximetry and EKG  Procedure  Sedation:yes    Guidance:ultrasound guided  Images:still images obtained    Laterality:right  Block Type:interscalene  Injection Technique:catheter  Needle Type:Tuohy and echogenic  Needle Gauge:18 G    Catheter Size:20 G (20g)  Cath Depth at skin: 8 cm  Medications Used: fentaNYL citrate (PF) (SUBLIMAZE) injection, 100 mcg  bupivacaine PF (MARCAINE) 0.25 % injection, 15 mL  Med admintered at 2/8/2019 7:09 AM  Post Assessment  Injection Assessment: negative aspiration for heme, no paresthesia on injection and incremental injection  Patient Tolerance:comfortable throughout block  Complications:no  Additional Notes  Procedure:                 The pt was placed in semifowlers position with a slight tilt of the thorax contralateral to the insertion site.  The Insertion Site was prepped and draped in sterile fashion.  The pt was anesthetized with  IV Sedation( see meds) and  Skin and cutaneous tissue was infiltrated and anesthetized with 1% Lidocaine 3 mls via a 25g needle.  Utilizing ultrasound guidance, a BBraun 2 inch 18 g Contiplex echogenic touhy needle was advanced in-plane.  Hydro dissection of tissue was achieved with Normal saline. Major vessels(carotid  and Internal Jugular) where visualized as the brachial plexus was approached at the approximate level of C-7/ T-1.  Cervical 5 and Branches of Cervical 6 nerve roots where visualized and the needle tip was placed posterior at the level of C-6 roots.  LA spread was visualized and injection was made incrementally every 5 mls with aspiration. Injection pressure was normal or little, there was no intraneural injection, no vascular injection.      The BBraun 20 g wire stylet  catheter was then placed under US guidance on the posterior aspect of the Brachial Plexus.  Location of catheter was confirmed with NS injection visualized with US . The tuohy was then removed and the skin was sealed with Skin AFix at catheter insertion site.  Skin was prepped with mastisol and the labeled catheter  was secured with steristrips and a CHG tegaderm. Thank You.

## 2019-02-08 NOTE — ANESTHESIA POSTPROCEDURE EVALUATION
Patient: Aleena Armstrong    Procedure Summary     Date:  02/08/19 Room / Location:   [facility]  [facility]    Anesthesia Start:  0738 Anesthesia Stop:      Procedure:  ORIF RIGHT PROXIMAL HUMERUS (Right Arm Upper) Diagnosis:       Proximal humerus fracture      (proximal humerus fracture)    Surgeon:  Yonatan Galdamez MD Provider:  Martínez Elizabeth MD    Anesthesia Type:  general with block ASA Status:  2          Anesthesia Type: general with block  Last vitals  /71  137/71 (02/08/19 0634)   Temp 98.3  98.4 °F (36.9 °C) (02/08/19 0634)   Pulse 75  73 (02/08/19 0634)   Resp 18  18 (02/08/19 0634)     SpO2 95  100 % (02/08/19 0634)     Post Anesthesia Care and Evaluation    Patient location during evaluation: PACU  Patient participation: complete - patient participated  Level of consciousness: awake and alert  Pain score: 0  Pain management: adequate  Airway patency: patent  Anesthetic complications: No anesthetic complications  PONV Status: none  Cardiovascular status: hemodynamically stable and acceptable  Respiratory status: nonlabored ventilation, acceptable and nasal cannula  Hydration status: acceptable

## 2019-02-09 PROBLEM — G89.18 ACUTE POSTOPERATIVE PAIN: Status: ACTIVE | Noted: 2019-02-09

## 2019-02-09 LAB
ANION GAP SERPL CALCULATED.3IONS-SCNC: 1 MMOL/L (ref 3–11)
BASOPHILS # BLD AUTO: 0.02 10*3/MM3 (ref 0–0.2)
BASOPHILS NFR BLD AUTO: 0.3 % (ref 0–1)
BUN BLD-MCNC: 15 MG/DL (ref 9–23)
BUN/CREAT SERPL: 16.9 (ref 7–25)
CALCIUM SPEC-SCNC: 8.4 MG/DL (ref 8.7–10.4)
CHLORIDE SERPL-SCNC: 103 MMOL/L (ref 99–109)
CO2 SERPL-SCNC: 34 MMOL/L (ref 20–31)
CREAT BLD-MCNC: 0.89 MG/DL (ref 0.6–1.3)
DEPRECATED RDW RBC AUTO: 43.5 FL (ref 37–54)
EOSINOPHIL # BLD AUTO: 0.03 10*3/MM3 (ref 0–0.3)
EOSINOPHIL NFR BLD AUTO: 0.4 % (ref 0–3)
ERYTHROCYTE [DISTWIDTH] IN BLOOD BY AUTOMATED COUNT: 12.2 % (ref 11.3–14.5)
GFR SERPL CREATININE-BSD FRML MDRD: 63 ML/MIN/1.73
GLUCOSE BLD-MCNC: 106 MG/DL (ref 70–100)
HCT VFR BLD AUTO: 25.8 % (ref 34.5–44)
HGB BLD-MCNC: 8.1 G/DL (ref 11.5–15.5)
IMM GRANULOCYTES # BLD AUTO: 0.02 10*3/MM3 (ref 0–0.03)
IMM GRANULOCYTES NFR BLD AUTO: 0.3 % (ref 0–0.6)
LYMPHOCYTES # BLD AUTO: 0.94 10*3/MM3 (ref 0.6–4.8)
LYMPHOCYTES NFR BLD AUTO: 11.8 % (ref 24–44)
MCH RBC QN AUTO: 30.9 PG (ref 27–31)
MCHC RBC AUTO-ENTMCNC: 31.4 G/DL (ref 32–36)
MCV RBC AUTO: 98.5 FL (ref 80–99)
MONOCYTES # BLD AUTO: 0.75 10*3/MM3 (ref 0–1)
MONOCYTES NFR BLD AUTO: 9.4 % (ref 0–12)
NEUTROPHILS # BLD AUTO: 6.19 10*3/MM3 (ref 1.5–8.3)
NEUTROPHILS NFR BLD AUTO: 77.8 % (ref 41–71)
PLATELET # BLD AUTO: 273 10*3/MM3 (ref 150–450)
PMV BLD AUTO: 9.8 FL (ref 6–12)
POTASSIUM BLD-SCNC: 3.7 MMOL/L (ref 3.5–5.5)
RBC # BLD AUTO: 2.62 10*6/MM3 (ref 3.89–5.14)
SODIUM BLD-SCNC: 138 MMOL/L (ref 132–146)
WBC NRBC COR # BLD: 7.95 10*3/MM3 (ref 3.5–10.8)

## 2019-02-09 PROCEDURE — A9270 NON-COVERED ITEM OR SERVICE: HCPCS | Performed by: ORTHOPAEDIC SURGERY

## 2019-02-09 PROCEDURE — 63710000001 ASPIRIN 81 MG TABLET DELAYED-RELEASE: Performed by: ORTHOPAEDIC SURGERY

## 2019-02-09 PROCEDURE — 63710000001 TRAMADOL 50 MG TABLET: Performed by: ORTHOPAEDIC SURGERY

## 2019-02-09 PROCEDURE — 80048 BASIC METABOLIC PNL TOTAL CA: CPT | Performed by: NURSE PRACTITIONER

## 2019-02-09 PROCEDURE — 63710000001 HYDROMORPHONE 2 MG TABLET: Performed by: ORTHOPAEDIC SURGERY

## 2019-02-09 PROCEDURE — 25010000002 KETOROLAC TROMETHAMINE PER 15 MG: Performed by: ORTHOPAEDIC SURGERY

## 2019-02-09 PROCEDURE — 97530 THERAPEUTIC ACTIVITIES: CPT

## 2019-02-09 PROCEDURE — G0378 HOSPITAL OBSERVATION PER HR: HCPCS

## 2019-02-09 PROCEDURE — 25010000003 CEFAZOLIN IN DEXTROSE 2-4 GM/100ML-% SOLUTION: Performed by: ORTHOPAEDIC SURGERY

## 2019-02-09 PROCEDURE — 63710000001 VITAMIN B-12 1000 MCG TABLET: Performed by: ORTHOPAEDIC SURGERY

## 2019-02-09 PROCEDURE — 63710000001 HYDROXYCHLOROQUINE 200 MG TABLET: Performed by: ORTHOPAEDIC SURGERY

## 2019-02-09 PROCEDURE — 63710000001 TRAZODONE 50 MG TABLET: Performed by: ORTHOPAEDIC SURGERY

## 2019-02-09 PROCEDURE — 25010000002 HYDROMORPHONE PER 4 MG: Performed by: ORTHOPAEDIC SURGERY

## 2019-02-09 PROCEDURE — 97165 OT EVAL LOW COMPLEX 30 MIN: CPT

## 2019-02-09 PROCEDURE — 63710000001 PANTOPRAZOLE 40 MG TABLET DELAYED-RELEASE: Performed by: ORTHOPAEDIC SURGERY

## 2019-02-09 PROCEDURE — 97161 PT EVAL LOW COMPLEX 20 MIN: CPT

## 2019-02-09 PROCEDURE — 63710000001 ACETAMINOPHEN 500 MG TABLET: Performed by: ORTHOPAEDIC SURGERY

## 2019-02-09 PROCEDURE — 63710000001 ESCITALOPRAM 10 MG TABLET: Performed by: ORTHOPAEDIC SURGERY

## 2019-02-09 PROCEDURE — 85025 COMPLETE CBC W/AUTO DIFF WBC: CPT | Performed by: ORTHOPAEDIC SURGERY

## 2019-02-09 RX ORDER — KETOROLAC TROMETHAMINE 30 MG/ML
15 INJECTION, SOLUTION INTRAMUSCULAR; INTRAVENOUS EVERY 6 HOURS PRN
Status: DISCONTINUED | OUTPATIENT
Start: 2019-02-09 | End: 2019-02-09

## 2019-02-09 RX ORDER — TRAMADOL HYDROCHLORIDE 50 MG/1
50 TABLET ORAL EVERY 6 HOURS PRN
Status: DISCONTINUED | OUTPATIENT
Start: 2019-02-09 | End: 2019-02-10 | Stop reason: HOSPADM

## 2019-02-09 RX ORDER — ACETAMINOPHEN 500 MG
1000 TABLET ORAL EVERY 8 HOURS
Status: DISCONTINUED | OUTPATIENT
Start: 2019-02-09 | End: 2019-02-10 | Stop reason: HOSPADM

## 2019-02-09 RX ADMIN — TRAMADOL HYDROCHLORIDE 50 MG: 50 TABLET, COATED ORAL at 18:12

## 2019-02-09 RX ADMIN — HYDROMORPHONE HYDROCHLORIDE 2 MG: 2 TABLET ORAL at 15:45

## 2019-02-09 RX ADMIN — PANTOPRAZOLE SODIUM 40 MG: 40 TABLET, DELAYED RELEASE ORAL at 08:42

## 2019-02-09 RX ADMIN — TRAZODONE HYDROCHLORIDE 50 MG: 50 TABLET ORAL at 20:46

## 2019-02-09 RX ADMIN — ACETAMINOPHEN 1000 MG: 500 TABLET, FILM COATED ORAL at 12:43

## 2019-02-09 RX ADMIN — TRAMADOL HYDROCHLORIDE 50 MG: 50 TABLET, COATED ORAL at 23:22

## 2019-02-09 RX ADMIN — HYDROMORPHONE HYDROCHLORIDE 2 MG: 2 TABLET ORAL at 08:52

## 2019-02-09 RX ADMIN — TRAMADOL HYDROCHLORIDE 50 MG: 50 TABLET, COATED ORAL at 12:43

## 2019-02-09 RX ADMIN — ASPIRIN 81 MG: 81 TABLET, COATED ORAL at 20:45

## 2019-02-09 RX ADMIN — HYDROMORPHONE HYDROCHLORIDE 2 MG: 2 TABLET ORAL at 00:25

## 2019-02-09 RX ADMIN — CYANOCOBALAMIN TAB 1000 MCG 1000 MCG: 1000 TAB at 20:45

## 2019-02-09 RX ADMIN — ACETAMINOPHEN 1000 MG: 500 TABLET, FILM COATED ORAL at 20:46

## 2019-02-09 RX ADMIN — CEFAZOLIN SODIUM 2 G: 2 INJECTION, SOLUTION INTRAVENOUS at 00:25

## 2019-02-09 RX ADMIN — ESCITALOPRAM OXALATE 10 MG: 10 TABLET ORAL at 20:45

## 2019-02-09 RX ADMIN — KETOROLAC TROMETHAMINE 15 MG: 30 INJECTION, SOLUTION INTRAMUSCULAR; INTRAVENOUS at 07:16

## 2019-02-09 RX ADMIN — PANTOPRAZOLE SODIUM 40 MG: 40 TABLET, DELAYED RELEASE ORAL at 17:13

## 2019-02-09 RX ADMIN — HYDROXYCHLOROQUINE SULFATE 200 MG: 200 TABLET, FILM COATED ORAL at 08:42

## 2019-02-09 RX ADMIN — HYDROMORPHONE HYDROCHLORIDE 0.5 MG: 1 INJECTION, SOLUTION INTRAMUSCULAR; INTRAVENOUS; SUBCUTANEOUS at 04:34

## 2019-02-09 RX ADMIN — HYDROMORPHONE HYDROCHLORIDE 0.5 MG: 1 INJECTION, SOLUTION INTRAMUSCULAR; INTRAVENOUS; SUBCUTANEOUS at 10:41

## 2019-02-09 RX ADMIN — HYDROXYCHLOROQUINE SULFATE 200 MG: 200 TABLET, FILM COATED ORAL at 20:45

## 2019-02-09 NOTE — PLAN OF CARE
Problem: Fracture Orthopaedic (Adult)  Goal: Signs and Symptoms of Listed Potential Problems Will be Absent, Minimized or Managed (Fracture Orthopaedic)  Outcome: Ongoing (interventions implemented as appropriate)   02/09/19 0188   Goal/Outcome Evaluation   Problems Assessed (Orthopaedic Fracture) all   Problems Present (Orthopaedic Fracture) pain

## 2019-02-09 NOTE — PROGRESS NOTES
I certify that this patient requires inpatient admission for greater than 2 midnights due to other right proximal humerus fracture     Orthopedic Progress Note      Patient: Aleena Armstrong  YOB: 1950     Date of Admission: 2/8/2019  5:31 AM Medical Record Number: [id number]     Attending Physician: Yonatan Galdamez MD    Status Post:  Procedure(s):  ORIF RIGHT PROXIMAL HUMERUS Post Operative Day Number: 1    Subjective : No new orthopaedic complaints     Pain Relief: some relief with present medication.     Systemic Complaints: Pain Control  Vitals:    02/08/19 2019 02/09/19 0058 02/09/19 0500 02/09/19 0707   BP: 131/75 131/75 123/66 128/68   BP Location: Left arm Left arm Left arm Left arm   Patient Position: Lying Lying Lying Lying   Pulse: 93 94 85 81   Resp: 16 16 16 18   Temp: 99.3 °F (37.4 °C) 99.3 °F (37.4 °C) 98.5 °F (36.9 °C) 98.9 °F (37.2 °C)   TempSrc: Oral Oral Oral Oral   SpO2: 95%  92% 91%       Physical Exam: 68 y.o. female    General Appearance:       Alert, cooperative, in no acute distress                  Extremities:    Dressing Clean, Dry and Intact             No clinical sign of DVT        Able to do good movements of digits    Pulses:   Pulses palpable and equal bilaterally           Diagnostic Tests:     Results from last 7 days   Lab Units 02/04/19  1111   WBC 10*3/mm3 5.27   HEMOGLOBIN g/dL 9.9*   HEMATOCRIT % 30.4*   PLATELETS 10*3/mm3 215     Results from last 7 days   Lab Units 02/04/19  1111   SODIUM mmol/L 139   POTASSIUM mmol/L 3.7   CHLORIDE mmol/L 105   CO2 mmol/L 30.0   BUN mg/dL 16   CREATININE mg/dL 0.98   GLUCOSE mg/dL 86   CALCIUM mg/dL 8.7     Results from last 7 days   Lab Units 02/04/19  1111   INR  0.94   APTT seconds 26.8     No results found for: URICACID  No results found for: CRYSTAL  Microbiology Results (last 10 days)     ** No results found for the last 240 hours. **        Fl C Arm During Surgery    Result Date: 2/8/2019  Intraoperative  fluoroscopy utilized during right humeral ORIF.  D:  02/08/2019 E:  02/08/2019    This report was finalized on 2/8/2019 2:12 PM by Dr. Nicholas Dugan.      Xr Chest Pa & Lateral    Result Date: 2/5/2019  Right shoulder fracture. No evidence of active chest disease.  D:  02/04/2019 E:  02/05/2019  This report was finalized on 2/5/2019 9:18 PM by DR. John Fagan MD.          Current Medications:  Scheduled Meds:  aspirin 81 mg Oral Daily   escitalopram 10 mg Oral Daily   hydroxychloroquine 200 mg Oral Q12H   pantoprazole 40 mg Oral BID AC   timolol 1 drop Left Eye Q24H   traZODone 50 mg Oral Nightly   vitamin B-12 1,000 mcg Oral Daily     Continuous Infusions:  lactated ringers 100 mL/hr    Ropivacine HCl-NaCl 6 mL/hr Last Rate: 6 mL/hr (02/09/19 0548)   sodium chloride 50 mL/hr Last Rate: Stopped (02/09/19 0006)     PRN Meds:.bisacodyl  •  docusate sodium  •  HYDROmorphone **AND** naloxone  •  HYDROmorphone  •  ketorolac  •  labetalol  •  magnesium hydroxide  •  ondansetron **OR** ondansetron  •  ondansetron  •  sodium chloride    Assessment: Status post  ORIF RIGHT HUMERUS PROXIMAL FRACTURE    Patient Active Problem List   Diagnosis   • WPW (Elodia-Parkinson-White syndrome)   • Chest pain   • Morbid obesity (CMS/HCC)   • Dyslipidemia   • Essential hypertension   • Atrial flutter (CMS/HCC)   • HO Atrial fibrillation (CMS/HCC)   • Acute kidney injury (CMS/HCC)   • Insomnia   • Anxiety and depression   • Vitamin D deficiency   • Rheumatoid arthritis (CMS/HCC)   • Glaucoma of left eye   • Peptic ulcer    • Bradycardia   • Paroxysmal atrial fibrillation (CMS/HCC)   • Hematoma of groin   • Proximal humerus fracture   • S/P ORIF right proximal humerus fracture   • Anemia   • Chronic pain       PLAN:   Continues current post-op course  Pain control: d/w nursing increasing pain pump  Mobilize with PT as tolerated per protocol    Weight Bearing: NWB, maintain sling, ok for ROM at the wrist hand and elbow, dressing to remain in  place until f/u  Discharge Plan: OK to plan for discharge in  today to home  from orthopadic perspective.    Chuy Dee MD    Date: 2/9/2019    Time: 7:24 AM

## 2019-02-09 NOTE — PROGRESS NOTES
Continued Stay Note  [facility]     Patient Name: Aleena Armstrong  MRN: [id number]  Today's Date: 2/9/2019    Admit Date: 2/8/2019    Discharge Plan     Row Name 02/09/19 1145       Plan    Plan  Home    Patient/Family in Agreement with Plan  yes    Plan Comments  CM consulted for possible need for home health. Met with patient in the room. She states she does not feel she needs home health at this time. She says her  is retired and will be with her at all times. 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 She states her big issue is pain control. CM is following her POC. Please reconsult CM if patient dc's this weekend and changes her mind about home health. CM will continue to follow.         Discharge Codes    No documentation.             Kristie Renae

## 2019-02-09 NOTE — PLAN OF CARE
Problem: Patient Care Overview  Goal: Plan of Care Review  Outcome: Ongoing (interventions implemented as appropriate)   02/09/19 0336   Coping/Psychosocial   Plan of Care Reviewed With patient   Plan of Care Review   Progress improving   OTHER   Outcome Summary patient had a good night. required IV pain medication x1. arrow pump remains at 6. has not had pain at surgical site but rather near elbow radiating toward hand relieved by ice brittany. Surgical drsg is CDI. voids without difficulty anxious to get home.       Problem: Pain, Acute (Adult)  Goal: Identify Related Risk Factors and Signs and Symptoms  Outcome: Ongoing (interventions implemented as appropriate)    Goal: Acceptable Pain Control/Comfort Level  Outcome: Ongoing (interventions implemented as appropriate)      Problem: Skin Injury Risk (Adult)  Goal: Skin Health and Integrity  Outcome: Outcome(s) achieved Date Met: 02/09/19

## 2019-02-09 NOTE — PROGRESS NOTES
[facility]    Acute pain service Inpatient Progress Note    Patient Name: Aleena Armstrong  :  1950  MRN:  [id number]        Acute Pain  Service Inpatient Progress Note:    Analgesia:Good  Pain Score:4/10  LOC: alert and awake  Resp Status: room air  Cardiac: VS stable  Side Effects:None  Catheter Site:clean  Infusion rate: 10ml/hr  Catheter Plan:Catheter to remain Insitu

## 2019-02-09 NOTE — THERAPY EVALUATION
Acute Care - Occupational Therapy Initial Evaluation  [facility]     Patient Name: Aleena Armstrong  : 1950  MRN: [id number]  Today's Date: 2019  Onset of Illness/Injury or Date of Surgery: 19  Date of Referral to OT: 19  Referring Physician: MD Rudolph    Admit Date: 2019       ICD-10-CM ICD-9-CM   1. Impaired functional mobility, balance, gait, and endurance Z74.09 V49.89   2. Impaired mobility and ADLs Z74.09 799.89     Patient Active Problem List   Diagnosis   • WPW (Elodia-Parkinson-White syndrome)   • Chest pain   • Morbid obesity (CMS/HCC)   • Dyslipidemia   • Essential hypertension   • Atrial flutter (CMS/HCC)   • HO Atrial fibrillation (CMS/HCC)   • Acute kidney injury (CMS/HCC)   • Insomnia   • Anxiety and depression   • Vitamin D deficiency   • Rheumatoid arthritis (CMS/HCC)   • Glaucoma of left eye   • Peptic ulcer    • Bradycardia   • Paroxysmal atrial fibrillation (CMS/[facility])   • Hematoma of groin   • Proximal humerus fracture   • S/P ORIF right proximal humerus fracture   • Anemia   • Chronic pain   • Acute postoperative pain     Past Medical History:   Diagnosis Date   • A-fib (CMS/[facility])     HAD ABLATION FOR THIS IN    • Anxiety and depression 2016   • GERD (gastroesophageal reflux disease)    • Glaucoma of left eye 2016   • H/O migraine     LAST 3  YEARS AGO    • History of MRSA infection     APPROX 8 YEARS, TREATED WITH ORAL ABX, NEGATIVE CULTURES FOLLOWING TX    • History of transfusion    • Hyperlipidemia    • Hypertension     MEDS DC'D BY DR GOMEZ 2017   • Insomnia 2016   • Kidney disease    • Peptic ulcer - s/p gastric bypass surgery (7-8 years ago) 2016   • Rheumatoid arthritis (CMS/HCC) 2016   • Spinal headache     FOLLOWING SPINAL FOR CHILDBIRTH    • Vitamin D deficiency 2016   • Wears dentures     UPPER PLATE    • Elodia-Parkinson-White (WPW) syndrome     1990s     Past Surgical History:   Procedure Laterality Date   •  CARDIAC CATHETERIZATION N/A 10/24/2016    Procedure: Left Heart Cath;  Surgeon: Rubens Morejon MD;  Location:  [facility];  Service:    • CARDIAC ELECTROPHYSIOLOGY PROCEDURE N/A 6/20/2017    Procedure: PVA;  Surgeon: Edvin Cook DO;  Location:  [facility] EP INVASIVE [facility];  Service:    • CHOLECYSTECTOMY     • COLONOSCOPY  2007   • GASTRIC BYPASS     • HAMMER TOE REPAIR      LEFT--GARO IN PLACE    • HYSTERECTOMY     • WRIST FRACTURE SURGERY Right 2014          OT ASSESSMENT FLOWSHEET (last 72 hours)      Occupational Therapy Evaluation     Row Name 02/09/19 1255                   OT Evaluation Time/Intention    Subjective Information  complains of;pain  -TB        Document Type  evaluation;therapy note (daily note)  -TB        Mode of Treatment  occupational therapy;individual therapy  -TB        Patient Effort  good  -TB        Symptoms Noted During/After Treatment  increased pain  -TB        Comment  R UE: NWB shoulder precautions, Sling AAT (off for bath, dress, HEP), AROM R elbow, wrist, hand only.   (Significant)   -TB           General Information    Patient Profile Reviewed?  yes  -TB        Onset of Illness/Injury or Date of Surgery  02/08/19  -TB        Referring Physician  Rudolph  -TB        Patient Observations  alert;cooperative  -TB        Patient/Family Observations  No family present  -TB        General Observations of Patient  Pt in bed, RA, IV heplocked, N. cath, R UE sling (standard blue); exit alarms  -TB        Prior Level of Function  independent:;all household mobility;community mobility;transfer;bed mobility;ADL's;home management;driving;shopping;using stairs  -TB        Equipment Currently Used at Home  raised toilet;grab bar has shower chair, not using prior  -TB        Pertinent History of Current Functional Problem  Pt presents for surgical mgmt of R proximal humerus sustained during mechanical fall at home. S/P R Humeral ORIF  -TB        Existing Precautions/Restrictions  " fall;non-weight bearing;right;shoulder NWB R UE, Sling ATT (except, bath, dress, HEP)  -TB        Limitations/Impairments  -- None  -TB        Equipment Issued to Patient  -- larger sling to allow for R hand support  -TB        Equipment Ordered for Patient  -- None  -TB        Risks Reviewed  patient:;LOB;increased discomfort;increased drainage;lines disloged  -TB        Benefits Reviewed  patient:;improve function;increase independence;decrease pain;increase knowledge  -TB        Barriers to Rehab  none identified  -TB           Relationship/Environment    Primary Source of Support/Comfort  spouse  -TB        Name of Support/Comfort Primary Source  Rodriguez  -TB        Family Caregiver if Needed  spouse;sibling(s)  -TB        Family Caregiver Names  Sister to stay with pt after d/c  -TB        Concerns About Impact on Relationships  Pt lives with spouse who is retired and available to assist prn at d/c  -TB           Resource/Environmental Concerns    Current Living Arrangements  home/apartment/condo  -TB           Home Main Entrance    Number of Stairs, Main Entrance  one  -TB           Stairs Within Home, Primary    Stairs, Within Home, Primary  1  -TB           Cognitive Assessment/Intervention- PT/OT    Orientation Status (Cognition)  oriented x 4  -TB        Follows Commands (Cognition)  WFL  -TB        Safety Deficit (Cognitive)  mild deficit;safety precautions awareness;safety precautions follow-through/compliance  -TB           Safety Issues, Functional Mobility    Safety Issues Affecting Function (Mobility)  impulsivity  -TB        Comment, Safety Issues/Impairments (Mobility)  pt reports h/o falls d/t \"moving too quickly\"  -TB           Mobility Assessment/Treatment    Extremity Weight-bearing Status  right upper extremity  (Significant)   -TB        Right Upper Extremity (Weight-bearing Status)  non weight-bearing (NWB)  (Significant)   -TB           Bed Mobility Assessment/Treatment    Bed Mobility " Assessment/Treatment  supine-sit;scooting/bridging  -TB        Scooting/Bridging [place] [address] (Bed Mobility)  supervision  -TB        Supine-Sit [address] (Bed Mobility)  supervision  -TB        Bed Mobility, Safety Issues  decreased use of arms for pushing/pulling  -TB        Assistive Device (Bed Mobility)  head of bed elevated  -TB        Comment (Bed Mobility)  pt has good core strength; moves to EOB without difficult  -TB           Functional Mobility    Functional Mobility- Ind. Level  supervision required  -TB        Functional Mobility- Device  -- No AD  -TB        Functional Mobility-Distance (Feet)  20  -TB        Functional Mobility- Safety Issues  -- None  -TB        Functional Mobility- Comment  fair safety, min cues to manage AROM pump  -TB           Transfer Assessment/Treatment    Transfer Assessment/Treatment  sit-stand transfer;stand-sit transfer;toilet transfer;bed-chair transfer  -TB        Comment (Transfers)  cues for hand placement   -TB           Bed-Chair Transfer    Bed-Chair [address] (Transfers)  supervision;verbal cues  -TB           Sit-Stand Transfer    Sit-Stand [address] (Transfers)  supervision;verbal cues  -TB           Stand-Sit Transfer    Stand-Sit [address] (Transfers)  supervision;verbal cues  -TB           Toilet Transfer    Type (Toilet Transfer)  sit-stand;stand-sit  -TB        [address] Level (Toilet Transfer)  supervision;verbal cues  -TB        Assistive Device (Toilet Transfer)  grab bars/safety frame;raised toilet seat  -TB           ADL Assessment/Intervention    54851 - OT Self Care/Mgmt Minutes  --  -TB        BADL Assessment/Intervention  bathing;upper body dressing;toileting;grooming  -TB           Bathing Assessment/Intervention    Bathing [address] Level  upper body;supervision;verbal cues  -TB        Bathing Position  unsupported sitting  -TB        Comment (Bathing)  Education completed for R shoulder precautions with ADLs and R axilla care  via pendulum mvmts - pt demonstrates good understanding  -TB           Upper Body Dressing Assessment/Training    Upper Body Dressing [place] Level  don;doff;pull-over garment;front opening garment;moderate assist (50% patient effort) sling mgmt - larger sling issued to allow hand support  -TB        Assistive Devices (Upper Body Dressing)  one hand technique  -TB        Upper Body Dressing Position  unsupported sitting  -TB        Comment (Upper Body Dressing)  Education completed for R shoulder precautions with ADLs and R UE johanne-dressing strategies via pendulum mvmts - OT demonstrated/pt verbalized good understanding including N. cath mgmt  -TB           Grooming Assessment/Training    [place] Level (Grooming)  set up  -TB        Comment (Grooming)  to wash/dry hands  -TB           Toileting Assessment/Training    [place] Level (Toileting)  adjust/manage clothing;perform perineal hygiene;supervision  -TB        Assistive Devices (Toileting)  grab bar/safety frame;raised toilet seat  -TB        Comment (Toileting)  pt manages clothing (pants/undergarment) without assist  -TB           BADL Safety/Performance    Impairments, BADL Safety/Performance  range of motion;pain;strength  -TB        Skilled BADL Treatment/Intervention  BADL process/adaptation training;johanne/one-hand technique  -TB           General ROM    GENERAL ROM COMMENTS  L UE WFL; R distal ROM WFL; Proximal ROM deferred per MD order  -TB           MMT (Manual Muscle Testing)    General MMT Comments  L UE WFL; R  3+/5  -TB           Motor Assessment/Interventions    Additional Documentation  Balance (Group);Therapeutic Exercise (Group)  -TB           Therapeutic Exercise    Therapeutic Exercise  seated, upper extremities  -TB        Additional Documentation  Therapeutic Exercise (Row)  -TB        33465 - OT Therapeutic Activity Minutes  25  -TB           Upper Extremity Seated Therapeutic Exercise    Performed, Seated Upper Extremity  (Therapeutic Exercise)  elbow flexion/extension;wrist flexion/extension;digit flexion/extension  -TB        Exercise Type, Seated Upper Extremity (Therapeutic Exercise)  AROM (active range of motion);AAROM (active assistive range of motion) AROM: hand/wrist; AAROM elbow  -TB        Restrictions, Seated Upper Extremity (Therapeutic Exercise)  Shoulder precautions; MD order for AROM: elbow/wrist/hand only; NWB R UE  -TB        Sets/Reps Detail, Seated Upper Extremity (Therapeutic Exercise)  10  -TB        Comment, Seated Upper Extremity (Therapeutic Exercise)  Pt tolerates HEP well  -TB           Balance    Balance  dynamic sitting balance;dynamic standing balance  -TB           Dynamic Sitting Balance    Level of [place], Reaches Outside Midline (Sitting, Dynamic Balance)  standby assist  -TB        Sitting Position, Reaches Outside Midline (Sitting, Dynamic Balance)  sitting in chair;sitting on edge of bed commode  -TB        Comment, Reaches Outside Midline (Sitting, Dynamic Balance)  ADL tasks, HEP  -TB           Dynamic Standing Balance    Level of [place], Reaches Outside Midline (Standing, Dynamic Balance)  standby assist  -TB        Time Able to Maintain Position, Reaches Outside Midline (Standing, Dynamic Balance)  2 to 3 minutes  -TB        Comment, Reaches Outside Midline (Standing, Dynamic Balance)  ADL tasks  -TB           Sensory Assessment/Intervention    Sensory General Assessment  no sensation deficits identified Pt denies numbness/tingling; localizes touch R UE  -TB           Positioning and Restraints    Pre-Treatment Position  in bed  -TB        Post Treatment Position  chair  -TB        In Chair  notified nsg;reclined;call light within reach;encouraged to call for assist;exit alarm on;legs elevated R UE supported, sling intact, n. cath intact  -TB           Pain Assessment    Additional Documentation  Pain Scale: Word Pre/Post-Treatment (Group)  -TB           Pain Scale: Numbers  Pre/Post-Treatment    Pain Location - Side  Right  -TB        Pain Location  shoulder  -TB        Pain Intervention(s)  Repositioned HEP, ADL tasks, sling mgmt  -TB           Pain Scale: Word Pre/Post-Treatment    Pain: Word Scale, Pretreatment  2 - mild pain  -TB        Pain: Word Scale, Post-Treatment  4 - moderate pain  -TB        Pre/Post Treatment Pain Comment  Pt tolerates ADL tasks and HEP well  -TB           Orthotics & Prosthetics Management    Orthosis Location  support device  -TB        Additional Documentation  Orthosis Location (Row)  -TB           Support Device Management    Type (Support/Positioning Device)  right;arm sling  -TB        Therapeutic Indications (Support/Positioning Device)  post-op positioning/protection  -TB        Wearing Schedule (Support/Positioning Device)  remove for exercise;remove for hygiene/bathing  -TB        Orthosis Training (Support/Positioning Device)  patient;activity limitations/precautions;donning/doffing orthosis;orthosis adjustment;able to verbalize training;able to teach back training;requires assistance  -TB        Compliance/Wearing Issues (Support/Positioning Device)  follow-up training required  -TB           Wound 02/08/19 1014 Right shoulder incision    Wound - Properties Group Date first assessed: 02/08/19  -MR Time first assessed: 1014  -MR Side: Right  -MR Location: shoulder  -MR Type: incision  -MR       Coping    Observed Emotional State  accepting;calm;cooperative  -TB        Verbalized Emotional State  acceptance  -TB           Plan of Care Review    Plan of Care Reviewed With  patient  -TB           Clinical Impression (OT)    Date of Referral to OT  02/08/19  -TB        OT Diagnosis  Impaired ADL  -TB        Patient/Family Goals Statement (OT Eval)  to manage pain or postpone d/c  -TB        Criteria for Skilled Therapeutic Interventions Met (OT Eval)  yes;treatment indicated  -TB        Therapy Frequency (OT Eval)  daily  -TB        Care Plan  Review (OT)  evaluation/treatment results reviewed;care plan/treatment goals reviewed;patient/other agree to care plan  -TB        Anticipated Equipment Needs at Discharge (OT)  -- None  -TB        Anticipated Discharge Disposition (OT)  home with assist  -TB           Vital Signs    Pre Systolic BP Rehab  -- RN cleared OT  -TB        O2 Delivery Post Treatment  room air  -TB        Pre Patient Position  Supine  -TB        Intra Patient Position  Standing  -TB        Post Patient Position  Sitting  -TB           Planned OT Interventions    Planned Therapy Interventions (OT Eval)  ROM/therapeutic exercise;occupation/activity based interventions;adaptive equipment training  -TB           OT Goals    Dressing Goal Selection (OT)  dressing, OT goal 1  -TB        Problem Specific Goal Selection (OT)  problem specific goal 1, OT  -TB           Dressing Goal 1 (OT)    Activity/Assistive Device (Dressing Goal 1, OT)  upper body dressing sling mgmt  -TB        [place]/Cues Needed (Dressing Goal 1, OT)  moderate assist (50-74% patient effort);verbal cues required  -TB        Time Frame (Dressing Goal 1, OT)  by discharge  -TB        Barriers (Dressing Goal 1, OT)  pain, ROM, strength  -TB        Progress/Outcome (Dressing Goal 1, OT)  goal ongoing  -TB           Problem Specific Goal 1 (OT)    Problem Specific Goal 1 (OT)  Pt demonstrates independence with R UE HEP (AROM: elbow, wrist, hand)   -TB        Time Frame (Problem Specific Goal 1, OT)  by discharge  -TB        Barriers (Problem Specific Goal 1, OT)  pain  -TB        Progress/Outcome (Problem Specific Goal 1, OT)  goal ongoing  -TB           Patient Education Goal (OT)    Activity (Patient Education Goal, OT)  Pt demonstrates independence with R shoulder precautions and positioning  -TB        [place]/Cues/Accuracy (Memory Goal 2, OT)  demonstrates adequately;verbalizes understanding  -TB        Time Frame (Patient Education Goal, OT)  by discharge  -TB         Barriers (Patient Education Goal, OT)  pain  -TB        Progress/Outcome (Patient Education Goal, OT)  goal ongoing  -TB           Living Environment    Home Accessibility  stairs to enter home;stairs within home large walk-in shower, ETS  -TB          User Key  (r) = Recorded By, (t) = Taken By, (c) = Cosigned By    Initials Name Effective Dates     Lissy Costa OT 06/08/18 -      MartínTamar RN 09/07/18 -          Occupational Therapy Education     Title: PT OT SLP Therapies (Done)     Topic: Occupational Therapy (Done)     Point: ADL training (Done)     Description: Instruct learner(s) on proper safety adaptation and remediation techniques during self care or transfers.   Instruct in proper use of assistive devices.    Learning Progress Summary           Patient Acceptance, E,D,TB, VU,DU,NR by  at 2/9/2019  2:14 PM    Comment:  Education initiated for R UE: NWB precautions, sling mgmt, positioning, johanne-dressing, axilla care and HEP per MD order.                   Point: Home exercise program (Done)     Description: Instruct learner(s) on appropriate technique for monitoring, assisting and/or progressing therapeutic exercises/activities.    Learning Progress Summary           Patient Acceptance, E,D,TB, VU,DU,NR by TB at 2/9/2019  2:14 PM    Comment:  Education initiated for R UE: NWB precautions, sling mgmt, positioning, johanne-dressing, axilla care and HEP per MD order.                   Point: Precautions (Done)     Description: Instruct learner(s) on prescribed precautions during self-care and functional transfers.    Learning Progress Summary           Patient Acceptance, E,D,TB, VU,DU,NR by TB at 2/9/2019  2:14 PM    Comment:  Education initiated for R UE: NWB precautions, sling mgmt, positioning, johanne-dressing, axilla care and HEP per MD order.                               User Key     Initials Effective Dates Name Provider Type Discipline     06/08/18 -  Lissy Costa OT  Occupational Therapist OT                  OT Recommendation and Plan  Outcome Summary/Treatment Plan (OT)  Anticipated Equipment Needs at Discharge (OT): (None)  Anticipated Discharge Disposition (OT): home with assist  Planned Therapy Interventions (OT Eval): ROM/therapeutic exercise, occupation/activity based interventions, adaptive equipment training  Therapy Frequency (OT Eval): daily  Plan of Care Review  Plan of Care Reviewed With: patient  Plan of Care Reviewed With: patient  Outcome Summary: OT IE completed. Pt alert and motivated to participate in OT. Pt reports increased pain, but actively participates to complete HEP. Education initiated for R UE: NWB precautions, positioning, sling mgmt, johanne-dressing, and axilla care. OT to follow. D/C plan is home with spouse assist.    Outcome Measures     Row Name 02/09/19 1330 02/09/19 1255          How much help from another person do you currently need...    Turning from your back to your side while in flat bed without using bedrails?  3  -LR  --     Moving from lying on back to sitting on the side of a flat bed without bedrails?  3  -LR  --     Moving to and from a bed to a chair (including a wheelchair)?  3  -LR  --     Standing up from a chair using your arms (e.g., wheelchair, bedside chair)?  3  -LR  --     Climbing 3-5 steps with a railing?  3  -LR  --     To walk in hospital room?  3  -LR  --     AM-PAC 6 Clicks Score  18  -LR  --        How much help from another is currently needed...    Putting on and taking off regular lower body clothing?  --  2  -TB     Bathing (including washing, rinsing, and drying)  --  2  -TB     Toileting (which includes using toilet bed pan or urinal)  --  3  -TB     Putting on and taking off regular upper body clothing  --  2  -TB     Taking care of personal grooming (such as brushing teeth)  --  3  -TB     Eating meals  --  3  -TB     Score  --  15  -TB        Functional Assessment    Outcome Measure Options  AM-PAC 6 Clicks  Basic Mobility (PT)  -LR  AM-PAC 6 Clicks Daily Activity (OT)  -TB       User Key  (r) = Recorded By, (t) = Taken By, (c) = Cosigned By    Initials Name Provider Type    TB Lissy Costa, OT Occupational Therapist    LR Courtney Jo, PT Physical Therapist          Time Calculation:   Time Calculation- OT     Row Name 02/09/19 1418 02/09/19 1255          Time Calculation- OT    OT Start Time  1255  -TB  --     OT Received On  02/09/19  -TB  --     OT Goal Re-Cert Due Date  02/19/19  -TB  --        Timed Charges    54260 - OT Therapeutic Activity Minutes  --  25  -TB     00326 - OT Self Care/Mgmt Minutes  --  --  -TB       User Key  (r) = Recorded By, (t) = Taken By, (c) = Cosigned By    Initials Name Provider Type    TB Lissy Costa, OT Occupational Therapist        Therapy Suggested Charges     Code   Minutes Charges    53820 (CPT®) Hc Ot Neuromusc Re Education Ea 15 Min      16040 (CPT®) Hc Ot Ther Proc Ea 15 Min      08653 (CPT®) Hc Ot Therapeutic Act Ea 15 Min 25 2    94411 (CPT®) Hc Ot Manual Therapy Ea 15 Min      22288 (CPT®) Hc Ot Iontophoresis Ea 15 Min      37521 (CPT®) Hc Ot Elec Stim Ea-Per 15 Min      19913 (CPT®) Hc Ot Ultrasound Ea 15 Min      95212 (CPT®) Hc Ot Self Care/Mgmt/Train Ea 15 Min      Total  25 2        Therapy Charges for Today     Code Description Service Date Service Provider Modifiers Qty    46092972621 HC OT THERAPEUTIC ACT EA 15 MIN 2/9/2019 Lissy Costa, OT GO 2    56640408087 HC OT EVAL LOW COMPLEXITY 3 2/9/2019 Lissy Costa, OT GO 1               Lissy Costa OT  2/9/2019

## 2019-02-09 NOTE — PROGRESS NOTES
IM progress note      Aleena Armstrong  [id number]  1950     LOS: 0 days     Attending: Yonatan Galdamez MD    Primary Care Provider: Aniyah Goldberg MD      Chief Complaint/Reason for visit:  Right shoulder fracture    Subjective   Doing ok. Complains of severe pain this morning, feels too much pain to go home, still requiring IV pain medication. Denies f/c/n/v/sob/cp.    Objective     Vital Signs  Visit Vitals  /72 (BP Location: Left arm, Patient Position: Lying)   Pulse 69   Temp 97.3 °F (36.3 °C) (Temporal)   Resp 16   LMP  (LMP Unknown)   SpO2 93%     Temp (24hrs), Av.7 °F (37.1 °C), Min:97.3 °F (36.3 °C), Max:99.3 °F (37.4 °C)      Nutrition: PO    Respiratory: RA    Physical Exam:     General Appearance:    Alert, cooperative, in no acute distress   Head:    Normocephalic, without obvious abnormality, atraumatic    Lungs:     Normal effort, symmetric chest rise, no crepitus, clear to      auscultation bilaterally             Heart:    Regular rhythm and normal rate, normal S1 and S2   Abdomen:     Normal bowel sounds, no masses, no organomegaly, soft        non-tender, non-distended, no guarding, no rebound                tenderness   Extremities:   RUE sling, nerve block present. Aquacel CDI. Good movement right digits.   Pulses:   Pulses palpable and equal bilaterally   Skin:   No bleeding, bruising or rash   Neurologic:   Moves all extremities with no obvious focal motor deficit.  Cranial nerves 2 - 12 grossly intact     Results Review:     I reviewed the patient's new clinical results.   Results from last 7 days   Lab Units 19  0812 19  1111   WBC 10*3/mm3 7.95 5.27   HEMOGLOBIN g/dL 8.1* 9.9*   HEMATOCRIT % 25.8* 30.4*   PLATELETS 10*3/mm3 273 215     Results from last 7 days   Lab Units 19  0812 19  1111   SODIUM mmol/L 138 139   POTASSIUM mmol/L 3.7 3.7   CHLORIDE mmol/L 103 105   CO2 mmol/L 34.0* 30.0   BUN mg/dL 15 16   CREATININE mg/dL 0.89 0.98   CALCIUM  mg/dL 8.4* 8.7   BILIRUBIN mg/dL  --  0.4   ALK PHOS U/L  --  81   ALT (SGPT) U/L  --  20   AST (SGOT) U/L  --  26   GLUCOSE mg/dL 106* 86     I reviewed the patient's new imaging including images and reports.    All medications reviewed.     acetaminophen 1,000 mg Oral Q8H   aspirin 81 mg Oral Daily   escitalopram 10 mg Oral Daily   hydroxychloroquine 200 mg Oral Q12H   pantoprazole 40 mg Oral BID AC   timolol 1 drop Left Eye Q24H   traZODone 50 mg Oral Nightly   vitamin B-12 1,000 mcg Oral Daily       Assessment/Plan     S/P ORIF right proximal humerus fracture    Proximal humerus fracture    HO Atrial fibrillation (CMS/HCC)    Rheumatoid arthritis (CMS/HCC)    Anemia    Acute postoperative pain      Plan  1. PT/OT- DESTINY nunes, NWB  2. Pain control-prns, interscalene nerve block. Increased block rate per anesthesia. Medication changes per Dr. Dee.   3. IS-encouraged  4. DVT proph- [facility]  5. Bowel regimen  6. Monitor post-op labs  7. DC planning for home tomorrow    RA  - Continue home plaquenil       YULI Priest  02/09/19  1:42 PM

## 2019-02-09 NOTE — PLAN OF CARE
Problem: Patient Care Overview  Goal: Plan of Care Review  Outcome: Ongoing (interventions implemented as appropriate)   02/09/19 1190   Coping/Psychosocial   Plan of Care Reviewed With patient   Plan of Care Review   Progress improving   OTHER   Outcome Summary Patient ambulated 150 feet with supervision with no LOB or unsteadiness. Climbed 3 steps with one handrail with no difficulty. Patient is independent with mobility and does not meet criteria for skilled acute PT at this time. Will D/C PT at this time and defer ther ex/ROM to R UE to OT.

## 2019-02-09 NOTE — PLAN OF CARE
Problem: Patient Care Overview  Goal: Plan of Care Review  Outcome: Ongoing (interventions implemented as appropriate)   02/09/19 6990   Coping/Psychosocial   Plan of Care Reviewed With patient   Plan of Care Review   Progress improving   OTHER   Outcome Summary pain improved has new order tramadol 50 mg po effective new sling better support i sl surgical site CDI will continue to monitor

## 2019-02-09 NOTE — THERAPY DISCHARGE NOTE
Acute Care - Physical Therapy Initial Eval/Discharge  [facility]     Patient Name: Aleena Armstrong  : 1950  MRN: [id number]  Today's Date: 2019   Onset of Illness/Injury or Date of Surgery: 19  Date of Referral to PT: 19  Referring Physician: MD Rudolph      Admit Date: 2019    Visit Dx:    ICD-10-CM ICD-9-CM   1. Impaired functional mobility, balance, gait, and endurance Z74.09 V49.89   2. Impaired mobility and ADLs Z74.09 799.89     Patient Active Problem List   Diagnosis   • WPW (Elodia-Parkinson-White syndrome)   • Chest pain   • Morbid obesity (CMS/HCC)   • Dyslipidemia   • Essential hypertension   • Atrial flutter (CMS/HCC)   • HO Atrial fibrillation (CMS/HCC)   • Acute kidney injury (CMS/HCC)   • Insomnia   • Anxiety and depression   • Vitamin D deficiency   • Rheumatoid arthritis (CMS/HCC)   • Glaucoma of left eye   • Peptic ulcer    • Bradycardia   • Paroxysmal atrial fibrillation (CMS/[facility])   • Hematoma of groin   • Proximal humerus fracture   • S/P ORIF right proximal humerus fracture   • Anemia   • Chronic pain   • Acute postoperative pain     Past Medical History:   Diagnosis Date   • A-fib (CMS/[facility])     HAD ABLATION FOR THIS IN    • Anxiety and depression 2016   • GERD (gastroesophageal reflux disease)    • Glaucoma of left eye 2016   • H/O migraine     LAST 3  YEARS AGO    • History of MRSA infection     APPROX 8 YEARS, TREATED WITH ORAL ABX, NEGATIVE CULTURES FOLLOWING TX    • History of transfusion    • Hyperlipidemia    • Hypertension     MEDS DC'D BY DR GOMEZ 2017   • Insomnia 2016   • Kidney disease    • Peptic ulcer - s/p gastric bypass surgery (7-8 years ago) 2016   • Rheumatoid arthritis (CMS/HCC) 2016   • Spinal headache     FOLLOWING SPINAL FOR CHILDBIRTH    • Vitamin D deficiency 2016   • Wears dentures     UPPER PLATE    • Elodia-Parkinson-White (WPW) syndrome     1990s     Past Surgical History:   Procedure Laterality  Date   • CARDIAC CATHETERIZATION N/A 10/24/2016    Procedure: Left Heart Cath;  Surgeon: Rubens Morejon MD;  Location:  [facility];  Service:    • CARDIAC ELECTROPHYSIOLOGY PROCEDURE N/A 6/20/2017    Procedure: PVA;  Surgeon: Edvin Cook DO;  Location:  [facility] EP INVASIVE [facility];  Service:    • CHOLECYSTECTOMY     • COLONOSCOPY  2007   • GASTRIC BYPASS     • HAMMER TOE REPAIR      LEFT--GARO IN PLACE    • HYSTERECTOMY     • WRIST FRACTURE SURGERY Right 2014          PT ASSESSMENT (last 12 hours)      Physical Therapy Evaluation     Row Name 02/09/19 7653          PT Evaluation Time/Intention    Subjective Information  complains of;pain  -LR     Document Type  evaluation;discharge evaluation/summary  -LR     Mode of Treatment  physical therapy;individual therapy  -LR     Patient Effort  excellent  -LR     Symptoms Noted During/After Treatment  none  -LR     Row Name 02/09/19 8424          General Information    Patient Profile Reviewed?  yes  -LR     Onset of Illness/Injury or Date of Surgery  02/08/19  -LR     Referring Physician  MD Rudolph  -LR     Patient Observations  alert;cooperative;agree to therapy  -LR     General Observations of Patient  Patient sitting reclined [facility] on arrival. R UE in sling. R interscalene nerve catheter, R UE elevated, exit alarm.   -LR     Prior Level of Function  independent:;all household mobility;community mobility;gait;transfer;bed mobility;ADL's;home management;cooking;cleaning;shopping;using stairs;driving  -LR     Equipment Currently Used at Home  cane, straight would use occasionally   -LR     Pertinent History of Current Functional Problem  Patient presents for surgical management of R proximal humerus fx after slipping and falling at home last Wednesday.   -LR     Existing Precautions/Restrictions  fall;non-weight bearing;right;shoulder;other (see comments) R UE NWB, sling to R UE  -LR     Limitations/Impairments  -- none  -LR     Equipment Issued to  Patient  -- none  -LR     Equipment Ordered for Patient  -- none  -LR     Risks Reviewed  patient:;LOB;nausea/vomiting;dizziness;increased discomfort;lines disloged  -LR     Benefits Reviewed  patient:;improve function;increase independence;increase strength;increase balance;decrease pain;decrease risk of DVT;increase knowledge  -LR     Barriers to Rehab  none identified  -LR     Row Name 02/09/19 1330          Relationship/Environment    Primary Source of Support/Comfort  spouse available to assist at all times upon d/c home.   -LR     Lives With  spouse  -LR     Row Name 02/09/19 1330          Resource/Environmental Concerns    Current Living Arrangements  home/apartment/condo  -LR     Resource/Environmental Concerns  none  -LR     Transportation Concerns  car, none  -LR     Row Name 02/09/19 1330          Home Main Entrance    Stairs Comment, Main Entrance  no steps to enter home.   -LR     Row Name 02/09/19 1330          Stairs Within Home, Primary    Number of Stairs, Within Home, Primary  one  -LR     Stairs Comment, Within Home, Primary  Multiple one step to enter various rooms of home.   -LR     Row Name 02/09/19 1330          Cognitive Assessment/Intervention- PT/OT    Orientation Status (Cognition)  oriented x 4  -LR     Follows Commands (Cognition)  WFL;follows one step commands;over 90% accuracy;verbal cues/prompting required;repetition of directions required  -LR     Safety Deficit (Cognitive)  safety precautions awareness;safety precautions follow-through/compliance  -LR     Row Name 02/09/19 1330          Safety Issues, Functional Mobility    Safety Issues Affecting Function (Mobility)  safety precautions follow-through/compliance;safety precaution awareness  -LR     Row Name 02/09/19 1330          Mobility Assessment/Treatment    Extremity Weight-bearing Status  right upper extremity  -LR     Right Upper Extremity (Weight-bearing Status)  non weight-bearing (NWB)  (Significant)   -LR     Row Name  02/09/19 1330          Bed Mobility Assessment/Treatment    Supine-Sit [place] (Bed Mobility)  not tested UIC on arrival.   -LR     Comment (Bed Mobility)  UIC at end of eval.   -LR     Row Name 02/09/19 1330          Transfer Assessment/Treatment    Transfer Assessment/Treatment  sit-stand transfer;stand-sit transfer  -LR     Comment (Transfers)  Verbal cues to push up from chair to stand and to reach back for chair to lower into sitting.   -LR     Sit-Stand [place] (Transfers)  verbal cues;supervision  -LR     Stand-Sit [place] (Transfers)  verbal cues;supervision  -LR     Row Name 02/09/19 1330          Sit-Stand Transfer    Assistive Device (Sit-Stand Transfers)  other (see comments) no AD  -LR     Row Name 02/09/19 1330          Stand-Sit Transfer    Assistive Device (Stand-Sit Transfers)  other (see comments) no AD  -LR     Row Name 02/09/19 1330          Gait/Stairs Assessment/Training    [place] Level (Gait)  verbal cues;supervision  -LR     Assistive Device (Gait)  other (see comments) no AD  -LR     Distance in Feet (Gait)  150  -LR     Pattern (Gait)  step-through  -LR     Deviations/Abnormal Patterns (Gait)  bilateral deviations;april decreased;gait speed decreased;stride length decreased  -LR     Bilateral Gait Deviations  heel strike decreased  -LR     Negotiation (Stairs)  stairs independence;stairs assistive device;handrail location;number of steps;ascending technique;descending technique  -LR     [place] Level (Stairs)  verbal cues;supervision  -LR     Handrail Location (Stairs)  left side (ascending)  -LR     Number of Steps (Stairs)  3  -LR     Ascending Technique (Stairs)  step-over-step  -LR     Descending Technique (Stairs)  step-over-step  -LR     Comment (Gait/Stairs)  Patient ambulated with step through gait pattern at slow pace. No LOB or unsteadiness observed with gait. Gait limited by fatigue. Patient climbed 3 steps reciprocally with no difficulty.   -LR      Row Name 02/09/19 1330          General ROM    RT Lower Ext  Comment  -LR     LT Lower Ext  Comment  -LR     Row Name 02/09/19 1330          Right Lower Ext    RT Lower Extremity Comments  R LE AROM WFL  -LR     Row Name 02/09/19 1330          Left Lower Ext    LT Lower Extremity Comments  L LE AROM WFL  -LR     Row Name 02/09/19 1330          MMT (Manual Muscle Testing)    Rt Lower Ext  Rt Hip WFL;Rt Knee WFL;Rt Ankle WFL  -LR     Lt Lower Ext  Lt Hip WFL;Lt Knee WFL;Lt Ankle WFL  -LR     Row Name 02/09/19 1330          Sensory Assessment/Intervention    Sensory General Assessment  other (see comments) denies numbness/tingling;light touch equal and intact  -LR     Row Name 02/09/19 1330          Vision Assessment/Intervention    Visual Impairment/Limitations  WFL  -LR     Row Name 02/09/19 1330          Pain Assessment    Additional Documentation  Pain Scale: Numbers Pre/Post-Treatment (Group)  -LR     Row Name 02/09/19 1330          Pain Scale: Numbers Pre/Post-Treatment    Pain Scale: Numbers, Pretreatment  5/10  -LR     Pain Scale: Numbers, Post-Treatment  5/10  -LR     Pain Location - Side  Right  -LR     Pain Location  other (see comments) arm  -LR     Pain Intervention(s)  Repositioned;Ambulation/increased activity  -LR     Row Name             Wound 02/08/19 1014 Right shoulder incision    Wound - Properties Group Date first assessed: 02/08/19  -MR Time first assessed: 1014  -MR Side: Right  -MR Location: shoulder  -MR Type: incision  -MR    Row Name 02/09/19 1330          Coping    Observed Emotional State  accepting;cooperative  -LR     Verbalized Emotional State  acceptance  -LR     Row Name 02/09/19 1330          Plan of Care Review    Plan of Care Reviewed With  patient  -LR     Row Name 02/09/19 1330          Physical Therapy Clinical Impression    Date of Referral to PT  02/08/19  -LR     PT Diagnosis (PT Clinical Impression)  R proximal humerus fx, Neer 3 part/ s/p ORIF R proximal humerus fx  -LR      Prognosis (PT Clinical Impression)  good  -LR     Patient/Family Goals Statement (PT Clinical Impression)  go home, decrease pain  -LR     Criteria for Skilled Interventions Met (PT Clinical Impression)  no problems identified which require skilled intervention;current level of function same as previous level of function;does not meet criteria for skilled intervention;no significant expected improvement in functional status  -LR     Rehab Potential (PT Clinical Summary)  good, to achieve stated therapy goals  -LR     Care Plan Review (PT)  evaluation/treatment results reviewed;care plan/treatment goals reviewed;risks/benefits reviewed;current/potential barriers reviewed;patient/other agree to care plan  -LR     Row Name 02/09/19 1330          Physical Therapy Goals    Transfer Goal Selection (PT)  transfer, PT goal 1  -LR     Gait Training Goal Selection (PT)  gait training, PT goal 1  -LR     Stairs Goal Selection (PT)  stairs, PT goal 1  -LR     Additional Documentation  Stairs Goal Selection (PT) (Row)  -LR     Row Name 02/09/19 1330          Transfer Goal 1 (PT)    Activity/Assistive Device (Transfer Goal 1, PT)  sit-to-stand/stand-to-sit  -LR     [place] Level/Cues Needed (Transfer Goal 1, PT)  supervision required  -LR     Time Frame (Transfer Goal 1, PT)  long term goal (LTG);1 day  -LR     Progress/Outcome (Transfer Goal 1, PT)  goal met  -LR     Row Name 02/09/19 1330          Gait Training Goal 1 (PT)    Activity/Assistive Device (Gait Training Goal 1, PT)  gait (walking locomotion)  -LR     [place] Level (Gait Training Goal 1, PT)  supervision required  -LR     Distance (Gait Goal 1, PT)  150 feet  -LR     Time Frame (Gait Training Goal 1, PT)  long term goal (LTG);1 day  -LR     Progress/Outcome (Gait Training Goal 1, PT)  goal met  -LR     Row Name 02/09/19 1330          Stairs Goal 1 (PT)    Activity/Assistive Device (Stairs Goal 1, PT)  ascending stairs;descending stairs;using handrail,  left;step-over step  -LR     [place] Level/Cues Needed (Stairs Goal 1, PT)  supervision required  -LR     Number of Stairs (Stairs Goal 1, PT)  3  -LR     Time Frame (Stairs Goal 1, PT)  long term goal (LTG);1 day  -LR     Progress/Outcome (Stairs Goal 1, PT)  goal met  -LR     Row Name 02/09/19 1330          Positioning and Restraints    Pre-Treatment Position  sitting in chair/recliner  -LR     Post Treatment Position  chair  -LR     In Chair  notified nsg;reclined;sitting;call light within reach;encouraged to call for assist;exit alarm on;RUE elevated;legs elevated  -LR     Row Name 02/09/19 1330          Physical Therapy Discharge Summary    Additional Documentation  Discharge Summary, PT Eval (Group)  -LR     Row Name 02/09/19 1330          Discharge Summary, PT Eval    Reason for Discharge (PT Discharge Summary)  no further needs identified;patient met all goals and outcomes;no further expectation of functional progress  -LR     Outcomes Achieved Upon Discharge (PT Discharge Summary)  able to achieve all goals within established timeline;evaluation only  -LR     Transfer to Another Level of Care or Facility (PT Discharge Summary)  patient will continue therapy goals following discharge  -LR     Row Name 02/09/19 1330          Living Environment    Home Accessibility  stairs within home  -LR       User Key  (r) = Recorded By, (t) = Taken By, (c) = Cosigned By    Initials Name Provider Type    LR Courtney Jo, PT Physical Therapist    Tamar Chadwick, RN Registered Nurse              PT Recommendation and Plan  Anticipated Discharge Disposition (PT): home with assist  Planned Therapy Interventions (PT Eval): other (see comments)(none)  Therapy Frequency (PT Clinical Impression): evaluation only  Outcome Summary/Treatment Plan (PT)  Anticipated Equipment Needs at Discharge (PT): (none)  Anticipated Discharge Disposition (PT): home with assist  Reason for Discharge (PT Discharge Summary): no further  needs identified, patient met all goals and outcomes, no further expectation of functional progress  Plan of Care Reviewed With: patient  Progress: improving  Outcome Summary: Patient ambulated 150 feet with supervision with no LOB or unsteadiness. Climbed 3 steps with one handrail with no difficulty. Patient is independent with mobility and does not meet criteria for skilled acute PT at this time. Will D/C PT at this time and defer ther ex/ROM to R UE to OT.     Outcome Measures     Row Name 02/09/19 1330 02/09/19 1255          How much help from another person do you currently need...    Turning from your back to your side while in flat bed without using bedrails?  3  -LR  --     Moving from lying on back to sitting on the side of a flat bed without bedrails?  3  -LR  --     Moving to and from a bed to a chair (including a wheelchair)?  3  -LR  --     Standing up from a chair using your arms (e.g., wheelchair, bedside chair)?  3  -LR  --     Climbing 3-5 steps with a railing?  3  -LR  --     To walk in hospital room?  3  -LR  --     AM-PAC 6 Clicks Score  18  -LR  --        How much help from another is currently needed...    Putting on and taking off regular lower body clothing?  --  2  -TB     Bathing (including washing, rinsing, and drying)  --  2  -TB     Toileting (which includes using toilet bed pan or urinal)  --  3  -TB     Putting on and taking off regular upper body clothing  --  2  -TB     Taking care of personal grooming (such as brushing teeth)  --  3  -TB     Eating meals  --  3  -TB     Score  --  15  -TB        Functional Assessment    Outcome Measure Options  AM-PAC 6 Clicks Basic Mobility (PT)  -LR  AM-PAC 6 Clicks Daily Activity (OT)  -TB       User Key  (r) = Recorded By, (t) = Taken By, (c) = Cosigned By    Initials Name Provider Type    TB Lissy Costa, OT Occupational Therapist    LR Courtney Jo, PT Physical Therapist           Time Calculation:   PT Charges     Row Name  02/09/19 1330             Time Calculation    Start Time  1330  -LR      PT Received On  02/09/19  -LR      PT Goal Re-Cert Due Date  02/19/19  -LR         Time Calculation- PT    Total Timed Code Minutes- PT  0 minute(s)  -LR        User Key  (r) = Recorded By, (t) = Taken By, (c) = Cosigned By    Initials Name Provider Type    LR Courtney Jo, PT Physical Therapist        Therapy Suggested Charges     Code   Minutes Charges    None           Therapy Charges for Today     Code Description Service Date Service Provider Modifiers Qty    [id number] HC PT EVAL LOW COMPLEXITY 3 2/9/2019 Courtney Jo, PT GP 1          PT G-Codes  Outcome Measure Options: AM-PAC 6 Clicks Basic Mobility (PT)  AM-PAC 6 Clicks Score: 18  Score: 15    PT Discharge Summary  Anticipated Discharge Disposition (PT): home with assist  Reason for Discharge: At baseline function, Independent, Maximum functional potential achieved  Outcomes Achieved: Able to achieve all goals within established timeline  Discharge Destination: Home with assist    Courtney Jo, PT  2/9/2019

## 2019-02-09 NOTE — PLAN OF CARE
Problem: Patient Care Overview  Goal: Plan of Care Review  Outcome: Ongoing (interventions implemented as appropriate)   02/09/19 3169   Coping/Psychosocial   Plan of Care Reviewed With patient   OTHER   Outcome Summary OT IE completed. Pt alert and motivated to participate in OT. Pt reports increased pain, but actively participates to complete HEP. Education initiated for R UE: NWB precautions, positioning, sling mgmt, johanne-dressing, and axilla care. OT to follow. D/C plan is home with spouse assist.

## 2019-02-10 VITALS
RESPIRATION RATE: 16 BRPM | HEART RATE: 72 BPM | TEMPERATURE: 98.6 F | SYSTOLIC BLOOD PRESSURE: 131 MMHG | DIASTOLIC BLOOD PRESSURE: 65 MMHG | OXYGEN SATURATION: 99 %

## 2019-02-10 PROCEDURE — G0378 HOSPITAL OBSERVATION PER HR: HCPCS

## 2019-02-10 PROCEDURE — 63710000001 TRAMADOL 50 MG TABLET: Performed by: ORTHOPAEDIC SURGERY

## 2019-02-10 PROCEDURE — A9270 NON-COVERED ITEM OR SERVICE: HCPCS | Performed by: ORTHOPAEDIC SURGERY

## 2019-02-10 PROCEDURE — 63710000001 PANTOPRAZOLE 40 MG TABLET DELAYED-RELEASE: Performed by: ORTHOPAEDIC SURGERY

## 2019-02-10 PROCEDURE — 97535 SELF CARE MNGMENT TRAINING: CPT

## 2019-02-10 PROCEDURE — 63710000001 HYDROXYCHLOROQUINE 200 MG TABLET: Performed by: ORTHOPAEDIC SURGERY

## 2019-02-10 PROCEDURE — 94799 UNLISTED PULMONARY SVC/PX: CPT

## 2019-02-10 PROCEDURE — 63710000001 ACETAMINOPHEN 500 MG TABLET: Performed by: ORTHOPAEDIC SURGERY

## 2019-02-10 PROCEDURE — 97110 THERAPEUTIC EXERCISES: CPT

## 2019-02-10 RX ORDER — PSEUDOEPHEDRINE HCL 30 MG
100 TABLET ORAL 2 TIMES DAILY PRN
Qty: 60 EACH | Refills: 0 | Status: SHIPPED | OUTPATIENT
Start: 2019-02-10 | End: 2020-11-11

## 2019-02-10 RX ORDER — TRAMADOL HYDROCHLORIDE 50 MG/1
50 TABLET ORAL EVERY 6 HOURS PRN
Qty: 60 TABLET | Refills: 0 | Status: SHIPPED | OUTPATIENT
Start: 2019-02-10 | End: 2019-02-25

## 2019-02-10 RX ADMIN — PANTOPRAZOLE SODIUM 40 MG: 40 TABLET, DELAYED RELEASE ORAL at 06:21

## 2019-02-10 RX ADMIN — ACETAMINOPHEN 1000 MG: 500 TABLET, FILM COATED ORAL at 06:21

## 2019-02-10 RX ADMIN — ACETAMINOPHEN 1000 MG: 500 TABLET, FILM COATED ORAL at 11:35

## 2019-02-10 RX ADMIN — PANTOPRAZOLE SODIUM 40 MG: 40 TABLET, DELAYED RELEASE ORAL at 08:14

## 2019-02-10 RX ADMIN — HYDROXYCHLOROQUINE SULFATE 200 MG: 200 TABLET, FILM COATED ORAL at 08:14

## 2019-02-10 RX ADMIN — TRAMADOL HYDROCHLORIDE 50 MG: 50 TABLET, COATED ORAL at 06:31

## 2019-02-10 NOTE — DISCHARGE INSTR - ACTIVITY
Do not bear any weight on your right arm.     Wear your sling as directed.     You may perform elbow, wrist, and hand range of motion exercises as instructed by therapy.

## 2019-02-10 NOTE — DISCHARGE INSTRUCTIONS
You have an aquacel dressing in place. This dressing is waterproof and you may shower with it in place. It will remain in place for 2 weeks.     You have a nerve block to assist with pain control. Please call the phone number provided by anesthesiology if you have any questions, problems, or concerns regarding your nerve block.

## 2019-02-10 NOTE — PLAN OF CARE
Problem: Patient Care Overview  Goal: Plan of Care Review  Outcome: Ongoing (interventions implemented as appropriate)   02/09/19 1840 02/10/19 0519   Coping/Psychosocial   Plan of Care Reviewed With patient --    Plan of Care Review   Progress improving --    OTHER   Outcome Summary --  patient had a good evening with regard to pain. seems better controlled. sleep/rest was intermittent. no changes required for ARROW pump. New medication Tramadol 50mg po effective. surgical and nerve block sites appear CDI. will monitor patient.       Problem: Skin Injury Risk (Adult)  Goal: Identify Related Risk Factors and Signs and Symptoms  Outcome: Ongoing (interventions implemented as appropriate)    Goal: Skin Health and Integrity  Outcome: Ongoing (interventions implemented as appropriate)      Problem: Fall Risk (Adult)  Goal: Identify Related Risk Factors and Signs and Symptoms  Outcome: Ongoing (interventions implemented as appropriate)    Goal: Absence of Fall  Outcome: Ongoing (interventions implemented as appropriate)

## 2019-02-10 NOTE — PROGRESS NOTES
I certify that this patient requires inpatient admission for greater than 2 midnights due to other right proximal humerus fracture ORIF    Orthopedic Progress Note      Patient: Aleena Armstrong  YOB: 1950     Date of Admission: 2/8/2019  5:31 AM Medical Record Number: [id number]     Attending Physician: Yonatan Galdamez MD    Status Post:  Procedure(s):  ORIF RIGHT PROXIMAL HUMERUS Post Operative Day Number: 2    Subjective : No new orthopaedic complaints     Pain Relief: some relief with present medication.     Systemic Complaints: No Complaints  Pain much improved       Vitals:    02/09/19 1943 02/09/19 2337 02/10/19 0338 02/10/19 0737   BP: 119/63 113/65 125/72 121/68   BP Location: Left arm Left arm Left arm Left arm   Patient Position: Lying Lying Lying Lying   Pulse: 74 69 73 67   Resp: 14 14 14 14   Temp: 98.7 °F (37.1 °C) 98.5 °F (36.9 °C) 98.2 °F (36.8 °C) 98.6 °F (37 °C)   TempSrc: Oral Oral Oral Oral   SpO2: 93% 96% 94% 97%       Physical Exam: 68 y.o. female    General Appearance:       Alert, cooperative, in no acute distress                  Extremities:    Dressing Clean, Dry and Intact                No clinical sign of DVT        Able to do good movements of digits    Pulses:   Pulses palpable and equal bilaterally           Diagnostic Tests:     Results from last 7 days   Lab Units 02/09/19  0812 02/04/19  1111   WBC 10*3/mm3 7.95 5.27   HEMOGLOBIN g/dL 8.1* 9.9*   HEMATOCRIT % 25.8* 30.4*   PLATELETS 10*3/mm3 273 215     Results from last 7 days   Lab Units 02/09/19  0812 02/04/19  1111   SODIUM mmol/L 138 139   POTASSIUM mmol/L 3.7 3.7   CHLORIDE mmol/L 103 105   CO2 mmol/L 34.0* 30.0   BUN mg/dL 15 16   CREATININE mg/dL 0.89 0.98   GLUCOSE mg/dL 106* 86   CALCIUM mg/dL 8.4* 8.7     Results from last 7 days   Lab Units 02/04/19  1111   INR  0.94   APTT seconds 26.8     No results found for: URICACID  No results found for: CRYSTAL  Microbiology Results (last 10 days)     ** No  results found for the last 240 hours. **        Fl C Arm During Surgery    Result Date: 2/8/2019  Intraoperative fluoroscopy utilized during right humeral ORIF.  D:  02/08/2019 E:  02/08/2019    This report was finalized on 2/8/2019 2:12 PM by Dr. Nicholas Dugan.      Xr Chest Pa & Lateral    Result Date: 2/5/2019  Right shoulder fracture. No evidence of active chest disease.  D:  02/04/2019 E:  02/05/2019  This report was finalized on 2/5/2019 9:18 PM by DR. John aFgan MD.          Current Medications:  Scheduled Meds:  acetaminophen 1,000 mg Oral Q8H   aspirin 81 mg Oral Daily   escitalopram 10 mg Oral Daily   hydroxychloroquine 200 mg Oral Q12H   pantoprazole 40 mg Oral BID AC   timolol 1 drop Left Eye Q24H   traZODone 50 mg Oral Nightly   vitamin B-12 1,000 mcg Oral Daily     Continuous Infusions:  lactated ringers 100 mL/hr    Ropivacine HCl-NaCl 6 mL/hr Last Rate: 6 mL/hr (02/09/19 0548)   sodium chloride 50 mL/hr Last Rate: Stopped (02/09/19 0006)     PRN Meds:.bisacodyl  •  docusate sodium  •  HYDROmorphone **AND** naloxone  •  HYDROmorphone  •  labetalol  •  magnesium hydroxide  •  ondansetron **OR** ondansetron  •  ondansetron  •  sodium chloride  •  traMADol    Assessment: Status post  ORIF RIGHT HUMERUS PROXIMAL FRACTURE    Patient Active Problem List   Diagnosis   • WPW (Elodia-Parkinson-White syndrome)   • Chest pain   • Morbid obesity (CMS/HCC)   • Dyslipidemia   • Essential hypertension   • Atrial flutter (CMS/HCC)   • HO Atrial fibrillation (CMS/HCC)   • Acute kidney injury (CMS/HCC)   • Insomnia   • Anxiety and depression   • Vitamin D deficiency   • Rheumatoid arthritis (CMS/HCC)   • Glaucoma of left eye   • Peptic ulcer    • Bradycardia   • Paroxysmal atrial fibrillation (CMS/HCC)   • Hematoma of groin   • Proximal humerus fracture   • S/P ORIF right proximal humerus fracture   • Anemia   • Chronic pain   • Acute postoperative pain       PLAN:   Continues current post-op course  Pain control:  dilaudid, tramadol, and standing tylenol   Mobilize with PT as tolerated per protocol    Weight Bearing: NWB DESTINY nunes, ok for elbow wrist hand rom   Discharge Plan: OK to plan for discharge in  today to home  from orthopadic perspective.    Chuy Dee MD    Date: 2/10/2019    Time: 8:33 AM

## 2019-02-10 NOTE — PLAN OF CARE
Problem: Fall Risk (Adult)  Goal: Absence of Fall  Outcome: Outcome(s) achieved Date Met: 02/10/19   02/10/19 0927   Fall Risk (Adult)   Absence of Fall achieves outcome

## 2019-02-10 NOTE — DISCHARGE SUMMARY
Patient Name: Aleena Armstrong  MRN: [id number]  : 1950  DOS: 2/10/2019    Attending: Yonatan Galdamez MD    Primary Care Provider: Aniyah Goldberg MD    Date of Admission:.2019  5:31 AM    Date of Discharge:  2/10/2019    Discharge Diagnosis:   S/P ORIF right proximal humerus fracture    Proximal humerus fracture    HO Atrial fibrillation (CMS/HCC)    Rheumatoid arthritis (CMS/HCC)    Anemia    Acute postoperative pain      Hospital Course  Patient is a 68 y.o. female presented for ORIF right proximal humerus fracture by Dr. Galdamez.     She underwent surgery under [facility]. She tolerated surgery well and was admitted for further medical management. She had a slip and fall at home last Wednesday and fractured the right shoulder. She denies LOC.    She has history of HTN, HLD, and afib. She is followed by Dr. Mon, cardiology, and had preop cardiac clearance.  She has history of RA and is followed by Dr. Florez, rheumatology.    Patient was provided pain medications as needed for pain control, along with interscalene nerve block infusion of Ropivacaine.    Adjustments were made to pain medications to optimize postop pain management. Risks and benefits of opiate medications discussed with patient.    The patient was seen by OT and was taught exercises for her right arm.  The patient used an IS for atelectasis prophylaxis and mechanicals for DVT prophylaxis.  Home medications were resumed as appropriate, and labs were monitored and remained fairly stable.     With the progress she has made, she is ready for DC home today.    The patient will have an arrow pump ( instructed on it during this admit)  Discussed with patient regarding plan and she shows understanding and agreement.          Procedures Performed  Preoperative Diagnosis:right proximal humerus fracture, Neer 3 part     Postoperative Diagnosis: Same     Procedure: ORIF right proximal humerus fracture     Surgeon: Dr. Tam  Rudolph      Pertinent Test Results:    I reviewed the patient's new clinical results.   Results from last 7 days   Lab Units 19  0812 19  1111   WBC 10*3/mm3 7.95 5.27   HEMOGLOBIN g/dL 8.1* 9.9*   HEMATOCRIT % 25.8* 30.4*   PLATELETS 10*3/mm3 273 215     Results from last 7 days   Lab Units 19  0812 19  1111   SODIUM mmol/L 138 139   POTASSIUM mmol/L 3.7 3.7   CHLORIDE mmol/L 103 105   CO2 mmol/L 34.0* 30.0   BUN mg/dL 15 16   CREATININE mg/dL 0.89 0.98   CALCIUM mg/dL 8.4* 8.7   BILIRUBIN mg/dL  --  0.4   ALK PHOS U/L  --  81   ALT (SGPT) U/L  --  20   AST (SGOT) U/L  --  26   GLUCOSE mg/dL 106* 86     I reviewed the patient's new imaging including images and reports.      Occupational therapy: Pt motivated to work with therapy; reports improved pain contol. Education reinforced for RUE: precautions, positioning, sling mgmt, n. cath mgmt, johanne-dressing strategies and axilla care. HEP completed without complaint. Pt set to d/c home with spouse assist. OT to d/c at this time.       Discharge Assessment:    Vital Signs  Visit Vitals  /65 (BP Location: Left arm, Patient Position: Lying)   Pulse 72   Temp 98.6 °F (37 °C) (Oral)   Resp 16   LMP  (LMP Unknown)   SpO2 99%     Temp (24hrs), Av.3 °F (36.8 °C), Min:97 °F (36.1 °C), Max:98.7 °F (37.1 °C)      General Appearance:    Alert, cooperative, in no acute distress   Lungs:     Clear to auscultation,respirations regular, even and                   unlabored    Heart:    Regular rhythm and normal rate, normal S1 and S2   Abdomen:     Normal bowel sounds, no masses, no organomegaly, soft        non-tender, non-distended, no guarding, no rebound                 tenderness   Extremities:   RUE sling, nerve block present. Aquacel CDI. Good movement right digits.   Pulses:   Pulses palpable and equal bilaterally   Skin:   No bleeding, bruising or rash   Neurologic:   Cranial nerves 2 - 12 grossly intact, sensation intact       Discharge  Disposition: Home    Discharge Medications     Discharge Medications      New Medications      Instructions Start Date   docusate sodium 100 MG capsule   100 mg, Oral, 2 Times Daily PRN      traMADol 50 MG tablet  Commonly known as:  ULTRAM   50 mg, Oral, Every 6 Hours PRN         Continue These Medications      Instructions Start Date   acetaminophen 500 MG tablet  Commonly known as:  TYLENOL   500 mg, Oral, Every 6 Hours PRN      aspirin 81 MG EC tablet   81 mg, Oral, Daily      B-12 COMPLIANCE INJECTION 1000 MCG/ML kit  Generic drug:  Cyanocobalamin  Notes to patient:  RESUME AS TAKING AT HOME   Injection, Every 30 Days, THAN MONTHLY -LAST DOSE 2-4-19      vitamin B-12 1000 MCG tablet  Commonly known as:  CYANOCOBALAMIN   5,000 mcg, Oral, Daily      DEXILANT 30 MG capsule  Generic drug:  dexlansoprazole   60 mg, Oral, Daily      escitalopram 20 MG tablet  Commonly known as:  LEXAPRO   20 mg, Oral, Daily      HYDROmorphone 2 MG tablet  Commonly known as:  DILAUDID   2 mg, Oral, Every 6 Hours PRN      hydroxychloroquine 200 MG tablet  Commonly known as:  PLAQUENIL   200 mg, Oral, 2 Times Daily      ondansetron 4 MG tablet  Commonly known as:  ZOFRAN   4 mg, Oral, Every 8 Hours PRN      promethazine 25 MG tablet  Commonly known as:  PHENERGAN   25 mg, Oral, Every 6 Hours PRN      timolol 0.25 % ophthalmic solution  Commonly known as:  BETIMOL   1-2 drops, Left Eye, Daily      traZODone 50 MG tablet  Commonly known as:  DESYREL   50 mg, Oral, Nightly         Stop These Medications    HYDROcodone-acetaminophen 7.5-325 MG per tablet  Commonly known as:  NORCO            Discharge Diet: regular diet    Activity at Discharge: DELMA Pereira    Follow-up Appointments  Dr. Galdamez per his orders    Discharge took over 30 min.    YULI Priest  02/10/19  11:52 AM

## 2019-02-10 NOTE — THERAPY DISCHARGE NOTE
Acute Care - Occupational Therapy Treatment Note/Discharge   [place]     Patient Name: Aleena Armstrong  : 1950  MRN: [id number]  Today's Date: 2/10/2019  Onset of Illness/Injury or Date of Surgery: 19  Date of Referral to OT: 19  Referring Physician: MD Rudolph      Admit Date: 2019    Visit Dx:     ICD-10-CM ICD-9-CM   1. Impaired functional mobility, balance, gait, and endurance Z74.09 V49.89   2. Impaired mobility and ADLs Z74.09 799.89     Patient Active Problem List   Diagnosis   • WPW ([place]-Parkinson-White syndrome)   • Chest pain   • Morbid obesity (CMS/HCC)   • Dyslipidemia   • Essential hypertension   • Atrial flutter (CMS/HCC)   • HO Atrial fibrillation (CMS/HCC)   • Acute kidney injury (CMS/HCC)   • Insomnia   • Anxiety and depression   • Vitamin D deficiency   • Rheumatoid arthritis (CMS/HCC)   • Glaucoma of left eye   • Peptic ulcer    • Bradycardia   • Paroxysmal atrial fibrillation (CMS/HCC)   • Hematoma of groin   • Proximal humerus fracture   • S/P ORIF right proximal humerus fracture   • Anemia   • Chronic pain   • Acute postoperative pain       Therapy Treatment    Rehabilitation Treatment Summary     Row Name 02/10/19 0735             Treatment Time/Intention    Discipline  occupational therapist  -TB      Document Type  therapy note (daily note);discharge evaluation/summary  -TB      Subjective Information  no complaints  -TB      Mode of Treatment  individual therapy  -TB      Patient/Family Observations  No family present; pt supine, room air, sling R UE, n. cath  -TB      Care Plan Review  care plan/treatment goals reviewed;risks/benefits reviewed;patient/other agree to care plan  -TB      Patient Effort  excellent  -TB      Comment  RUE: NWB, sling AAT (off for bath, dress, HEP), AROM R elbow, wrist, hand only  -TB      Existing Precautions/Restrictions  fall;non-weight bearing;right;shoulder  -TB      Patient Response to Treatment  Pt reports good pain control  today  -TB      Recorded by [TB] Lissy Costa, OT 02/10/19 0931      Row Name 02/10/19 0735             Vital Signs    Pre Systolic BP Rehab  -- RN cleared OT  -TB      O2 Delivery Post Treatment  room air  -TB      Pre Patient Position  Supine  -TB      Intra Patient Position  Standing  -TB      Post Patient Position  Sitting  -TB      Recorded by [TB] Lissy Costa, OT 02/10/19 0931      Row Name 02/10/19 0735             Cognitive Assessment/Intervention- PT/OT    Orientation Status (Cognition)  oriented x 4  -TB      Follows Commands (Cognition)  WFL  -TB      Safety Deficit (Cognitive)  safety precautions awareness;mild deficit;safety precautions follow-through/compliance New learning for precautions  -TB      Recorded by [TB] Lissy Costa, OT 02/10/19 0931      Row Name 02/10/19 0735             Safety Issues, Functional Mobility    Safety Issues Affecting Function (Mobility)  safety precaution awareness;safety precautions follow-through/compliance  -TB      Recorded by [TB] Lissy Costa, OT 02/10/19 0931      Row Name 02/10/19 0735             Mobility Assessment/Intervention    Extremity Weight-bearing Status  right upper extremity  -TB      Right Upper Extremity (Weight-bearing Status)  weight-bearing as tolerated (WBAT)  (Significant)   -TB      Recorded by [TB] Lissy Costa, OT 02/10/19 0931      Row Name 02/10/19 0735             Bed Mobility Assessment/Treatment    Bed Mobility Assessment/Treatment  supine-sit;scooting/bridging  -TB      Scooting/Bridging [place] (Bed Mobility)  conditional independence  -TB      Supine-Sit [place] (Bed Mobility)  conditional independence  -TB      Bed Mobility, Safety Issues  decreased use of arms for pushing/pulling  -TB      Assistive Device (Bed Mobility)  bed rails;head of bed elevated  -TB      Recorded by [TB] Lissy Costa, OT 02/10/19 0931      Row Name 02/10/19 0735             Functional  Mobility    Functional Mobility- Ind. Level  supervision required  -TB      Functional Mobility- Comment  good safety, no LOB; pt able to manage n. cath  -TB      Recorded by [TB] Lissy Costa, OT 02/10/19 0931      Row Name 02/10/19 0735             Transfer Assessment/Treatment    Transfer Assessment/Treatment  sit-stand transfer;stand-sit transfer;bed-chair transfer  -TB      Recorded by [TB] Lissy Costa, OT 02/10/19 0931      Row Name 02/10/19 0735             Bed-Chair Transfer    Bed-Chair [place] (Transfers)  supervision  -TB      Recorded by [TB] Lissy Costa, OT 02/10/19 0931      Row Name 02/10/19 0735             Sit-Stand Transfer    Sit-Stand [place] (Transfers)  supervision  -TB      Recorded by [TB] Lissy Costa, OT 02/10/19 0931      Row Name 02/10/19 0735             Stand-Sit Transfer    Stand-Sit [place] (Transfers)  supervision  -TB      Recorded by [TB] Lissy Costa, OT 02/10/19 0931      Row Name 02/10/19 0735             Toilet Transfer    [place] Level (Toilet Transfer)  supervision up with nursing   -TB      Recorded by [TB] Lissy Costa, OT 02/10/19 0931      Row Name 02/10/19 0735             ADL Assessment/Intervention    62586 - OT Self Care/Mgmt Minutes  15  -TB      BADL Assessment/Intervention  bathing;upper body dressing;toileting  -TB      Recorded by [TB] Lissy Costa, OT 02/10/19 0931      Row Name 02/10/19 0735             Bathing Assessment/Intervention    Bathing [place] Level  upper body  -TB      Comment (Bathing)  Education reinforced for R shoulder precautions with ADLs and axilla care  -TB      Recorded by [TB] Lissy Costa, OT 02/10/19 0931      Row Name 02/10/19 0735             Upper Body Dressing Assessment/Training    Upper Body Dressing [place] Level  don;doff;pull-over garment sling mgmt  -TB      Assistive Devices (Upper Body Dressing)  one hand technique   -TB      Comment (Upper Body Dressing)  Education reinforced for R shoulder precautions with ADLs, n. cath mgmt with pull-over shirt and  R UE johanne-dressing via pendulum; Min A sling mgmt this session with cuing  -TB      Recorded by [TB] Lissy Costa, OT 02/10/19 0931      Row Name 02/10/19 0735             Toileting Assessment/Training    [place] Level (Toileting)  adjust/manage clothing;perform perineal hygiene;conditional independence  -TB      Assistive Devices (Toileting)  grab bar/safety frame;raised toilet seat  -TB      Recorded by [TB] Lissy Costa, OT 02/10/19 0931      Row Name 02/10/19 0735             BADL Safety/Performance    Impairments, BADL Safety/Performance  range of motion;strength;pain  -TB      Skilled BADL Treatment/Intervention  BADL process/adaptation training;compensatory training;johanne/one-hand technique  -TB      Recorded by [TB] Lissy Costa, OT 02/10/19 0931      Row Name 02/10/19 0735             Motor Skills Assessment/Interventions    Additional Documentation  Therapeutic Exercise (Group)  -TB      Recorded by [TB] Lissy Costa, OT 02/10/19 0931      Row Name 02/10/19 0735             Therapeutic Exercise    Therapeutic Exercise  seated, upper extremities  -TB      06050 - OT Therapeutic Exercise Minutes  13  -TB      Recorded by [TB] Lissy Costa, OT 02/10/19 0931      Row Name 02/10/19 0735             Upper Extremity Seated Therapeutic Exercise    Performed, Seated Upper Extremity (Therapeutic Exercise)  elbow flexion/extension;wrist flexion/extension;digit flexion/extension  -TB      Exercise Type, Seated Upper Extremity (Therapeutic Exercise)  AROM (active range of motion)  -TB      Restrictions, Seated Upper Extremity (Therapeutic Exercise)  Shoulder precautions. HEP per MD order: R UE AROM elbow, wrist, hand  -TB      Sets/Reps Detail, Seated Upper Extremity (Therapeutic Exercise)  10  -TB      Comment, Seated Upper  Extremity (Therapeutic Exercise)  Pt completes HEP independently with AROM R hand/wrist, SROM R elbow  -TB      Recorded by [TB] Lissy Costa, OT 02/10/19 0931      Row Name 02/10/19 0735             Balance    Balance  dynamic sitting balance;dynamic standing balance  -TB      Recorded by [TB] Lissy Costa, OT 02/10/19 0931      Row Name 02/10/19 0735             Dynamic Sitting Balance    Level of [place], Reaches Outside Midline (Sitting, Dynamic Balance)  supervision  -TB      Comment, Reaches Outside Midline (Sitting, Dynamic Balance)  ADL tasks/HEP  -TB      Recorded by [TB] Lissy Costa, OT 02/10/19 0931      Row Name 02/10/19 0735             Dynamic Standing Balance    Level of [place], Reaches Outside Midline (Standing, Dynamic Balance)  supervision  -TB      Comment, Reaches Outside Midline (Standing, Dynamic Balance)  ADL tasks  -TB      Recorded by [TB] Lissy Costa, OT 02/10/19 0931      Row Name 02/10/19 0735             Positioning and Restraints    Pre-Treatment Position  in bed  -TB      Post Treatment Position  chair  -TB      In Chair  notified nsg;reclined;call light within reach;encouraged to call for assist;exit alarm on;legs elevated R sling intact, n. cath intact  -TB      Recorded by [TB] Lissy Costa, OT 02/10/19 0931      Row Name 02/10/19 0735             Pain Assessment    Additional Documentation  Pain Scale: Word Pre/Post-Treatment (Group)  -TB      Recorded by [TB] Lissy Costa, OT 02/10/19 0931      Row Name 02/10/19 0735             Pain Scale: Numbers Pre/Post-Treatment    Pain Location - Side  Right  -TB      Pain Location  shoulder  -TB      Recorded by [TB] Lissy Costa, OT 02/10/19 0931      Row Name 02/10/19 0735             Pain Scale: Word Pre/Post-Treatment    Pain: Word Scale, Pretreatment  2 - mild pain  -TB      Pain: Word Scale, Post-Treatment  2 - mild pain  -TB      Pre/Post Treatment  Pain Comment  No increase in pain with HEP/activity  -TB      Recorded by [TB] Lissy Costa, OT 02/10/19 0931      Row Name 02/10/19 0735             Orthotic/Prosthetic Management    Orthosis Location  support device  -TB      Recorded by [TB] Lissy Costa, OT 02/10/19 0931      Row Name 02/10/19 0735             Support Device Management    Type (Support/Positioning Device)  right;arm sling  -TB      Therapeutic Indications (Support/Positioning Device)  post-op positioning/protection  -TB      Wearing Schedule (Support/Positioning Device)  remove for exercise;remove for hygiene/bathing  -TB      Orthosis Training (Support/Positioning Device)  patient;activity limitations/precautions;donning/doffing orthosis;able to verbalize training;able to teach back training;requires cues  -TB      Compliance/Wearing Issues (Support/Positioning Device)  patient/caregiver comprehend strategies  -TB      Recorded by [TB] Lissy Costa, OT 02/10/19 0931      Row Name                Wound 02/08/19 1014 Right shoulder incision    Wound - Properties Group Date first assessed: 02/08/19 [MR] Time first assessed: 1014 [MR] Side: Right [MR] Location: shoulder [MR] Type: incision [MR] Recorded by:  [MR] Tamar Resendiz RN 02/08/19 1014    Row Name 02/10/19 0735             Coping    Observed Emotional State  cooperative;hopeful  -TB      Verbalized Emotional State  hopefulness  -TB      Recorded by [TB] Lissy Costa, OT 02/10/19 0931      Row Name 02/10/19 0735             Plan of Care Review    Plan of Care Reviewed With  patient  -TB      Recorded by [TB] Lissy Costa, OT 02/10/19 0931      Row Name 02/10/19 0735             Outcome Summary/Treatment Plan (OT)    Daily Summary of Progress (OT)  progress toward functional goals is good  -TB      Plan for Continued Treatment (OT)  d/c to home with spouse assist today; OT to d/c  -TB      Anticipated Discharge Disposition (OT)  home with assist   -TB      Reason for Discharge (OT Discharge Summary)  patient discharged from this facility  -TB      Recorded by [TB] Igor Lissy Grant, OT 02/10/19 0931        User Key  (r) = Recorded By, (t) = Taken By, (c) = Cosigned By    Initials Name Effective Dates Discipline    TB MaikelmarybethLissy mejia Juanita, OT 06/08/18 -  OT    Tamar Chadwick, RN 09/07/18 -  Nurse        Wound 02/08/19 1014 Right shoulder incision (Active)   Dressing Appearance dry;intact;no drainage 2/10/2019  8:14 AM   Closure JADEN 2/10/2019  8:14 AM   Drainage Amount none 2/10/2019  8:14 AM           Occupational Therapy Education     Title: PT OT SLP Therapies (Done)     Topic: Occupational Therapy (Done)     Point: ADL training (Done)     Description: Instruct learner(s) on proper safety adaptation and remediation techniques during self care or transfers.   Instruct in proper use of assistive devices.    Learning Progress Summary           Patient Acceptance, E,D,TB, VU,DU by TB at 2/10/2019  9:32 AM    Comment:  Education reinforced for RUE: precautions, positioning, sling mgmt, n. cath mgmt with ADLs, johanne-dressing strategies, axilla care and HEP    Acceptance, E,D,TB, VU,DU,NR by TB at 2/9/2019  2:14 PM    Comment:  Education initiated for R UE: NWB precautions, sling mgmt, positioning, johanne-dressing, axilla care and HEP per MD order.                   Point: Home exercise program (Done)     Description: Instruct learner(s) on appropriate technique for monitoring, assisting and/or progressing therapeutic exercises/activities.    Learning Progress Summary           Patient Acceptance, E,D,TB, VU,DU by TB at 2/10/2019  9:32 AM    Comment:  Education reinforced for RUE: precautions, positioning, sling mgmt, n. cath mgmt with ADLs, johanne-dressing strategies, axilla care and HEP    Acceptance, E,D,TB, VU,DU,NR by TB at 2/9/2019  2:14 PM    Comment:  Education initiated for R UE: NWB precautions, sling mgmt, positioning, johanne-dressing, axilla care and  HEP per MD order.                   Point: Precautions (Done)     Description: Instruct learner(s) on prescribed precautions during self-care and functional transfers.    Learning Progress Summary           Patient Acceptance, E,D,TB, VU,DU by TB at 2/10/2019  9:32 AM    Comment:  Education reinforced for RUE: precautions, positioning, sling mgmt, n. cath mgmt with ADLs, johanne-dressing strategies, axilla care and HEP    Acceptance, E,D,TB, STEPHANIE,DU,NR by TB at 2/9/2019  2:14 PM    Comment:  Education initiated for R UE: NWB precautions, sling mgmt, positioning, johanne-dressing, axilla care and HEP per MD order.                               User Key     Initials Effective Dates Name Provider Type Discipline     06/08/18 -  Lissy Costa, OT Occupational Therapist OT                OT Recommendation and Plan  Outcome Summary/Treatment Plan (OT)  Daily Summary of Progress (OT): progress toward functional goals is good  Plan for Continued Treatment (OT): d/c to home with spouse assist today; OT to d/c  Anticipated Equipment Needs at Discharge (OT): (None)  Anticipated Discharge Disposition (OT): home with assist  Reason for Discharge (OT Discharge Summary): patient discharged from this facility  Planned Therapy Interventions (OT Eval): ROM/therapeutic exercise, occupation/activity based interventions, adaptive equipment training  Therapy Frequency (OT Eval): daily  Daily Summary of Progress (OT): progress toward functional goals is good  Plan of Care Review  Plan of Care Reviewed With: patient  Plan of Care Reviewed With: patient  Outcome Summary: Pt motivated to work with therapy; reports improved pain contol. Education reinforced for RUE: precautions, positioning, sling mgmt, n. cath mgmt, johanne-dressing strategies and axilla care. HEP completed without complaint. Pt set to d/c home with spouse assist. OT to d/c at this time.     Outcome Measures     Row Name 02/10/19 0735 02/09/19 1330 02/09/19 1255       How  much help from another person do you currently need...    Turning from your back to your side while in flat bed without using bedrails?  --  3  -LR  --    Moving from lying on back to sitting on the side of a flat bed without bedrails?  --  3  -LR  --    Moving to and from a bed to a chair (including a wheelchair)?  --  3  -LR  --    Standing up from a chair using your arms (e.g., wheelchair, bedside chair)?  --  3  -LR  --    Climbing 3-5 steps with a railing?  --  3  -LR  --    To walk in hospital room?  --  3  -LR  --    AM-PAC 6 Clicks Score  --  18  -LR  --       How much help from another is currently needed...    Putting on and taking off regular lower body clothing?  2  -TB  --  2  -TB    Bathing (including washing, rinsing, and drying)  3  -TB  --  2  -TB    Toileting (which includes using toilet bed pan or urinal)  3  -TB  --  3  -TB    Putting on and taking off regular upper body clothing  2  -TB  --  2  -TB    Taking care of personal grooming (such as brushing teeth)  3  -TB  --  3  -TB    Eating meals  3  -TB  --  3  -TB    Score  16  -TB  --  15  -TB       Functional Assessment    Outcome Measure Options  AM-PAC 6 Clicks Daily Activity (OT)  -TB  AM-PAC 6 Clicks Basic Mobility (PT)  -LR  AM-PAC 6 Clicks Daily Activity (OT)  -TB      User Key  (r) = Recorded By, (t) = Taken By, (c) = Cosigned By    Initials Name Provider Type    TB Lissy Costa OT Occupational Therapist    LR Courtney Jo, PT Physical Therapist           Time Calculation:    Time Calculation- OT     Row Name 02/10/19 0936 02/10/19 0735          Time Calculation- OT    OT Start Time  0735  -TB  --     OT Received On  02/10/19  -TB  --        Timed Charges    57901 - OT Therapeutic Exercise Minutes  --  13  -TB     16104 - OT Self Care/Mgmt Minutes  --  15  -TB       User Key  (r) = Recorded By, (t) = Taken By, (c) = Cosigned By    Initials Name Provider Type    TB Lissy Costa OT Occupational Therapist         Therapy Suggested Charges     Code   Minutes Charges    46054 (CPT®) Hc Ot Neuromusc Re Education Ea 15 Min      99190 (CPT®) Hc Ot Ther Proc Ea 15 Min 13 1    31869 (CPT®) Hc Ot Therapeutic Act Ea 15 Min      77066 (CPT®) Hc Ot Manual Therapy Ea 15 Min      57407 (CPT®) Hc Ot Iontophoresis Ea 15 Min      37878 (CPT®) Hc Ot Elec Stim Ea-Per 15 Min      27395 (CPT®) Hc Ot Ultrasound Ea 15 Min      12956 (CPT®) Hc Ot Self Care/Mgmt/Train Ea 15 Min 15 1    Total  28 2        Therapy Charges for Today     Code Description Service Date Service Provider Modifiers Qty    60930003453 HC OT THERAPEUTIC ACT EA 15 MIN 2/9/2019 Lsisy Costa OT GO 2    21145648428 HC OT EVAL LOW COMPLEXITY 3 2/9/2019 Lissy Costa, OT GO 1    12078804816 HC OT THER PROC EA 15 MIN 2/10/2019 Lissy Costa OT GO 1    00389503074 HC OT SELF CARE/MGMT/TRAIN EA 15 MIN 2/10/2019 Lissy Costa OT GO 1               OT Discharge Summary  Anticipated Discharge Disposition (OT): home with assist    Lissy Costa OT  2/10/2019

## 2019-02-10 NOTE — PLAN OF CARE
Problem: Patient Care Overview  Goal: Plan of Care Review  Outcome: Outcome(s) achieved Date Met: 02/10/19   02/10/19 1393   Coping/Psychosocial   Plan of Care Reviewed With patient   OTHER   Outcome Summary Pt motivated to work with therapy; reports improved pain contol. Education reinforced for RUE: precautions, positioning, sling mgmt, n. cath mgmt, johanne-dressing strategies and axilla care. HEP completed without complaint. Pt set to d/c home with spouse assist. OT to d/c at this time.

## 2019-02-10 NOTE — PROGRESS NOTES
[facility]    Acute pain service Inpatient Progress Note    Patient Name: Aleena Armstrong  :  1950  MRN:  [id number]        Acute Pain  Service Inpatient Progress Note:    Analgesia:Excellent  Pain Score:0/10  LOC: alert and awake  Resp Status: room air  Cardiac: VS stable  Side Effects:None  Catheter Site:clean and dry  Cath type: peripheral nerve cath(MOOG pump)  Infusion rate: 8ml/hr  Catheter Plan:Catheter to remain Insitu  Comments: Ding vewry well since rate increased yesteray DC home today

## 2019-02-11 NOTE — PROGRESS NOTES
[facility]    Nerve Cath Post Op Call    Patient Name: Aleena Armstrong  :  1950  MRN:  [id number]  Date of Discharge: 2/10/2019    Nerve Cath Post Op Call:    Catheter Plan:Patient/Family member report nerve catheter previously discontinued, tip intact

## 2019-02-11 NOTE — PROGRESS NOTES
[facility]    Nerve Cath Post Op Call    Patient Name: Aleena Armstrong  :  1950  MRN:  [id number]  Date of Discharge: 2/10/2019    Nerve Cath Post Op Call:    Catheter Plan:Patient/Family member report nerve catheter previously discontinued, tip intact
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
## 2019-03-08 ENCOUNTER — CONSULT (OUTPATIENT)
Dept: ONCOLOGY | Facility: CLINIC | Age: 69
End: 2019-03-08

## 2019-03-08 ENCOUNTER — APPOINTMENT (OUTPATIENT)
Dept: LAB | Facility: HOSPITAL | Age: 69
End: 2019-03-08

## 2019-03-08 VITALS
SYSTOLIC BLOOD PRESSURE: 141 MMHG | RESPIRATION RATE: 18 BRPM | HEART RATE: 77 BPM | TEMPERATURE: 98.2 F | DIASTOLIC BLOOD PRESSURE: 68 MMHG | WEIGHT: 161 LBS | HEIGHT: 66 IN | OXYGEN SATURATION: 98 % | BODY MASS INDEX: 25.88 KG/M2

## 2019-03-08 DIAGNOSIS — D64.9 ANEMIA, UNSPECIFIED TYPE: Primary | ICD-10-CM

## 2019-03-08 LAB
ERYTHROCYTE [DISTWIDTH] IN BLOOD BY AUTOMATED COUNT: 14.8 % (ref 11.3–14.5)
FERRITIN SERPL-MCNC: 52 NG/ML (ref 10–291)
HCT VFR BLD AUTO: 30.8 % (ref 34.5–44)
HGB BLD-MCNC: 10.2 G/DL (ref 11.5–15.5)
IRON 24H UR-MRATE: 84 MCG/DL (ref 50–175)
IRON SATN MFR SERPL: 26 % (ref 15–50)
LDH SERPL-CCNC: 187 U/L (ref 120–246)
LYMPHOCYTES # BLD AUTO: 1.5 10*3/MM3 (ref 0.6–4.8)
LYMPHOCYTES NFR BLD AUTO: 33.5 % (ref 24–44)
MCH RBC QN AUTO: 31.8 PG (ref 27–31)
MCHC RBC AUTO-ENTMCNC: 33.1 G/DL (ref 32–36)
MCV RBC AUTO: 96.1 FL (ref 80–99)
MONOCYTES # BLD AUTO: 0.2 10*3/MM3 (ref 0–1)
MONOCYTES NFR BLD AUTO: 5.6 % (ref 0–12)
NEUTROPHILS # BLD AUTO: 2.7 10*3/MM3 (ref 1.5–8.3)
NEUTROPHILS NFR BLD AUTO: 60.9 % (ref 41–71)
PLATELET # BLD AUTO: 247 10*3/MM3 (ref 150–450)
PMV BLD AUTO: 8 FL (ref 6–12)
RBC # BLD AUTO: 3.2 10*6/MM3 (ref 3.89–5.14)
RETICS/RBC NFR AUTO: 1.12 % (ref 0.5–1.5)
TIBC SERPL-MCNC: 317 MCG/DL (ref 250–450)
WBC NRBC COR # BLD: 4.4 10*3/MM3 (ref 3.5–10.8)

## 2019-03-08 PROCEDURE — 86334 IMMUNOFIX E-PHORESIS SERUM: CPT | Performed by: INTERNAL MEDICINE

## 2019-03-08 PROCEDURE — 85025 COMPLETE CBC W/AUTO DIFF WBC: CPT | Performed by: INTERNAL MEDICINE

## 2019-03-08 PROCEDURE — 36415 COLL VENOUS BLD VENIPUNCTURE: CPT | Performed by: INTERNAL MEDICINE

## 2019-03-08 PROCEDURE — 84165 PROTEIN E-PHORESIS SERUM: CPT | Performed by: INTERNAL MEDICINE

## 2019-03-08 PROCEDURE — 83615 LACTATE (LD) (LDH) ENZYME: CPT | Performed by: INTERNAL MEDICINE

## 2019-03-08 PROCEDURE — 83540 ASSAY OF IRON: CPT | Performed by: INTERNAL MEDICINE

## 2019-03-08 PROCEDURE — 82784 ASSAY IGA/IGD/IGG/IGM EACH: CPT | Performed by: INTERNAL MEDICINE

## 2019-03-08 PROCEDURE — 99204 OFFICE O/P NEW MOD 45 MIN: CPT | Performed by: INTERNAL MEDICINE

## 2019-03-08 PROCEDURE — 85045 AUTOMATED RETICULOCYTE COUNT: CPT | Performed by: INTERNAL MEDICINE

## 2019-03-08 PROCEDURE — 82728 ASSAY OF FERRITIN: CPT | Performed by: INTERNAL MEDICINE

## 2019-03-08 PROCEDURE — 83550 IRON BINDING TEST: CPT | Performed by: INTERNAL MEDICINE

## 2019-03-08 PROCEDURE — 84155 ASSAY OF PROTEIN SERUM: CPT | Performed by: INTERNAL MEDICINE

## 2019-03-11 LAB
ALBUMIN SERPL-MCNC: 3.2 G/DL (ref 2.9–4.4)
ALBUMIN/GLOB SERPL: 1.2 {RATIO} (ref 0.7–1.7)
ALPHA1 GLOB FLD ELPH-MCNC: 0.2 G/DL (ref 0–0.4)
ALPHA2 GLOB SERPL ELPH-MCNC: 0.6 G/DL (ref 0.4–1)
B-GLOBULIN SERPL ELPH-MCNC: 1 G/DL (ref 0.7–1.3)
GAMMA GLOB SERPL ELPH-MCNC: 0.9 G/DL (ref 0.4–1.8)
GLOBULIN SER CALC-MCNC: 2.8 G/DL (ref 2.2–3.9)
IGA SERPL-MCNC: 319 MG/DL (ref 87–352)
IGG SERPL-MCNC: 798 MG/DL (ref 700–1600)
IGM SERPL-MCNC: 69 MG/DL (ref 26–217)
INTERPRETATION SERPL IEP-IMP: NORMAL
Lab: NORMAL
M-SPIKE: NORMAL G/DL
PROT SERPL-MCNC: 6 G/DL (ref 6–8.5)

## 2019-03-12 ENCOUNTER — TELEPHONE (OUTPATIENT)
Dept: ONCOLOGY | Facility: CLINIC | Age: 69
End: 2019-03-12

## 2019-03-12 DIAGNOSIS — D64.9 ANEMIA, UNSPECIFIED TYPE: Primary | ICD-10-CM

## 2019-03-12 NOTE — TELEPHONE ENCOUNTER
Called patient re: lab results.  hgb has returned to her usual baseline at 10.2.  Iron levels normal.  SPEP normal.  Recommend monitoring for now.  Will plan to see her back in clinic in 4-5 months with repeat labs.

## 2019-05-22 ENCOUNTER — OFFICE VISIT (OUTPATIENT)
Dept: CARDIOLOGY | Facility: CLINIC | Age: 69
End: 2019-05-22

## 2019-05-22 VITALS
OXYGEN SATURATION: 94 % | WEIGHT: 162 LBS | DIASTOLIC BLOOD PRESSURE: 64 MMHG | HEIGHT: 66 IN | HEART RATE: 64 BPM | SYSTOLIC BLOOD PRESSURE: 110 MMHG | BODY MASS INDEX: 26.03 KG/M2

## 2019-05-22 DIAGNOSIS — E78.2 MIXED HYPERLIPIDEMIA: ICD-10-CM

## 2019-05-22 DIAGNOSIS — I25.10 CORONARY ARTERY DISEASE INVOLVING NATIVE CORONARY ARTERY OF NATIVE HEART WITHOUT ANGINA PECTORIS: Primary | ICD-10-CM

## 2019-05-22 DIAGNOSIS — I48.0 PAROXYSMAL ATRIAL FIBRILLATION (HCC): ICD-10-CM

## 2019-05-22 DIAGNOSIS — R00.1 BRADYCARDIA: ICD-10-CM

## 2019-05-22 PROCEDURE — 99214 OFFICE O/P EST MOD 30 MIN: CPT | Performed by: INTERNAL MEDICINE
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
## 2019-06-24 ENCOUNTER — TRANSCRIBE ORDERS (OUTPATIENT)
Dept: ADMINISTRATIVE | Facility: HOSPITAL | Age: 69
End: 2019-06-24

## 2019-06-24 DIAGNOSIS — I77.811 ECTATIC ABDOMINAL AORTA (HCC): Primary | ICD-10-CM

## 2019-07-23 ENCOUNTER — APPOINTMENT (OUTPATIENT)
Dept: LAB | Facility: HOSPITAL | Age: 69
End: 2019-07-23

## 2019-07-23 ENCOUNTER — OFFICE VISIT (OUTPATIENT)
Dept: ONCOLOGY | Facility: CLINIC | Age: 69
End: 2019-07-23

## 2019-07-23 VITALS
RESPIRATION RATE: 24 BRPM | OXYGEN SATURATION: 99 % | DIASTOLIC BLOOD PRESSURE: 60 MMHG | BODY MASS INDEX: 26.68 KG/M2 | HEIGHT: 66 IN | HEART RATE: 72 BPM | SYSTOLIC BLOOD PRESSURE: 116 MMHG | WEIGHT: 166 LBS | TEMPERATURE: 97.3 F

## 2019-07-23 DIAGNOSIS — D64.9 ANEMIA, UNSPECIFIED TYPE: Primary | ICD-10-CM

## 2019-07-23 LAB
ERYTHROCYTE [DISTWIDTH] IN BLOOD BY AUTOMATED COUNT: 13.6 % (ref 12.3–15.4)
FERRITIN SERPL-MCNC: 40.58 NG/ML (ref 13–150)
HCT VFR BLD AUTO: 33.9 % (ref 34–46.6)
HGB BLD-MCNC: 11.4 G/DL (ref 12–15.9)
IRON 24H UR-MRATE: 86 MCG/DL (ref 37–145)
IRON SATN MFR SERPL: 19 % (ref 20–50)
LYMPHOCYTES # BLD AUTO: 1.5 10*3/MM3 (ref 0.7–3.1)
LYMPHOCYTES NFR BLD AUTO: 38.1 % (ref 19.6–45.3)
MCH RBC QN AUTO: 32.7 PG (ref 26.6–33)
MCHC RBC AUTO-ENTMCNC: 33.6 G/DL (ref 31.5–35.7)
MCV RBC AUTO: 97.5 FL (ref 79–97)
MONOCYTES # BLD AUTO: 0.2 10*3/MM3 (ref 0.1–0.9)
MONOCYTES NFR BLD AUTO: 4.9 % (ref 5–12)
NEUTROPHILS # BLD AUTO: 2.3 10*3/MM3 (ref 1.7–7)
NEUTROPHILS NFR BLD AUTO: 57 % (ref 42.7–76)
PLATELET # BLD AUTO: 219 10*3/MM3 (ref 140–450)
PMV BLD AUTO: 8.4 FL (ref 6–12)
RBC # BLD AUTO: 3.47 10*6/MM3 (ref 3.77–5.28)
TIBC SERPL-MCNC: 446 MCG/DL (ref 298–536)
TRANSFERRIN SERPL-MCNC: 299 MG/DL (ref 200–360)
WBC NRBC COR # BLD: 4 10*3/MM3 (ref 3.4–10.8)

## 2019-07-23 PROCEDURE — 82728 ASSAY OF FERRITIN: CPT | Performed by: NURSE PRACTITIONER

## 2019-07-23 PROCEDURE — 99214 OFFICE O/P EST MOD 30 MIN: CPT | Performed by: NURSE PRACTITIONER

## 2019-07-23 PROCEDURE — 36415 COLL VENOUS BLD VENIPUNCTURE: CPT | Performed by: NURSE PRACTITIONER

## 2019-07-23 PROCEDURE — 85025 COMPLETE CBC W/AUTO DIFF WBC: CPT | Performed by: NURSE PRACTITIONER

## 2019-07-23 PROCEDURE — 83540 ASSAY OF IRON: CPT | Performed by: NURSE PRACTITIONER

## 2019-07-23 PROCEDURE — 84466 ASSAY OF TRANSFERRIN: CPT | Performed by: NURSE PRACTITIONER

## 2019-07-23 NOTE — PROGRESS NOTES
"      PROBLEM LIST:  1. Macrocytic anemia  2. Elodia parkinson white syndrome  3. Hypertension  4. Atrial fib/flutter  5. Obesity  6. PUD  7. Rheumatoid arthritis  8. Anxiety/depression  9. Glaucoma  10. Peripheral neuropathy  11. GERD  12. Fibromyalgia  13. History of gastric bypass surgery       Subjective     CHIEF COMPLAINT: anemia     HISTORY OF PRESENT ILLNESS:   Aleena Armstrong returns for follow-up.  She continues on oral ferrous sulfate daily and is tolerating it well. She continues to have fatigue. No chest pain, palpitations, or dyspnea.       Past Medical History, Past Surgical History, Social History, Family History have been reviewed and are without significant changes except as mentioned.    Review of Systems   A comprehensive 14 point review of systems was performed and was negative except as mentioned.    Medications:  The current medication list was reviewed in the EMR    ALLERGIES:    Allergies   Allergen Reactions   • Bactrim [Sulfamethoxazole-Trimethoprim] Other (See Comments)     MOUTH SORES   • Percocet [Oxycodone-Acetaminophen] Other (See Comments)     NIGHT TERRORS    • Statins Myalgia     MYALGIA    • Sulfa Antibiotics Other (See Comments)     MOUTH SORES AND RASH    • Erythromycin Rash     RASH        Objective      /60   Pulse 72   Temp 97.3 °F (36.3 °C) (Temporal)   Resp 24   Ht 167.6 cm (66\")   Wt 75.3 kg (166 lb)   LMP  (LMP Unknown)   SpO2 99%   BMI 26.79 kg/m²      Performance Status: 0    General: well appearing female in no acute distress  Neuro: alert and oriented  HEENT: sclera anicteric, oropharynx clear  Lymphatics: no cervical, supraclavicular, or axillary adenopathy  Cardiovascular: regular rate and rhythm, no murmurs  Lungs: clear to auscultation bilaterally  Abdomen: soft, nontender, nondistended.  No palpable organomegaly  Extremeties: no lower extremity edema  Skin: no rashes, lesions, bruising, or petechiae  Psych: mood and affect appropriate      RECENT " LABS:    Lab Results   Component Value Date    HGB 10.2 (L) 03/08/2019    HGB 8.1 (L) 02/09/2019    HGB 9.9 (L) 02/04/2019     Lab Results   Component Value Date    FERRITIN 52.00 03/08/2019     Lab Results   Component Value Date    MCV 96.1 03/08/2019    MCV 98.5 02/09/2019    .0 (H) 02/04/2019     Lab Results   Component Value Date    TIBC 317 03/08/2019     Lab Results   Component Value Date    LABIRON 26 03/08/2019     Lab Results   Component Value Date    IRON 84 03/08/2019               Assessment/Plan   Aleena Armstrong is a 68 y.o. year old female  with a history of chronic anemia.  Her hemoglobin appears to stay between 10 and 11.  She has very mild macrocytosis.  B12 and folic acid levels in the past have been normal. Hemoglobin 3/8/2019 was 10.2 with normal iron and ferritin levels.       I will recheck her CBC, ferritin and iron profile today. If she is iron deficient we will look at IV iron infusion since she is taking oral iron and most likely has malabsorption issues related to prior gastric bypass surgery.     I will contact her by phone with her lab results.     Follow up in 4 months.               I spent 25 minutes with the patient. I spent > 50% percent of this time counseling and discussing prognosis, diagnostic testing, evaluation, current status and management.        Suze Panchal, YULI  [facility]ematology [facility] On[facility]    7/23/2019          CC:

## 2019-08-07 ENCOUNTER — HOSPITAL ENCOUNTER (OUTPATIENT)
Dept: ULTRASOUND IMAGING | Facility: HOSPITAL | Age: 69
Discharge: HOME | End: 2019-08-07
Admitting: INTERNAL MEDICINE

## 2019-08-07 DIAGNOSIS — I77.811 ECTATIC ABDOMINAL AORTA (HCC): ICD-10-CM

## 2019-08-07 PROCEDURE — 76775 US EXAM ABDO BACK WALL LIM: CPT

## 2019-12-04 ENCOUNTER — OFFICE VISIT (OUTPATIENT)
Dept: ONCOLOGY | Facility: CLINIC | Age: 69
End: 2019-12-04

## 2019-12-04 ENCOUNTER — APPOINTMENT (OUTPATIENT)
Dept: LAB | Facility: HOSPITAL | Age: 69
End: 2019-12-04

## 2019-12-04 VITALS
HEART RATE: 62 BPM | DIASTOLIC BLOOD PRESSURE: 61 MMHG | SYSTOLIC BLOOD PRESSURE: 127 MMHG | WEIGHT: 168 LBS | BODY MASS INDEX: 27 KG/M2 | RESPIRATION RATE: 18 BRPM | TEMPERATURE: 97.7 F | OXYGEN SATURATION: 98 % | HEIGHT: 66 IN

## 2019-12-04 DIAGNOSIS — D64.9 ANEMIA, UNSPECIFIED TYPE: Primary | ICD-10-CM

## 2019-12-04 LAB
ERYTHROCYTE [DISTWIDTH] IN BLOOD BY AUTOMATED COUNT: 14.9 % (ref 12.3–15.4)
FERRITIN SERPL-MCNC: 56.4 NG/ML (ref 13–150)
HCT VFR BLD AUTO: 34.4 % (ref 34–46.6)
HGB BLD-MCNC: 11.2 G/DL (ref 12–15.9)
IRON 24H UR-MRATE: 80 MCG/DL (ref 37–145)
IRON SATN MFR SERPL: 18 % (ref 20–50)
LYMPHOCYTES # BLD AUTO: 1.5 10*3/MM3 (ref 0.7–3.1)
LYMPHOCYTES NFR BLD AUTO: 36.9 % (ref 19.6–45.3)
MCH RBC QN AUTO: 32.7 PG (ref 26.6–33)
MCHC RBC AUTO-ENTMCNC: 32.5 G/DL (ref 31.5–35.7)
MCV RBC AUTO: 100.6 FL (ref 79–97)
MONOCYTES # BLD AUTO: 0.2 10*3/MM3 (ref 0.1–0.9)
MONOCYTES NFR BLD AUTO: 4.1 % (ref 5–12)
NEUTROPHILS # BLD AUTO: 2.4 10*3/MM3 (ref 1.7–7)
NEUTROPHILS NFR BLD AUTO: 59 % (ref 42.7–76)
PLATELET # BLD AUTO: 212 10*3/MM3 (ref 140–450)
PMV BLD AUTO: 6.9 FL (ref 6–12)
RBC # BLD AUTO: 3.42 10*6/MM3 (ref 3.77–5.28)
TIBC SERPL-MCNC: 435 MCG/DL (ref 298–536)
TRANSFERRIN SERPL-MCNC: 292 MG/DL (ref 200–360)
WBC NRBC COR # BLD: 4.1 10*3/MM3 (ref 3.4–10.8)

## 2019-12-04 PROCEDURE — 85025 COMPLETE CBC W/AUTO DIFF WBC: CPT | Performed by: INTERNAL MEDICINE

## 2019-12-04 PROCEDURE — 84466 ASSAY OF TRANSFERRIN: CPT | Performed by: INTERNAL MEDICINE

## 2019-12-04 PROCEDURE — 36415 COLL VENOUS BLD VENIPUNCTURE: CPT | Performed by: INTERNAL MEDICINE

## 2019-12-04 PROCEDURE — 99213 OFFICE O/P EST LOW 20 MIN: CPT | Performed by: INTERNAL MEDICINE

## 2019-12-04 PROCEDURE — 82728 ASSAY OF FERRITIN: CPT | Performed by: INTERNAL MEDICINE

## 2019-12-04 PROCEDURE — 83540 ASSAY OF IRON: CPT | Performed by: INTERNAL MEDICINE

## 2019-12-04 NOTE — PROGRESS NOTES
"      PROBLEM LIST:  1. Macrocytic anemia  2. Elodia parkinson white syndrome  3. Hypertension  4. Atrial fib/flutter  5. Obesity  6. PUD  7. Rheumatoid arthritis  8. Anxiety/depression  9. Glaucoma  10. Peripheral neuropathy  11. GERD  12. Fibromyalgia  13. History of gastric bypass surgery       Subjective     CHIEF COMPLAINT: anemia     HISTORY OF PRESENT ILLNESS:   Aleena Armstrong returns for follow-up.  She says she is doing well.  She has not noticed any change in her energy level.  She has not had any recent labs.    Past Medical History, Past Surgical History, Social History, Family History have been reviewed and are without significant changes except as mentioned.    Review of Systems   A comprehensive 14 point review of systems was performed and was negative except as mentioned.    Medications:  The current medication list was reviewed in the EMR    ALLERGIES:    Allergies   Allergen Reactions   • Bactrim [Sulfamethoxazole-Trimethoprim] Other (See Comments)     MOUTH SORES   • Percocet [Oxycodone-Acetaminophen] Other (See Comments)     NIGHT TERRORS    • Statins Myalgia     MYALGIA    • Sulfa Antibiotics Other (See Comments)     MOUTH SORES AND RASH    • Erythromycin Rash     RASH        Objective      /61 Comment: LUE  Pulse 62   Temp 97.7 °F (36.5 °C) (Temporal)   Resp 18   Ht 167.6 cm (66\")   Wt 76.2 kg (168 lb)   LMP  (LMP Unknown)   SpO2 98% Comment: RA  BMI 27.12 kg/m²      Performance Status: 0    General: well appearing female in no acute distress  Neuro: alert and oriented  HEENT: sclera anicteric, oropharynx clear  Lymphatics: no cervical, supraclavicular, or axillary adenopathy  Cardiovascular: regular rate and rhythm, no murmurs  Lungs: clear to auscultation bilaterally  Abdomen: soft, nontender, nondistended.  No palpable organomegaly  Extremeties: no lower extremity edema  Skin: no rashes, lesions, bruising, or petechiae  Psych: mood and affect appropriate      RECENT LABS:    Lab " Results   Component Value Date    HGB 11.4 (L) 07/23/2019    HGB 10.2 (L) 03/08/2019    HGB 8.1 (L) 02/09/2019     Lab Results   Component Value Date    FERRITIN 40.58 07/23/2019    FERRITIN 52.00 03/08/2019     Lab Results   Component Value Date    MCV 97.5 (H) 07/23/2019    MCV 96.1 03/08/2019    MCV 98.5 02/09/2019     Lab Results   Component Value Date    TIBC 446 07/23/2019    TIBC 317 03/08/2019     Lab Results   Component Value Date    LABIRON 19 (L) 07/23/2019    LABIRON 26 03/08/2019     Lab Results   Component Value Date    IRON 86 07/23/2019    IRON 84 03/08/2019               Assessment/Plan   Aleena Armstrong is a 68 y.o. year old female  with a history of chronic anemia.    At her last visit her hemoglobin was 11.4 with adequate iron levels.  If her values are similar today, I will likely release her from this clinic and have her continue monitoring with Dr. Goldberg.  I will certainly be happy to see her again in the future if her hemoglobin drops significantly.    I will contact her by phone with her lab results.     Follow up PRN.              I spent 15 minutes with the patient. I spent > 50% percent of this time counseling and discussing prognosis, diagnostic testing, evaluation, current status and management.        Mey Monique MD  [facility] Hematology and Oncology    12/4/2019          CC:

## 2020-07-10 ENCOUNTER — HOSPITAL ENCOUNTER (OUTPATIENT)
Dept: GENERAL RADIOLOGY | Facility: HOSPITAL | Age: 70
Discharge: HOME | End: 2020-07-10
Admitting: INTERNAL MEDICINE

## 2020-07-10 ENCOUNTER — TRANSCRIBE ORDERS (OUTPATIENT)
Dept: ADMINISTRATIVE | Facility: HOSPITAL | Age: 70
End: 2020-07-10

## 2020-07-10 DIAGNOSIS — M79.672 ACUTE FOOT PAIN, LEFT: Primary | ICD-10-CM

## 2020-07-10 PROCEDURE — 73610 X-RAY EXAM OF ANKLE: CPT

## 2020-07-10 PROCEDURE — 73630 X-RAY EXAM OF FOOT: CPT

## 2020-10-08 ENCOUNTER — TRANSCRIBE ORDERS (OUTPATIENT)
Dept: ADMINISTRATIVE | Facility: HOSPITAL | Age: 70
End: 2020-10-08

## 2020-10-08 DIAGNOSIS — R29.818 POSITIVE ROMBERG TEST: Primary | ICD-10-CM

## 2020-10-15 ENCOUNTER — HOSPITAL ENCOUNTER (OUTPATIENT)
Dept: CT IMAGING | Facility: HOSPITAL | Age: 70
Discharge: HOME | End: 2020-10-15
Admitting: PHYSICIAN ASSISTANT

## 2020-10-15 DIAGNOSIS — R29.818 POSITIVE ROMBERG TEST: ICD-10-CM

## 2020-10-15 PROCEDURE — 70450 CT HEAD/BRAIN W/O DYE: CPT

## 2020-11-10 NOTE — PROGRESS NOTES
[facility] Cardiology  Office Progress Note  Aleena Armstrong  1950  [address] [facility] [address]       Visit Date: 11/11/20    PCP: Aniyah Goldberg MD  [address]  [facility] [address]    IDENTIFICATION: A 69 y.o. female     PROBLEM LIST:   1. Atrial flutter.   A) [facility] ED presentation, 10/23/2016, with rapid atrial flutter converted after IV diltiazem.    B) Nl LVEF catheterization, 10/24/2016.    C) Echocardiogram, 10/23/2016, revealing LVEF of 60%, mild MR, mild TR, LV diastolic dysfunction, Dominic Wolff MD.    D) 6/20/17 PVA Joyce-R groin bleed requiring 2UPRBC              E) [id number] = 3 (age, HTN, female) Off Eliquis 2018 and on ASA 81 mg  2. Chest pain.   A) Symptoms of chest discomfort with radiation to the neck and jaw, 10/23/2016.   B) Negative troponin x2.    C) Cardiac catheterization, 10/24/2016, Rubens Morejon MD, revealing nonobstructive plaque involving a large obtuse marginal and the apical LAD without evidence of hemodynamically significant CAD, acceptable FFR of the OM (0.95) and of apical LAD (0.83). LVEF of 60%.    D) Medical therapy and risk factor modification.   3. History of WPW.   4. Dyslipidemia- lipophilic statin intolerant  5. Morbid obesity.   A) Prior gastric bypass  6. Hypertension.   7. Gastric ulcer, diagnosed 08/2016, per EGD secondary to Plaquenil therapy.   8.R proximal humerus fracture s/p ORIF Dr. Galdamez      CC:   Chief Complaint   Patient presents with   • Coronary artery disease involving native coronary artery of        Allergies  Allergies   Allergen Reactions   • Bactrim [Sulfamethoxazole-Trimethoprim] Other (See Comments)     MOUTH SORES   • Percocet [Oxycodone-Acetaminophen] Other (See Comments)     NIGHT TERRORS    • Statins Myalgia     MYALGIA    • Sulfa Antibiotics Other (See Comments)     MOUTH SORES AND RASH    • Erythromycin Rash     RASH        Current Medications    Current Outpatient  "Medications:   •  aspirin 81 MG EC tablet, Take 81 mg by mouth Daily., Disp: , Rfl:   •  Cyanocobalamin (B-12 COMPLIANCE INJECTION) 1000 MCG/ML kit, Inject  as directed Every 30 (Thirty) Days. THAN MONTHLY -LAST DOSE 2-4-19, Disp: , Rfl:   •  dexlansoprazole (DEXILANT) 30 MG capsule, Take 60 mg by mouth Daily., Disp: , Rfl:   •  FLUoxetine (PROzac) 40 MG capsule, Take 1 capsule by mouth Daily., Disp: , Rfl:   •  folic acid (FOLVITE) 1 MG tablet, Take 1 mg by mouth Daily., Disp: , Rfl:   •  hydroxychloroquine (PLAQUENIL) 200 MG tablet, Take 200 mg by mouth 2 (Two) Times a Day., Disp: , Rfl:   •  methotrexate 2.5 MG tablet, Take 15 mg by mouth 1 (One) Time Per Week., Disp: , Rfl:   •  ondansetron (ZOFRAN) 4 MG tablet, Take 1 tablet by mouth Every 8 (Eight) Hours As Needed for post op nausea, Disp: 30 tablet, Rfl: 0  •  promethazine (PHENERGAN) 25 MG tablet, Take 25 mg by mouth Every 6 (Six) Hours As Needed for Nausea or Vomiting., Disp: , Rfl:   •  traZODone (DESYREL) 100 MG tablet, Take 100 mg by mouth Every Night., Disp: , Rfl:   •  vitamin B-12 (CYANOCOBALAMIN) 1000 MCG tablet, Take 5,000 mcg by mouth Daily., Disp: , Rfl:   •  acetaminophen (TYLENOL) 500 MG tablet, Take 500 mg by mouth Every 6 (Six) Hours As Needed for Mild Pain (1-3)., Disp: , Rfl:       History of Present Illness   Aleena Armstrong is a 69 y.o. year old female here for follow up.  She has had some recurrence of palpitations typically nocturnally.  She is concerned that maybe atrial fibrillation.  She did fall recently just tripped and hit some stairs.  Had increased stress that she is taking care of her  with recent shoulder surgery          OBJECTIVE:  Vitals:    11/11/20 1456   BP: 118/68   BP Location: Right arm   Patient Position: Sitting   Pulse: 68   Weight: 68 kg (150 lb)   Height: 167.6 cm (66\")     Constitutional:       Appearance: Healthy appearance. Not in distress.   Neck:      Vascular: No JVR. JVD normal.   Pulmonary:      " Effort: Pulmonary effort is normal.      Breath sounds: Normal breath sounds. No wheezing. No rhonchi. No rales.   Chest:      Chest wall: Not tender to palpatation.   Cardiovascular:      PMI at left midclavicular line. Normal rate. Regular rhythm. Normal S1. Normal S2.      Murmurs: There is no murmur.      No gallop. No click. No rub.   Pulses:     Intact distal pulses.   Edema:     Peripheral edema absent.   Abdominal:      General: Bowel sounds are normal.      Palpations: Abdomen is soft.      Tenderness: There is no abdominal tenderness.   Musculoskeletal: Normal range of motion.         General: No tenderness.   Skin:     General: Skin is warm and dry.   Neurological:      General: No focal deficit present.      Mental Status: Alert and oriented to person, place and time.         Diagnostic Data:  Procedures      ASSESSMENT:   Diagnosis Plan   1. Palpitations     2. Paroxysmal atrial fibrillation (CMS/HCC)     3. Essential hypertension         PLAN:    Palpitations historical atrial flutter follow-up 1 week E patch at current    Hypertension controlled off medication may need to add AV bryan blocking agent pending E patch      Aniyah Goldberg MD, thank you for referring Ms. Armstrong for evaluation.  I have forwarded my electronically generated recommendations to you for review.  Please do not hesitate to call with any questions.      Dominic Wolff MD, [facility]

## 2020-11-11 ENCOUNTER — OFFICE VISIT (OUTPATIENT)
Dept: CARDIOLOGY | Facility: CLINIC | Age: 70
End: 2020-11-11

## 2020-11-11 VITALS
HEART RATE: 68 BPM | SYSTOLIC BLOOD PRESSURE: 118 MMHG | DIASTOLIC BLOOD PRESSURE: 68 MMHG | HEIGHT: 66 IN | BODY MASS INDEX: 24.11 KG/M2 | WEIGHT: 150 LBS

## 2020-11-11 DIAGNOSIS — R00.2 PALPITATIONS: Primary | ICD-10-CM

## 2020-11-11 DIAGNOSIS — I48.0 PAROXYSMAL ATRIAL FIBRILLATION (HCC): ICD-10-CM

## 2020-11-11 DIAGNOSIS — I45.6 WPW (WOLFF-PARKINSON-WHITE SYNDROME): Primary | ICD-10-CM

## 2020-11-11 DIAGNOSIS — I10 ESSENTIAL HYPERTENSION: ICD-10-CM

## 2020-11-11 PROCEDURE — 99213 OFFICE O/P EST LOW 20 MIN: CPT | Performed by: INTERNAL MEDICINE

## 2020-11-11 RX ORDER — FOLIC ACID 1 MG/1
1 TABLET ORAL DAILY
COMMUNITY
End: 2021-05-12

## 2020-11-11 RX ORDER — FLUOXETINE HYDROCHLORIDE 40 MG/1
1 CAPSULE ORAL DAILY
COMMUNITY

## 2020-12-02 ENCOUNTER — TELEPHONE (OUTPATIENT)
Dept: CARDIOLOGY | Facility: CLINIC | Age: 70
End: 2020-12-02

## 2020-12-02 NOTE — TELEPHONE ENCOUNTER
LVM with patient that per  most recent holter monitor had showed afib and she is to restart elqquis 5 mg BID and begin metoprolol succ 25 mg daily. Pt is to call nurse back with any questions and advise where she wants scripts to be sent to.

## 2020-12-04 RX ORDER — METOPROLOL SUCCINATE 25 MG/1
25 TABLET, EXTENDED RELEASE ORAL DAILY
Qty: 90 TABLET | Refills: 1 | Status: SHIPPED | OUTPATIENT
Start: 2020-12-04 | End: 2021-05-12 | Stop reason: SDUPTHER

## 2021-05-12 ENCOUNTER — OFFICE VISIT (OUTPATIENT)
Dept: CARDIOLOGY | Facility: CLINIC | Age: 71
End: 2021-05-12

## 2021-05-12 VITALS
SYSTOLIC BLOOD PRESSURE: 122 MMHG | WEIGHT: 158 LBS | DIASTOLIC BLOOD PRESSURE: 62 MMHG | OXYGEN SATURATION: 98 % | HEIGHT: 66 IN | HEART RATE: 60 BPM | BODY MASS INDEX: 25.39 KG/M2

## 2021-05-12 DIAGNOSIS — I10 ESSENTIAL HYPERTENSION: ICD-10-CM

## 2021-05-12 DIAGNOSIS — I25.10 CORONARY ARTERY DISEASE INVOLVING NATIVE CORONARY ARTERY OF NATIVE HEART WITHOUT ANGINA PECTORIS: Primary | ICD-10-CM

## 2021-05-12 DIAGNOSIS — I48.0 PAROXYSMAL ATRIAL FIBRILLATION (HCC): ICD-10-CM

## 2021-05-12 PROCEDURE — 99214 OFFICE O/P EST MOD 30 MIN: CPT | Performed by: INTERNAL MEDICINE

## 2021-05-12 RX ORDER — METOPROLOL SUCCINATE 25 MG/1
25 TABLET, EXTENDED RELEASE ORAL DAILY
Qty: 90 TABLET | Refills: 1 | Status: ON HOLD | OUTPATIENT
Start: 2021-05-12 | End: 2021-11-23 | Stop reason: SDUPTHER

## 2021-05-12 RX ORDER — TIMOLOL MALEATE 5 MG/ML
SOLUTION/ DROPS OPHTHALMIC EVERY 12 HOURS SCHEDULED
Status: ON HOLD | COMMUNITY
End: 2021-11-23 | Stop reason: SDUPTHER

## 2021-05-12 NOTE — PROGRESS NOTES
[facility] Cardiology  Office Progress Note  Aleena Armstrong  1950  [address] [facility] [address]       Visit Date: 05/12/21    PCP: Aniyah Goldberg MD  [address] [facility] [address]  [facility] [address]    IDENTIFICATION: A 70 y.o. female     PROBLEM LIST:   1. Atrial flutter.   A)10/16  [facility] ED presentation,  with rapid atrial flutter converted after IV diltiazem.    B) Echocardiogram, 10/23/2016, revealing LVEF of 60%, mild MR, mild TR, LV diastolic dysfunction, Dominic Wolff MD.    C) 6/20/17 PVA Joyce-R groin bleed requiring 2UPRBC              D) [id number] = 3 (age, HTN, female) Off Eliquis 2018 and on ASA 81 mg              E) 11/20 4 day epatch 59/83/187 3.3% afib longest 1.1 hrs, <0.1% pvc recommitted to NOAC /metop  2. Chest pain.   10/16  Cardiac catheterization Rubens Morejon MD, revealing nonobstructive plaque involving a large obtuse marginal and the apical LAD without evidence of hemodynamically significant CAD, acceptable FFR of the OM (0.95) and of apical LAD (0.83). LVEF of 60%.   3. History of WPW? DD  4. Dyslipidemia- lipophilic statin intolerant  10/16 204/68/98H/84  5. Morbid obesity.   A) Prior gastric bypass  6. Hypertension.   7. Gastric ulcer, diagnosed 08/2016, per EGD secondary to Plaquenil therapy.   8.R proximal humerus fracture s/p ORIF Dr. Galdamez      CC:   Chief Complaint   Patient presents with   • Palpitations       Allergies  Allergies   Allergen Reactions   • Bactrim [Sulfamethoxazole-Trimethoprim] Other (See Comments)     MOUTH SORES   • Percocet [Oxycodone-Acetaminophen] Other (See Comments)     NIGHT TERRORS    • Statins Myalgia     MYALGIA    • Sulfa Antibiotics Other (See Comments)     MOUTH SORES AND RASH    • Erythromycin Rash     RASH        Current Medications    Current Outpatient Medications:   •  acetaminophen (TYLENOL) 500 MG tablet, Take 500 mg by mouth Every 6 (Six) Hours As Needed for Mild Pain (1-3)., Disp: , Rfl:  "  •  apixaban (ELIQUIS) 5 MG tablet tablet, Take 1 tablet by mouth Every 12 (Twelve) Hours., Disp: 60 tablet, Rfl: 5  •  dexlansoprazole (Dexilant) 60 MG capsule, Take 60 mg by mouth Daily., Disp: , Rfl:   •  FLUoxetine (PROzac) 40 MG capsule, Take 1 capsule by mouth Daily., Disp: , Rfl:   •  hydroxychloroquine (PLAQUENIL) 200 MG tablet, Take 200 mg by mouth 2 (Two) Times a Day., Disp: , Rfl:   •  metoprolol succinate XL (TOPROL-XL) 25 MG 24 hr tablet, Take 1 tablet by mouth Daily., Disp: 90 tablet, Rfl: 1  •  ondansetron (ZOFRAN) 4 MG tablet, Take 1 tablet by mouth Every 8 (Eight) Hours As Needed for post op nausea, Disp: 30 tablet, Rfl: 0  •  promethazine (PHENERGAN) 25 MG tablet, Take 25 mg by mouth Every 6 (Six) Hours As Needed for Nausea or Vomiting., Disp: , Rfl:   •  timolol (TIMOPTIC) 0.5 % ophthalmic solution, Every 12 (Twelve) Hours., Disp: , Rfl:   •  traZODone (DESYREL) 100 MG tablet, Take 100 mg by mouth Every Night., Disp: , Rfl:       History of Present Illness   Aleena Armstrong is a 70 y.o. year old female here for follow up.  No recurrent tachypalpitations.  She is actually relieved that she was placed back on beta-blocker and NOAC as she was having worsening episodes at night.  She has traveled to [place] in [place] since last visit and is doing life            OBJECTIVE:  Vitals:    05/12/21 0955   BP: 122/62   BP Location: Right arm   Patient Position: Sitting   Pulse: 60   SpO2: 98%   Weight: 71.7 kg (158 lb)   Height: 167.6 cm (66\")     Constitutional:       Appearance: Healthy appearance. Not in distress.   Neck:      Vascular: No JVR. JVD normal.   Pulmonary:      Effort: Pulmonary effort is normal.      Breath sounds: Normal breath sounds. No wheezing. No rhonchi. No rales.   Chest:      Chest wall: Not tender to palpatation.   Cardiovascular:      PMI at left midclavicular line. Normal rate. Regular rhythm. Normal S1. Normal S2.      Murmurs: There is no murmur.      No gallop. No " click. No rub.   Pulses:     Intact distal pulses.   Edema:     Peripheral edema absent.   Abdominal:      General: Bowel sounds are normal.      Palpations: Abdomen is soft.      Tenderness: There is no abdominal tenderness.   Musculoskeletal: Normal range of motion.         General: No tenderness. Skin:     General: Skin is warm and dry.   Neurological:      General: No focal deficit present.      Mental Status: Alert and oriented to person, place and time.         Diagnostic Data:  Procedures      ASSESSMENT:   Diagnosis Plan   1. Coronary artery disease involving native coronary artery of native heart without angina pectoris     2. Paroxysmal atrial fibrillation (CMS/[facility])     3. Essential hypertension         PLAN:    Palpitations short lived a fib recommitted to beta-blockade and Eliquis    Hypertension controlled on metoprolol      Historical nonobstructive CAD with document lipid profile      Aniyah Goldberg MD, thank you for referring Ms. Armstrong for evaluation.  I have forwarded my electronically generated recommendations to you for review.  Please do not hesitate to call with any questions.      Dominic Wolff MD, [facility]

## 2021-05-19 ENCOUNTER — HOSPITAL ENCOUNTER (OUTPATIENT)
Dept: CT IMAGING | Facility: HOSPITAL | Age: 71
Discharge: HOME | End: 2021-05-19
Admitting: PHYSICIAN ASSISTANT

## 2021-05-19 ENCOUNTER — TRANSCRIBE ORDERS (OUTPATIENT)
Dept: ADMINISTRATIVE | Facility: HOSPITAL | Age: 71
End: 2021-05-19

## 2021-05-19 DIAGNOSIS — N39.0 URINARY TRACT INFECTION WITHOUT HEMATURIA, SITE UNSPECIFIED: ICD-10-CM

## 2021-05-19 DIAGNOSIS — N39.0 URINARY TRACT INFECTION WITHOUT HEMATURIA, SITE UNSPECIFIED: Primary | ICD-10-CM

## 2021-05-19 PROCEDURE — 74176 CT ABD & PELVIS W/O CONTRAST: CPT

## 2021-11-15 ENCOUNTER — APPOINTMENT (OUTPATIENT)
Dept: GENERAL RADIOLOGY | Facility: HOSPITAL | Age: 71
End: 2021-11-15

## 2021-11-15 ENCOUNTER — APPOINTMENT (OUTPATIENT)
Dept: CT IMAGING | Facility: HOSPITAL | Age: 71
End: 2021-11-15

## 2021-11-15 ENCOUNTER — HOSPITAL ENCOUNTER (INPATIENT)
Facility: HOSPITAL | Age: 71
LOS: 7 days | Discharge: NURSING, REHAB OR LONG-TERM CARE | End: 2021-11-23
Attending: EMERGENCY MEDICINE | Admitting: INTERNAL MEDICINE
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
## 2021-11-15 DIAGNOSIS — S82.141A CLOSED FRACTURE OF RIGHT TIBIAL PLATEAU, INITIAL ENCOUNTER: ICD-10-CM

## 2021-11-15 DIAGNOSIS — Z98.890 S/P ORIF (OPEN REDUCTION INTERNAL FIXATION) FRACTURE: ICD-10-CM

## 2021-11-15 DIAGNOSIS — Z87.81 S/P ORIF (OPEN REDUCTION INTERNAL FIXATION) FRACTURE: ICD-10-CM

## 2021-11-15 DIAGNOSIS — G89.18 ACUTE POSTOPERATIVE PAIN: ICD-10-CM

## 2021-11-15 DIAGNOSIS — S42.201A CLOSED FRACTURE OF PROXIMAL END OF RIGHT HUMERUS, UNSPECIFIED FRACTURE MORPHOLOGY, INITIAL ENCOUNTER: Primary | ICD-10-CM

## 2021-11-15 DIAGNOSIS — Z79.01 ANTICOAGULATED: ICD-10-CM

## 2021-11-15 PROCEDURE — 25010000002 ONDANSETRON PER 1 MG: Performed by: EMERGENCY MEDICINE

## 2021-11-15 PROCEDURE — 73030 X-RAY EXAM OF SHOULDER: CPT

## 2021-11-15 PROCEDURE — 99284 EMERGENCY DEPT VISIT MOD MDM: CPT

## 2021-11-15 PROCEDURE — 73700 CT LOWER EXTREMITY W/O DYE: CPT

## 2021-11-15 PROCEDURE — 73060 X-RAY EXAM OF HUMERUS: CPT

## 2021-11-15 PROCEDURE — 25010000002 MORPHINE PER 10 MG: Performed by: EMERGENCY MEDICINE

## 2021-11-15 PROCEDURE — 70450 CT HEAD/BRAIN W/O DYE: CPT

## 2021-11-15 PROCEDURE — 73560 X-RAY EXAM OF KNEE 1 OR 2: CPT

## 2021-11-15 PROCEDURE — 73070 X-RAY EXAM OF ELBOW: CPT

## 2021-11-15 PROCEDURE — 73502 X-RAY EXAM HIP UNI 2-3 VIEWS: CPT

## 2021-11-15 RX ORDER — ONDANSETRON 2 MG/ML
4 INJECTION INTRAMUSCULAR; INTRAVENOUS ONCE
Status: COMPLETED | OUTPATIENT
Start: 2021-11-15 | End: 2021-11-15

## 2021-11-15 RX ORDER — MORPHINE SULFATE 4 MG/ML
4 INJECTION, SOLUTION INTRAMUSCULAR; INTRAVENOUS ONCE
Status: COMPLETED | OUTPATIENT
Start: 2021-11-15 | End: 2021-11-15

## 2021-11-15 RX ADMIN — ONDANSETRON 4 MG: 2 INJECTION INTRAMUSCULAR; INTRAVENOUS at 21:55

## 2021-11-15 RX ADMIN — MORPHINE SULFATE 4 MG: 4 INJECTION, SOLUTION INTRAMUSCULAR; INTRAVENOUS at 21:56

## 2021-11-16 ENCOUNTER — ANESTHESIA EVENT CONVERTED (OUTPATIENT)
Dept: ANESTHESIOLOGY | Facility: HOSPITAL | Age: 71
End: 2021-11-16

## 2021-11-16 ENCOUNTER — APPOINTMENT (OUTPATIENT)
Dept: CT IMAGING | Facility: HOSPITAL | Age: 71
End: 2021-11-16

## 2021-11-16 ENCOUNTER — ANESTHESIA (OUTPATIENT)
Dept: ANESTHESIOLOGY | Facility: HOSPITAL | Age: 71
End: 2021-11-16

## 2021-11-16 ENCOUNTER — ANESTHESIA (OUTPATIENT)
Dept: TELEMETRY | Facility: HOSPITAL | Age: 71
End: 2021-11-16

## 2021-11-16 ENCOUNTER — ANESTHESIA EVENT (OUTPATIENT)
Dept: TELEMETRY | Facility: HOSPITAL | Age: 71
End: 2021-11-16
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
## 2021-11-16 PROBLEM — S42.301A FRACTURE OF RIGHT HUMERUS: Status: ACTIVE | Noted: 2021-11-16

## 2021-11-16 PROBLEM — S82.141A FRACTURE OF RIGHT TIBIAL PLATEAU: Status: ACTIVE | Noted: 2021-11-16

## 2021-11-16 LAB
ABO GROUP BLD: NORMAL
ALBUMIN SERPL-MCNC: 3.4 G/DL (ref 3.5–5.2)
ALBUMIN/GLOB SERPL: 1.4 G/DL
ALP SERPL-CCNC: 62 U/L (ref 39–117)
ALT SERPL W P-5'-P-CCNC: 8 U/L (ref 1–33)
ANION GAP SERPL CALCULATED.3IONS-SCNC: 9 MMOL/L (ref 5–15)
APTT PPP: 26.2 SECONDS (ref 22–39)
AST SERPL-CCNC: 18 U/L (ref 1–32)
BASOPHILS # BLD AUTO: 0.04 10*3/MM3 (ref 0–0.2)
BASOPHILS NFR BLD AUTO: 0.7 % (ref 0–1.5)
BILIRUB SERPL-MCNC: 0.3 MG/DL (ref 0–1.2)
BLD GP AB SCN SERPL QL: NEGATIVE
BUN SERPL-MCNC: 17 MG/DL (ref 8–23)
BUN/CREAT SERPL: 17.9 (ref 7–25)
CALCIUM SPEC-SCNC: 8.4 MG/DL (ref 8.6–10.5)
CHLORIDE SERPL-SCNC: 108 MMOL/L (ref 98–107)
CO2 SERPL-SCNC: 22 MMOL/L (ref 22–29)
CREAT SERPL-MCNC: 0.95 MG/DL (ref 0.57–1)
DEPRECATED RDW RBC AUTO: 48.5 FL (ref 37–54)
EOSINOPHIL # BLD AUTO: 0.05 10*3/MM3 (ref 0–0.4)
EOSINOPHIL NFR BLD AUTO: 0.9 % (ref 0.3–6.2)
ERYTHROCYTE [DISTWIDTH] IN BLOOD BY AUTOMATED COUNT: 12.9 % (ref 12.3–15.4)
GFR SERPL CREATININE-BSD FRML MDRD: 58 ML/MIN/1.73
GLOBULIN UR ELPH-MCNC: 2.4 GM/DL
GLUCOSE SERPL-MCNC: 94 MG/DL (ref 65–99)
HCT VFR BLD AUTO: 31 % (ref 34–46.6)
HGB BLD-MCNC: 9.8 G/DL (ref 12–15.9)
IMM GRANULOCYTES # BLD AUTO: 0.01 10*3/MM3 (ref 0–0.05)
IMM GRANULOCYTES NFR BLD AUTO: 0.2 % (ref 0–0.5)
INR PPP: 1.07 (ref 0.85–1.16)
LYMPHOCYTES # BLD AUTO: 1.55 10*3/MM3 (ref 0.7–3.1)
LYMPHOCYTES NFR BLD AUTO: 26.5 % (ref 19.6–45.3)
MCH RBC QN AUTO: 32.5 PG (ref 26.6–33)
MCHC RBC AUTO-ENTMCNC: 31.6 G/DL (ref 31.5–35.7)
MCV RBC AUTO: 102.6 FL (ref 79–97)
MONOCYTES # BLD AUTO: 0.63 10*3/MM3 (ref 0.1–0.9)
MONOCYTES NFR BLD AUTO: 10.8 % (ref 5–12)
NEUTROPHILS NFR BLD AUTO: 3.57 10*3/MM3 (ref 1.7–7)
NEUTROPHILS NFR BLD AUTO: 60.9 % (ref 42.7–76)
NRBC BLD AUTO-RTO: 0 /100 WBC (ref 0–0.2)
PLATELET # BLD AUTO: 182 10*3/MM3 (ref 140–450)
PMV BLD AUTO: 9.9 FL (ref 6–12)
POTASSIUM SERPL-SCNC: 5.3 MMOL/L (ref 3.5–5.2)
PROT SERPL-MCNC: 5.8 G/DL (ref 6–8.5)
PROTHROMBIN TIME: 13.6 SECONDS (ref 11.4–14.4)
RBC # BLD AUTO: 3.02 10*6/MM3 (ref 3.77–5.28)
RH BLD: POSITIVE
SARS-COV-2 RDRP RESP QL NAA+PROBE: NORMAL
SODIUM SERPL-SCNC: 139 MMOL/L (ref 136–145)
T&S EXPIRATION DATE: NORMAL
WBC # BLD AUTO: 5.85 10*3/MM3 (ref 3.4–10.8)

## 2021-11-16 PROCEDURE — 87635 SARS-COV-2 COVID-19 AMP PRB: CPT | Performed by: INTERNAL MEDICINE

## 2021-11-16 PROCEDURE — 99223 1ST HOSP IP/OBS HIGH 75: CPT | Performed by: INTERNAL MEDICINE

## 2021-11-16 PROCEDURE — 86901 BLOOD TYPING SEROLOGIC RH(D): CPT | Performed by: NURSE PRACTITIONER

## 2021-11-16 PROCEDURE — 85025 COMPLETE CBC W/AUTO DIFF WBC: CPT | Performed by: NURSE PRACTITIONER

## 2021-11-16 PROCEDURE — 86850 RBC ANTIBODY SCREEN: CPT | Performed by: NURSE PRACTITIONER

## 2021-11-16 PROCEDURE — 73200 CT UPPER EXTREMITY W/O DYE: CPT

## 2021-11-16 PROCEDURE — 85730 THROMBOPLASTIN TIME PARTIAL: CPT | Performed by: NURSE PRACTITIONER

## 2021-11-16 PROCEDURE — 80053 COMPREHEN METABOLIC PANEL: CPT | Performed by: NURSE PRACTITIONER

## 2021-11-16 PROCEDURE — 25010000002 ROPIVACAINE PER 1 MG: Performed by: NURSE ANESTHETIST, CERTIFIED REGISTERED

## 2021-11-16 PROCEDURE — 25010000002 MORPHINE PER 10 MG: Performed by: EMERGENCY MEDICINE

## 2021-11-16 PROCEDURE — 25010000002 MORPHINE PER 10 MG: Performed by: INTERNAL MEDICINE

## 2021-11-16 PROCEDURE — 86900 BLOOD TYPING SEROLOGIC ABO: CPT | Performed by: NURSE PRACTITIONER

## 2021-11-16 PROCEDURE — 85610 PROTHROMBIN TIME: CPT | Performed by: NURSE PRACTITIONER

## 2021-11-16 RX ORDER — TRAZODONE HYDROCHLORIDE 50 MG/1
100 TABLET ORAL NIGHTLY
Status: DISCONTINUED | OUTPATIENT
Start: 2021-11-17 | End: 2021-11-16

## 2021-11-16 RX ORDER — NALOXONE HCL 0.4 MG/ML
0.4 VIAL (ML) INJECTION
Status: DISCONTINUED | OUTPATIENT
Start: 2021-11-16 | End: 2021-11-22

## 2021-11-16 RX ORDER — FLUOXETINE HYDROCHLORIDE 20 MG/1
40 CAPSULE ORAL DAILY
Status: DISCONTINUED | OUTPATIENT
Start: 2021-11-16 | End: 2021-11-18

## 2021-11-16 RX ORDER — MORPHINE SULFATE 2 MG/ML
2 INJECTION, SOLUTION INTRAMUSCULAR; INTRAVENOUS EVERY 4 HOURS PRN
Status: DISCONTINUED | OUTPATIENT
Start: 2021-11-16 | End: 2021-11-22

## 2021-11-16 RX ORDER — BUPIVACAINE HYDROCHLORIDE 2.5 MG/ML
INJECTION, SOLUTION EPIDURAL; INFILTRATION; INTRACAUDAL
Status: COMPLETED | OUTPATIENT
Start: 2021-11-16 | End: 2021-11-16

## 2021-11-16 RX ORDER — ROPIVACAINE HYDROCHLORIDE 2 MG/ML
6 INJECTION, SOLUTION EPIDURAL; INFILTRATION CONTINUOUS
Status: DISPENSED | OUTPATIENT
Start: 2021-11-16 | End: 2021-11-21

## 2021-11-16 RX ORDER — ONDANSETRON 2 MG/ML
4 INJECTION INTRAMUSCULAR; INTRAVENOUS EVERY 6 HOURS PRN
Status: DISCONTINUED | OUTPATIENT
Start: 2021-11-16 | End: 2021-11-22

## 2021-11-16 RX ORDER — TRAZODONE HYDROCHLORIDE 50 MG/1
100 TABLET ORAL NIGHTLY
Status: DISCONTINUED | OUTPATIENT
Start: 2021-11-16 | End: 2021-11-23 | Stop reason: HOSPADM

## 2021-11-16 RX ORDER — HYDROCODONE BITARTRATE AND ACETAMINOPHEN 5; 325 MG/1; MG/1
1 TABLET ORAL EVERY 6 HOURS PRN
Status: DISCONTINUED | OUTPATIENT
Start: 2021-11-16 | End: 2021-11-19

## 2021-11-16 RX ORDER — KETOROLAC TROMETHAMINE 10 MG/1
10 TABLET, FILM COATED ORAL EVERY 6 HOURS PRN
Status: DISCONTINUED | OUTPATIENT
Start: 2021-11-16 | End: 2021-11-17

## 2021-11-16 RX ORDER — METOPROLOL SUCCINATE 25 MG/1
25 TABLET, EXTENDED RELEASE ORAL DAILY
Status: DISCONTINUED | OUTPATIENT
Start: 2021-11-16 | End: 2021-11-23 | Stop reason: HOSPADM

## 2021-11-16 RX ORDER — MORPHINE SULFATE 4 MG/ML
4 INJECTION, SOLUTION INTRAMUSCULAR; INTRAVENOUS ONCE
Status: COMPLETED | OUTPATIENT
Start: 2021-11-16 | End: 2021-11-16

## 2021-11-16 RX ORDER — SODIUM CHLORIDE 0.9 % (FLUSH) 0.9 %
10 SYRINGE (ML) INJECTION AS NEEDED
Status: DISCONTINUED | OUTPATIENT
Start: 2021-11-16 | End: 2021-11-23 | Stop reason: HOSPADM

## 2021-11-16 RX ORDER — MORPHINE SULFATE 4 MG/ML
3 INJECTION, SOLUTION INTRAMUSCULAR; INTRAVENOUS
Status: DISCONTINUED | OUTPATIENT
Start: 2021-11-16 | End: 2021-11-22

## 2021-11-16 RX ORDER — SODIUM CHLORIDE 0.9 % (FLUSH) 0.9 %
10 SYRINGE (ML) INJECTION EVERY 12 HOURS SCHEDULED
Status: DISCONTINUED | OUTPATIENT
Start: 2021-11-16 | End: 2021-11-23 | Stop reason: HOSPADM

## 2021-11-16 RX ADMIN — BUPIVACAINE HYDROCHLORIDE 15 ML: 2.5 INJECTION, SOLUTION EPIDURAL; INFILTRATION; INTRACAUDAL; PERINEURAL at 11:08

## 2021-11-16 RX ADMIN — SODIUM CHLORIDE, PRESERVATIVE FREE 10 ML: 5 INJECTION INTRAVENOUS at 08:28

## 2021-11-16 RX ADMIN — KETOROLAC TROMETHAMINE 10 MG: 10 TABLET, FILM COATED ORAL at 18:18

## 2021-11-16 RX ADMIN — BUPIVACAINE HYDROCHLORIDE 15 ML: 2.5 INJECTION, SOLUTION EPIDURAL; INFILTRATION; INTRACAUDAL at 11:23

## 2021-11-16 RX ADMIN — TRAZODONE HYDROCHLORIDE 100 MG: 50 TABLET ORAL at 22:11

## 2021-11-16 RX ADMIN — MORPHINE SULFATE 4 MG: 4 INJECTION, SOLUTION INTRAMUSCULAR; INTRAVENOUS at 00:25

## 2021-11-16 RX ADMIN — MORPHINE SULFATE 3 MG: 4 INJECTION, SOLUTION INTRAMUSCULAR; INTRAVENOUS at 08:24

## 2021-11-16 RX ADMIN — ROPIVACAINE HYDROCHLORIDE 6 ML/HR: 2 INJECTION, SOLUTION EPIDURAL; INFILTRATION at 11:47

## 2021-11-16 RX ADMIN — FLUOXETINE HYDROCHLORIDE 40 MG: 20 CAPSULE ORAL at 08:19

## 2021-11-16 RX ADMIN — METOPROLOL SUCCINATE 25 MG: 25 TABLET, EXTENDED RELEASE ORAL at 08:19

## 2021-11-16 RX ADMIN — ROPIVACAINE HYDROCHLORIDE 6 ML/HR: 2 INJECTION, SOLUTION EPIDURAL; INFILTRATION at 23:44

## 2021-11-16 RX ADMIN — MORPHINE SULFATE 3 MG: 4 INJECTION, SOLUTION INTRAMUSCULAR; INTRAVENOUS at 05:25

## 2021-11-16 NOTE — ANESTHESIA PROCEDURE NOTES
Right Femoral SS block      Patient reassessed immediately prior to procedure    Patient location during procedure: floor  Reason for block: post-op pain management  Performed by  CRNA: Nan Ibrahim CRNA  Assisted by: Guillermina Crews RN  Preanesthetic Checklist  Completed: patient identified, IV checked, site marked, surgical consent, monitors and equipment checked, pre-op evaluation and timeout performed  Prep:  Pt Position: supine  Sterile barriers:gloves, cap, sterile barriers and mask  Prep: ChloraPrep  Patient monitoring: blood pressure monitoring, continuous pulse oximetry and EKG  Procedure    Sedation: no  Performed under: local infiltration  Guidance:ultrasound guided, nerve stimulator and landmark technique  Images:still images not obtained    Laterality:right  Block Type:femoral  Injection Technique:single-shot  Needle Type:short-bevel  Needle Gauge:20 G  Resistance on Injection: none    Medications Used: bupivacaine PF (MARCAINE) 0.25 % injection, 15 mL  Med administered at 11/16/2021 11:23 AM      Post Assessment  Injection Assessment: negative aspiration for heme, no paresthesia on injection and incremental injection  Patient Tolerance:comfortable throughout block  Complications:no  Additional Notes  The BBRaun 360 degree echogenic needle was introduced in plane, in a lateral to medial direction at the level of the inguinal crease.  Under ultrasound guidance, the femoral artery and vein where located.  The needle was then directed below Fascia Iliacus towards the Femoral nerve.  NS was utilized to hydro dissect and narda needle advancement towards the target structure.   LA was injected incrementally in 3-5 ml aliquots with negative aspirate.  LA spread was visualized around the nerve, negative intraneural injection, low injection pressures.  Thank you

## 2021-11-16 NOTE — PROGRESS NOTES
I have reviewed this patient's clinical history.  I reviewed her imaging.patient with periprosthetic proximal third diaphyseal humeral shaft fracture.  With displacement.  This will warrant surgical intervention.    The plan would be to operate on this in the next 72 hours likely Thursday afternoon.  She also has concomitant nondisplaced proximal tibia fracture which is nonsurgical.  From a pain control standpoint we will contact the block team for an interscalene brachial plexus block with catheter.  Furthermore she will be nonweightbearing on the lower extremity.  Should continue in the sling.  We could advance her diet as tolerated.  She will be nothing by mouth past midnight on Wednesday.    A full consult note will follow later this evening once I see this patient for a full physical examination

## 2021-11-16 NOTE — CASE MANAGEMENT/SOCIAL WORK
Discharge Planning Assessment  [facility]     Patient Name: Aleena Roque  MRN: [id number]  Today's Date: 11/16/2021    Admit Date: 11/15/2021     Discharge Needs Assessment     Row Name 11/16/21 0853       Living Environment    Lives With spouse    Name(s) of Who Lives With Patient YAYA ROQUE Spouse [phone]    Current Living Arrangements home/apartment/condo    Primary Care Provided by self    Provides Primary Care For no one    Family Caregiver if Needed spouse    Family Caregiver Names YAYA ROQUE Spouse [phone]    Quality of Family Relationships helpful; involved; supportive       Resource/Environmental Concerns    Resource/Environmental Concerns none    Transportation Concerns car, none       Transition Planning    Patient/Family Anticipates Transition to home with help/services    Patient/Family Anticipated Services at Transition ; rehabilitation services    Transportation Anticipated family or friend will provide       Discharge Needs Assessment    Readmission Within the Last 30 Days no previous admission in last 30 days    Equipment Currently Used at Home none    Concerns to be Addressed discharge planning    Anticipated Changes Related to Illness none    Discharge Facility/Level of Care Needs outpatient therapy               Discharge Plan     Row Name 11/16/21 0854       Plan    Plan Initial    Plan Comments CM spoke with patient at bedside. Patient resides in [facility] with her spouse. Patient is a retired RN. Patient independent with ADL's PTA. Patient uses no DME. Patient denies any current home health or outpatient services. Patient has medical insurance, prescription coverage and is able to afford/obtain medications without difficulty. Patient has had outpatient physical therapy in the past with [facility] and would like their services again at discharge. DC plan tentatively is home with spouse and [facility] Transitions. CM following.    Final Discharge Disposition Code 30 - still a  patient              Continued Care and Services - Admitted Since 11/15/2021    Coordination has not been started for this encounter.          Demographic Summary     Row Name 11/16/21 0834       General Information    Arrived From home    Referral Source emergency department    Reason for Consult discharge planning    Preferred Language English     Used During This Interaction no       Contact Information    Contact Information Comments YAYA ROQUE Spouse [phone]               Functional Status     Row Name 11/16/21 0848       Functional Status    Usual Activity Tolerance good    Current Activity Tolerance poor       Functional Status, IADL    Medications independent    Meal Preparation independent    Housekeeping independent    Laundry independent    Shopping independent       Mental Status    General Appearance WDL WDL       Mental Status Summary    Recent Changes in Mental Status/Cognitive Functioning no changes       Employment/    Employment Status retired               Psychosocial    No documentation.                Abuse/Neglect    No documentation.                Legal    No documentation.                Substance Abuse    No documentation.                Patient Forms    No documentation.                   Sania Diaz RN

## 2021-11-16 NOTE — H&P
[facility]  HISTORY AND PHYSICAL    Patient Name: Aleena Armstrong  : 1950  MRN: [id number]  Primary Care Physician: Aniyah Goldberg MD  Date of admission: 11/15/2021    Subjective   Subjective     Chief Complaint:  fall    HPI:  Aleena Armstrong is a 70 y.o. female with a history of afib on Eliquis, GERD, anxiety, depression, HTN, HLD, RA, WPW, presents to the ED after having a fall.  Patient reports that she was hanging wallpaper while standing in a chair.  Her chair tipped over and she fell onto the hardwood floor on her right side.  She complains of severe pain in her right shoulder area and right knee.  She has numbness in her right hand and right forearm.      No fevers, shortness of air, chest pain, nausea, vomiting, diarrhea, or any other complaints at this time. CT right lower extremity shows there are fractures involving both tibial plateaus with extension into the intercondylar notch. X-ray consistent with a right humerus fracture.  Patient is being admitted to the Hospitalist for further evaluation and management.      COVID Details:        Symptoms: [x] NONE [] Fever []  Cough [] Shortness of breath [] Change in taste or smell  The patient has started, but not completed, their COVID-19 vaccination series.    Review of Systems   Constitutional: Negative.    HENT: Negative.    Eyes: Negative.    Respiratory: Negative.    Cardiovascular: Negative.    Gastrointestinal: Negative.    Endocrine: Negative.    Genitourinary: Negative.    Musculoskeletal:        Right shoulder pain and right knee pain   Skin: Negative.    Allergic/Immunologic: Negative.    Neurological: Negative.    Hematological: Negative.    Psychiatric/Behavioral: Negative.         All other systems reviewed and are negative.     Personal History     Past Medical History:   Diagnosis Date   • A-fib (HCC)     HAD ABLATION FOR THIS IN 2017   • Anemia    • Anxiety and depression 2016   •  GERD (gastroesophageal reflux disease)    • Glaucoma of left eye 12/11/2016   • H/O migraine     LAST 3  YEARS AGO    • History of MRSA infection     APPROX 8 YEARS, TREATED WITH ORAL ABX, NEGATIVE CULTURES FOLLOWING [place]    • History of transfusion    • Hyperlipidemia    • Hypertension     MEDS DC'D BY DR GOMEZ 1/2017   • Insomnia 12/11/2016   • Kidney disease    • Peptic ulcer - s/p gastric bypass surgery (7-8 years ago) 12/11/2016   • Rheumatoid arthritis (HCC) 12/11/2016   • Spinal headache     FOLLOWING SPINAL FOR CHILDBIRTH    • Vitamin D deficiency 12/11/2016   • Wears dentures     UPPER PLATE    • Elodia-Parkinson-White (WPW) syndrome     1990s       Past Surgical History:   Procedure Laterality Date   • BREAST BIOPSY  ~1990   • CARDIAC CATHETERIZATION N/A 10/24/2016    Procedure: Left Heart Cath;  Surgeon: Rubens Morejon MD;  Location:  [facility] CATH INVASIVE LOCATION;  Service:    • CARDIAC ELECTROPHYSIOLOGY PROCEDURE N/A 6/20/2017    Procedure: PVA;  Surgeon: Edvin Gomez DO;  Location:  [facility] INVASIVE LOCATION;  Service:    • CHOLECYSTECTOMY  ~1990   • COLONOSCOPY  2007   • GASTRIC BYPASS     • GASTRIC BYPASS  2005   • HAMMER TOE REPAIR      LEFT--GARO IN PLACE    • HYSTERECTOMY  1993    Complete   • ORIF HUMERUS FRACTURE Right 2/8/2019    Procedure: ORIF RIGHT PROXIMAL HUMERUS;  Surgeon: Yonatan Galdamez MD;  Location:  [facility] OR;  Service: Orthopedics   • SHOULDER ACROMIOPLASTY Right 2019   • WRIST FRACTURE SURGERY Right 2014       Family History:  family history includes Arrhythmia in her mother; Breast cancer in her cousin, maternal aunt, and sister; Diabetes in her mother and son; Hypertension in her mother; Lung cancer in her father; Ovarian cancer in her cousin; Stroke in her mother. Otherwise pertinent FHx was reviewed and unremarkable.     Social History:  reports that she quit smoking about 29 years ago. Her smoking use included cigarettes. She smoked 1.00 pack per day. She has never used  smokeless tobacco. She reports current alcohol use of about 5.0 standard drinks of alcohol per week. She reports that she does not use drugs.  Social History     Social History Narrative   • Not on file       Medications:  FLUoxetine, acetaminophen, apixaban, dexlansoprazole, hydroxychloroquine, metoprolol succinate XL, ondansetron, promethazine, timolol, and traZODone    Allergies   Allergen Reactions   • Bactrim [Sulfamethoxazole-Trimethoprim] Other (See Comments)     MOUTH SORES   • Percocet [Oxycodone-Acetaminophen] Other (See Comments)     NIGHT TERRORS    • Statins Myalgia     MYALGIA    • Sulfa Antibiotics Other (See Comments)     MOUTH SORES AND RASH    • Erythromycin Rash     RASH        Objective   Objective     Vital Signs:   Temp:  [98.6 °F (37 °C)] 98.6 °F (37 °C)  Heart Rate:  [68-86] 80  Resp:  [18] 18  BP: (120-151)/() 125/73    Physical Exam   Constitutional: Awake, alert, resting in bed  Eyes: PERRLA, sclerae anicteric, no conjunctival injection  HENT: NCAT, mucous membranes moist  Neck: Supple, no thyromegaly, no lymphadenopathy, trachea midline  Respiratory: Clear to auscultation bilaterally, nonlabored respirations   Cardiovascular: RRR, no murmurs, rubs, or gallops, palpable pedal pulses on the left, doppler pt/dp pulses  Gastrointestinal: Positive bowel sounds, soft, nontender, nondistended  Musculoskeletal: No bilateral ankle edema, no clubbing or cyanosis to extremities, limited ROM RUE and RLE d/t pain/fracture  Psychiatric: Appropriate affect, cooperative  Neurologic: Oriented x 3, strength symmetric in all extremities, Cranial Nerves grossly intact to confrontation, speech clear  Skin: No rashes       Result Review:  I have personally reviewed the results from the time of this admission to 11/16/21 1:42 AM EST and agree with these findings:  [x]  Laboratory  []  Microbiology  [x]  Radiology  []  EKG/Telemetry   []  Cardiology/Vascular   []  Pathology  []  Old records  []   Other:  Most notable findings include: Fracture to the tibial plateau of right lower extremity, right humerus fracture, CT head without any evidence of intracranial bleed      LAB RESULTS:                                Microbiology Results (last 10 days)     ** No results found for the last 240 hours. **          XR Shoulder 2+ View Right    Result Date: 11/15/2021  CR Shoulder Comp Min 2 Vws RT INDICATION: Fall, right lateral shoulder pain COMPARISON: None available. FINDINGS: AP internal rotation, AP external rotation, and scapular Y views of the right shoulder.  Gross fracture deformity is seen involving the proximal humerus. Acromioclavicular and glenohumeral joints are intact.     Impression: Gross fracture deformity seen involving the proximal humerus. Signer Name: Gabriella Arce MD  Signed: 11/15/2021 11:36 PM  Workstation Name: [id number]  [facility]    XR Humerus Right    Result Date: 11/15/2021  CR Humerus Min 2 Vws RT INDICATION: Fall, right upper arm pain COMPARISON: None available. FINDINGS: AP, oblique and lateral views of the right humerus.  Fracture deformity is seen involving the patient's proximal humeral shaft. The patient's fixation hardware involves the proximal fragment. Glenohumeral joint and acromioclavicular joint appear intact.     Impression: Severe fracture deformity is seen involving the patient's proximal humeral shaft. Signer Name: Gabriella Arce MD  Signed: 11/15/2021 11:23 PM  Workstation Name: [id number]  Radiology [facility]    XR Elbow 2 View Right    Result Date: 11/15/2021  CR Elbow 2 Vws RT INDICATION: Fall, pain COMPARISON: None available FINDINGS: AP, lateral, and oblique views of the right elbow.  No definite acute fracture or subluxation. There may be a small joint effusion. There is an olecranon spur..  No bone erosion or destruction.  No foreign body.     Impression: No acute fracture or subluxation involving the right elbow. Signer Name:  Gabriella Arce MD  Signed: 11/15/2021 11:37 PM  Workstation Name: [id number]  Radiology [facility]    XR Knee 1 or 2 View Right    Result Date: 11/15/2021  CR Knee 1 or 2 Vws RT INDICATION: Fall, pain in lateral knee COMPARISON: None available. FINDINGS: AP, lateral view(s) of the right knee. Findings suggest fracture involving the lateral tibial plateau that may extend to the intercondylar region. . There is a prominent suprapatellar joint effusion. No bone erosion or destruction.  No foreign body.     Impression: Findings suggest fracture involving the lateral tibial plateau that may extend to the intercondylar region. Signer Name: Gabriella Arce MD  Signed: 11/15/2021 11:20 PM  Workstation Name: [id number]  Radiology [facility]    CT Head Without Contrast    Result Date: 11/15/2021  CT Head WO: 11/15/2021 11:30 PM HISTORY: Fall TECHNIQUE: Axial unenhanced head CT. Radiation dose reduction techniques included automated exposure control or exposure modulation based on body size. Radiation audit for number of CT and nuclear cardiology exams performed in the last year: 0.  COMPARISON: 10/15/2020 FINDINGS: No intracranial hemorrhage, mass, or infarct. No hydrocephalus or extra-axial fluid collection. The skull base, calvarium, and extracranial soft tissues are normal heart from opacification of the left maxillary sinus.     Impression: No acute intracranial abnormality. Signer Name: Gabriella Arce MD  Signed: 11/15/2021 10:34 PM  Workstation Name: [id number]  Radiology [facility]    CT Lower Extremity Right Without Contrast    Result Date: 11/15/2021  CT OF THE RIGHT KNEE W/O CONTRAST   HISTORY: Trauma  COMPARISON: X-ray from earlier same day TECHNIQUE: CT images of the right knee Radiation dose reduction techniques included automated exposure control. Radiation audit for CT and nuclear cardiology exams in the last 12 months: .  FINDINGS: Compression fracture is identified involving  the patient's lateral tibial plateau. Fracture extends to the intercondylar region. Fracture is also done finding involving the medial portion of the medial plateau. Femur and fibula appear intact. There is a lipohemarthrosis joint effusion. Patella is intact. No foreign body.     Impression: There are fractures involving both tibial plateaus with extension into the intercondylar notch. Signer Name: Gabriella Arce MD  Signed: 11/15/2021 11:51 PM  Workstation Name: [id number]  [facility]    XR Hip With or Without Pelvis 2 - 3 View Right    Result Date: 11/15/2021  CR Hip Uni Comp Min 2 Vws RT INDICATION: Pain after trauma COMPARISON: None. FINDINGS: AP and frog-leg lateral view(s) of the right hip.  No fracture or dislocation. There is bilateral femoral acetabular joint space narrowing. No bone erosion or destruction.      Impression: No acute fracture or subluxation. Signer Name: Gabriella Arce MD  Signed: 11/15/2021 11:21 PM  Workstation Name: [id number]  [facility]      Results for orders placed during the hospital encounter of 06/20/17    Adult Transesophageal Echo    Interpretation Summary  · Left ventricular systolic function is normal. Estimated EF = 60%.  · The cardiac valves are anatomically and functionally normal.  · No evidence of a left atrial appendage thrombus was present      Assessment/Plan   Assessment & Plan       Fall    Dyslipidemia    Essential hypertension    HO Atrial fibrillation (CMS/HCC)    Anxiety and depression    Rheumatoid arthritis (HCC)    Fracture of right humerus    Fracture of right tibial plateau      Aleena Armstrong is a 70 y.o. female with a history of afib on Eliquis, GERD, anxiety, depression, HTN, HLD, RA, WPW, presents to the ED after having a fall.  Patient is here with a right tibial plateau fracture and a right humerus fracture.    Assessment and Plan:    Right humerus fracture  Right tibial plateau fracture  Fall  --neurovascular  checks q 4 hours--doppler pulses in the right foot  --npo  --consult ortho  --pain control  --fall precautions  --Incentive spirometry  --type and screen  --hold eliquis, patient reports she last took Eliquis on the evening of 11/14/2021  --labs    Afib  --hold Eliquis for surgery--last dose of Eliquis was on Sunday night    HTN  HLD  --continue metoprolol    RA  --hold plaquenil    Anxiety and depression  --prozac      DVT prophylaxis:  Holding eliquis for surgery    CODE STATUS: Full  Level Of Support Discussed With: Patient  Code Status (Patient has no pulse and is not breathing): CPR (Attempt to Resuscitate)  Medical Interventions (Patient has pulse or is breathing): Full Support      This note has been completed as part of a split-shared workflow.   Signature: Electronically signed by YULI Esteban, 11/16/21, 2:38 AM EST.         Attending Addendum  INPATIENT Level of Care       I have seen and examined the patient, performing an independent face-to-face diagnostic evaluation and reviewed documentation performed by collaborating APC.  Aleena Armstrong is appropriate for INPATIENT admission status due to fracture right humerus, fracture of right tibial plateau.      Brief Summary Statement:   Aleena Armstrong is a 70 y.o. female with past medical history of hypertension, hyperlipidemia, WPW, anxiety/depression, GERD, atrial fibrillation on chronic Eliquis, rheumatoid arthritis who presents to the ER status post fall at home.    Patient reports that she was hanging wallpaper and was standing on a chair tonight when she suddenly tipped over and fell onto her right side. She immediately noticed severe pain in her right arm/shoulder region and in her right knee. She noticed immediate swelling of her right knee. She reports numbness involving the right hand and forearm. Patient noted to have adequate pulse to right upper extremity. Pulse was slightly diminished in right lower extremity and slightly cool to  the touch but easily dopplerable. Capillary refill remains intact. Work-up in the ER showing right humerus fracture, right tibial plateau fracture. Case was discussed with Dr. Galdamez who plans to likely operate in the a.m. Patient has not had Eliquis since the evening of 11/14/2021.    Attending Physical Exam:  Constitutional: Awake, alert  Eyes: PERRLA, sclerae anicteric, no conjunctival injection  HENT: NCAT, mucous membranes moist  Neck: Supple, no thyromegaly, no lymphadenopathy, trachea midline  Respiratory: Clear to auscultation bilaterally, nonlabored respirations   Cardiovascular: RRR, no murmurs, rubs, or gallops, palpable pedal pulse and left lower extremity, difficult to palpate pedal pulse and right lower extremity and slightly cooler to the touch than the left. Easily dopplerable  Gastrointestinal: Positive bowel sounds, soft, nontender, nondistended  Musculoskeletal: Tenderness to right humerus with noted deformity, swelling with effusion to right knee and point tenderness to tibia proximally with mild bruising no bilateral ankle edema, no clubbing or cyanosis to extremities  Psychiatric: Appropriate affect, cooperative  Neurologic: Oriented x 3, strength symmetric in all extremities, Cranial Nerves grossly intact to confrontation, speech clear  Skin: No rashes      Assessment:  Active Hospital Problems    Diagnosis  POA   • **Fall [W19.XXXA]  Yes   • Fracture of right humerus [S42.301A]  Yes   • Fracture of right tibial plateau [S82.141A]  Yes   • HO Atrial fibrillation (CMS/HCC) [I48.91]  Yes   • Anxiety and depression [F41.9, F32.A]  Yes   • Rheumatoid arthritis (HCC) [M06.9]  Yes   • Dyslipidemia [E78.5]  Yes   • Essential hypertension [I10]  Yes      Resolved Hospital Problems   No resolved problems to display.     Problem List has been updated to reflect all pertinent diagnoses.    Plan:  See detailed plan developed with APC which I have reviewed and/or edited for completeness.

## 2021-11-16 NOTE — PROGRESS NOTES
[facility]  ADMISSION FOLLOW-UP NOTE          Patient admitted after midnight, H&P by my partner performed earlier on today's date reviewed.  Interim findings, labs, and charting also reviewed.        [facility] Hospital Problem List has been managed and updated to include any new diagnoses:  Active Hospital Problems    Diagnosis  POA   • **Fall [W19.XXXA]  Yes   • Fracture of right humerus [S42.301A]  Yes   • Fracture of right tibial plateau [S82.141A]  Yes   • HO Atrial fibrillation (CMS/HCC) [I48.91]  Yes   • Anxiety and depression [F41.9, F32.A]  Yes   • Rheumatoid arthritis (HCC) [M06.9]  Yes   • Dyslipidemia [E78.5]  Yes   • Essential hypertension [I10]  Yes      Resolved Hospital Problems   No resolved problems to display.         ADDITIONAL PLAN:  - detailed assessment and plan from admission reviewed  -In summary 70-year-old female with A. fib on Eliquis admitted for a right humerus fracture, a right tibial plateau fracture after a fall.  She had denied any loss of consciousness for me.  She has had several fractures in the past couple of years, she is being treated for osteopenia outpatient. Ortho plans for surgery on Thurs. Continue pain control- she reports significant relief from nerve block. Continue holding dusty Haile MD  11/16/21

## 2021-11-16 NOTE — ANESTHESIA PROCEDURE NOTES
Right Interscalene Catheter      Patient reassessed immediately prior to procedure    Patient location during procedure: floor  Reason for block: at surgeon's request and post-op pain management  Performed by  CRNA: Nan Ibrahim CRNA  Assisted by: Guillermina Crews RN  Preanesthetic Checklist  Completed: patient identified, IV checked, site marked, risks and benefits discussed, surgical consent, monitors and equipment checked, pre-op evaluation and timeout performed  Prep:  Pt Position: left lateral decubitus  Sterile barriers:cap, gloves, mask and sterile barriers  Prep: ChloraPrep  Patient monitoring: blood pressure monitoring, continuous pulse oximetry and EKG  Procedure    Sedation: no  Performed under: local infiltration  Guidance:ultrasound guided  Images:still images obtained, printed/placed on chart    Laterality:right  Block Type:interscalene  Injection Technique:catheter  Needle Type:Tuohy and echogenic  Needle Gauge:18 G  Resistance on Injection: none  Catheter Size:20 G (20g)  Cath Depth at skin: 13 cm    Medications Used: bupivacaine PF (MARCAINE) 0.25 % injection, 15 mL  Med administered at 11/16/2021 11:08 AM      Post Assessment  Injection Assessment: negative aspiration for heme, no paresthesia on injection and incremental injection  Patient Tolerance:comfortable throughout block  Complications:no  Additional Notes  Procedure:                 The pt was placed in semifowlers position with a slight tilt of the thorax contralateral to the insertion site.  The Insertion Site was prepped and draped in sterile fashion.  The pt was anesthetized with  IV Sedation( see meds) and  Skin and cutaneous tissue was infiltrated and anesthetized with 1% Lidocaine 3 mls via a 25g needle.  Utilizing ultrasound guidance, a BBraun 4 inch 18 g Contiplex echogenic touhy needle was advanced in-plane.  Hydro dissection of tissue was achieved with Normal saline. Major vessels(carotid and Internal Jugular) where  visualized as the brachial plexus was approached at the approximate level of C-7/ T-1.  Cervical 5 and Branches of Cervical 6 nerve roots where visualized and the needle tip was placed posterior at the level of C-6 roots.  LA spread was visualized and injection was made incrementally every 5 mls with aspiration. Injection pressure was normal or little, there was no intraneural injection, no vascular injection.      The BBraun 20 g wire stylet catheter was then placed under US guidance on the posterior aspect of the Brachial Plexus. The tuohy was removed and the location of catheter was confirmed with NS injection visualized with US . The skin was sealed with exofin tissue adhesive at catheter insertion site.  Skin was prepped with benzoin and the catheter was secured with steristrips and a CHG tegaderm. Appropriate labels applied. Thank You.

## 2021-11-16 NOTE — ED PROVIDER NOTES
[facility]    EMERGENCY DEPARTMENT ENCOUNTER      Pt Name: Aleena Armstrong  MRN: [id number]  YOB: 1950  Date of evaluation: 11/15/2021  Provider: Александр Lunsford MD    CHIEF COMPLAINT       Chief Complaint   Patient presents with   • Fall         HISTORY OF PRESENT ILLNESS  (Location/Symptom, Timing/Onset, Context/Setting, Quality, Duration, Modifying Factors, Severity.)   Aleena Armstrong is a 70 y.o. female who presents to the emergency department after mechanical fall out of chair that occurred just prior to arrival and now with severe aching pain at the right upper extremity is worse with movement as well as the right knee is also worse with movement.any associated paresthesias, weakness, or numbness in the setting of previous proximal humerus fracture on the right with repair.  Patient does use anticoagulant medications.      Nursing notes were reviewed.    REVIEW OF SYSTEMS    (2-9 systems for level 4, 10 or more for level 5)   ROS:  General:  No fevers, no chills, no weakness  Cardiovascular:  No chest pain, no palpitations  Respiratory:  No shortness of breath, no cough, no wheezing  Gastrointestinal:  No pain, no nausea, no vomiting, no diarrhea  Musculoskeletal: Shoulder pain, knee pain  Skin:  No rash  Neurologic:  No speech problems, no headache, no extremity numbness, no extremity tingling, no extremity weakness  Psychiatric:  No anxiety  Genitourinary:  No dysuria, no hematuria    Except as noted above the remainder of the review of systems was reviewed and negative.       PAST MEDICAL HISTORY     Past Medical History:   Diagnosis Date   • A-fib (HCC)     HAD ABLATION FOR THIS IN 2017   • Anemia    • Anxiety and depression 12/11/2016   • GERD (gastroesophageal reflux disease)    • Glaucoma of left eye 12/11/2016   • H/O migraine     LAST 3  YEARS AGO    • History of MRSA infection     APPROX 8 YEARS, TREATED WITH ORAL ABX, NEGATIVE CULTURES FOLLOWING TX    • History of transfusion    •  Hyperlipidemia    • Hypertension     MEDS DC'D BY DR GOMEZ 1/2017   • Insomnia 12/11/2016   • Kidney disease    • Peptic ulcer - s/p gastric bypass surgery (7-8 years ago) 12/11/2016   • Rheumatoid arthritis (HCC) 12/11/2016   • Spinal headache     FOLLOWING SPINAL FOR CHILDBIRTH    • Vitamin D deficiency 12/11/2016   • Wears dentures     UPPER PLATE    • Elodia-Parkinson-White (WPW) syndrome     1990s         SURGICAL HISTORY       Past Surgical History:   Procedure Laterality Date   • BREAST BIOPSY  ~1990   • CARDIAC CATHETERIZATION N/A 10/24/2016    Procedure: Left Heart Cath;  Surgeon: Rubens Morejon MD;  Location:  [facility];  Service:    • CARDIAC ELECTROPHYSIOLOGY PROCEDURE N/A 6/20/2017    Procedure: PVA;  Surgeon: Edvin Gomez DO;  Location:  [facility];  Service:    • CHOLECYSTECTOMY  ~1990   • COLONOSCOPY  2007   • GASTRIC BYPASS     • GASTRIC BYPASS  2005   • HAMMER TOE REPAIR      LEFT--GARO IN PLACE    • HYSTERECTOMY  1993    Complete   • ORIF HUMERUS FRACTURE Right 2/8/2019    Procedure: ORIF RIGHT PROXIMAL HUMERUS;  Surgeon: Yonatan Galdamez MD;  Location:  [facility];  Service: Orthopedics   • SHOULDER ACROMIOPLASTY Right 2019   • WRIST FRACTURE SURGERY Right 2014         CURRENT MEDICATIONS       Current Facility-Administered Medications:   •  acetaminophen (TYLENOL) tablet 650 mg, 650 mg, Oral, Q4H PRN, 650 mg at 11/18/21 2205 **OR** acetaminophen (TYLENOL) suppository 650 mg, 650 mg, Rectal, Q4H PRN, Yonatan Galdamez MD  •  apixaban (ELIQUIS) tablet 5 mg, 5 mg, Oral, Q12H, Chance Lopez MD, 5 mg at 11/21/21 2032  •  sennosides-docusate (PERICOLACE) 8.6-50 MG per tablet 2 tablet, 2 tablet, Oral, BID, 2 tablet at 11/21/21 2032 **AND** polyethylene glycol (MIRALAX) packet 17 g, 17 g, Oral, Daily PRN, 17 g at 11/20/21 0823 **AND** bisacodyl (DULCOLAX) EC tablet 5 mg, 5 mg, Oral, Daily PRN, 5 mg at 11/20/21 0823 **AND** bisacodyl (DULCOLAX)  suppository 10 mg, 10 mg, Rectal, Daily PRN, Yonatan Galdamez MD  •  hydrocortisone 2.5 % cream 1 application, 1 application, Topical, Q12H, Chance Lopez MD, 1 application at 11/21/21 2035  •  HYDROmorphone (DILAUDID) tablet 2 mg, 2 mg, Oral, Q3H PRN, Chance Lopez MD, 2 mg at 11/21/21 2344  •  hydroxychloroquine (PLAQUENIL) tablet 200 mg, 200 mg, Oral, Daily, Chance Lopez MD, 200 mg at 11/21/21 0816  •  metoprolol succinate XL (TOPROL-XL) 24 hr tablet 25 mg, 25 mg, Oral, Daily, Yonatan Galdamez MD, 25 mg at 11/18/21 0842  •  morphine injection 2 mg, 2 mg, Intravenous, Q4H PRN, 2 mg at 11/19/21 0101 **AND** naloxone (NARCAN) injection 0.4 mg, 0.4 mg, Intravenous, Q5 Min PRN, Yonatan Galdamez MD  •  Morphine sulfate (PF) injection 3 mg, 3 mg, Intravenous, Q3H PRN, Yonatan Galdamez MD, 3 mg at 11/16/21 0824  •  ondansetron (ZOFRAN) injection 4 mg, 4 mg, Intravenous, Q6H PRN, Yonatan Galdamez MD  •  sodium chloride 0.45 % infusion, 50 mL/hr, Intravenous, Continuous, Yonatan Galdamez MD, Last Rate: 50 mL/hr at 11/19/21 0140, 50 mL/hr at 11/19/21 0140  •  sodium chloride 0.9 % flush 10 mL, 10 mL, Intravenous, Q12H, Yonatan Galdamez MD, 10 mL at 11/21/21 2032  •  sodium chloride 0.9 % flush 10 mL, 10 mL, Intravenous, PRN, Yonatan Galdamez MD  •  traZODone (DESYREL) tablet 100 mg, 100 mg, Oral, Nightly, Yonatan Galdamez MD, 100 mg at 11/21/21 2032    ALLERGIES     Bactrim [sulfamethoxazole-trimethoprim], Percocet [oxycodone-acetaminophen], Statins, Sulfa antibiotics, and Erythromycin    FAMILY HISTORY       Family History   Problem Relation Age of Onset   • Arrhythmia Mother    • Hypertension Mother    • Stroke Mother    • Diabetes Mother    • Lung cancer Father    • Breast cancer Sister    • Breast cancer Maternal Aunt    • Diabetes Son         Type 1   • Breast cancer Cousin    • Ovarian cancer Cousin           SOCIAL HISTORY       Social  History     Socioeconomic History   • Marital status:    Tobacco Use   • Smoking status: Former Smoker     Packs/day: 1.00     Types: Cigarettes     Quit date:      Years since quittin.9   • Smokeless tobacco: Never Used   Vaping Use   • Vaping Use: Never used   Substance and Sexual Activity   • Alcohol use: Yes     Alcohol/week: 5.0 standard drinks     Types: 5 Glasses of wine per week     Comment: RARE   • Drug use: No   • Sexual activity: Defer         PHYSICAL EXAM    (up to 7 for level 4, 8 or more for level 5)     Vitals:    21 1238 21 1608 21 2000 21 0151   BP: 126/76 129/81 122/73 137/79   BP Location: Left arm Left arm  Left arm   Patient Position: Sitting Lying  Lying   Pulse: 80 80 90    Resp: 17 18 18 18   Temp: 98.2 °F (36.8 °C) 98.8 °F (37.1 °C) 98.8 °F (37.1 °C) 98 °F (36.7 °C)   TempSrc: Oral Oral Oral Oral   SpO2:   97%    Weight:       Height:           Physical Exam  General: Awake, alert, no acute distress.  HEENT: Pupils are equally round and reactive to light, EOMI, conjunctivae clear, sclerae white, there is no injection no icterus.  Oral mucosa is moist, no exudate. Uvula is midline. No malocclusion or tenderness over the mandible. There is no hemotympanum, wall sign, or raccoon eyes.  Neck: Neck is supple, full range of motion, trachea midline. No midline tenderness.  Cardiac: Heart regular rate, rhythm, no murmurs, rubs, or gallops. Peripheral pulses are 2+ throughout.  Lungs: Lungs are clear to auscultation, there is no wheezing, rhonchi, or rales. There is no use of accessory muscles.  Chest wall: There is no tenderness to palpation over the chest wall or over ribs. There are no chest wall ecchymoses.  Abdomen: Abdomen is soft, nontender, nondistended. There are no firm or pulsatile masses, no rebound rigidity or guarding. No abdominal wall ecchymoses.  Musculoskeletal: Deformity with severe tenderness to the right proximal humerus as well as the  right knee with effusion.  Compartments are soft and compressible.  Distal to these areas, pulses are strong and there is no evidence of motor or sensory dysfunction.  Neuro: Motor intact, sensory intact, level of consciousness is normal.  Dermatology: Skin is warm and dry  Psych: Mentation is grossly normal, cognition is grossly normal. Affect is appropriate.        DIAGNOSTIC RESULTS     EKG: All EKGs are interpreted by the Emergency Department Physician who either signs or Co-signs this chart in the absence of a cardiologist.    ECG 12 Lead   Final Result   Test Reason : Surgery   Blood Pressure :   */*   mmHG   Vent. Rate :  57 BPM     Atrial Rate :  57 BPM      P-R Int : 170 ms          QRS Dur :  88 ms       QT Int : 452 ms       P-R-T Axes :  70  33  25 degrees      QTc Int : 439 ms      Sinus bradycardia   Otherwise normal ECG   When compared with ECG of 04-FEB-2019 11:21,   aberrant conduction is no longer present   Confirmed by MD Camilo, Mark ([id number]) on 11/18/2021 4:52:58 PM      Referred By:            Confirmed By: Mark Page MD          RADIOLOGY:   Non-plain film images such as CT, Ultrasound and MRI are read by the radiologist. Plain radiographic images are visualized and preliminarily interpreted by the emergency physician with the below findings:      [x] Radiologist's Report Reviewed:  FL C Arm During Surgery   Final Result   1 minute 24 seconds of fluoroscopy time provided with 6   images saved during right humerus nail placement.       This report was finalized on 11/19/2021 10:52 AM by Gadiel Mendoza.          CT Upper Extremity Right Without Contrast   Final Result   Prior plate and screw fixation of the proximal right humerus   noted. There is acute comminuted oblique fracture of the proximal   humeral diaphysis with intervening butterfly fragment, with mild lateral   displacement and foreshortening. The existing fixation hardware is not   displaced, however fracture fragment less than  this portion of the   proximal humerus.            This report was finalized on 11/17/2021 9:57 AM by Gadiel Mendoza.          CT Lower Extremity Right Without Contrast   Final Result   There are fractures involving both tibial plateaus with extension into the intercondylar notch.      Signer Name: Gabriella Arce MD    Signed: 11/15/2021 11:51 PM    Workstation Name: [place] [id number]     Radiology [facility]      XR Elbow 2 View Right   Final Result   No acute fracture or subluxation involving the right elbow.      Signer Name: Gabriella Arce MD    Signed: 11/15/2021 11:37 PM    Workstation Name: [place] [id number]     Radiology [facility]      XR Shoulder 2+ View Right   Final Result   Gross fracture deformity seen involving the proximal humerus.      Signer Name: Gabriella Arce MD    Signed: 11/15/2021 11:36 PM    Workstation Name: [place] [id number]     Radiology [facility]      XR Humerus Right   Final Result   Severe fracture deformity is seen involving the patient's proximal humeral shaft.      Signer Name: Gabriella Arce MD    Signed: 11/15/2021 11:23 PM    Workstation Name: [place] [id number]     Radiology [facility]      XR Hip With or Without Pelvis 2 - 3 View Right   Final Result   No acute fracture or subluxation.      Signer Name: Gabriella Arce MD    Signed: 11/15/2021 11:21 PM    Workstation Name: [place] [id number]     Radiology [facility]      XR Knee 1 or 2 View Right   Final Result   Findings suggest fracture involving the lateral tibial plateau that may extend to the intercondylar region.       Signer Name: Gabriella Arce MD    Signed: 11/15/2021 11:20 PM    Workstation Name: [place] [id number]     Radiology [facility]      CT Head Without Contrast   Final Result   No acute intracranial abnormality.               Signer Name: Gabriella Arce MD    Signed: 11/15/2021 10:34 PM    Workstation Name: [place] [id number]     Radiology [facility]            ED BEDSIDE ULTRASOUND:    Performed by ED Physician - none    LABS:    I have reviewed and interpreted all of the currently available lab results from this visit (if applicable):  Results for orders placed or performed during the hospital encounter of 11/15/21   COVID-19, [facility] IN-HOUSE,NASAL Swab (NO TRANSPORT MEDIA) 2 HR TAT - Swab, Nasopharynx    Specimen: Nasopharynx; Swab   Result Value Ref Range    COVID19 Presumptive Negative Presumptive Negative - Ref. Range   CBC Auto Differential    Specimen: Blood   Result Value Ref Range    WBC 5.85 3.40 - 10.80 10*3/mm3    RBC 3.02 (L) 3.77 - 5.28 10*6/mm3    Hemoglobin 9.8 (L) 12.0 - 15.9 g/dL    Hematocrit 31.0 (L) 34.0 - 46.6 %    .6 (H) 79.0 - 97.0 fL    MCH 32.5 26.6 - 33.0 pg    MCHC 31.6 31.5 - 35.7 g/dL    RDW 12.9 12.3 - 15.4 %    RDW-SD 48.5 37.0 - 54.0 fl    MPV 9.9 6.0 - 12.0 fL    Platelets 182 140 - 450 10*3/mm3    Neutrophil % 60.9 42.7 - 76.0 %    Lymphocyte % 26.5 19.6 - 45.3 %    Monocyte % 10.8 5.0 - 12.0 %    Eosinophil % 0.9 0.3 - 6.2 %    Basophil % 0.7 0.0 - 1.5 %    Immature Grans % 0.2 0.0 - 0.5 %    Neutrophils, Absolute 3.57 1.70 - 7.00 10*3/mm3    Lymphocytes, Absolute 1.55 0.70 - 3.10 10*3/mm3    Monocytes, Absolute 0.63 0.10 - 0.90 10*3/mm3    Eosinophils, Absolute 0.05 0.00 - 0.40 10*3/mm3    Basophils, Absolute 0.04 0.00 - 0.20 10*3/mm3    Immature Grans, Absolute 0.01 0.00 - 0.05 10*3/mm3    nRBC 0.0 0.0 - 0.2 /100 WBC   Comprehensive Metabolic Panel    Specimen: Blood   Result Value Ref Range    Glucose 94 65 - 99 mg/dL    BUN 17 8 - 23 mg/dL    Creatinine 0.95 0.57 - 1.00 mg/dL    Sodium 139 136 - 145 mmol/L    Potassium 5.3 (H) 3.5 - 5.2 mmol/L    Chloride 108 (H) 98 - 107 mmol/L    CO2 22.0 22.0 - 29.0 mmol/L    Calcium 8.4 (L) 8.6 - 10.5 mg/dL    Total Protein 5.8 (L) 6.0 - 8.5 g/dL    Albumin 3.40 (L) 3.50 - 5.20 g/dL    ALT (SGPT) 8 1 - 33 U/L    AST (SGOT) 18 1 - 32 U/L    Alkaline Phosphatase 62 39 - 117 U/L    Total Bilirubin 0.3 0.0 - 1.2  mg/dL    eGFR Non African Amer 58 (L) >60 mL/min/1.73    Globulin 2.4 gm/dL    A/G Ratio 1.4 g/dL    BUN/Creatinine Ratio 17.9 7.0 - 25.0    Anion Gap 9.0 5.0 - 15.0 mmol/L   aPTT    Specimen: Blood   Result Value Ref Range    PTT 26.2 22.0 - 39.0 seconds   Protime-INR    Specimen: Blood   Result Value Ref Range    Protime 13.6 11.4 - 14.4 Seconds    INR 1.07 0.85 - 1.16   Basic Metabolic Panel    Specimen: Blood   Result Value Ref Range    Glucose 91 65 - 99 mg/dL    BUN 21 8 - 23 mg/dL    Creatinine 0.94 0.57 - 1.00 mg/dL    Sodium 137 136 - 145 mmol/L    Potassium 4.3 3.5 - 5.2 mmol/L    Chloride 106 98 - 107 mmol/L    CO2 23.0 22.0 - 29.0 mmol/L    Calcium 8.5 (L) 8.6 - 10.5 mg/dL    eGFR Non African Amer 59 (L) >60 mL/min/1.73    BUN/Creatinine Ratio 22.3 7.0 - 25.0    Anion Gap 8.0 5.0 - 15.0 mmol/L   CBC Auto Differential    Specimen: Blood   Result Value Ref Range    WBC 4.35 3.40 - 10.80 10*3/mm3    RBC 2.93 (L) 3.77 - 5.28 10*6/mm3    Hemoglobin 9.5 (L) 12.0 - 15.9 g/dL    Hematocrit 29.6 (L) 34.0 - 46.6 %    .0 (H) 79.0 - 97.0 fL    MCH 32.4 26.6 - 33.0 pg    MCHC 32.1 31.5 - 35.7 g/dL    RDW 12.6 12.3 - 15.4 %    RDW-SD 46.7 37.0 - 54.0 fl    MPV 10.4 6.0 - 12.0 fL    Platelets 154 140 - 450 10*3/mm3    Neutrophil % 60.7 42.7 - 76.0 %    Lymphocyte % 27.1 19.6 - 45.3 %    Monocyte % 9.9 5.0 - 12.0 %    Eosinophil % 1.4 0.3 - 6.2 %    Basophil % 0.7 0.0 - 1.5 %    Immature Grans % 0.2 0.0 - 0.5 %    Neutrophils, Absolute 2.64 1.70 - 7.00 10*3/mm3    Lymphocytes, Absolute 1.18 0.70 - 3.10 10*3/mm3    Monocytes, Absolute 0.43 0.10 - 0.90 10*3/mm3    Eosinophils, Absolute 0.06 0.00 - 0.40 10*3/mm3    Basophils, Absolute 0.03 0.00 - 0.20 10*3/mm3    Immature Grans, Absolute 0.01 0.00 - 0.05 10*3/mm3    nRBC 0.0 0.0 - 0.2 /100 WBC   Basic Metabolic Panel    Specimen: Blood   Result Value Ref Range    Glucose 103 (H) 65 - 99 mg/dL    BUN 17 8 - 23 mg/dL    Creatinine 0.90 0.57 - 1.00 mg/dL    Sodium 135  (L) 136 - 145 mmol/L    Potassium 4.4 3.5 - 5.2 mmol/L    Chloride 104 98 - 107 mmol/L    CO2 23.0 22.0 - 29.0 mmol/L    Calcium 8.2 (L) 8.6 - 10.5 mg/dL    eGFR Non African Amer 62 >60 mL/min/1.73    BUN/Creatinine Ratio 18.9 7.0 - 25.0    Anion Gap 8.0 5.0 - 15.0 mmol/L   CBC Auto Differential    Specimen: Blood   Result Value Ref Range    WBC 5.69 3.40 - 10.80 10*3/mm3    RBC 2.61 (L) 3.77 - 5.28 10*6/mm3    Hemoglobin 8.5 (L) 12.0 - 15.9 g/dL    Hematocrit 27.1 (L) 34.0 - 46.6 %    .8 (H) 79.0 - 97.0 fL    MCH 32.6 26.6 - 33.0 pg    MCHC 31.4 (L) 31.5 - 35.7 g/dL    RDW 12.5 12.3 - 15.4 %    RDW-SD 46.9 37.0 - 54.0 fl    MPV 10.6 6.0 - 12.0 fL    Platelets 181 140 - 450 10*3/mm3    Neutrophil % 66.5 42.7 - 76.0 %    Lymphocyte % 22.0 19.6 - 45.3 %    Monocyte % 9.5 5.0 - 12.0 %    Eosinophil % 1.1 0.3 - 6.2 %    Basophil % 0.5 0.0 - 1.5 %    Immature Grans % 0.4 0.0 - 0.5 %    Neutrophils, Absolute 3.79 1.70 - 7.00 10*3/mm3    Lymphocytes, Absolute 1.25 0.70 - 3.10 10*3/mm3    Monocytes, Absolute 0.54 0.10 - 0.90 10*3/mm3    Eosinophils, Absolute 0.06 0.00 - 0.40 10*3/mm3    Basophils, Absolute 0.03 0.00 - 0.20 10*3/mm3    Immature Grans, Absolute 0.02 0.00 - 0.05 10*3/mm3    nRBC 0.0 0.0 - 0.2 /100 WBC   CBC Auto Differential    Specimen: Blood   Result Value Ref Range    WBC 4.70 3.40 - 10.80 10*3/mm3    RBC 2.62 (L) 3.77 - 5.28 10*6/mm3    Hemoglobin 8.5 (L) 12.0 - 15.9 g/dL    Hematocrit 27.1 (L) 34.0 - 46.6 %    .4 (H) 79.0 - 97.0 fL    MCH 32.4 26.6 - 33.0 pg    MCHC 31.4 (L) 31.5 - 35.7 g/dL    RDW 12.3 12.3 - 15.4 %    RDW-SD 46.5 37.0 - 54.0 fl    MPV 10.4 6.0 - 12.0 fL    Platelets 194 140 - 450 10*3/mm3    Neutrophil % 59.0 42.7 - 76.0 %    Lymphocyte % 28.5 19.6 - 45.3 %    Monocyte % 10.4 5.0 - 12.0 %    Eosinophil % 1.1 0.3 - 6.2 %    Basophil % 0.6 0.0 - 1.5 %    Immature Grans % 0.4 0.0 - 0.5 %    Neutrophils, Absolute 2.77 1.70 - 7.00 10*3/mm3    Lymphocytes, Absolute 1.34 0.70 -  3.10 10*3/mm3    Monocytes, Absolute 0.49 0.10 - 0.90 10*3/mm3    Eosinophils, Absolute 0.05 0.00 - 0.40 10*3/mm3    Basophils, Absolute 0.03 0.00 - 0.20 10*3/mm3    Immature Grans, Absolute 0.02 0.00 - 0.05 10*3/mm3    nRBC 0.0 0.0 - 0.2 /100 WBC   ECG 12 Lead   Result Value Ref Range    QT Interval 452 ms    QTC Interval 439 ms   Type & Screen    Specimen: Blood   Result Value Ref Range    ABO Type A     RH type Positive     Antibody Screen Negative     T&S Expiration Date 11/19/2021 11:59:59 PM         All other labs were within normal range or not returned as of this dictation.      EMERGENCY DEPARTMENT COURSE and DIFFERENTIAL DIAGNOSIS/MDM:   Vitals:    Vitals:    11/21/21 1238 11/21/21 1608 11/21/21 2000 11/22/21 0151   BP: 126/76 129/81 122/73 137/79   BP Location: Left arm Left arm  Left arm   Patient Position: Sitting Lying  Lying   Pulse: 80 80 90    Resp: 17 18 18 18   Temp: 98.2 °F (36.8 °C) 98.8 °F (37.1 °C) 98.8 °F (37.1 °C) 98 °F (36.7 °C)   TempSrc: Oral Oral Oral Oral   SpO2:   97%    Weight:       Height:           ED Course as of 11/22/21 0205   Mon Nov 15, 2021   2354 On call ortho for Dr. Galdamez paged. [NS]   2355 Ortho paged for the second time. [NS]   Tue Nov 16, 2021   0118 Spoke w/ Dr. Galdamez. Admit to hospitalist and they will see in the AM. [NS]   0140 Spoke with Dr. Santana who accepts the patient for admission. [NS]   Mon Nov 22, 2021   0203 Presentation consistent with right proximal humerus fracture as well as tibial plateau fracture without any evidence of neurovascular compromise, compartment syndrome, open fracture, or additional traumatic injury based on physical lamination. [NS]      ED Course User Index  [NS] Александр Lunsford MD       MEDICATIONS ADMINISTERED IN ED:  Medications   Morphine sulfate (PF) injection 3 mg ( Intravenous MAR Unhold 11/18/21 1439)   metoprolol succinate XL (TOPROL-XL) 24 hr tablet 25 mg (25 mg Oral Not Given 11/21/21 0816)   sodium chloride 0.9 %  flush 10 mL (10 mL Intravenous Given 11/21/21 2032)   sodium chloride 0.9 % flush 10 mL ( Intravenous MAR Unhold 11/18/21 1439)   morphine injection 2 mg (2 mg Intravenous Given 11/19/21 0101)     And   naloxone (NARCAN) injection 0.4 mg ( Intravenous MAR Unhold 11/18/21 1439)   ondansetron (ZOFRAN) injection 4 mg ( Intravenous MAR Unhold 11/18/21 1439)   ropivacaine (NAROPIN) 0.2% peripheral nerve cath (moog) (6 mL/hr Peripheral Nerve New Bag 11/21/21 0328)   traZODone (DESYREL) tablet 100 mg (100 mg Oral Given 11/21/21 2032)   sennosides-docusate (PERICOLACE) 8.6-50 MG per tablet 2 tablet (2 tablets Oral Given 11/21/21 2032)     And   polyethylene glycol (MIRALAX) packet 17 g (17 g Oral Given 11/20/21 0823)     And   bisacodyl (DULCOLAX) EC tablet 5 mg (5 mg Oral Given 11/20/21 0823)     And   bisacodyl (DULCOLAX) suppository 10 mg ( Rectal MAR Unhold 11/18/21 1439)   sodium chloride 0.45 % infusion (50 mL/hr Intravenous New Bag 11/19/21 0140)   acetaminophen (TYLENOL) tablet 650 mg (650 mg Oral Given 11/18/21 2205)     Or   acetaminophen (TYLENOL) suppository 650 mg ( Rectal Not Given:  See Alt 11/18/21 2205)   hydroxychloroquine (PLAQUENIL) tablet 200 mg (200 mg Oral Given 11/21/21 0816)   apixaban (ELIQUIS) tablet 5 mg (5 mg Oral Given 11/21/21 2032)   HYDROmorphone (DILAUDID) tablet 2 mg (2 mg Oral Given 11/21/21 2344)   hydrocortisone 2.5 % cream 1 application (1 application Topical Given 11/21/21 2035)   Morphine sulfate (PF) injection 4 mg (4 mg Intravenous Given 11/15/21 2156)   ondansetron (ZOFRAN) injection 4 mg (4 mg Intravenous Given 11/15/21 2155)   Morphine sulfate (PF) injection 4 mg (4 mg Intravenous Given 11/16/21 0025)   famotidine (PEPCID) tablet 20 mg (20 mg Oral Given 11/18/21 9504)   tranexamic acid 1000 mg in 100 ml NS Mini-bag plus (1,000 mg Intravenous Given 11/18/21 1056)   tranexamic acid 1000 mg in 100 ml NS Mini-bag plus (1,000 mg Intravenous Given 11/18/21 1242)   ceFAZolin in  dextrose (ANCEF) IVPB solution 2 g (2 g Intravenous Given 11/18/21 1042)   vancomycin (VANCOCIN) in iso-osmotic dextrose IVPB 1 g (premix) 200 mL (1,000 mg Intravenous New Bag 11/18/21 1020)   ceFAZolin in dextrose (ANCEF) IVPB solution 2 g (2 g Intravenous New Bag 11/19/21 0101)         FINAL IMPRESSION      1. Closed fracture of proximal end of right humerus, unspecified fracture morphology, initial encounter    2. Closed fracture of right tibial plateau, initial encounter    3. Anticoagulated    4. Acute postoperative pain    5. Fall, initial encounter          DISPOSITION/PLAN     ED Disposition     ED Disposition Condition Comment    Decision to Admit  Level of Care: Telemetry [5]   Diagnosis: Closed fracture of proximal end of right humerus, unspecified fracture morphology, initial encounter [[id number]]   Admitting Physician: SEAN DUNBAR [[id number]]   Attending Physician: SEAN DUNBAR [[id number]]              Александр Lunsford MD  Attending Emergency Physician               Александр Lunsford MD  11/22/21 0201

## 2021-11-17 PROCEDURE — 99232 SBSQ HOSP IP/OBS MODERATE 35: CPT | Performed by: INTERNAL MEDICINE

## 2021-11-17 PROCEDURE — 25010000002 ROPIVACAINE PER 1 MG: Performed by: NURSE ANESTHETIST, CERTIFIED REGISTERED

## 2021-11-17 PROCEDURE — 25010000002 ENOXAPARIN PER 10 MG: Performed by: INTERNAL MEDICINE

## 2021-11-17 PROCEDURE — 25010000002 KETOROLAC TROMETHAMINE PER 15 MG: Performed by: INTERNAL MEDICINE

## 2021-11-17 RX ORDER — BISACODYL 5 MG/1
5 TABLET, DELAYED RELEASE ORAL DAILY PRN
Status: DISCONTINUED | OUTPATIENT
Start: 2021-11-17 | End: 2021-11-23 | Stop reason: HOSPADM

## 2021-11-17 RX ORDER — AMOXICILLIN 250 MG
2 CAPSULE ORAL 2 TIMES DAILY
Status: DISCONTINUED | OUTPATIENT
Start: 2021-11-17 | End: 2021-11-23 | Stop reason: HOSPADM

## 2021-11-17 RX ORDER — POLYETHYLENE GLYCOL 3350 17 G/17G
17 POWDER, FOR SOLUTION ORAL DAILY PRN
Status: DISCONTINUED | OUTPATIENT
Start: 2021-11-17 | End: 2021-11-23 | Stop reason: HOSPADM

## 2021-11-17 RX ORDER — KETOROLAC TROMETHAMINE 30 MG/ML
30 INJECTION, SOLUTION INTRAMUSCULAR; INTRAVENOUS EVERY 6 HOURS PRN
Status: DISCONTINUED | OUTPATIENT
Start: 2021-11-17 | End: 2021-11-18

## 2021-11-17 RX ORDER — BISACODYL 10 MG
10 SUPPOSITORY, RECTAL RECTAL DAILY PRN
Status: DISCONTINUED | OUTPATIENT
Start: 2021-11-17 | End: 2021-11-23 | Stop reason: HOSPADM

## 2021-11-17 RX ADMIN — ENOXAPARIN SODIUM 40 MG: 40 INJECTION SUBCUTANEOUS at 12:25

## 2021-11-17 RX ADMIN — KETOROLAC TROMETHAMINE 30 MG: 30 INJECTION, SOLUTION INTRAMUSCULAR; INTRAVENOUS at 12:33

## 2021-11-17 RX ADMIN — TRAZODONE HYDROCHLORIDE 100 MG: 50 TABLET ORAL at 21:20

## 2021-11-17 RX ADMIN — FLUOXETINE HYDROCHLORIDE 40 MG: 20 CAPSULE ORAL at 08:04

## 2021-11-17 RX ADMIN — KETOROLAC TROMETHAMINE 30 MG: 30 INJECTION, SOLUTION INTRAMUSCULAR; INTRAVENOUS at 21:21

## 2021-11-17 RX ADMIN — SENNOSIDES AND DOCUSATE SODIUM 2 TABLET: 50; 8.6 TABLET ORAL at 21:20

## 2021-11-17 RX ADMIN — METOPROLOL SUCCINATE 25 MG: 25 TABLET, EXTENDED RELEASE ORAL at 08:04

## 2021-11-17 RX ADMIN — ROPIVACAINE HYDROCHLORIDE 6 ML/HR: 2 INJECTION, SOLUTION EPIDURAL; INFILTRATION at 13:11

## 2021-11-17 RX ADMIN — SENNOSIDES AND DOCUSATE SODIUM 2 TABLET: 50; 8.6 TABLET ORAL at 12:25

## 2021-11-17 NOTE — PROGRESS NOTES
I reviewed the patient's CT scan.  Patient has moderate to severe comminution adjacent to the posterior aspect of the proximal diaphysis and distal metaphysis.    Given the fracture pattern the requirement for taking the old hardware out and that associated exposure we would proceed with revision open reduction internal fixation explantation of plate.  We will place a longer locking proximal humeral plate.  Goals risks and benefits were described when discussing this with the patient yesterday we will proceed tomorrow.  Patient should be nothing by mouth past midnight.  Hold any anticoagulation tomorrow.  Continue sling immobilization.

## 2021-11-17 NOTE — PLAN OF CARE
Goal Outcome Evaluation:           Progress: no change   VSS, on RA. Pain well controlled with ropivacaine, running at 6ml/hr. RUE in sling. Doppler need for RLE. Slept well.

## 2021-11-17 NOTE — PLAN OF CARE
Problem: Fall Injury Risk  Goal: Absence of Fall and Fall-Related Injury  Intervention: Promote Injury-Free Environment  Recent Flowsheet Documentation  Taken 11/17/2021 1800 by Cruzito Retana, RN  Safety Promotion/Fall Prevention:   activity supervised   assistive device/personal items within reach   fall prevention program maintained   clutter free environment maintained   elopement precautions   lighting adjusted   gait belt   mobility aid in reach   muscle strengthening facilitated   nonskid shoes/slippers when out of bed   room organization consistent   safety round/check completed  Taken 11/17/2021 1600 by Cruzito Retana, RN  Safety Promotion/Fall Prevention:   activity supervised   assistive device/personal items within reach   clutter free environment maintained   elopement precautions   fall prevention program maintained   gait belt   lighting adjusted   muscle strengthening facilitated   mobility aid in reach   nonskid shoes/slippers when out of bed   room organization consistent   safety round/check completed  Taken 11/17/2021 1400 by Cruzito Retana, RN  Safety Promotion/Fall Prevention:   activity supervised   elopement precautions   clutter free environment maintained   assistive device/personal items within reach   fall prevention program maintained   gait belt   lighting adjusted   mobility aid in reach   muscle strengthening facilitated   nonskid shoes/slippers when out of bed   room organization consistent   safety round/check completed  Taken 11/17/2021 1200 by Cruzito Retana, RN  Safety Promotion/Fall Prevention:   activity supervised   assistive device/personal items within reach   gait belt   clutter free environment maintained   elopement precautions   fall prevention program maintained   mobility aid in reach   lighting adjusted   muscle strengthening facilitated   nonskid shoes/slippers when out of bed   room organization consistent   safety round/check completed  Taken  11/17/2021 1000 by Cruzito Retana, RN  Safety Promotion/Fall Prevention:   activity supervised   clutter free environment maintained   mobility aid in reach   assistive device/personal items within reach   fall prevention program maintained   elopement precautions   gait belt   lighting adjusted   muscle strengthening facilitated   nonskid shoes/slippers when out of bed   room organization consistent   safety round/check completed  Taken 11/17/2021 0800 by Cruzito Retana, RN  Safety Promotion/Fall Prevention:   activity supervised   assistive device/personal items within reach   clutter free environment maintained   fall prevention program maintained   gait belt   elopement precautions   lighting adjusted   mobility aid in reach   muscle strengthening facilitated   safety round/check completed   room organization consistent   nonskid shoes/slippers when out of bed   Goal Outcome Evaluation:         VSS. Pt very pleasant. Awaiting surgery tomorrow. Consent signed and in computer. Pt to be NPO @ midnight. Will continue to monitor.

## 2021-11-17 NOTE — PROGRESS NOTES
[facility] Medicine Services  PROGRESS NOTE    Patient Name: Aleena Armstrong  : 1950  MRN: [id number]    Date of Admission: 11/15/2021  Primary Care Physician: Aniyah Goldberg MD    Subjective   Subjective     CC:  Right arm and right leg pain after fall    HPI:  I have seen and evaluated the patient this morning.  Comfortable in bed.  Reported that the pain in her right upper extremity is better after the scalene nerve block.  But not working as much for her right leg.  Reported the Toradol is helping with her pain.  Otherwise no acute complaints    ROS:  10 point review of systems is negative except for what is mentioned in HPI    Objective   Objective     Vital Signs:   Temp:  [97.9 °F (36.6 °C)-98.5 °F (36.9 °C)] 97.9 °F (36.6 °C)  Heart Rate:  [70-92] 80  Resp:  [12-18] 18  BP: (103-132)/(47-78) 114/61     Physical Exam:  General: Comfortable, not in any acute distress, appears stated age, conversant and cooperative  Head: Atraumatic and normocephalic, without obvious abnormality  Eyes:   Conjunctivae and sclerae normal, no Icterus. No pallor  Ears:  Ears appear intact with no abnormalities noted  Throat: No oral lesions, no thrush, oral mucosa moist  Neck: Supple, trachea midline, no thyromegaly  Back:   No kyphoscoliosis present. No tenderness to palpation,   no sacral edema  Lungs: Clear to auscultation bilaterally, equal air entry, no wheezing or crackles  Heart:  Normal S1 and S2, no murmur, no gallop, No JVD, no lower extremity swelling  Abdomen:  Soft, no tenderness, no organomegaly, normal bowel sounds  Normal bowel sounds, no masses, no organomegaly. Soft, nontender, nondistended, no guarding, no rebound tenderness.  Extremities: Nerve block catheter in right upper shoulder.  Range of movement of the right knee is limited because of pain  Skin: No bleeding, bruising or rash, normal skin turgor and elasticity  Neurologic: Cranial nerves appear intact with no evidence  of facial asymmetry, normal motor and sensory functions in all 4 extremities  Psych: Alert and oriented x 3, normal mood    Results Reviewed:  LAB RESULTS:      Lab 11/16/21 0633   WBC 5.85   HEMOGLOBIN 9.8*   HEMATOCRIT 31.0*   PLATELETS 182   NEUTROS ABS 3.57   IMMATURE GRANS (ABS) 0.01   LYMPHS ABS 1.55   MONOS ABS 0.63   EOS ABS 0.05   .6*   PROTIME 13.6   APTT 26.2         Lab 11/16/21 0633   SODIUM 139   POTASSIUM 5.3*   CHLORIDE 108*   CO2 22.0   ANION GAP 9.0   BUN 17   CREATININE 0.95   GLUCOSE 94   CALCIUM 8.4*         Lab 11/16/21 0633   TOTAL PROTEIN 5.8*   ALBUMIN 3.40*   GLOBULIN 2.4   ALT (SGPT) 8   AST (SGOT) 18   BILIRUBIN 0.3   ALK PHOS 62         Lab 11/16/21 0633   PROTIME 13.6   INR 1.07             Lab 11/16/21 0633   ABO TYPING A   RH TYPING Positive   ANTIBODY SCREEN Negative         Brief Urine Lab Results     None          Microbiology Results Abnormal     Procedure Component Value - Date/Time    COVID PRE-OP / PRE-PROCEDURE SCREENING ORDER (NO ISOLATION) - Swab, Nasopharynx [[id number]]  (Normal) Collected: 11/16/21 0150    Lab Status: Final result Specimen: Swab from Nasopharynx Updated: 11/16/21 0243    Narrative:      The following orders were created for panel order COVID PRE-OP / PRE-PROCEDURE SCREENING ORDER (NO ISOLATION) - Swab, Nasopharynx.  Procedure                               Abnormality         Status                     ---------                               -----------         ------                     COVID-19, ABBOTT IN-HOUS...[[id number]]  Normal              Final result                 Please view results for these tests on the individual orders.    COVID-19, ABBOTT IN-HOUSE,NASAL Swab (NO TRANSPORT MEDIA) 2 HR TAT - Swab, Nasopharynx [[id number]]  (Normal) Collected: 11/16/21 0150    Lab Status: Final result Specimen: Swab from Nasopharynx Updated: 11/16/21 0243     COVID19 Presumptive Negative    Narrative:      Fact sheet for providers:  https://www.fda.gov/media/323999/download     Fact sheet for patients: https://www.fda.gov/media/466818/download    Test performed by PCR.  If inconsistent with clinical signs and symptoms patient should be tested with different authorized molecular test.          XR Shoulder 2+ View Right    Result Date: 11/15/2021  CR Shoulder Comp Min 2 Vws RT INDICATION: Fall, right lateral shoulder pain COMPARISON: None available. FINDINGS: AP internal rotation, AP external rotation, and scapular Y views of the right shoulder.  Gross fracture deformity is seen involving the proximal humerus. Acromioclavicular and glenohumeral joints are intact.     Impression: Gross fracture deformity seen involving the proximal humerus. Signer Name: Gabriella Arce MD  Signed: 11/15/2021 11:36 PM  Workstation Name: [id number]  [facility]    XR Humerus Right    Result Date: 11/15/2021  CR Humerus Min 2 Vws RT INDICATION: Fall, right upper arm pain COMPARISON: None available. FINDINGS: AP, oblique and lateral views of the right humerus.  Fracture deformity is seen involving the patient's proximal humeral shaft. The patient's fixation hardware involves the proximal fragment. Glenohumeral joint and acromioclavicular joint appear intact.     Impression: Severe fracture deformity is seen involving the patient's proximal humeral shaft. Signer Name: Gabriella Arce MD  Signed: 11/15/2021 11:23 PM  Workstation Name: [id number]  [facility]    XR Elbow 2 View Right    Result Date: 11/15/2021  CR Elbow 2 Vws RT INDICATION: Fall, pain COMPARISON: None available FINDINGS: AP, lateral, and oblique views of the right elbow.  No definite acute fracture or subluxation. There may be a small joint effusion. There is an olecranon spur..  No bone erosion or destruction.  No foreign body.     Impression: No acute fracture or subluxation involving the right elbow. Signer Name: Gabriella Arce MD  Signed: 11/15/2021 11:37 PM   Workstation Name: [id number]  [facility]    XR Knee 1 or 2 View Right    Result Date: 11/15/2021  CR Knee 1 or 2 Vws RT INDICATION: Fall, pain in lateral knee COMPARISON: None available. FINDINGS: AP, lateral view(s) of the right knee. Findings suggest fracture involving the lateral tibial plateau that may extend to the intercondylar region. . There is a prominent suprapatellar joint effusion. No bone erosion or destruction.  No foreign body.     Impression: Findings suggest fracture involving the lateral tibial plateau that may extend to the intercondylar region. Signer Name: Gabriella Arce MD  Signed: 11/15/2021 11:20 PM  Workstation Name: [id number]  [facility]    CT Head Without Contrast    Result Date: 11/15/2021  CT Head WO: 11/15/2021 11:30 PM HISTORY: Fall TECHNIQUE: Axial unenhanced head CT. Radiation dose reduction techniques included automated exposure control or exposure modulation based on body size. Radiation audit for number of CT and nuclear cardiology exams performed in the last year: 0.  COMPARISON: 10/15/2020 FINDINGS: No intracranial hemorrhage, mass, or infarct. No hydrocephalus or extra-axial fluid collection. The skull base, calvarium, and extracranial soft tissues are normal heart from opacification of the left maxillary sinus.     Impression: No acute intracranial abnormality. Signer Name: Gabriella Arce MD  Signed: 11/15/2021 10:34 PM  Workstation Name: [id number]  [facility]    Right Femoral SS block    Result Date: 11/16/2021  Nan Ibrahim CRNA     11/16/2021 11:23 AM Right Femoral SS block Patient reassessed immediately prior to procedure Patient location during procedure: floor Reason for block: post-op pain management Performed by CRNA: Nan Ibrahim CRNA Assisted by: Guillermina Crews RN Preanesthetic Checklist Completed: patient identified, IV checked, site marked, surgical consent, monitors and equipment  checked, pre-op evaluation and timeout performed Prep: Pt Position: supine Sterile barriers:gloves, cap, sterile barriers and mask Prep: ChloraPrep Patient monitoring: blood pressure monitoring, continuous pulse oximetry and EKG Procedure Sedation: no Performed under: local infiltration Guidance:ultrasound guided, nerve stimulator and landmark technique Images:still images not obtained Laterality:right Block Type:femoral Injection Technique:single-shot Needle Type:short-bevel Needle Gauge:20 G Resistance on Injection: none Medications Used: bupivacaine PF (MARCAINE) 0.25 % injection, 15 mL Med administered at 11/16/2021 11:23 AM Post Assessment Injection Assessment: negative aspiration for heme, no paresthesia on injection and incremental injection Patient Tolerance:comfortable throughout block Complications:no Additional Notes The BBRaun 360 degree echogenic needle was introduced in plane, in a lateral to medial direction at the level of the inguinal crease.  Under ultrasound guidance, the femoral artery and vein where located.  The needle was then directed below Fascia Iliacus towards the Femoral nerve.  NS was utilized to hydro dissect and narda needle advancement towards the target structure.   LA was injected incrementally in 3-5 ml aliquots with negative aspirate.  LA spread was visualized around the nerve, negative intraneural injection, low injection pressures.  Thank you     Right Interscalene Catheter    Result Date: 11/16/2021  Nan Ibrahim CRNA     11/16/2021 11:10 AM Right Interscalene Catheter Patient reassessed immediately prior to procedure Patient location during procedure: floor Reason for block: at surgeon's request and post-op pain management Performed by CRNA: Nan Ibrahim CRNA Assisted by: Guillermina Crews RN Preanesthetic Checklist Completed: patient identified, IV checked, site marked, risks and benefits discussed, surgical consent, monitors and equipment checked, pre-op  evaluation and timeout performed Prep: Pt Position: left lateral decubitus Sterile barriers:cap, gloves, mask and sterile barriers Prep: ChloraPrep Patient monitoring: blood pressure monitoring, continuous pulse oximetry and EKG Procedure Sedation: no Performed under: local infiltration Guidance:ultrasound guided Images:still images obtained, printed/placed on chart Laterality:right Block Type:interscalene Injection Technique:catheter Needle Type:Tuohy and echogenic Needle Gauge:18 G Resistance on Injection: none Catheter Size:20 G (20g) Cath Depth at skin: 13 cm Medications Used: bupivacaine PF (MARCAINE) 0.25 % injection, 15 mL Med administered at 11/16/2021 11:08 AM Post Assessment Injection Assessment: negative aspiration for heme, no paresthesia on injection and incremental injection Patient Tolerance:comfortable throughout block Complications:no Additional Notes Procedure:               The pt was placed in semifowlers position with a slight tilt of the thorax contralateral to the insertion site.  The Insertion Site was prepped and draped in sterile fashion.  The pt was anesthetized with  IV Sedation( see meds) and  Skin and cutaneous tissue was infiltrated and anesthetized with 1% Lidocaine 3 mls via a 25g needle.  Utilizing ultrasound guidance, a BBraun 4 inch 18 g Contiplex echogenic touhy needle was advanced in-plane.  Hydro dissection of tissue was achieved with Normal saline. Major vessels(carotid and Internal Jugular) where visualized as the brachial plexus was approached at the approximate level of C-7/ T-1.  Cervical 5 and Branches of Cervical 6 nerve roots where visualized and the needle tip was placed posterior at the level of C-6 roots.  LA spread was visualized and injection was made incrementally every 5 mls with aspiration. Injection pressure was normal or little, there was no intraneural injection, no vascular injection.    The BBraun 20 g wire stylet catheter was then placed under US guidance  on the posterior aspect of the Brachial Plexus. The tuohy was removed and the location of catheter was confirmed with NS injection visualized with US . The skin was sealed with exofin tissue adhesive at catheter insertion site.  Skin was prepped with benzoin and the catheter was secured with steristrips and a CHG tegaderm. Appropriate labels applied. Thank You.     CT Lower Extremity Right Without Contrast    Result Date: 11/15/2021  CT OF THE RIGHT KNEE W/O CONTRAST   HISTORY: Trauma  COMPARISON: X-ray from earlier same day TECHNIQUE: CT images of the right knee Radiation dose reduction techniques included automated exposure control. Radiation audit for CT and nuclear cardiology exams in the last 12 months: .  FINDINGS: Compression fracture is identified involving the patient's lateral tibial plateau. Fracture extends to the intercondylar region. Fracture is also done finding involving the medial portion of the medial plateau. Femur and fibula appear intact. There is a lipohemarthrosis joint effusion. Patella is intact. No foreign body.     Impression: There are fractures involving both tibial plateaus with extension into the intercondylar notch. Signer Name: Gabriella Arce MD  Signed: 11/15/2021 11:51 PM  Workstation Name: [id number]  [facility]    CT Upper Extremity Right Without Contrast    Result Date: 11/17/2021  EXAMINATION: CT UPPER EXTREMITY RIGHT WO CONTRAST-  INDICATION: Fracture, shoulder  TECHNIQUE: Axial noncontrast CT of the proximal right upper extremity with multiplanar reconstruction  The radiation dose reduction device was turned on for each scan per the ALARA (As Low as Reasonably Achievable) protocol.  COMPARISON: Radiographs one day prior  FINDINGS: Prior plate and screw fixation of the proximal right humerus noted. There is acute comminuted oblique fracture of the proximal humeral diaphysis with intervening butterfly fragment, with mild lateral displacement and  foreshortening. The existing fixation hardware is not displaced, however fracture fragment less than this portion of the proximal humerus. The acromioclavicular and glenohumeral articulations appear preserved. There is a small joint effusion and intramuscular edema and hemorrhage. No suspicious unexpected lytic or sclerotic osseous lesions. The partially imaged right lung field demonstrates some dependent atelectasis.      Impression: Prior plate and screw fixation of the proximal right humerus noted. There is acute comminuted oblique fracture of the proximal humeral diaphysis with intervening butterfly fragment, with mild lateral displacement and foreshortening. The existing fixation hardware is not displaced, however fracture fragment less than this portion of the proximal humerus.   This report was finalized on 11/17/2021 9:57 AM by Gadiel Mendoza.      XR Hip With or Without Pelvis 2 - 3 View Right    Result Date: 11/15/2021  CR Hip Uni Comp Min 2 Vws RT INDICATION: Pain after trauma COMPARISON: None. FINDINGS: AP and frog-leg lateral view(s) of the right hip.  No fracture or dislocation. There is bilateral femoral acetabular joint space narrowing. No bone erosion or destruction.      Impression: No acute fracture or subluxation. Signer Name: Gabriella Arce MD  Signed: 11/15/2021 11:21 PM  Workstation Name: [id number]  [facility] [place]      Results for orders placed during the hospital encounter of 06/20/17    Adult Transesophageal Echo    Interpretation Summary  · Left ventricular systolic function is normal. Estimated EF = 60%.  · The cardiac valves are anatomically and functionally normal.  · No evidence of a left atrial appendage thrombus was present      I have reviewed the medications:  Scheduled Meds:apixaban (ELIQUIS) NOT ORDERED, , Does not apply, Daily With Dinner  FLUoxetine, 40 mg, Oral, Daily  metoprolol succinate XL, 25 mg, Oral, Daily  senna-docusate sodium, 2 tablet, Oral,  BID  sodium chloride, 10 mL, Intravenous, Q12H  traZODone, 100 mg, Oral, Nightly      Continuous Infusions:ropivacaine (NAROPIN) 0.2% peripheral nerve cath (moog), 6 mL/hr, Last Rate: 6 mL/hr (11/17/21 1311)      PRN Meds:.senna-docusate sodium **AND** polyethylene glycol **AND** bisacodyl **AND** bisacodyl  •  HYDROcodone-acetaminophen  •  ketorolac  •  Morphine **AND** naloxone  •  Morphine  •  ondansetron  •  sodium chloride    Assessment/Plan   Assessment & Plan     Active Hospital Problems    Diagnosis  POA   • **Fall [W19.XXXA]  Yes   • Fracture of right humerus [S42.301A]  Yes   • Fracture of right tibial plateau [S82.141A]  Yes   • HO Atrial fibrillation (CMS/HCC) [I48.91]  Yes   • Anxiety and depression [F41.9, F32.A]  Yes   • Rheumatoid arthritis (HCC) [M06.9]  Yes   • Dyslipidemia [E78.5]  Yes   • Essential hypertension [I10]  Yes      Resolved Hospital Problems   No resolved problems to display.        Brief Hospital Course to date:  Aleena Armstrong is a 70 y.o. female patient with past medical history of atrial fibrillation on Eliquis, anxiety and depression, GERD, essential hypertension, dyslipidemia, rheumatoid arthritis, WPW who presented to the hospital status post mechanical fall that resulted in right tibial plateau fracture and right humeral fracture.    Assessment and Plan:  Right humerus fracture  Right tibial plateau fracture  · Continue as needed analgesia  · Orthopedic team consulted.  Appreciate help and recommendations  · Hold Eliquis.  Last dose on the evening of 11/15/2021     Essential hypertension  Atrial fibrillation  · Continue to hold Eliquis  · Continue metoprolol    Rheumatoid arthritis  · Hold Plaquenil    Anxiety and depression  · Prozac    DVT prophylaxis:  Medical DVT prophylaxis orders are present.       AM-PAC 6 Clicks Score (PT): 12 (11/16/21 1000)    Disposition: I expect the patient to be discharged TBD.    CODE STATUS:   Code Status and Medical Interventions:   Ordered  at: 11/16/21 0235     Level Of Support Discussed With:    Patient     Code Status (Patient has no pulse and is not breathing):    CPR (Attempt to Resuscitate)     Medical Interventions (Patient has pulse or is breathing):    Full Support       Chance Lopez MD  11/17/21

## 2021-11-17 NOTE — CONSULTS
Orthopedic Consult      Patient: Aleena Armstrong    Date of Admission: 11/15/2021  9:29 PM    YOB: 1950    Medical Record Number: [id number]    Attending Physician: Crystal Haile MD    Consulting Physician: Yonatan Galdamez MD      Chief Complaints: Closed fracture of proximal end of right humerus, unspecified fracture morphology, initial encounter [S42.201A] per medicine note  Aleena Armstrong is a 70 y.o. female with a history of afib on Eliquis, GERD, anxiety, depression, HTN, HLD, RA, WPW, presents to the ED after having a fall.  Patient reports that she was hanging wallpaper while standing in a chair.  Her chair tipped over and she fell onto the hardwood floor on her right side.  She complains of severe pain in her right shoulder area and right knee.  She has numbness in her right hand and right forearm.       No fevers, shortness of air, chest pain, nausea, vomiting, diarrhea, or any other complaints at this time. CT right lower extremity shows there are fractures involving both tibial plateaus with extension into the intercondylar notch. X-ray consistent with a right humerus fracture    Patient s/p ORIF proximal humerus approx 2 years ago         Allergies   Allergen Reactions   • Bactrim [Sulfamethoxazole-Trimethoprim] Other (See Comments)     MOUTH SORES   • Percocet [Oxycodone-Acetaminophen] Other (See Comments)     NIGHT TERRORS    • Statins Myalgia     MYALGIA    • Sulfa Antibiotics Other (See Comments)     MOUTH SORES AND RASH    • Erythromycin Rash     RASH         Home Medications:  Medications Prior to Admission   Medication Sig Dispense Refill Last Dose   • acetaminophen (TYLENOL) 500 MG tablet Take 500 mg by mouth Every 6 (Six) Hours As Needed for Mild Pain (1-3).      • apixaban (ELIQUIS) 5 MG tablet tablet Take 1 tablet by mouth Every 12 (Twelve) Hours. 60 tablet 5    • dexlansoprazole (Dexilant) 60 MG capsule Take 60 mg by mouth Daily.      • FLUoxetine (PROzac) 40 MG capsule Take 1  capsule by mouth Daily.      • hydroxychloroquine (PLAQUENIL) 200 MG tablet Take 200 mg by mouth 2 (Two) Times a Day.      • metoprolol succinate XL (TOPROL-XL) 25 MG 24 hr tablet Take 1 tablet by mouth Daily. 90 tablet 1    • promethazine (PHENERGAN) 25 MG tablet Take 25 mg by mouth Every 6 (Six) Hours As Needed for Nausea or Vomiting.      • timolol (TIMOPTIC) 0.5 % ophthalmic solution Every 12 (Twelve) Hours.      • traZODone (DESYREL) 100 MG tablet Take 100 mg by mouth Every Night.      • ondansetron (ZOFRAN) 4 MG tablet Take 1 tablet by mouth Every 8 (Eight) Hours As Needed for post op nausea 30 tablet 0          Past Medical History:   Diagnosis Date   • A-fib (HCC)     HAD ABLATION FOR THIS IN 2017   • Anemia    • Anxiety and depression 12/11/2016   • GERD (gastroesophageal reflux disease)    • Glaucoma of left eye 12/11/2016   • H/O migraine     LAST 3  YEARS AGO    • History of MRSA infection     APPROX 8 YEARS, TREATED WITH ORAL ABX, NEGATIVE CULTURES FOLLOWING TX    • History of transfusion    • Hyperlipidemia    • Hypertension     MEDS DC'D BY DR GOMEZ 1/2017   • Insomnia 12/11/2016   • Kidney disease    • Peptic ulcer - s/p gastric bypass surgery (7-8 years ago) 12/11/2016   • Rheumatoid arthritis (HCC) 12/11/2016   • Spinal headache     FOLLOWING SPINAL FOR CHILDBIRTH    • Vitamin D deficiency 12/11/2016   • Wears dentures     UPPER PLATE    • Elodia-Parkinson-White (WPW) syndrome     1990s        Past Surgical History:   Procedure Laterality Date   • BREAST BIOPSY  ~1990   • CARDIAC CATHETERIZATION N/A 10/24/2016    Procedure: Left Heart Cath;  Surgeon: Rubens Morejon MD;  Location:  [facility];  Service:    • CARDIAC ELECTROPHYSIOLOGY PROCEDURE N/A 6/20/2017    Procedure: PVA;  Surgeon: Edvin Gomez DO;  Location:  [facility];  Service:    • CHOLECYSTECTOMY  ~1990   • COLONOSCOPY  2007   • GASTRIC BYPASS     • GASTRIC BYPASS  2005   • HAMMER TOE REPAIR       LEFT--GARO IN PLACE    • HYSTERECTOMY  1993    Complete   • ORIF HUMERUS FRACTURE Right 2019    Procedure: ORIF RIGHT PROXIMAL HUMERUS;  Surgeon: Yonatan Galdamez MD;  Location: [facility];  Service: Orthopedics   • SHOULDER ACROMIOPLASTY Right 2019   • WRIST FRACTURE SURGERY Right 2014        Social History     Occupational History   • Not on file   Tobacco Use   • Smoking status: Former Smoker     Packs/day: 1.00     Types: Cigarettes     Quit date:      Years since quittin.8   • Smokeless tobacco: Never Used   Vaping Use   • Vaping Use: Never used   Substance and Sexual Activity   • Alcohol use: Yes     Alcohol/week: 5.0 standard drinks     Types: 5 Glasses of wine per week     Comment: RARE   • Drug use: No   • Sexual activity: Defer      Social History     Social History Narrative   • Not on file        Family History   Problem Relation Age of Onset   • Arrhythmia Mother    • Hypertension Mother    • Stroke Mother    • Diabetes Mother    • Lung cancer Father    • Breast cancer Sister    • Breast cancer Maternal Aunt    • Diabetes Son         Type 1   • Breast cancer Cousin    • Ovarian cancer Cousin          Review of Systems:   Review of Systems   Constitutional: Negative.    HENT: Negative.    Eyes: Negative.    Respiratory: Negative.    Cardiovascular: Negative.    Gastrointestinal: Negative.    Endocrine: Negative.    Genitourinary: Negative.    Musculoskeletal:        Right shoulder pain and right knee pain   Skin: Negative.    Allergic/Immunologic: Negative.    Neurological: Negative.    Hematological: Negative.    Psychiatric/Behavioral: Negative    Physical Exam: 70 y.o. female  General Appearance:    Alert, cooperative, in no acute distress                   Vitals:    21 0808 21 0954 21 1533 21 1907   BP:  134/91 114/58 103/53   BP Location:  Left arm Right arm Left arm   Patient Position:  Lying Lying Lying   Pulse: 89 90 65 70   Resp:  18 16 14   Temp:  98.5  °F (36.9 °C) 99 °F (37.2 °C) 98.3 °F (36.8 °C)   TempSrc:  Oral Oral Oral   SpO2: 100% 98% 93% 93%   Weight:       Height:            Head:    Normocephalic, without obvious abnormality, atraumatic      Right Upper Extremity:obvious deformity, s/p nerve lock so difficult to assess neuromotor exam.  Skin intact,  Fingers WWP  Right Lower Extremity:  No obvious deformity, s/p nerve block so difficult to assess neuromotor exam.  Skin intact, + knee swelling. Toes WWP         Diagnostic Tests:    I have reviewed the labs, radiology results and diagnostic studies:    Results from last 7 days   Lab Units 11/16/21  0633   WBC 10*3/mm3 5.85   HEMOGLOBIN g/dL 9.8*   PLATELETS 10*3/mm3 182     Results from last 7 days   Lab Units 11/16/21  0633   SODIUM mmol/L 139   POTASSIUM mmol/L 5.3*   CO2 mmol/L 22.0   CREATININE mg/dL 0.95   GLUCOSE mg/dL 94       CT OF THE RIGHT KNEE W/O CONTRAST       HISTORY: Trauma     COMPARISON: X-ray from earlier same day      TECHNIQUE: CT images of the right knee  Radiation dose reduction techniques included automated exposure control. Radiation audit for CT and nuclear cardiology exams in the last 12 months: .      FINDINGS:  Compression fracture is identified involving the patient's lateral tibial plateau. Fracture extends to the intercondylar region. Fracture is also done finding involving the medial portion of the medial plateau. Femur and fibula appear intact. There is a  lipohemarthrosis joint effusion. Patella is intact. No foreign body.     IMPRESSION:  There are fractures involving both tibial plateaus with extension into the intercondylar notch.       Study Result    Narrative & Impression   CR Humerus Min 2 Vws RT     INDICATION:   Fall, right upper arm pain     COMPARISON:   None available.     FINDINGS:   AP, oblique and lateral views of the right humerus.  Fracture deformity is seen involving the patient's proximal humeral shaft. The patient's fixation hardware involves the  proximal fragment. Glenohumeral joint and acromioclavicular joint appear intact.        IMPRESSION:  Severe fracture deformity is seen involving the patient's proximal humeral shaft           Assessment:  Patient Active Problem List   Diagnosis   • WPW (Elodia-Parkinson-White syndrome)   • Chest pain   • Morbid obesity (HCC)   • Dyslipidemia   • Essential hypertension   • Atrial flutter (HCC)   • HO Atrial fibrillation (CMS/HCC)   • Acute kidney injury (HCC)   • Insomnia   • Anxiety and depression   • Vitamin D deficiency   • Rheumatoid arthritis (HCC)   • Glaucoma of left eye   • Peptic ulcer    • Bradycardia   • Paroxysmal atrial fibrillation (HCC)   • Hematoma of groin   • Proximal humerus fracture   • S/P ORIF right proximal humerus fracture   • Anemia   • Chronic pain   • Acute postoperative pain   • Fracture of right humerus   • Fracture of right tibial plateau   • Fall     A/P:     periprosthetic proximal third diaphyseal humeral shaft fracture.  With displacement.  This will warrant surgical intervention.     The plan would be to operate on this in the next 72 hours likely Thursday afternoon.  She also has concomitant nondisplaced proximal tibia fracture which is nonsurgical.   Furthermore she will be nonweightbearing on the lower extremity.  Should be placed in  sling.  We could advance her diet as tolerated.  She will be nothing by mouth past midnight on Wednesday.     Consider heparin SQ prophylactic dose tonight and tomorrow and hold on Thursday for surgery                Yonatan Galdamez MD  11/16/21  19:22 EST

## 2021-11-18 ENCOUNTER — ANESTHESIA EVENT (OUTPATIENT)
Dept: PERIOP | Facility: HOSPITAL | Age: 71
End: 2021-11-18

## 2021-11-18 ENCOUNTER — ANESTHESIA (OUTPATIENT)
Dept: PERIOP | Facility: HOSPITAL | Age: 71
End: 2021-11-18

## 2021-11-18 ENCOUNTER — APPOINTMENT (OUTPATIENT)
Dept: GENERAL RADIOLOGY | Facility: HOSPITAL | Age: 71
End: 2021-11-18

## 2021-11-18 LAB
ANION GAP SERPL CALCULATED.3IONS-SCNC: 8 MMOL/L (ref 5–15)
BASOPHILS # BLD AUTO: 0.03 10*3/MM3 (ref 0–0.2)
BASOPHILS NFR BLD AUTO: 0.7 % (ref 0–1.5)
BUN SERPL-MCNC: 21 MG/DL (ref 8–23)
BUN/CREAT SERPL: 22.3 (ref 7–25)
CALCIUM SPEC-SCNC: 8.5 MG/DL (ref 8.6–10.5)
CHLORIDE SERPL-SCNC: 106 MMOL/L (ref 98–107)
CO2 SERPL-SCNC: 23 MMOL/L (ref 22–29)
CREAT SERPL-MCNC: 0.94 MG/DL (ref 0.57–1)
DEPRECATED RDW RBC AUTO: 46.7 FL (ref 37–54)
EOSINOPHIL # BLD AUTO: 0.06 10*3/MM3 (ref 0–0.4)
EOSINOPHIL NFR BLD AUTO: 1.4 % (ref 0.3–6.2)
ERYTHROCYTE [DISTWIDTH] IN BLOOD BY AUTOMATED COUNT: 12.6 % (ref 12.3–15.4)
GFR SERPL CREATININE-BSD FRML MDRD: 59 ML/MIN/1.73
GLUCOSE SERPL-MCNC: 91 MG/DL (ref 65–99)
HCT VFR BLD AUTO: 29.6 % (ref 34–46.6)
HGB BLD-MCNC: 9.5 G/DL (ref 12–15.9)
IMM GRANULOCYTES # BLD AUTO: 0.01 10*3/MM3 (ref 0–0.05)
IMM GRANULOCYTES NFR BLD AUTO: 0.2 % (ref 0–0.5)
LYMPHOCYTES # BLD AUTO: 1.18 10*3/MM3 (ref 0.7–3.1)
LYMPHOCYTES NFR BLD AUTO: 27.1 % (ref 19.6–45.3)
MCH RBC QN AUTO: 32.4 PG (ref 26.6–33)
MCHC RBC AUTO-ENTMCNC: 32.1 G/DL (ref 31.5–35.7)
MCV RBC AUTO: 101 FL (ref 79–97)
MONOCYTES # BLD AUTO: 0.43 10*3/MM3 (ref 0.1–0.9)
MONOCYTES NFR BLD AUTO: 9.9 % (ref 5–12)
NEUTROPHILS NFR BLD AUTO: 2.64 10*3/MM3 (ref 1.7–7)
NEUTROPHILS NFR BLD AUTO: 60.7 % (ref 42.7–76)
NRBC BLD AUTO-RTO: 0 /100 WBC (ref 0–0.2)
PLATELET # BLD AUTO: 154 10*3/MM3 (ref 140–450)
PMV BLD AUTO: 10.4 FL (ref 6–12)
POTASSIUM SERPL-SCNC: 4.3 MMOL/L (ref 3.5–5.2)
QT INTERVAL: 452 MS
QTC INTERVAL: 439 MS
RBC # BLD AUTO: 2.93 10*6/MM3 (ref 3.77–5.28)
SODIUM SERPL-SCNC: 137 MMOL/L (ref 136–145)
WBC NRBC COR # BLD: 4.35 10*3/MM3 (ref 3.4–10.8)

## 2021-11-18 PROCEDURE — C1713 ANCHOR/SCREW BN/BN,TIS/BN: HCPCS | Performed by: ORTHOPAEDIC SURGERY

## 2021-11-18 PROCEDURE — 25010000002 NEOSTIGMINE 10 MG/10ML SOLUTION: Performed by: NURSE ANESTHETIST, CERTIFIED REGISTERED

## 2021-11-18 PROCEDURE — 25010000002 KETOROLAC TROMETHAMINE PER 15 MG: Performed by: INTERNAL MEDICINE

## 2021-11-18 PROCEDURE — 0PSC04Z REPOSITION RIGHT HUMERAL HEAD WITH INTERNAL FIXATION DEVICE, OPEN APPROACH: ICD-10-PCS | Performed by: ORTHOPAEDIC SURGERY

## 2021-11-18 PROCEDURE — 25010000002 PROPOFOL 10 MG/ML EMULSION: Performed by: NURSE ANESTHETIST, CERTIFIED REGISTERED

## 2021-11-18 PROCEDURE — 0PPC04Z REMOVAL OF INTERNAL FIXATION DEVICE FROM RIGHT HUMERAL HEAD, OPEN APPROACH: ICD-10-PCS | Performed by: ORTHOPAEDIC SURGERY

## 2021-11-18 PROCEDURE — 25010000002 ROPIVACAINE PER 1 MG: Performed by: NURSE ANESTHETIST, CERTIFIED REGISTERED

## 2021-11-18 PROCEDURE — 0 CEFAZOLIN IN DEXTROSE 2-4 GM/100ML-% SOLUTION: Performed by: ORTHOPAEDIC SURGERY

## 2021-11-18 PROCEDURE — 93010 ELECTROCARDIOGRAM REPORT: CPT | Performed by: INTERNAL MEDICINE

## 2021-11-18 PROCEDURE — 25010000002 VANCOMYCIN PER 500 MG: Performed by: ORTHOPAEDIC SURGERY

## 2021-11-18 PROCEDURE — C1889 IMPLANT/INSERT DEVICE, NOC: HCPCS | Performed by: ORTHOPAEDIC SURGERY

## 2021-11-18 PROCEDURE — 93005 ELECTROCARDIOGRAM TRACING: CPT | Performed by: ANESTHESIOLOGY

## 2021-11-18 PROCEDURE — 76000 FLUOROSCOPY <1 HR PHYS/QHP: CPT

## 2021-11-18 PROCEDURE — 25010000002 VANCOMYCIN 1 G RECONSTITUTED SOLUTION: Performed by: ORTHOPAEDIC SURGERY

## 2021-11-18 PROCEDURE — 99232 SBSQ HOSP IP/OBS MODERATE 35: CPT | Performed by: INTERNAL MEDICINE

## 2021-11-18 PROCEDURE — 25010000002 DEXAMETHASONE PER 1 MG: Performed by: NURSE ANESTHETIST, CERTIFIED REGISTERED

## 2021-11-18 PROCEDURE — 85025 COMPLETE CBC W/AUTO DIFF WBC: CPT | Performed by: INTERNAL MEDICINE

## 2021-11-18 PROCEDURE — 0 LIDOCAINE 1 % SOLUTION: Performed by: NURSE ANESTHETIST, CERTIFIED REGISTERED

## 2021-11-18 PROCEDURE — 25010000002 FENTANYL CITRATE (PF) 50 MCG/ML SOLUTION: Performed by: NURSE ANESTHETIST, CERTIFIED REGISTERED

## 2021-11-18 PROCEDURE — 80048 BASIC METABOLIC PNL TOTAL CA: CPT | Performed by: INTERNAL MEDICINE

## 2021-11-18 PROCEDURE — 25010000002 PHENYLEPHRINE 10 MG/ML SOLUTION 1 ML VIAL: Performed by: NURSE ANESTHETIST, CERTIFIED REGISTERED

## 2021-11-18 PROCEDURE — 25010000002 ROPIVACAINE PER 1 MG: Performed by: ORTHOPAEDIC SURGERY

## 2021-11-18 DEVICE — SCRW MULTILOCK TI 4.5X40MM: Type: IMPLANTABLE DEVICE | Site: HUMERUS | Status: FUNCTIONAL

## 2021-11-18 DEVICE — SCRW MULTILOCK TI 4.5X34MM: Type: IMPLANTABLE DEVICE | Site: HUMERUS | Status: FUNCTIONAL

## 2021-11-18 DEVICE — IMPLANTABLE DEVICE: Type: IMPLANTABLE DEVICE | Site: HUMERUS | Status: FUNCTIONAL

## 2021-11-18 DEVICE — SUT FW #2 W/TPR NDL 1/2 CIR 38IN 97CM 26.5MM BLU: Type: IMPLANTABLE DEVICE | Site: HUMERUS | Status: FUNCTIONAL

## 2021-11-18 DEVICE — SCRW LK STRDRV TI 4X26MM: Type: IMPLANTABLE DEVICE | Site: HUMERUS | Status: FUNCTIONAL

## 2021-11-18 RX ORDER — CEFAZOLIN SODIUM 2 G/100ML
2 INJECTION, SOLUTION INTRAVENOUS ONCE
Status: COMPLETED | OUTPATIENT
Start: 2021-11-18 | End: 2021-11-18

## 2021-11-18 RX ORDER — MIDAZOLAM HYDROCHLORIDE 1 MG/ML
0.5 INJECTION INTRAMUSCULAR; INTRAVENOUS
Status: DISCONTINUED | OUTPATIENT
Start: 2021-11-18 | End: 2021-11-18 | Stop reason: HOSPADM

## 2021-11-18 RX ORDER — VANCOMYCIN HYDROCHLORIDE 1 G/20ML
INJECTION, POWDER, LYOPHILIZED, FOR SOLUTION INTRAVENOUS AS NEEDED
Status: DISCONTINUED | OUTPATIENT
Start: 2021-11-18 | End: 2021-11-18 | Stop reason: HOSPADM

## 2021-11-18 RX ORDER — SODIUM CHLORIDE 450 MG/100ML
50 INJECTION, SOLUTION INTRAVENOUS CONTINUOUS
Status: DISCONTINUED | OUTPATIENT
Start: 2021-11-18 | End: 2021-11-22

## 2021-11-18 RX ORDER — DEXAMETHASONE SODIUM PHOSPHATE 4 MG/ML
INJECTION, SOLUTION INTRA-ARTICULAR; INTRALESIONAL; INTRAMUSCULAR; INTRAVENOUS; SOFT TISSUE AS NEEDED
Status: DISCONTINUED | OUTPATIENT
Start: 2021-11-18 | End: 2021-11-18 | Stop reason: SURG

## 2021-11-18 RX ORDER — ACETAMINOPHEN 325 MG/1
650 TABLET ORAL EVERY 4 HOURS PRN
Status: DISCONTINUED | OUTPATIENT
Start: 2021-11-18 | End: 2021-11-23 | Stop reason: HOSPADM

## 2021-11-18 RX ORDER — NEOSTIGMINE METHYLSULFATE 1 MG/ML
INJECTION, SOLUTION INTRAVENOUS AS NEEDED
Status: DISCONTINUED | OUTPATIENT
Start: 2021-11-18 | End: 2021-11-18 | Stop reason: SURG

## 2021-11-18 RX ORDER — SODIUM CHLORIDE 0.9 % (FLUSH) 0.9 %
10 SYRINGE (ML) INJECTION AS NEEDED
Status: DISCONTINUED | OUTPATIENT
Start: 2021-11-18 | End: 2021-11-18 | Stop reason: HOSPADM

## 2021-11-18 RX ORDER — PROMETHAZINE HYDROCHLORIDE 25 MG/1
25 TABLET ORAL ONCE AS NEEDED
Status: DISCONTINUED | OUTPATIENT
Start: 2021-11-18 | End: 2021-11-18 | Stop reason: HOSPADM

## 2021-11-18 RX ORDER — MAGNESIUM HYDROXIDE 1200 MG/15ML
LIQUID ORAL AS NEEDED
Status: DISCONTINUED | OUTPATIENT
Start: 2021-11-18 | End: 2021-11-18 | Stop reason: HOSPADM

## 2021-11-18 RX ORDER — SODIUM CHLORIDE, SODIUM LACTATE, POTASSIUM CHLORIDE, CALCIUM CHLORIDE 600; 310; 30; 20 MG/100ML; MG/100ML; MG/100ML; MG/100ML
9 INJECTION, SOLUTION INTRAVENOUS CONTINUOUS
Status: DISCONTINUED | OUTPATIENT
Start: 2021-11-18 | End: 2021-11-18

## 2021-11-18 RX ORDER — PROMETHAZINE HYDROCHLORIDE 25 MG/1
25 SUPPOSITORY RECTAL ONCE AS NEEDED
Status: DISCONTINUED | OUTPATIENT
Start: 2021-11-18 | End: 2021-11-18 | Stop reason: HOSPADM

## 2021-11-18 RX ORDER — EPHEDRINE SULFATE 50 MG/ML
5 INJECTION, SOLUTION INTRAVENOUS ONCE AS NEEDED
Status: DISCONTINUED | OUTPATIENT
Start: 2021-11-18 | End: 2021-11-18 | Stop reason: HOSPADM

## 2021-11-18 RX ORDER — GLYCOPYRROLATE 0.2 MG/ML
INJECTION INTRAMUSCULAR; INTRAVENOUS AS NEEDED
Status: DISCONTINUED | OUTPATIENT
Start: 2021-11-18 | End: 2021-11-18 | Stop reason: SURG

## 2021-11-18 RX ORDER — BUPIVACAINE HCL/0.9 % NACL/PF 0.125 %
PLASTIC BAG, INJECTION (ML) EPIDURAL AS NEEDED
Status: DISCONTINUED | OUTPATIENT
Start: 2021-11-18 | End: 2021-11-18 | Stop reason: SURG

## 2021-11-18 RX ORDER — LIDOCAINE HYDROCHLORIDE 10 MG/ML
0.5 INJECTION, SOLUTION EPIDURAL; INFILTRATION; INTRACAUDAL; PERINEURAL ONCE AS NEEDED
Status: CANCELLED | OUTPATIENT
Start: 2021-11-18

## 2021-11-18 RX ORDER — HYDROXYCHLOROQUINE SULFATE 200 MG/1
200 TABLET, FILM COATED ORAL DAILY
Status: DISCONTINUED | OUTPATIENT
Start: 2021-11-18 | End: 2021-11-23 | Stop reason: HOSPADM

## 2021-11-18 RX ORDER — FAMOTIDINE 10 MG/ML
20 INJECTION, SOLUTION INTRAVENOUS ONCE
Status: CANCELLED | OUTPATIENT
Start: 2021-11-18 | End: 2021-11-18

## 2021-11-18 RX ORDER — PROPOFOL 10 MG/ML
VIAL (ML) INTRAVENOUS AS NEEDED
Status: DISCONTINUED | OUTPATIENT
Start: 2021-11-18 | End: 2021-11-18 | Stop reason: SURG

## 2021-11-18 RX ORDER — VANCOMYCIN HYDROCHLORIDE 1 G/200ML
15 INJECTION, SOLUTION INTRAVENOUS ONCE
Status: COMPLETED | OUTPATIENT
Start: 2021-11-18 | End: 2021-11-18

## 2021-11-18 RX ORDER — LIDOCAINE HYDROCHLORIDE 10 MG/ML
INJECTION, SOLUTION INFILTRATION; PERINEURAL AS NEEDED
Status: DISCONTINUED | OUTPATIENT
Start: 2021-11-18 | End: 2021-11-18 | Stop reason: SURG

## 2021-11-18 RX ORDER — CEFAZOLIN SODIUM 2 G/100ML
2 INJECTION, SOLUTION INTRAVENOUS EVERY 8 HOURS
Status: COMPLETED | OUTPATIENT
Start: 2021-11-18 | End: 2021-11-19

## 2021-11-18 RX ORDER — ROCURONIUM BROMIDE 10 MG/ML
INJECTION, SOLUTION INTRAVENOUS AS NEEDED
Status: DISCONTINUED | OUTPATIENT
Start: 2021-11-18 | End: 2021-11-18 | Stop reason: SURG

## 2021-11-18 RX ORDER — FAMOTIDINE 20 MG/1
20 TABLET, FILM COATED ORAL ONCE
Status: COMPLETED | OUTPATIENT
Start: 2021-11-18 | End: 2021-11-18

## 2021-11-18 RX ORDER — FENTANYL CITRATE 50 UG/ML
50 INJECTION, SOLUTION INTRAMUSCULAR; INTRAVENOUS
Status: DISCONTINUED | OUTPATIENT
Start: 2021-11-18 | End: 2021-11-18 | Stop reason: HOSPADM

## 2021-11-18 RX ORDER — ESMOLOL HYDROCHLORIDE 10 MG/ML
INJECTION INTRAVENOUS AS NEEDED
Status: DISCONTINUED | OUTPATIENT
Start: 2021-11-18 | End: 2021-11-18 | Stop reason: SURG

## 2021-11-18 RX ORDER — ROPIVACAINE HYDROCHLORIDE 2 MG/ML
INJECTION, SOLUTION EPIDURAL; INFILTRATION; PERINEURAL AS NEEDED
Status: DISCONTINUED | OUTPATIENT
Start: 2021-11-18 | End: 2021-11-18 | Stop reason: SURG

## 2021-11-18 RX ORDER — EPHEDRINE SULFATE 50 MG/ML
INJECTION, SOLUTION INTRAVENOUS AS NEEDED
Status: DISCONTINUED | OUTPATIENT
Start: 2021-11-18 | End: 2021-11-18 | Stop reason: SURG

## 2021-11-18 RX ORDER — SODIUM CHLORIDE 0.9 % (FLUSH) 0.9 %
10 SYRINGE (ML) INJECTION EVERY 12 HOURS SCHEDULED
Status: DISCONTINUED | OUTPATIENT
Start: 2021-11-18 | End: 2021-11-18 | Stop reason: HOSPADM

## 2021-11-18 RX ADMIN — ROCURONIUM BROMIDE 50 MG: 10 INJECTION, SOLUTION INTRAVENOUS at 10:46

## 2021-11-18 RX ADMIN — KETOROLAC TROMETHAMINE 30 MG: 30 INJECTION, SOLUTION INTRAMUSCULAR; INTRAVENOUS at 05:57

## 2021-11-18 RX ADMIN — DEXAMETHASONE SODIUM PHOSPHATE 8 MG: 4 INJECTION, SOLUTION INTRA-ARTICULAR; INTRALESIONAL; INTRAMUSCULAR; INTRAVENOUS; SOFT TISSUE at 10:57

## 2021-11-18 RX ADMIN — PROPOFOL 150 MG: 10 INJECTION, EMULSION INTRAVENOUS at 10:46

## 2021-11-18 RX ADMIN — ROPIVACAINE HYDROCHLORIDE 6 ML/HR: 2 INJECTION, SOLUTION EPIDURAL; INFILTRATION at 19:11

## 2021-11-18 RX ADMIN — FENTANYL CITRATE 50 MCG: 50 INJECTION, SOLUTION INTRAMUSCULAR; INTRAVENOUS at 14:25

## 2021-11-18 RX ADMIN — ACETAMINOPHEN 650 MG: 325 TABLET, FILM COATED ORAL at 22:05

## 2021-11-18 RX ADMIN — Medication 200 MCG: at 11:01

## 2021-11-18 RX ADMIN — CEFAZOLIN SODIUM 2 G: 2 INJECTION, SOLUTION INTRAVENOUS at 10:42

## 2021-11-18 RX ADMIN — FAMOTIDINE 20 MG: 20 TABLET ORAL at 09:50

## 2021-11-18 RX ADMIN — NEOSTIGMINE METHYLSULFATE 4 MG: 0.5 INJECTION INTRAVENOUS at 13:30

## 2021-11-18 RX ADMIN — VANCOMYCIN HYDROCHLORIDE 1000 MG: 1 INJECTION, SOLUTION INTRAVENOUS at 10:20

## 2021-11-18 RX ADMIN — LIDOCAINE HYDROCHLORIDE 50 MG: 10 INJECTION, SOLUTION INFILTRATION; PERINEURAL at 10:46

## 2021-11-18 RX ADMIN — FLUOXETINE HYDROCHLORIDE 40 MG: 20 CAPSULE ORAL at 08:42

## 2021-11-18 RX ADMIN — APIXABAN 5 MG: 5 TABLET, FILM COATED ORAL at 20:16

## 2021-11-18 RX ADMIN — FENTANYL CITRATE 50 MCG: 50 INJECTION, SOLUTION INTRAMUSCULAR; INTRAVENOUS at 14:09

## 2021-11-18 RX ADMIN — CEFAZOLIN SODIUM 2 G: 2 INJECTION, SOLUTION INTRAVENOUS at 18:08

## 2021-11-18 RX ADMIN — SODIUM CHLORIDE, PRESERVATIVE FREE 10 ML: 5 INJECTION INTRAVENOUS at 08:43

## 2021-11-18 RX ADMIN — ROPIVACAINE HYDROCHLORIDE 6 ML/HR: 2 INJECTION, SOLUTION EPIDURAL; INFILTRATION at 02:45

## 2021-11-18 RX ADMIN — HYDROXYCHLOROQUINE SULFATE 200 MG: 200 TABLET ORAL at 18:09

## 2021-11-18 RX ADMIN — Medication 100 MCG: at 10:46

## 2021-11-18 RX ADMIN — GLYCOPYRROLATE 0.4 MG: 0.2 INJECTION INTRAMUSCULAR; INTRAVENOUS at 13:30

## 2021-11-18 RX ADMIN — METOPROLOL SUCCINATE 25 MG: 25 TABLET, EXTENDED RELEASE ORAL at 08:42

## 2021-11-18 RX ADMIN — EPHEDRINE SULFATE 15 MG: 50 INJECTION INTRAVENOUS at 11:15

## 2021-11-18 RX ADMIN — PHENYLEPHRINE HYDROCHLORIDE 1 MCG/KG/MIN: 10 INJECTION INTRAVENOUS at 11:23

## 2021-11-18 RX ADMIN — ROPIVACAINE HYDROCHLORIDE 10 MG: 2 INJECTION, SOLUTION EPIDURAL; INFILTRATION at 13:30

## 2021-11-18 RX ADMIN — Medication 200 MCG: at 11:08

## 2021-11-18 RX ADMIN — SODIUM CHLORIDE, POTASSIUM CHLORIDE, SODIUM LACTATE AND CALCIUM CHLORIDE 9 ML/HR: 600; 310; 30; 20 INJECTION, SOLUTION INTRAVENOUS at 09:39

## 2021-11-18 RX ADMIN — SENNOSIDES AND DOCUSATE SODIUM 2 TABLET: 50; 8.6 TABLET ORAL at 20:16

## 2021-11-18 RX ADMIN — TRAZODONE HYDROCHLORIDE 100 MG: 50 TABLET ORAL at 20:16

## 2021-11-18 RX ADMIN — EPHEDRINE SULFATE 15 MG: 50 INJECTION INTRAVENOUS at 11:03

## 2021-11-18 RX ADMIN — ESMOLOL HYDROCHLORIDE 50 MG: 10 INJECTION, SOLUTION INTRAVENOUS at 10:46

## 2021-11-18 NOTE — PROGRESS NOTES
[facility] Medicine Services  PROGRESS NOTE    Patient Name: Aleena Armstrong  : 1950  MRN: [id number]    Date of Admission: 11/15/2021  Primary Care Physician: Aniyah Goldberg MD    Subjective   Subjective     CC:  Right arm and right leg pain after fall    HPI:  I have seen and evaluated the patient this afternoon.  Comfortable in bed.  Had her right humerus ORIF this morning.  Reported that pain in her arm is controlled with the nerve block.  Still hurting in her knee particularly when she moves.  Waiting for physical therapy to evaluate her.    ROS:  10 point review of systems is negative except for what is mentioned in HPI    Objective   Objective     Vital Signs:   Temp:  [97.6 °F (36.4 °C)-98.4 °F (36.9 °C)] 98.3 °F (36.8 °C)  Heart Rate:  [52-86] 86  Resp:  [15-18] 17  BP: ()/(47-73) 112/47  Flow (L/min):  [2] 2     Physical Exam:  General: Comfortable, not in any acute distress, appears stated age, conversant and cooperative  Head: Atraumatic and normocephalic, without obvious abnormality  Eyes:   Conjunctivae and sclerae normal, no Icterus. No pallor  Ears:  Ears appear intact with no abnormalities noted  Throat: No oral lesions, no thrush, oral mucosa moist  Neck: Supple, trachea midline, no thyromegaly  Back:   No kyphoscoliosis present. No tenderness to palpation,   no sacral edema  Lungs: Clear to auscultation bilaterally, equal air entry, no wheezing or crackles  Heart:  Normal S1 and S2, no murmur, no gallop, No JVD, no lower extremity swelling  Abdomen:  Soft, no tenderness, no organomegaly, normal bowel sounds  Normal bowel sounds, no masses, no organomegaly. Soft, nontender, nondistended, no guarding, no rebound tenderness.  Extremities: Nerve block catheter in right upper shoulder.  Right shoulder wound is clean with no discharge or erythema.  Range of movement of the right knee is limited because of pain  Skin: No bleeding, bruising or rash, normal skin  turgor and elasticity  Neurologic: Cranial nerves appear intact with no evidence of facial asymmetry, normal motor and sensory functions in all 4 extremities  Psych: Alert and oriented x 3, normal mood    Results Reviewed:  LAB RESULTS:      Lab 11/18/21  1005 11/16/21  0633   WBC 4.35 5.85   HEMOGLOBIN 9.5* 9.8*   HEMATOCRIT 29.6* 31.0*   PLATELETS 154 182   NEUTROS ABS 2.64 3.57   IMMATURE GRANS (ABS) 0.01 0.01   LYMPHS ABS 1.18 1.55   MONOS ABS 0.43 0.63   EOS ABS 0.06 0.05   .0* 102.6*   PROTIME  --  13.6   APTT  --  26.2         Lab 11/18/21  1005 11/16/21  0633   SODIUM 137 139   POTASSIUM 4.3 5.3*   CHLORIDE 106 108*   CO2 23.0 22.0   ANION GAP 8.0 9.0   BUN 21 17   CREATININE 0.94 0.95   GLUCOSE 91 94   CALCIUM 8.5* 8.4*         Lab 11/16/21  0633   TOTAL PROTEIN 5.8*   ALBUMIN 3.40*   GLOBULIN 2.4   ALT (SGPT) 8   AST (SGOT) 18   BILIRUBIN 0.3   ALK PHOS 62         Lab 11/16/21  0633   PROTIME 13.6   INR 1.07             Lab 11/16/21  0633   ABO TYPING A   RH TYPING Positive   ANTIBODY SCREEN Negative         Brief Urine Lab Results     None          Microbiology Results Abnormal     Procedure Component Value - Date/Time    COVID PRE-OP / PRE-PROCEDURE SCREENING ORDER (NO ISOLATION) - Swab, Nasopharynx [[id number]]  (Normal) Collected: 11/16/21 0150    Lab Status: Final result Specimen: Swab from Nasopharynx Updated: 11/16/21 0243    Narrative:      The following orders were created for panel order COVID PRE-OP / PRE-PROCEDURE SCREENING ORDER (NO ISOLATION) - Swab, Nasopharynx.  Procedure                               Abnormality         Status                     ---------                               -----------         ------                     COVID-19 ABBOTT IN-HOUS...[[id number]]  Normal              Final result                 Please view results for these tests on the individual orders.    COVID-19, ABBOTT IN-HOUSE,NASAL Swab (NO TRANSPORT MEDIA) 2 HR TAT - Swab, Nasopharynx [[id number]]   (Normal) Collected: 11/16/21 0150    Lab Status: Final result Specimen: Swab from Nasopharynx Updated: 11/16/21 0243     COVID19 Presumptive Negative    Narrative:      Fact sheet for providers: https://www.fda.gov/media/822571/download     Fact sheet for patients: https://www.fda.gov/media/477878/download    Test performed by PCR.  If inconsistent with clinical signs and symptoms patient should be tested with different authorized molecular test.          CT Upper Extremity Right Without Contrast    Result Date: 11/17/2021  EXAMINATION: CT UPPER EXTREMITY RIGHT WO CONTRAST-  INDICATION: Fracture, shoulder  TECHNIQUE: Axial noncontrast CT of the proximal right upper extremity with multiplanar reconstruction  The radiation dose reduction device was turned on for each scan per the ALARA (As Low as Reasonably Achievable) protocol.  COMPARISON: Radiographs one day prior  FINDINGS: Prior plate and screw fixation of the proximal right humerus noted. There is acute comminuted oblique fracture of the proximal humeral diaphysis with intervening butterfly fragment, with mild lateral displacement and foreshortening. The existing fixation hardware is not displaced, however fracture fragment less than this portion of the proximal humerus. The acromioclavicular and glenohumeral articulations appear preserved. There is a small joint effusion and intramuscular edema and hemorrhage. No suspicious unexpected lytic or sclerotic osseous lesions. The partially imaged right lung field demonstrates some dependent atelectasis.      Impression: Prior plate and screw fixation of the proximal right humerus noted. There is acute comminuted oblique fracture of the proximal humeral diaphysis with intervening butterfly fragment, with mild lateral displacement and foreshortening. The existing fixation hardware is not displaced, however fracture fragment less than this portion of the proximal humerus.   This report was finalized on 11/17/2021 9:57  AM by Gadiel Mendoza.        Results for orders placed during the hospital encounter of 06/20/17    Adult Transesophageal Echo    Interpretation Summary  · Left ventricular systolic function is normal. Estimated EF = 60%.  · The cardiac valves are anatomically and functionally normal.  · No evidence of a left atrial appendage thrombus was present      I have reviewed the medications:  Scheduled Meds:apixaban (ELIQUIS) NOT ORDERED, , Does not apply, Daily With Dinner  ceFAZolin, 2 g, Intravenous, Q8H  metoprolol succinate XL, 25 mg, Oral, Daily  senna-docusate sodium, 2 tablet, Oral, BID  sodium chloride, 10 mL, Intravenous, Q12H  traZODone, 100 mg, Oral, Nightly      Continuous Infusions:ropivacaine (NAROPIN) 0.2% peripheral nerve cath (moog), 6 mL/hr, Last Rate: 6 mL/hr (11/18/21 0245)  sodium chloride, 50 mL/hr      PRN Meds:.•  acetaminophen **OR** acetaminophen  •  senna-docusate sodium **AND** polyethylene glycol **AND** bisacodyl **AND** bisacodyl  •  HYDROcodone-acetaminophen  •  Morphine **AND** naloxone  •  Morphine  •  ondansetron  •  sodium chloride    Assessment/Plan   Assessment & Plan     Active Hospital Problems    Diagnosis  POA   • **Fall [W19.XXXA]  Yes   • Fracture of right humerus [S42.301A]  Yes   • Fracture of right tibial plateau [S82.141A]  Yes   • HO Atrial fibrillation (CMS/HCC) [I48.91]  Yes   • Anxiety and depression [F41.9, F32.A]  Yes   • Rheumatoid arthritis (HCC) [M06.9]  Yes   • Dyslipidemia [E78.5]  Yes   • Essential hypertension [I10]  Yes      Resolved Hospital Problems   No resolved problems to display.        Brief Hospital Course to date:  Aleena Armstrong is a 70 y.o. female patient with past medical history of atrial fibrillation on Eliquis, anxiety and depression, GERD, essential hypertension, dyslipidemia, rheumatoid arthritis, WPW who presented to the hospital status post mechanical fall that resulted in right tibial plateau fracture and right humeral  fracture.    Assessment and Plan:  Right humerus fracture  Right tibial plateau fracture  · Underwent ORIF of right humerus by Dr Galdamez 11/18/2021  · Orthopedic team on board appreciate help and recommendations  · PT and OT  · Declined rehab.  Anticipate discharge home in 24 to 48 hours once her pain is controlled.      Essential hypertension  Atrial fibrillation  · Restart Eliquis  · Continue metoprolol    Rheumatoid arthritis  · Hold Plaquenil    Anxiety and depression  · Prozac    DVT prophylaxis:  Medical and mechanical DVT prophylaxis orders are present.       AM-PAC 6 Clicks Score (PT): 12 (11/18/21 6488)    Disposition: I expect the patient to be discharged TBD.    CODE STATUS:   Code Status and Medical Interventions:   Ordered at: 11/18/21 5450     Level Of Support Discussed With:    Patient     Code Status (Patient has no pulse and is not breathing):    CPR (Attempt to Resuscitate)     Medical Interventions (Patient has pulse or is breathing):    Full Support       Chance Lopez MD  11/18/21

## 2021-11-18 NOTE — ANESTHESIA PROCEDURE NOTES
Airway  Urgency: elective    Date/Time: 11/18/2021 10:49 AM  Airway not difficult    General Information and Staff    Patient location during procedure: OR  CRNA: Adeel Ruby CRNA    Indications and Patient Condition  Indications for airway management: airway protection    Preoxygenated: yes  MILS not maintained throughout  Mask difficulty assessment: 1 - vent by mask    Final Airway Details  Final airway type: endotracheal airway      Successful airway: ETT  Cuffed: yes   Successful intubation technique: direct laryngoscopy  Endotracheal tube insertion site: oral  Blade: Janie  Blade size: 3  ETT size (mm): 7.0  Cormack-Lehane Classification: grade I - full view of glottis  Placement verified by: chest auscultation and capnometry   Cuff volume (mL): 8  Measured from: lips  ETT/EBT  to lips (cm): 20  Number of attempts at approach: 1  Assessment: lips, teeth, and gum same as pre-op and atraumatic intubation    Additional Comments  Negative epigastric sounds, Breath sound equal bilaterally with symmetric chest rise and fall

## 2021-11-18 NOTE — ANESTHESIA POSTPROCEDURE EVALUATION
Patient: Aleena Armstrong    Procedure Summary     Date: 11/18/21 Room / Location:  [facility] /  [facility]    Anesthesia Start: 1037 Anesthesia Stop: 1338    Procedure: HUMERUS PROXIMAL OPEN REDUCTION INTERNAL FIXATION RIGHT WITH HARDWARE REMOVAL AND NAIL PLACEMENT (Right Arm Upper) Diagnosis:       Proximal humerus fracture      (periprosthetic humerus fracture)    Surgeons: Yonatan Galdamez MD Provider: Sanchez Leyva MD    Anesthesia Type: general ASA Status: 2          Anesthesia Type: general    Vitals  Vitals Value Taken Time   BP     Temp     Pulse     Resp     SpO2 96 % 11/18/21 1337   Vitals shown include unvalidated device data.        Post Anesthesia Care and Evaluation    Patient location during evaluation: PACU  Patient participation: complete - patient participated  Level of consciousness: awake and alert  Pain management: adequate  Airway patency: patent  Anesthetic complications: No anesthetic complications  PONV Status: none  Cardiovascular status: hemodynamically stable and acceptable  Respiratory status: nonlabored ventilation, acceptable and nasal cannula  Hydration status: acceptable

## 2021-11-18 NOTE — OP NOTE
Operative Report ORIF Proximal Humerus Fracture     Preoperative Diagnosis:right periprosthetic proximal humerus fracture     Postoperative Diagnosis: Same     Procedure:removal of hardware intramedullary nailing right proximal humerus fracture     Surgeon: Dr. Yonatan Galdamez     Assistant: Netta Lyon PA-C  ** Please note the physician assistant was medically necessary to assist with positioning retraction, arm positioning, care of soft tissues and closure     EBL:   150 cc     Anesthesia: GETA with interscalene brachial plexus block     Implants: Synthes Multiloc humeral nail  1)  Size 8.5mm x 270mm        Indications:Patient  is a 71 yo male  who suffered a rightproximal humerus fracture.  Risks and benefits of operative versus nonoperative management discussed.  Patient elected to proceed with surgery. Informed consent obtained.         Description: Patient was met in the preoperative holding area and confirmed by name, medical record number, .  The operative site was correctly identified. Patient was then met by anesthesia and cleared for surgery.  An interscalene brachial plexus block was then performed.  Patient was then brought to the operating room suite and and placed supine on the OR table.  Patient underwent smooth induction with GETA.  Patient then positioned in the beach chair position. Patient’s right shoulder and right upper extremity prepped and draped in sterile fashion. Preoperative prophylactic antibiotic given with 2 g of Ancef and Vancomycin given history of MRSA in past.  A timeout was then observed     An approx 12cm skin incision was made extending from the anterolateral acromion distally toward the deltoid insertion.  The raphe between the anterior and middle deltoid was split and opened.  Deep retractors were then placed.   Traction stitches were then placed in the rotator cuff.     We then carefully dissected identifying the axillary nerve and tagging it with a vessel loop.  We  then removed the locking caps screws and proximal humeral plate, 3 hole MDS    We then split the supraspinatus in line with its fibers (a small full thickness supraspinatus tear was visualized.     We then placed our guide pin under flouro guidance, the humeral head was reamed appropriately, the canal was entered with a ball tipped guide wire and held adequately aligned. We reamed to a 10mm reamer and placed a 8.5 x 270 mm nail.  Proximal guide jig was placed and used to place four proximal interlocking screws.       We then used perfect [place] technique to place 2 distal interlocking screws which were confirmed with C arm,  Final flouro shots were taken.  We used 2 #2 fiber wires to repair our split in the musclar portion of the supraspinatus     We then irrigated with sterile saline. The deltoid was closed with 2 running locking 0-vicryl sutures.  We then closed the dermal layer with 2-0 vicryl and the skin with interrupted 3-0 nylon.  A sterile dressing was applied.       Patient tolerated the procedure well.  He was extubated and placed in a sling.  At the end of the case all sponge and instrument counts were correct x 2.             Plan:  Patient will be admitted for postoperative monitoring. They will remain in the sling until he is seen back in clinic at approx 10 days postop. They can come out of the sling for elbow wrist and hand ROM.             Yonatan Galdamez MD, MS

## 2021-11-18 NOTE — ANESTHESIA PREPROCEDURE EVALUATION
Anesthesia Evaluation     Patient summary reviewed and Nursing notes reviewed   NPO Solid Status: > 8 hours  NPO Liquid Status: > 8 hours           Airway   Mallampati: II  TM distance: >3 FB  Neck ROM: full  No difficulty expected  Dental      Pulmonary - normal exam   Cardiovascular - normal exam    (+) hypertension, dysrhythmias Atrial Fib, hyperlipidemia,       Neuro/Psych  (+) headaches, psychiatric history,     GI/Hepatic/Renal/Endo    (+) obesity, morbid obesity, GERD, PUD,  renal disease,     Musculoskeletal     Abdominal    Substance History      OB/GYN          Other   arthritis,                      Anesthesia Plan    ASA 2     general     intravenous induction     Anesthetic plan, all risks, benefits, and alternatives have been provided, discussed and informed consent has been obtained with: patient.    Plan discussed with CRNA.

## 2021-11-18 NOTE — PROGRESS NOTES
[facility]    Acute pain service Inpatient Progress Note    Patient Name: Aleena Armstrong  :  1950  MRN:  [id number]        Acute Pain  Service Inpatient Progress Note:    Analgesia:Excellent  Pain Score:0/10  LOC: alert and awake  Resp Status: room air  Cardiac: VS stable  Side Effects:None  Catheter Site:clean, dressing intact and dry  Cath type: peripheral nerve cath with ON Q  Infusion rate: 6ml/hr  Catheter Plan:Catheter to remain Insitu and Continue catheter infusion rate unchanged  Comments: Anterior and posterior knee pain of 8.

## 2021-11-18 NOTE — PLAN OF CARE
Goal Outcome Evaluation:  Plan of Care Reviewed With: patient        Progress: no change  Outcome Summary: Pt had uneventful night, slept some.  RUE pain controlled with ropiv @ 6 ml/hr.  IV toradol given for R knee pain.  Pt NPO today for RUE surgery.  Unable to void, I/O cath x 1.

## 2021-11-18 NOTE — CASE MANAGEMENT/SOCIAL WORK
Continued Stay Note  [facility]     Patient Name: Aleena Armstrong  MRN: [id number]  Today's Date: 11/18/2021    Admit Date: 11/15/2021     Discharge Plan     Row Name 11/18/21 1138       Plan    Plan TBD    Plan Comments Ms. Armstrong is having surgery today and was off floor at time of CM visit.    CM will continue to follow and assist with DC needs.    Final Discharge Disposition Code 30 - still a patient               Discharge Codes    No documentation.               Expected Discharge Date and Time     Expected Discharge Date Expected Discharge Time    Nov 19, 2021             Pastora Sotelo RN

## 2021-11-19 ENCOUNTER — ANESTHESIA EVENT (OUTPATIENT)
Dept: ANESTHESIOLOGY | Facility: HOSPITAL | Age: 71
End: 2021-11-19

## 2021-11-19 ENCOUNTER — ANESTHESIA EVENT CONVERTED (OUTPATIENT)
Dept: ANESTHESIOLOGY | Facility: HOSPITAL | Age: 71
End: 2021-11-19

## 2021-11-19 LAB
ANION GAP SERPL CALCULATED.3IONS-SCNC: 8 MMOL/L (ref 5–15)
BASOPHILS # BLD AUTO: 0.03 10*3/MM3 (ref 0–0.2)
BASOPHILS NFR BLD AUTO: 0.5 % (ref 0–1.5)
BUN SERPL-MCNC: 17 MG/DL (ref 8–23)
BUN/CREAT SERPL: 18.9 (ref 7–25)
CALCIUM SPEC-SCNC: 8.2 MG/DL (ref 8.6–10.5)
CHLORIDE SERPL-SCNC: 104 MMOL/L (ref 98–107)
CO2 SERPL-SCNC: 23 MMOL/L (ref 22–29)
CREAT SERPL-MCNC: 0.9 MG/DL (ref 0.57–1)
DEPRECATED RDW RBC AUTO: 46.9 FL (ref 37–54)
EOSINOPHIL # BLD AUTO: 0.06 10*3/MM3 (ref 0–0.4)
EOSINOPHIL NFR BLD AUTO: 1.1 % (ref 0.3–6.2)
ERYTHROCYTE [DISTWIDTH] IN BLOOD BY AUTOMATED COUNT: 12.5 % (ref 12.3–15.4)
GFR SERPL CREATININE-BSD FRML MDRD: 62 ML/MIN/1.73
GLUCOSE SERPL-MCNC: 103 MG/DL (ref 65–99)
HCT VFR BLD AUTO: 27.1 % (ref 34–46.6)
HGB BLD-MCNC: 8.5 G/DL (ref 12–15.9)
IMM GRANULOCYTES # BLD AUTO: 0.02 10*3/MM3 (ref 0–0.05)
IMM GRANULOCYTES NFR BLD AUTO: 0.4 % (ref 0–0.5)
LYMPHOCYTES # BLD AUTO: 1.25 10*3/MM3 (ref 0.7–3.1)
LYMPHOCYTES NFR BLD AUTO: 22 % (ref 19.6–45.3)
MCH RBC QN AUTO: 32.6 PG (ref 26.6–33)
MCHC RBC AUTO-ENTMCNC: 31.4 G/DL (ref 31.5–35.7)
MCV RBC AUTO: 103.8 FL (ref 79–97)
MONOCYTES # BLD AUTO: 0.54 10*3/MM3 (ref 0.1–0.9)
MONOCYTES NFR BLD AUTO: 9.5 % (ref 5–12)
NEUTROPHILS NFR BLD AUTO: 3.79 10*3/MM3 (ref 1.7–7)
NEUTROPHILS NFR BLD AUTO: 66.5 % (ref 42.7–76)
NRBC BLD AUTO-RTO: 0 /100 WBC (ref 0–0.2)
PLATELET # BLD AUTO: 181 10*3/MM3 (ref 140–450)
PMV BLD AUTO: 10.6 FL (ref 6–12)
POTASSIUM SERPL-SCNC: 4.4 MMOL/L (ref 3.5–5.2)
RBC # BLD AUTO: 2.61 10*6/MM3 (ref 3.77–5.28)
SODIUM SERPL-SCNC: 135 MMOL/L (ref 136–145)
WBC NRBC COR # BLD: 5.69 10*3/MM3 (ref 3.4–10.8)

## 2021-11-19 PROCEDURE — 25010000002 MORPHINE PER 10 MG: Performed by: ORTHOPAEDIC SURGERY

## 2021-11-19 PROCEDURE — 99232 SBSQ HOSP IP/OBS MODERATE 35: CPT | Performed by: INTERNAL MEDICINE

## 2021-11-19 PROCEDURE — 0 CEFAZOLIN IN DEXTROSE 2-4 GM/100ML-% SOLUTION: Performed by: ORTHOPAEDIC SURGERY

## 2021-11-19 PROCEDURE — 97535 SELF CARE MNGMENT TRAINING: CPT

## 2021-11-19 PROCEDURE — 97530 THERAPEUTIC ACTIVITIES: CPT

## 2021-11-19 PROCEDURE — 80048 BASIC METABOLIC PNL TOTAL CA: CPT | Performed by: ORTHOPAEDIC SURGERY

## 2021-11-19 PROCEDURE — 97162 PT EVAL MOD COMPLEX 30 MIN: CPT

## 2021-11-19 PROCEDURE — 85025 COMPLETE CBC W/AUTO DIFF WBC: CPT | Performed by: ORTHOPAEDIC SURGERY

## 2021-11-19 PROCEDURE — 25010000002 ROPIVACAINE PER 1 MG: Performed by: ORTHOPAEDIC SURGERY

## 2021-11-19 PROCEDURE — 97110 THERAPEUTIC EXERCISES: CPT

## 2021-11-19 PROCEDURE — 97166 OT EVAL MOD COMPLEX 45 MIN: CPT

## 2021-11-19 RX ORDER — HYDROMORPHONE HYDROCHLORIDE 2 MG/1
2 TABLET ORAL
Status: DISCONTINUED | OUTPATIENT
Start: 2021-11-19 | End: 2021-11-22

## 2021-11-19 RX ORDER — ROPIVACAINE HYDROCHLORIDE 2 MG/ML
INJECTION, SOLUTION EPIDURAL; INFILTRATION; PERINEURAL
Status: COMPLETED | OUTPATIENT
Start: 2021-11-19 | End: 2021-11-19

## 2021-11-19 RX ADMIN — APIXABAN 5 MG: 5 TABLET, FILM COATED ORAL at 20:22

## 2021-11-19 RX ADMIN — CEFAZOLIN SODIUM 2 G: 2 INJECTION, SOLUTION INTRAVENOUS at 01:01

## 2021-11-19 RX ADMIN — HYDROCODONE BITARTRATE AND ACETAMINOPHEN 1 TABLET: 5; 325 TABLET ORAL at 01:40

## 2021-11-19 RX ADMIN — ROPIVACAINE HYDROCHLORIDE 6 ML/HR: 2 INJECTION, SOLUTION EPIDURAL; INFILTRATION at 08:06

## 2021-11-19 RX ADMIN — HYDROMORPHONE HYDROCHLORIDE 2 MG: 2 TABLET ORAL at 16:39

## 2021-11-19 RX ADMIN — TRAZODONE HYDROCHLORIDE 100 MG: 50 TABLET ORAL at 20:22

## 2021-11-19 RX ADMIN — HYDROMORPHONE HYDROCHLORIDE 2 MG: 2 TABLET ORAL at 20:22

## 2021-11-19 RX ADMIN — HYDROXYCHLOROQUINE SULFATE 200 MG: 200 TABLET ORAL at 08:51

## 2021-11-19 RX ADMIN — ROPIVACAINE HYDROCHLORIDE 6 ML/HR: 2 INJECTION, SOLUTION EPIDURAL; INFILTRATION at 20:27

## 2021-11-19 RX ADMIN — HYDROCODONE BITARTRATE AND ACETAMINOPHEN 1 TABLET: 5; 325 TABLET ORAL at 08:13

## 2021-11-19 RX ADMIN — SENNOSIDES AND DOCUSATE SODIUM 2 TABLET: 50; 8.6 TABLET ORAL at 08:50

## 2021-11-19 RX ADMIN — ROPIVACAINE HYDROCHLORIDE 15 ML: 2 INJECTION, SOLUTION EPIDURAL; INFILTRATION; PERINEURAL at 17:12

## 2021-11-19 RX ADMIN — SODIUM CHLORIDE 50 ML/HR: 4.5 INJECTION, SOLUTION INTRAVENOUS at 01:40

## 2021-11-19 RX ADMIN — SENNOSIDES AND DOCUSATE SODIUM 2 TABLET: 50; 8.6 TABLET ORAL at 20:22

## 2021-11-19 RX ADMIN — MORPHINE SULFATE 2 MG: 2 INJECTION, SOLUTION INTRAMUSCULAR; INTRAVENOUS at 01:01

## 2021-11-19 RX ADMIN — HYDROMORPHONE HYDROCHLORIDE 2 MG: 2 TABLET ORAL at 13:39

## 2021-11-19 RX ADMIN — APIXABAN 5 MG: 5 TABLET, FILM COATED ORAL at 08:51

## 2021-11-19 NOTE — PROGRESS NOTES
[facility]    Acute pain service Inpatient Progress Note    Patient Name: Aleena Armstrong  :  1950  MRN:  [id number]        Acute Pain  Service Inpatient Progress Note:    Analgesia:Good  Pain Score:8/10  LOC: alert and awake  Resp Status: room air  Cardiac: VS stable  Side Effects:None  Catheter Site:clean, dressing intact and dry  Cath type: peripheral nerve cath(MOOG pump)  Infusion rate: 8ml/hr  Catheter Plan:Catheter to remain Insitu and Continue catheter infusion rate unchanged  Comments: Patient is in so much pain. Catheter's position to be checked

## 2021-11-19 NOTE — PLAN OF CARE
Goal Outcome Evaluation:  Plan of Care Reviewed With: patient           Outcome Summary: OT alex complete. Pt presents w/ increased pain, decreased activity tolerance, and balance deficits limiting her ADL independence. Pt gave good effort throughout session. Pt max Ax2 for bed mobility & SPT from bed-to-chair, mod Ax2 for STS, dep for LBD, max A for UBD, mod A for meal set-up. Pt educated on RUE HEP and tolerated 10 reps of AAROM. Pt would benefit from continued skilled IPOT services to address current functional deficits and return to PLOF. Recommend IRF at discharge.

## 2021-11-19 NOTE — CASE MANAGEMENT/SOCIAL WORK
Continued Stay Note  [facility]     Patient Name: Aleena Armstrong  MRN: [id number]  Today's Date: 11/19/2021    Admit Date: 11/15/2021     Discharge Plan     Row Name 11/19/21 1635       Plan    Plan Home with family assistance, [employer], a hospital bed, bedside commode and wheelchair    Patient/Family in Agreement with Plan yes    Plan Comments Met with Ms. Armstrong and her , Rodriguez, at the bedside for discharge planning.  Ms. Armstrong is having some issues with pain to her RUE.  Anesthesia to follow up.  The patient is NWB to RUE and has a knee immobilizer to RLE.  DC plan is to return home with a hospital bed with trapeze bar, wheelchair and bedside commode, and [employer].  The patient states that she would not be able to have the hospital bed brought to her home until Tuesday.  However, if she is ready for discharge on Monday, she would like the wheelchair and commode brought to the hospital room.  She will sleep in a recliner on Monday night and the hospital bed can be brought to her home on Tuesday.  She did not have preference for DME provider.    Ms. Armstrong states that her  and other family members can assist her at home.  Her son and daughter in law, who are both RN's, will be coming to visit on Wednesday, and they can also assist.    CM will cotinue to follow and arrange for the DME and [employer] on Monday.    Final Discharge Disposition Code 01 - home or self-care               Discharge Codes    No documentation.               Expected Discharge Date and Time     Expected Discharge Date Expected Discharge Time    Nov 22, 2021             Pastora Sotelo RN

## 2021-11-19 NOTE — PROGRESS NOTES
[facility] Medicine Services  PROGRESS NOTE    Patient Name: Aleena Armstrong  : 1950  MRN: [id number]    Date of Admission: 11/15/2021  Primary Care Physician: Aniyah Goldberg MD    Subjective   Subjective     CC:  Right arm and right leg pain after fall    HPI:  I have seen and evaluated the patient this afternoon.  Sitting in the recliner today.  Reported that the pain in her right shoulder is well controlled.  Still hurting her right knee only when she stands up.  Despite the knee splint.  Wants to see how she does over the next couple of days in terms of pain control before going home.  Still declining rehabilitation.    ROS:  10 point review of systems is negative except for what is mentioned in HPI    Objective   Objective     Vital Signs:   Temp:  [97.6 °F (36.4 °C)-98.3 °F (36.8 °C)] 98.2 °F (36.8 °C)  Heart Rate:  [52-86] 65  Resp:  [15-18] 16  BP: ()/(47-73) 114/65  Flow (L/min):  [2] 2     Physical Exam:  General: Comfortable, not in any acute distress, appears stated age, conversant and cooperative  Head: Atraumatic and normocephalic, without obvious abnormality  Eyes:   Conjunctivae and sclerae normal, no Icterus. No pallor  Ears:  Ears appear intact with no abnormalities noted  Throat: No oral lesions, no thrush, oral mucosa moist  Neck: Supple, trachea midline, no thyromegaly  Back:   No kyphoscoliosis present. No tenderness to palpation,   no sacral edema  Lungs: Clear to auscultation bilaterally, equal air entry, no wheezing or crackles  Heart:  Normal S1 and S2, no murmur, no gallop, No JVD, no lower extremity swelling  Abdomen:  Soft, no tenderness, no organomegaly, normal bowel sounds  Normal bowel sounds, no masses, no organomegaly. Soft, nontender, nondistended, no guarding, no rebound tenderness.  Extremities: Nerve block catheter in right upper shoulder.  Right shoulder wound is clean with no discharge or erythema.  Range of movement of the right knee  is limited because of pain  Skin: No bleeding, bruising or rash, normal skin turgor and elasticity  Neurologic: Cranial nerves appear intact with no evidence of facial asymmetry, normal motor and sensory functions in all 4 extremities  Psych: Alert and oriented x 3, normal mood    Results Reviewed:  LAB RESULTS:      Lab 11/18/21  1005 11/16/21  0633   WBC 4.35 5.85   HEMOGLOBIN 9.5* 9.8*   HEMATOCRIT 29.6* 31.0*   PLATELETS 154 182   NEUTROS ABS 2.64 3.57   IMMATURE GRANS (ABS) 0.01 0.01   LYMPHS ABS 1.18 1.55   MONOS ABS 0.43 0.63   EOS ABS 0.06 0.05   .0* 102.6*   PROTIME  --  13.6   APTT  --  26.2         Lab 11/18/21  1005 11/16/21  0633   SODIUM 137 139   POTASSIUM 4.3 5.3*   CHLORIDE 106 108*   CO2 23.0 22.0   ANION GAP 8.0 9.0   BUN 21 17   CREATININE 0.94 0.95   GLUCOSE 91 94   CALCIUM 8.5* 8.4*         Lab 11/16/21  0633   TOTAL PROTEIN 5.8*   ALBUMIN 3.40*   GLOBULIN 2.4   ALT (SGPT) 8   AST (SGOT) 18   BILIRUBIN 0.3   ALK PHOS 62         Lab 11/16/21  0633   PROTIME 13.6   INR 1.07             Lab 11/16/21  0633   ABO TYPING A   RH TYPING Positive   ANTIBODY SCREEN Negative         Brief Urine Lab Results     None          Microbiology Results Abnormal     Procedure Component Value - Date/Time    COVID PRE-OP / PRE-PROCEDURE SCREENING ORDER (NO ISOLATION) - Swab, Nasopharynx [[id number]]  (Normal) Collected: 11/16/21 0150    Lab Status: Final result Specimen: Swab from Nasopharynx Updated: 11/16/21 0243    Narrative:      The following orders were created for panel order COVID PRE-OP / PRE-PROCEDURE SCREENING ORDER (NO ISOLATION) - Swab, Nasopharynx.  Procedure                               Abnormality         Status                     ---------                               -----------         ------                     COVID-19, ABBOTT IN-HOUS...[[id number]]  Normal              Final result                 Please view results for these tests on the individual orders.    COVID-19, ABBOTT  IN-HOUSE,NASAL Swab (NO TRANSPORT MEDIA) 2 HR TAT - Swab, Nasopharynx [[id number]]  (Normal) Collected: 11/16/21 0150    Lab Status: Final result Specimen: Swab from Nasopharynx Updated: 11/16/21 0243     COVID19 Presumptive Negative    Narrative:      Fact sheet for providers: https://www.fda.gov/media/723420/download     Fact sheet for patients: https://www.fda.gov/media/261394/download    Test performed by PCR.  If inconsistent with clinical signs and symptoms patient should be tested with different authorized molecular test.          No radiology results from the last 24 hrs    Results for orders placed during the hospital encounter of 06/20/17    Adult Transesophageal Echo    Interpretation Summary  · Left ventricular systolic function is normal. Estimated EF = 60%.  · The cardiac valves are anatomically and functionally normal.  · No evidence of a left atrial appendage thrombus was present      I have reviewed the medications:  Scheduled Meds:apixaban, 5 mg, Oral, Q12H  hydroxychloroquine, 200 mg, Oral, Daily  metoprolol succinate XL, 25 mg, Oral, Daily  senna-docusate sodium, 2 tablet, Oral, BID  sodium chloride, 10 mL, Intravenous, Q12H  traZODone, 100 mg, Oral, Nightly      Continuous Infusions:ropivacaine (NAROPIN) 0.2% peripheral nerve cath (moog), 6 mL/hr, Last Rate: 6 mL/hr (11/19/21 0806)  sodium chloride, 50 mL/hr, Last Rate: 50 mL/hr (11/19/21 0140)      PRN Meds:.•  acetaminophen **OR** acetaminophen  •  senna-docusate sodium **AND** polyethylene glycol **AND** bisacodyl **AND** bisacodyl  •  HYDROcodone-acetaminophen  •  Morphine **AND** naloxone  •  Morphine  •  ondansetron  •  sodium chloride    Assessment/Plan   Assessment & Plan     Active Hospital Problems    Diagnosis  POA   • **Fall [W19.XXXA]  Yes   • Fracture of right humerus [S42.301A]  Yes   • Fracture of right tibial plateau [S82.141A]  Yes   • HO Atrial fibrillation (CMS/HCC) [I48.91]  Yes   • Anxiety and depression [F41.9, F32.A]  Yes    • Rheumatoid arthritis (HCC) [M06.9]  Yes   • Dyslipidemia [E78.5]  Yes   • Essential hypertension [I10]  Yes      Resolved Hospital Problems   No resolved problems to display.        Brief Hospital Course to date:  Aleena Armstrong is a 70 y.o. female patient with past medical history of atrial fibrillation on Eliquis, anxiety and depression, GERD, essential hypertension, dyslipidemia, rheumatoid arthritis, WPW who presented to the hospital status post mechanical fall that resulted in right tibial plateau fracture and right humeral fracture.    Assessment and Plan:  Right humerus fracture  Right tibial plateau fracture  · Underwent ORIF of right humerus by Dr Galdamez 11/18/2021  · Right knee (tibial plateau fracture) is inoperable and orthopedic service recommended conservative treatment with knee splinting.  · Continue PT and OT  · Plan to discharge home with home PT once pain is under control.  Hopefully in the next 24-48 hrs    Essential hypertension  Atrial fibrillation  · Continue Eliquis  · Continue metoprolol    Rheumatoid arthritis  · Hold Plaquenil    Anxiety and depression  · Prozac    DVT prophylaxis:  Medical and mechanical DVT prophylaxis orders are present.       AM-PAC 6 Clicks Score (PT): 12 (11/18/21 0871)    Disposition: I expect the patient to be discharged TBD.    CODE STATUS:   Code Status and Medical Interventions:   Ordered at: 11/18/21 7920     Level Of Support Discussed With:    Patient     Code Status (Patient has no pulse and is not breathing):    CPR (Attempt to Resuscitate)     Medical Interventions (Patient has pulse or is breathing):    Full Support       Chance Lopez MD  11/19/21

## 2021-11-19 NOTE — PLAN OF CARE
"Goal Outcome Evaluation:           Progress: no change  Outcome Summary: Pt. c/o pain to inner right arm of \"8\" throughout most of day with little relief.  PO meds admin. per order and Pain Management team aware and checking nerve cath placement.  Dsg to right arm CDI.  Will continue to monitor and will notify MD ROSE.  Tone Newell RN  "

## 2021-11-19 NOTE — THERAPY EVALUATION
Patient Name: Aleena Armstrong  : 1950    MRN: [id number]                              Today's Date: 2021       Admit Date: 11/15/2021    Visit Dx:     ICD-10-CM ICD-9-CM   1. Closed fracture of proximal end of right humerus, unspecified fracture morphology, initial encounter  S42.201A 812.00   2. Closed fracture of right tibial plateau, initial encounter  S82.141A 823.00   3. Anticoagulated  Z79.01 V58.61     Patient Active Problem List   Diagnosis   • WPW (Elodia-Parkinson-White syndrome)   • Chest pain   • Morbid obesity (HCC)   • Dyslipidemia   • Essential hypertension   • Atrial flutter (HCC)   • HO Atrial fibrillation (CMS/HCC)   • Acute kidney injury (HCC)   • Insomnia   • Anxiety and depression   • Vitamin D deficiency   • Rheumatoid arthritis (HCC)   • Glaucoma of left eye   • Peptic ulcer    • Bradycardia   • Paroxysmal atrial fibrillation (HCC)   • Hematoma of groin   • Proximal humerus fracture   • S/P ORIF right proximal humerus fracture   • Anemia   • Chronic pain   • Acute postoperative pain   • Fracture of right humerus   • Fracture of right tibial plateau   • Fall     Past Medical History:   Diagnosis Date   • A-fib ([facility])     HAD ABLATION FOR THIS IN    • Anemia    • Anxiety and depression 2016   • GERD (gastroesophageal reflux disease)    • Glaucoma of left eye 2016   • H/O migraine     LAST 3  YEARS AGO    • History of MRSA infection     APPROX 8 YEARS, TREATED WITH ORAL ABX, NEGATIVE CULTURES FOLLOWING TX    • History of transfusion    • Hyperlipidemia    • Hypertension     MEDS DC'D BY DR GOMEZ 2017   • Insomnia 2016   • Kidney disease    • Peptic ulcer - s/p gastric bypass surgery (7-8 years ago) 2016   • Rheumatoid arthritis (HCC) 2016   • Spinal headache     FOLLOWING SPINAL FOR CHILDBIRTH    • Vitamin D deficiency 2016   • Wears dentures     UPPER PLATE    • Elodia-Parkinson-White (WPW) syndrome     1990s     Past Surgical History:    Procedure Laterality Date   • BREAST BIOPSY  ~1990   • CARDIAC CATHETERIZATION N/A 10/24/2016    Procedure: Left Heart Cath;  Surgeon: Rubens Morejon MD;  Location:  [facility];  Service:    • CARDIAC ELECTROPHYSIOLOGY PROCEDURE N/A 6/20/2017    Procedure: PVA;  Surgeon: Edvin Cook DO;  Location:  [facility]VAS[facility];  Service:    • CHOLECYSTECTOMY  ~1990   • COLONOSCOPY  2007   • GASTRIC BYPASS     • GASTRIC BYPASS  2005   • HAMMER TOE REPAIR      LEFT--GARO IN PLACE    • HYSTERECTOMY  1993    Complete   • ORIF HUMERUS FRACTURE Right 2/8/2019    Procedure: ORIF RIGHT PROXIMAL HUMERUS;  Surgeon: Yonatan Galdamez MD;  Location:  [facility] OR;  Service: Orthopedics   • SHOULDER ACROMIOPLASTY Right 2019   • WRIST FRACTURE SURGERY Right 2014      General Information     [facility] Name 11/19/21 1102          Physical Therapy Time and Intention    Document Type evaluation  -SC     Mode of Treatment physical therapy  -[facility] Name 11/19/21 1102          General Information    Patient Profile Reviewed yes  -SC     Existing Precautions/Restrictions fall; non-weight bearing; right; other (see comments)  WBAT R LR with knee immobilizer on, BWB R UE  -SC     Barriers to Rehab medically complex  -SC     Row Name 11/19/21 1102          Living Environment    Lives With spouse  -[facility] Name 11/19/21 1102          Home Main Entrance    Number of Stairs, Main Entrance none  -[facility] Name 11/19/21 1102          Stairs Within Home, Primary    Number of Stairs, Within Home, Primary two  -SC     Stair Railings, Within Home, Primary none  -SC     Row Name 11/19/21 1102          Cognition    Orientation Status (Cognition) oriented x 3  -SC     Row Name 11/19/21 1102          Safety Issues, Functional Mobility    Impairments Affecting Function (Mobility) balance; endurance/activity tolerance; pain; strength; sensation/sensory awareness; motor control; range of motion (ROM)  -SC     Comment, Safety  Issues/Impairments (Mobility) alert, following commands, limited by pain when standing  -SC           User Key  (r) = Recorded By, (t) = Taken By, (c) = Cosigned By    Initials Name Provider Type    SC Blanquita Mcclure PT Physical Therapist               Mobility     Row Name 11/19/21 1104          Bed Mobility    Comment (Bed Mobility) uic  -SC     Row Name 11/19/21 1104          Transfers    Comment (Transfers) Worked on transfers to standing from recliner . Cues for pushing off form hand rail and on standing keeping L UE on johanne walker. Worked ons wt shifting in standing (brace on R leg)  -SC     Row Name 11/19/21 1104          Sit-Stand Transfer    Sit-Stand [place] (Transfers) 2 person assist; minimum assist (75% patient effort)  -SC     Assistive Device (Sit-Stand Transfers) walker, johanne  -SC     Row Name 11/19/21 1104          Gait/Stairs (Locomotion)    [place] Level (Gait) 2 person assist; verbal cues; minimum assist (75% patient effort)  -SC     Assistive Device (Gait) walker, johanne  -SC     Distance in Feet (Gait) 2  -SC     Deviations/Abnormal Patterns (Gait) right sided deviations; antalgic; weight shifting decreased; stride length decreased; april decreased; base of support, narrow  -SC     Comment (Gait/Stairs) GT training focused on step to gait pattern with hemiwalker. Patient only able to take 3 small steps -limited by pain  -SC     Row Name 11/19/21 1104          Mobility    Extremity Weight-bearing Status right upper extremity; right lower extremity  -SC     Left Upper Extremity (Weight-bearing Status) weight-bearing as tolerated (WBAT)  -SC     Right Upper Extremity (Weight-bearing Status) non weight-bearing (NWB)  -SC           User Key  (r) = Recorded By, (t) = Taken By, (c) = Cosigned By    Initials Name Provider Type    SC Blanquita Mcclure PT Physical Therapist               Obj/Interventions     Row Name 11/19/21 1108          Range of Motion Comprehensive    Comment, General  Range of Motion R UE in sling. R Knee in extension - no flexion allowed, brace on. L UE WFL, L LE WFL  -[facility] Name 11/19/21 1108          Strength Comprehensive (MMT)    Comment, General Manual Muscle Testing (MMT) Assessment R UE 0/5 -blocked, R LE: tia ant 4/5, quads -3/5 L LE grossly 5/5,  -[facility] Name 11/19/21 1108          Motor Skills    Therapeutic Exercise knee; ankle  -[facility] Name 11/19/21 1108          Knee (Therapeutic Exercise)    Knee (Therapeutic Exercise) isometric exercises  -SC     Knee Isometrics (Therapeutic Exercise) right; quad sets; sitting; 10 repetitions  -[facility] Name 11/19/21 1108          Ankle (Therapeutic Exercise)    Ankle (Therapeutic Exercise) AROM (active range of motion)  -SC     Ankle AROM (Therapeutic Exercise) bilateral; dorsiflexion; plantarflexion; 10 repetitions  -[facility] Name 11/19/21 1108          Balance    Balance Assessment standing static balance  -SC     Static Sitting Balance mild impairment; supported  -SC     Static Standing Balance moderate impairment; supported  -SC     Comment, Balance needs hemiwalker and PT support for mobility  -[facility] Name 11/19/21 1108          Sensory Assessment (Somatosensory)    Sensory Assessment (Somatosensory) other (see comments)  R UE numbness  -SC           User Key  (r) = Recorded By, (t) = Taken By, (c) = Cosigned By    Initials Name Provider Type    SC Blanquita Mcclure, PT Physical Therapist               Goals/Plan     [facility] Name 11/19/21 1129          Bed Mobility Goal 1 (PT)    Activity/Assistive Device (Bed Mobility Goal 1, PT) sit to supine; scooting  -SC     [place] Level/Cues Needed (Bed Mobility Goal 1, PT) modified independence  -SC     Time Frame (Bed Mobility Goal 1, PT) long term goal (LTG); 10 days  -[facility] Name 11/19/21 1129          Transfer Goal 1 (PT)    Activity/Assistive Device (Transfer Goal 1, PT) bed-to-chair/chair-to-bed; wheelchair transfer; other (see comments)  may do sit  transfers or stand transfers  -SC     [place] Level/Cues Needed (Transfer Goal 1, PT) 1 person assist; minimum assist (75% or more patient effort)  -SC     Time Frame (Transfer Goal 1, PT) long term goal (LTG); 10 days  -[facility] Name 11/19/21 1129          Gait Training Goal 1 (PT)    Activity/Assistive Device (Gait Training Goal 1, PT) gait (walking locomotion); walker, johanne  -SC     [place] Level (Gait Training Goal 1, PT) 1 person assist; minimum assist (75% or more patient effort)  -SC     Distance (Gait Training Goal 1, PT) 10  -SC     Time Frame (Gait Training Goal 1, PT) long term goal (LTG); 10 days  -SC     Strategies/Barriers (Gait Training Goal 1, PT) goal for w/c propulsion also set for independence x100 feet [place] 10 days   -SC           User Key  (r) = Recorded By, (t) = Taken By, (c) = Cosigned By    Initials Name Provider Type    SC Blanquita Mcclure, PT Physical Therapist               Clinical Impression     Row Name 11/19/21 1111          Pain    Additional Documentation Pain Scale: FACES Pre/Post-Treatment (Group)  -SC     Row Name 11/19/21 1111          Pain Scale: Numbers Pre/Post-Treatment    Pretreatment Pain Rating 4/10  -SC     Posttreatment Pain Rating 9/10  -SC     Pain Location - Side Right  -SC     Pain Location knee  -SC     Pain Intervention(s) Cold applied; Repositioned  -SC     Row Name 11/19/21 1111          Pain Scale: FACES Pre/Post-Treatment    Pre/Posttreatment Pain Comment no c/o R shoulder pain  -SC     Row Name 11/19/21 1111          Plan of Care Review    Plan of Care Reviewed With patient  -SC     Progress improving  -SC     Outcome Summary Patient required min assist x2 to stand and mod assist x2 to take a few steps with johanne walker. She is very limited in standing from pain. I recommend w/c for mobility until R LE pain improves, then she will be able to ambulate with a hemiwalker.  I recommend rehab stay at discharge. She may consider home if family can provide  support with safe transfer  -SC     Row Name 11/19/21 1111          Therapy Assessment/Plan (PT)    Patient/Family Therapy Goals Statement (PT) go hme  -SC     Rehab Potential (PT) good, to achieve stated therapy goals  -SC     Criteria for Skilled Interventions Met (PT) yes; skilled treatment is necessary  -SC     Row Name 11/19/21 1111          Positioning and Restraints    Pre-Treatment Position sitting in chair/recliner  -SC     Post Treatment Position chair  -SC     In Chair notified nsg; reclined; sitting; call light within reach; encouraged to call for assist; exit alarm on  -SC           User Key  (r) = Recorded By, (t) = Taken By, (c) = Cosigned By    Initials Name Provider Type    SC Blanquita Mcclure, PT Physical Therapist               Outcome Measures     Row Name 11/19/21 1133          How much help from another person do you currently need...    Turning from your back to your side while in flat bed without using bedrails? 3  -SC     Moving from lying on back to sitting on the side of a flat bed without bedrails? 2  -SC     Moving to and from a bed to a chair (including a wheelchair)? 2  -SC     Standing up from a chair using your arms (e.g., wheelchair, bedside chair)? 2  -SC     Climbing 3-5 steps with a railing? 1  -SC     To walk in hospital room? 2  -SC     AM-PAC 6 Clicks Score (PT) 12  -SC     Row Name 11/19/21 1133 11/19/21 0858       Functional Assessment    Outcome Measure Options AM-PAC 6 Clicks Basic Mobility (PT)  -SC AM-PAC 6 Clicks Daily Activity (OT)  -MA          User Key  (r) = Recorded By, (t) = Taken By, (c) = Cosigned By    Initials Name Provider Type    SC Blanquita Mcclure, PT Physical Therapist    Farrah Deal OT Occupational Therapist                             Physical Therapy Education                 Title: PT OT SLP Therapies (Done)     Topic: Physical Therapy (Done)     Point: Mobility training (Done)     Learning Progress Summary           Patient TIKI Christine VUDU by  SC at 11/19/2021 1133    Comment: reviewed Benefits of mobility and precuations                   Point: Home exercise program (Done)     Learning Progress Summary           Patient TIKI Christine VUDU by SC at 11/19/2021 1133    Comment: reviewed Benefits of mobility and precuations                   Point: Body mechanics (Done)     Learning Progress Summary           Patient TIKI Christine VU,DU by SC at 11/19/2021 1133    Comment: reviewed Benefits of mobility and precuations                   Point: Precautions (Done)     Learning Progress Summary           Patient TIKI Christine VUDU by SC at 11/19/2021 1133    Comment: reviewed Benefits of mobility and precuations                               User Key     Initials Effective Dates Name Provider Type [facility] 06/16/21 -  Blanquita Mcclure PT Physical Therapist PT              PT Recommendation and Plan     Plan of Care Reviewed With: patient  Progress: improving  Outcome Summary: Patient required min assist x2 to stand and mod assist x2 to take a few steps with johanne walker. She is very limited in standing from pain. I recommend w/c for mobility until R LE pain improves, then she will be able to ambulate with a hemiwalker.  I recommend rehab stay at discharge. She may consider home if family can provide support with safe transfer     Time Calculation:    PT Charges     Row Name 11/19/21 1003             Time Calculation    Start Time 1003  -SC      PT Received On 11/19/21  -SC      PT Goal Re-Cert Due Date 11/29/21  -SC              Untimed Charges    PT Eval/Re-eval Minutes 60  -SC              Total Minutes    Untimed Charges Total Minutes 60  -SC       Total Minutes 60  -SC            User Key  (r) = Recorded By, (t) = Taken By, (c) = Cosigned By    Initials Name Provider Type    SC Blanquita Mcclure PT Physical Therapist              Therapy Charges for Today     Code Description Service Date Service Provider Modifiers Qty    12430949348 HC PT EVAL MOD COMPLEXITY 4  11/19/2021 Blanquita Mcclure, PT GP 1    98597676339 HC PT THER SUPP EA 15 MIN 11/19/2021 Blanquita Mcclure PT GP 2          PT G-Codes  Outcome Measure Options: AM-PAC 6 Clicks Basic Mobility (PT)  AM-PAC 6 Clicks Score (PT): 12  AM-PAC 6 Clicks Score (OT): 13    Blanquita Mcclure, PT  11/19/2021

## 2021-11-19 NOTE — ANESTHESIA PROCEDURE NOTES
Right Interscalene Catheter re-block      Patient reassessed immediately prior to procedure    Patient location during procedure: floor  Reason for block: at surgeon's request and post-op pain management  Performed by  CRNA: Lesly Huynh CRNA  Assisted by: Nan Ibrahim CRNA  Preanesthetic Checklist  Completed: patient identified, IV checked, site marked, risks and benefits discussed, surgical consent, monitors and equipment checked, pre-op evaluation and timeout performed  Prep:  Pt Position: left lateral decubitus  Sterile barriers:cap, gloves, mask and sterile barriers  Prep: ChloraPrep  Patient monitoring: blood pressure monitoring, continuous pulse oximetry and EKG  Procedure    Sedation: no  Performed under: local infiltration  Guidance:ultrasound guided  Images:still images obtained, printed/placed on chart    Laterality:right  Block Type:interscalene  Injection Technique:catheter  Needle Type:Tuohy and echogenic  Needle Gauge:18 G  Resistance on Injection: none  Catheter Size:20 G (20g)  Cath Depth at skin: 10 cm    Medications Used: ropivacaine (NAROPIN) 0.2 % injection, 15 mL  Med administered at 11/19/2021 5:12 PM      Post Assessment  Injection Assessment: negative aspiration for heme, no paresthesia on injection and incremental injection  Patient Tolerance:comfortable throughout block  Complications:no  Additional Notes  Procedure:  APS called due to increased pain with no relief. Upon evaluation of the nerve catheter under the U/S, nerve catheter was noted to have migrated away from the brachial plexus. Nerve catheter was replaced after patient's consent.                The pt was placed in semifowlers position with a slight tilt of the thorax contralateral to the insertion site.  The Insertion Site was prepped and draped in sterile fashion.  Skin and cutaneous tissue was infiltrated and anesthetized with 1% Lidocaine 3 mls via a 25g needle.  Utilizing ultrasound guidance, a Paty Silverman  inch 18 g Contiplex echogenic touhy needle was advanced in-plane.  Hydro dissection of tissue was achieved with Normal saline. Major vessels(carotid and Internal Jugular) where visualized as the brachial plexus was approached at the approximate level of C-7/ T-1.  Cervical 5 and Branches of Cervical 6 nerve roots where visualized and the needle tip was placed posterior at the level of C-6 roots.  LA spread was visualized and injection was made incrementally every 5 mls with aspiration. Injection pressure was normal or little, there was no intraneural injection, no vascular injection.      The BBraun 20 g wire stylet catheter was then placed under US guidance on the posterior aspect of the Brachial Plexus. The tuohy was removed and the location of catheter was confirmed with NS injection visualized with US . The skin was sealed with exofin tissue adhesive at catheter insertion site.  Skin was prepped with benzoin and the catheter was secured with steristrips and a CHG tegaderm. Appropriate labels applied. Thank You.

## 2021-11-19 NOTE — THERAPY EVALUATION
Patient Name: Aleena Armstrong  : 1950    MRN: [id number]                              Today's Date: 2021       Admit Date: 11/15/2021    Visit Dx:     ICD-10-CM ICD-9-CM   1. Closed fracture of proximal end of right humerus, unspecified fracture morphology, initial encounter  S42.201A 812.00   2. Closed fracture of right tibial plateau, initial encounter  S82.141A 823.00   3. Anticoagulated  Z79.01 V58.61     Patient Active Problem List   Diagnosis   • WPW (Elodia-Parkinson-White syndrome)   • Chest pain   • Morbid obesity (HCC)   • Dyslipidemia   • Essential hypertension   • Atrial flutter (HCC)   • HO Atrial fibrillation (CMS/HCC)   • Acute kidney injury (HCC)   • Insomnia   • Anxiety and depression   • Vitamin D deficiency   • Rheumatoid arthritis (HCC)   • Glaucoma of left eye   • Peptic ulcer    • Bradycardia   • Paroxysmal atrial fibrillation (HCC)   • Hematoma of groin   • Proximal humerus fracture   • S/P ORIF right proximal humerus fracture   • Anemia   • Chronic pain   • Acute postoperative pain   • Fracture of right humerus   • Fracture of right tibial plateau   • Fall     Past Medical History:   Diagnosis Date   • A-fib ([facility])     HAD ABLATION FOR THIS IN    • Anemia    • Anxiety and depression 2016   • GERD (gastroesophageal reflux disease)    • Glaucoma of left eye 2016   • H/O migraine     LAST 3  YEARS AGO    • History of MRSA infection     APPROX 8 YEARS, TREATED WITH ORAL ABX, NEGATIVE CULTURES FOLLOWING TX    • History of transfusion    • Hyperlipidemia    • Hypertension     MEDS DC'D BY DR GOMEZ 2017   • Insomnia 2016   • Kidney disease    • Peptic ulcer - s/p gastric bypass surgery (7-8 years ago) 2016   • Rheumatoid arthritis (HCC) 2016   • Spinal headache     FOLLOWING SPINAL FOR CHILDBIRTH    • Vitamin D deficiency 2016   • Wears dentures     UPPER PLATE    • Elodia-Parkinson-White (WPW) syndrome     1990s     Past Surgical History:    Procedure Laterality Date   • BREAST BIOPSY  ~1990   • CARDIAC CATHETERIZATION N/A 10/24/2016    Procedure: Left Heart Cath;  Surgeon: Rubens Morejon MD;  Location:  [facility];  Service:    • CARDIAC ELECTROPHYSIOLOGY PROCEDURE N/A 6/20/2017    Procedure: PVA;  Surgeon: Edvin Cook DO;  Location:  [facility];  Service:    • CHOLECYSTECTOMY  ~1990   • COLONOSCOPY  2007   • GASTRIC BYPASS     • GASTRIC BYPASS  2005   • HAMMER TOE REPAIR      LEFT--GARO IN PLACE    • HYSTERECTOMY  1993    Complete   • ORIF HUMERUS FRACTURE Right 2/8/2019    Procedure: ORIF RIGHT PROXIMAL HUMERUS;  Surgeon: Yonatan Galdamez MD;  Location:  [facility] OR;  Service: Orthopedics   • SHOULDER ACROMIOPLASTY Right 2019   • WRIST FRACTURE SURGERY Right 2014      General Information     Row Name 11/19/21 0847          OT Time and Intention    Document Type evaluation  -MA     Mode of Treatment individual therapy; occupational therapy  -MA     Row Name 11/19/21 0847          General Information    Patient Profile Reviewed yes  -MA     Prior Level of Function independent:; bed mobility; ADL's; transfer; all household mobility  -MA     Existing Precautions/Restrictions fall; non-weight bearing; right  NWB RUE, WBAT RLE w/ KI donned. R interscalene nerve catheter  -MA     Barriers to Rehab medically complex  -MA     Row Name 11/19/21 0847          Living Environment    Lives With spouse  -MA     Row Name 11/19/21 0847          Cognition    Orientation Status (Cognition) oriented x 3  -MA     Row Name 11/19/21 0847          Safety Issues, Functional Mobility    Safety Issues Affecting Function (Mobility) insight into deficits/self-awareness; safety precaution awareness; safety precautions follow-through/compliance; sequencing abilities  -MA     Impairments Affecting Function (Mobility) balance; endurance/activity tolerance; pain; strength; sensation/sensory awareness  -MA           User Key  (r) = Recorded By,  (t) = Taken By, (c) = Cosigned By    Initials Name Provider Type    Farrah Deal OT Occupational Therapist                 Mobility/ADL's     Row Name 11/19/21 0849          Bed Mobility    Bed Mobility supine-sit  -MA     Supine-Sit [place] (Bed Mobility) maximum assist (25% patient effort); 2 person assist; verbal cues  -MA     Bed Mobility, Safety Issues decreased use of arms for pushing/pulling; decreased use of legs for bridging/pushing  -MA     Assistive Device (Bed Mobility) draw sheet; head of bed elevated  -MA     Row Name 11/19/21 0849          Transfers    Transfers sit-stand transfer; bed-chair transfer  -MA     Comment (Transfers) R KI donned prior to any EOB/OOB activity.  -MA     Bed-Chair [place] (Transfers) maximum assist (25% patient effort); 2 person assist; verbal cues  -MA     Assistive Device (Bed-Chair Transfers) other (see comments)  LUE support, GB  -MA     Sit-Stand [place] (Transfers) moderate assist (50% patient effort); 2 person assist; verbal cues  -MA     Row Name 11/19/21 0849          Sit-Stand Transfer    Assistive Device (Sit-Stand Transfers) other (see comments)  LUE support, GB  -MA     Row Name 11/19/21 0849          Functional Mobility    Functional Mobility- Comment Deferred to PT  -MA     Row Name 11/19/21 0849          Activities of Daily Living    BADL Assessment/Intervention lower body dressing; upper body dressing; feeding  -MA     Row Name 11/19/21 0849          Lower Body Dressing Assessment/Training    [place] Level (Lower Body Dressing) don; socks; dependent (less than 25% patient effort); verbal cues  -MA     Position (Lower Body Dressing) supine  -MA     Row Name 11/19/21 0849          Upper Body Dressing Assessment/Training    [place] Level (Upper Body Dressing) don; pajama/robe; maximum assist (25% patient effort); verbal cues  -MA     Position (Upper Body Dressing) edge of bed sitting  -MA     Row Name 11/19/21 0849           Self-Feeding Assessment/Training    [place] Level (Feeding) prepare tray/open items; moderate assist (50% patient effort); liquids to mouth; scoop food and bring to mouth; independent  -MA     Position (Self-Feeding) supported sitting  -MA           User Key  (r) = Recorded By, (t) = Taken By, (c) = Cosigned By    Initials Name Provider Type    Farrah Deal OT Occupational Therapist               Obj/Interventions     Row Name 11/19/21 0850          Sensory Assessment (Somatosensory)    Sensory Assessment (Somatosensory) right UE  -MA     Sensory Subjective Reports numbness; tingling  -MA     Row Name 11/19/21 0850          Vision Assessment/Intervention    Visual Impairment/Limitations WFL  -MA     Row Name 11/19/21 0850          Range of Motion Comprehensive    General Range of Motion upper extremity range of motion deficits identified  -MA     Comment, General Range of Motion RUE elbow/wrist/hand ROM only; LUE WFL  -MA     Row Name 11/19/21 0850          Strength Comprehensive (MMT)    General Manual Muscle Testing (MMT) Assessment upper extremity strength deficits identified  -MA     Comment, General Manual Muscle Testing (MMT) Assessment LUE grossly 4/5, RUE deferred  -MA     Row Name 11/19/21 0850          Elbow/Forearm (Therapeutic Exercise)    Elbow/Forearm (Therapeutic Exercise) AAROM (active assistive range of motion)  -MA     Elbow/Forearm AAROM (Therapeutic Exercise) right; flexion; extension; supination; pronation; 10 repetitions; sitting  -MA     Row Name 11/19/21 0850          Wrist (Therapeutic Exercise)    Wrist (Therapeutic Exercise) AAROM (active assistive range of motion)  -MA     Wrist AAROM (Therapeutic Exercise) right; flexion; extension; 10 repetitions  -MA     Row Name 11/19/21 0850          Hand (Therapeutic Exercise)    Hand (Therapeutic Exercise) AROM (active range of motion)  -MA     Hand AROM/AAROM (Therapeutic Exercise) AAROM (active assistive range of motion); finger  flexion; finger extension; 10 repetitions  -MA     Row Name 11/19/21 0850          Motor Skills    Motor Skills therapeutic exercise  -MA     Row Name 11/19/21 0850          Balance    Balance Assessment sitting static balance; standing dynamic balance; standing static balance  -MA     Static Sitting Balance WFL; unsupported; sitting in chair; sitting, edge of bed  -MA     Static Standing Balance moderate impairment; supported; standing  -MA     Dynamic Standing Balance severe impairment; supported; standing  -MA     Balance Interventions sit to stand; occupation based/functional task  -MA     Row Name 11/19/21 0802          Therapeutic Exercise    Therapeutic Exercise elbow/forearm; wrist; hand  -MA           User Key  (r) = Recorded By, (t) = Taken By, (c) = Cosigned By    Initials Name Provider Type    Farrah Deal OT Occupational Therapist               Goals/Plan     Row Name 11/19/21 0881          Transfer Goal 1 (OT)    Activity/Assistive Device (Transfer Goal 1, OT) bed-to-chair/chair-to-bed; toilet; commode, bedside without drop arms  -MA     [place] Level/Cues Needed (Transfer Goal 1, OT) moderate assist (50-74% patient effort); verbal cues required  -MA     Time Frame (Transfer Goal 1, OT) long term goal (LTG); 10 days  -MA     Progress/Outcome (Transfer Goal 1, OT) goal ongoing  -MA     Row Name 11/19/21 0856          Toileting Goal 1 (OT)    Activity/Device (Toileting Goal 1, OT) adjust/manage clothing; perform perineal hygiene; commode, bedside without drop arms  -MA     [place] Level/Cues Needed (Toileting Goal 1, OT) minimum assist (75% or more patient effort); verbal cues required  -MA     Time Frame (Toileting Goal 1, OT) long term goal (LTG); 10 days  -MA     Progress/Outcome (Toileting Goal 1, OT) goal ongoing  -MA     Row Name 11/19/21 0830          ROM Goal 1 (OT)    ROM Goal 1 (OT) Pt will demo ability to perform RUE HEP daily.  -MA     Time Frame (ROM Goal 1, OT) long term  goal (LTG); 10 days  -MA     Progress/Outcome (ROM Goal 1, OT) goal ongoing  -MA           User Key  (r) = Recorded By, (t) = Taken By, (c) = Cosigned By    Initials Name Provider Type    Farrah Deal, OT Occupational Therapist               Clinical Impression     Row Name 11/19/21 0852          Pain Assessment    Additional Documentation Pain Scale: Numbers Pre/Post-Treatment (Group)  -MA     Row Name 11/19/21 0852          Pain Scale: Numbers Pre/Post-Treatment    Pretreatment Pain Rating 8/10  -MA     Posttreatment Pain Rating 6/10  -MA     Pain Location - Side Right  -MA     Pain Location shoulder; knee  -MA     Pain Intervention(s) Repositioned; Ambulation/increased activity; Cold applied  -MA     Row Name 11/19/21 0852          Plan of Care Review    Plan of Care Reviewed With patient  -MA     Outcome Summary OT eval complete. Pt presents w/ increased pain, decreased activity tolerance, and balance deficits limiting her ADL independence. Pt gave good effort throughout session. Pt max Ax2 for bed mobility & SPT from bed-to-chair, mod Ax2 for STS, dep for LBD, max A for UBD, mod A for meal set-up. Pt educated on RUE HEP and tolerated 10 reps of AAROM. Pt would benefit from continued skilled IPOT services to address current functional deficits and return to PLOF. Recommend IRF at discharge.  -MA     Row Name 11/19/21 0852          Therapy Assessment/Plan (OT)    Rehab Potential (OT) good, to achieve stated therapy goals  -MA     Criteria for Skilled Therapeutic Interventions Met (OT) yes; skilled treatment is necessary  -MA     Therapy Frequency (OT) daily  -MA     Row Name 11/19/21 0852          Therapy Plan Review/Discharge Plan (OT)    Anticipated Discharge Disposition (OT) inpatient rehabilitation facility  -MA     Row Name 11/19/21 0852          Vital Signs    Pre Systolic BP Rehab 114  -MA     Pre Treatment Diastolic BP 65  -MA     Pretreatment Heart Rate (beats/min) 65  -MA     Posttreatment Heart  Rate (beats/min) 67  -MA     Pre SpO2 (%) 98  -MA     O2 Delivery Pre Treatment room air  -MA     O2 Delivery Intra Treatment room air  -MA     Post SpO2 (%) 97  -MA     O2 Delivery Post Treatment room air  -MA     Pre Patient Position Supine  -MA     Intra Patient Position Standing  -MA     Post Patient Position Sitting  -MA     Row Name 11/19/21 0852          Positioning and Restraints    Pre-Treatment Position in bed  -MA     Post Treatment Position chair  -MA     In Chair reclined; call light within reach; encouraged to call for assist; exit alarm on; waffle cushion; on mechanical lift sling; legs elevated; with brace; RUE elevated  -MA           User Key  (r) = Recorded By, (t) = Taken By, (c) = Cosigned By    Initials Name Provider Type    Farrah Deal OT Occupational Therapist               Outcome Measures     Row Name 11/19/21 0858          How much help from another is currently needed...    Putting on and taking off regular lower body clothing? 1  -MA     Bathing (including washing, rinsing, and drying) 2  -MA     Toileting (which includes using toilet bed pan or urinal) 2  -MA     Putting on and taking off regular upper body clothing 2  -MA     Taking care of personal grooming (such as brushing teeth) 3  -MA     Eating meals 3  -MA     AM-PAC 6 Clicks Score (OT) 13  -MA     Row Name 11/19/21 0858          Functional Assessment    Outcome Measure Options AM-PAC 6 Clicks Daily Activity (OT)  -MA           User Key  (r) = Recorded By, (t) = Taken By, (c) = Cosigned By    Initials Name Provider Type    Farrah Deal OT Occupational Therapist                Occupational Therapy Education                 Title: PT OT SLP Therapies (In Progress)     Topic: Occupational Therapy (Done)     Point: ADL training (Done)     Description:   Instruct learner(s) on proper safety adaptation and remediation techniques during self care or transfers.   Instruct in proper use of assistive devices.               Learning Progress Summary           Patient Acceptance, E, VU by MA at 11/19/2021 0859                   Point: Home exercise program (Done)     Description:   Instruct learner(s) on appropriate technique for monitoring, assisting and/or progressing therapeutic exercises/activities.              Learning Progress Summary           Patient Acceptance, E, VU by MA at 11/19/2021 0859                   Point: Precautions (Done)     Description:   Instruct learner(s) on prescribed precautions during self-care and functional transfers.              Learning Progress Summary           Patient Acceptance, E, VU by MA at 11/19/2021 0859                   Point: Body mechanics (Done)     Description:   Instruct learner(s) on proper positioning and spine alignment during self-care, functional mobility activities and/or exercises.              Learning Progress Summary           Patient Acceptance, E, VU by MA at 11/19/2021 0859                               User Key     Initials Effective Dates Name Provider Type Discipline    MA 11/19/20 -  Fararh Mojica OT Occupational Therapist OT              OT Recommendation and Plan  Therapy Frequency (OT): daily  Plan of Care Review  Plan of Care Reviewed With: patient  Outcome Summary: OT eval complete. Pt presents w/ increased pain, decreased activity tolerance, and balance deficits limiting her ADL independence. Pt gave good effort throughout session. Pt max Ax2 for bed mobility & SPT from bed-to-chair, mod Ax2 for STS, dep for LBD, max A for UBD, mod A for meal set-up. Pt educated on RUE HEP and tolerated 10 reps of AAROM. Pt would benefit from continued skilled IPOT services to address current functional deficits and return to PLOF. Recommend IRF at discharge.     Time Calculation:    Time Calculation- OT     Row Name 11/19/21 0900             Time Calculation- OT    OT Start Time 0752  -MA      OT Received On 11/19/21  -MA      OT Goal Re-Cert Due Date 11/29/21  -MA               Timed Charges    68503 - OT Therapeutic Exercise Minutes 10  -MA      94630 - OT Therapeutic Activity Minutes 11  -MA      09435 - OT Self Care/Mgmt Minutes 18  -MA              Untimed Charges    OT Eval/Re-eval Minutes 31  -MA              Total Minutes    Timed Charges Total Minutes 39  -MA      Untimed Charges Total Minutes 31  -MA       Total Minutes 70  -MA            User Key  (r) = Recorded By, (t) = Taken By, (c) = Cosigned By    Initials Name Provider Type    MA Farrah Mojica OT Occupational Therapist              Therapy Charges for Today     Code Description Service Date Service Provider Modifiers Qty    96823934598 HC OT THER PROC EA 15 MIN 11/19/2021 Farrah Mojica OT GO 1    39832209328 HC OT THERAPEUTIC ACT EA 15 MIN 11/19/2021 Farrah Mojica OT GO 1    45275791906 HC OT SELF CARE/MGMT/TRAIN EA 15 MIN 11/19/2021 Farrah Mojica OT GO 1    12982673932 HC OT EVAL MOD COMPLEXITY 3 11/19/2021 Farrah Mojica OT GO 1    21391538653 HC OT THER SUPP EA 15 MIN 11/19/2021 Farrah Mojica OT GO 2               Farrah Mojica OT  11/19/2021

## 2021-11-19 NOTE — PLAN OF CARE
Goal Outcome Evaluation:  Plan of Care Reviewed With: patient        Progress: improving  Outcome Summary: Patient required min assist x2 to stand and mod assist x2 to take a few steps with johanne walker. She is very limited in standing from pain. I recommend w/c for mobility until R LE pain improves, then she will be able to ambulate with a hemiwalker.  I recommend rehab stay at discharge. She may consider home if family can provide support with safe transfer

## 2021-11-19 NOTE — PROGRESS NOTES
Orthopedic Daily Progress Note      CC: s/p removal of hardware and placement of IMN perirosthetic humerus fx    Pain well controlled  General: no fevers, chills  Abdomen: no nausea, vomiting, or diarrhea    No other complaints    Physical Exam:  I have reviewed the vital signs.  Temp:  [98.1 °F (36.7 °C)-98.2 °F (36.8 °C)] 98.2 °F (36.8 °C)  Heart Rate:  [61-70] 70  Resp:  [16-18] 16  BP: ()/(50-80) 130/80    Objective  General Appearance:    Alert, cooperative, no distress  Extremities: No clubbing, cyanosis, or edema to lower extremities  Pulses:  2+ in distal surgical extremity  Skin: Dressing Clean/dry/intact      Results Review:    I have reviewed the labs, radiology results and diagnostic studies:    Results from last 7 days   Lab Units 11/19/21  0900   WBC 10*3/mm3 5.69   HEMOGLOBIN g/dL 8.5*   PLATELETS 10*3/mm3 181     Results from last 7 days   Lab Units 11/19/21  0900   SODIUM mmol/L 135*   POTASSIUM mmol/L 4.4   CO2 mmol/L 23.0   CREATININE mg/dL 0.90   GLUCOSE mg/dL 103*       I have reviewed the medications.    Assessment/Problem List  POD# 1 Day Post-Op   S/p orif humerusfx, non op treatment of nondisplaced tibial plateau fx     Plan  1) NWB LUE,may do gentle elbow wrist and hand ROM only, -- OT  2) sling  3) po pain meds, ISB block  4) dressing to remain in place until fu  5) WBAT r knee in knee immoblizer             Discharge Planning: I expect patient to be discharged to rehab when bed available     Fu at [facility] in 2 weeks with Xrays.    Yonatan Galdamez MD  11/19/21  17:26 EST

## 2021-11-20 LAB
BASOPHILS # BLD AUTO: 0.03 10*3/MM3 (ref 0–0.2)
BASOPHILS NFR BLD AUTO: 0.6 % (ref 0–1.5)
DEPRECATED RDW RBC AUTO: 46.5 FL (ref 37–54)
EOSINOPHIL # BLD AUTO: 0.05 10*3/MM3 (ref 0–0.4)
EOSINOPHIL NFR BLD AUTO: 1.1 % (ref 0.3–6.2)
ERYTHROCYTE [DISTWIDTH] IN BLOOD BY AUTOMATED COUNT: 12.3 % (ref 12.3–15.4)
HCT VFR BLD AUTO: 27.1 % (ref 34–46.6)
HGB BLD-MCNC: 8.5 G/DL (ref 12–15.9)
IMM GRANULOCYTES # BLD AUTO: 0.02 10*3/MM3 (ref 0–0.05)
IMM GRANULOCYTES NFR BLD AUTO: 0.4 % (ref 0–0.5)
LYMPHOCYTES # BLD AUTO: 1.34 10*3/MM3 (ref 0.7–3.1)
LYMPHOCYTES NFR BLD AUTO: 28.5 % (ref 19.6–45.3)
MCH RBC QN AUTO: 32.4 PG (ref 26.6–33)
MCHC RBC AUTO-ENTMCNC: 31.4 G/DL (ref 31.5–35.7)
MCV RBC AUTO: 103.4 FL (ref 79–97)
MONOCYTES # BLD AUTO: 0.49 10*3/MM3 (ref 0.1–0.9)
MONOCYTES NFR BLD AUTO: 10.4 % (ref 5–12)
NEUTROPHILS NFR BLD AUTO: 2.77 10*3/MM3 (ref 1.7–7)
NEUTROPHILS NFR BLD AUTO: 59 % (ref 42.7–76)
NRBC BLD AUTO-RTO: 0 /100 WBC (ref 0–0.2)
PLATELET # BLD AUTO: 194 10*3/MM3 (ref 140–450)
PMV BLD AUTO: 10.4 FL (ref 6–12)
RBC # BLD AUTO: 2.62 10*6/MM3 (ref 3.77–5.28)
WBC NRBC COR # BLD: 4.7 10*3/MM3 (ref 3.4–10.8)

## 2021-11-20 PROCEDURE — 97116 GAIT TRAINING THERAPY: CPT

## 2021-11-20 PROCEDURE — 85025 COMPLETE CBC W/AUTO DIFF WBC: CPT | Performed by: INTERNAL MEDICINE

## 2021-11-20 PROCEDURE — 97110 THERAPEUTIC EXERCISES: CPT

## 2021-11-20 PROCEDURE — 97535 SELF CARE MNGMENT TRAINING: CPT

## 2021-11-20 PROCEDURE — 99232 SBSQ HOSP IP/OBS MODERATE 35: CPT | Performed by: INTERNAL MEDICINE

## 2021-11-20 PROCEDURE — 25010000002 ROPIVACAINE PER 1 MG: Performed by: ORTHOPAEDIC SURGERY

## 2021-11-20 RX ADMIN — HYDROMORPHONE HYDROCHLORIDE 2 MG: 2 TABLET ORAL at 12:10

## 2021-11-20 RX ADMIN — APIXABAN 5 MG: 5 TABLET, FILM COATED ORAL at 20:27

## 2021-11-20 RX ADMIN — HYDROMORPHONE HYDROCHLORIDE 2 MG: 2 TABLET ORAL at 06:57

## 2021-11-20 RX ADMIN — HYDROMORPHONE HYDROCHLORIDE 2 MG: 2 TABLET ORAL at 18:41

## 2021-11-20 RX ADMIN — HYDROMORPHONE HYDROCHLORIDE 2 MG: 2 TABLET ORAL at 01:57

## 2021-11-20 RX ADMIN — HYDROCORTISONE 1 APPLICATION: 25 CREAM TOPICAL at 15:44

## 2021-11-20 RX ADMIN — SENNOSIDES AND DOCUSATE SODIUM 2 TABLET: 50; 8.6 TABLET ORAL at 08:23

## 2021-11-20 RX ADMIN — TRAZODONE HYDROCHLORIDE 100 MG: 50 TABLET ORAL at 20:26

## 2021-11-20 RX ADMIN — HYDROCORTISONE 1 APPLICATION: 25 CREAM TOPICAL at 20:28

## 2021-11-20 RX ADMIN — BISACODYL 5 MG: 5 TABLET, COATED ORAL at 08:23

## 2021-11-20 RX ADMIN — SENNOSIDES AND DOCUSATE SODIUM 2 TABLET: 50; 8.6 TABLET ORAL at 20:27

## 2021-11-20 RX ADMIN — APIXABAN 5 MG: 5 TABLET, FILM COATED ORAL at 08:23

## 2021-11-20 RX ADMIN — ROPIVACAINE HYDROCHLORIDE 6 ML/HR: 2 INJECTION, SOLUTION EPIDURAL; INFILTRATION at 12:10

## 2021-11-20 RX ADMIN — HYDROMORPHONE HYDROCHLORIDE 2 MG: 2 TABLET ORAL at 22:06

## 2021-11-20 RX ADMIN — HYDROXYCHLOROQUINE SULFATE 200 MG: 200 TABLET ORAL at 08:23

## 2021-11-20 RX ADMIN — SODIUM CHLORIDE, PRESERVATIVE FREE 10 ML: 5 INJECTION INTRAVENOUS at 08:24

## 2021-11-20 RX ADMIN — POLYETHYLENE GLYCOL 3350 17 G: 17 POWDER, FOR SOLUTION ORAL at 08:23

## 2021-11-20 NOTE — PLAN OF CARE
Goal Outcome Evaluation:  Plan of Care Reviewed With: patient        Progress: improving  Outcome Summary: Pt feels as though her 2nd nerve cath block may not be well placed.  PO dilaudid given prn.  Pt slept well.  Up to BSC x 2 assist, voiding well.  Tolerating PO intake.  Remains on RA.

## 2021-11-20 NOTE — THERAPY TREATMENT NOTE
Patient Name: Aleena Armstrong  : 1950    MRN: [id number]                              Today's Date: 2021       Admit Date: 11/15/2021    Visit Dx:     ICD-10-CM ICD-9-CM   1. Closed fracture of proximal end of right humerus, unspecified fracture morphology, initial encounter  S42.201A 812.00   2. Closed fracture of right tibial plateau, initial encounter  S82.141A 823.00   3. Anticoagulated  Z79.01 V58.61   4. Acute postoperative pain  G89.18 338.18   5. Fall, initial encounter  W19.XXXA E888.9     Patient Active Problem List   Diagnosis   • WPW (Elodia-Parkinson-White syndrome)   • Chest pain   • Morbid obesity (HCC)   • Dyslipidemia   • Essential hypertension   • Atrial flutter (HCC)   • HO Atrial fibrillation (CMS/HCC)   • Acute kidney injury (HCC)   • Insomnia   • Anxiety and depression   • Vitamin D deficiency   • Rheumatoid arthritis (HCC)   • Glaucoma of left eye   • Peptic ulcer    • Bradycardia   • Paroxysmal atrial fibrillation (HCC)   • Hematoma of groin   • Proximal humerus fracture   • S/P ORIF right proximal humerus fracture   • Anemia   • Chronic pain   • Acute postoperative pain   • Fracture of right humerus   • Fracture of right tibial plateau   • Fall     Past Medical History:   Diagnosis Date   • A-fib (Formerly [facility])     HAD ABLATION FOR THIS IN    • Anemia    • Anxiety and depression 2016   • GERD (gastroesophageal reflux disease)    • Glaucoma of left eye 2016   • H/O migraine     LAST 3  YEARS AGO    • History of MRSA infection     APPROX 8 YEARS, TREATED WITH ORAL ABX, NEGATIVE CULTURES FOLLOWING TX    • History of transfusion    • Hyperlipidemia    • Hypertension     MEDS DC'D BY DR GOMEZ 2017   • Insomnia 2016   • Kidney disease    • Peptic ulcer - s/p gastric bypass surgery (7-8 years ago) 2016   • Rheumatoid arthritis (HCC) 2016   • Spinal headache     FOLLOWING SPINAL FOR CHILDBIRTH    • Vitamin D deficiency 2016   • Wears dentures     UPPER  PLATE    • Elodia-Parkinson-White (WPW) syndrome     1990s     Past Surgical History:   Procedure Laterality Date   • BREAST BIOPSY  ~1990   • CARDIAC CATHETERIZATION N/A 10/24/2016    Procedure: Left Heart Cath;  Surgeon: Rubens Morejon MD;  Location:  [facility];  Service:    • CARDIAC ELECTROPHYSIOLOGY PROCEDURE N/A 6/20/2017    Procedure: PVA;  Surgeon: Edvin Cook DO;  Location:  [facility];  Service:    • CHOLECYSTECTOMY  ~1990   • COLONOSCOPY  2007   • GASTRIC BYPASS     • GASTRIC BYPASS  2005   • HAMMER TOE REPAIR      LEFT--GARO IN PLACE    • HYSTERECTOMY  1993    Complete   • ORIF HUMERUS FRACTURE Right 2/8/2019    Procedure: ORIF RIGHT PROXIMAL HUMERUS;  Surgeon: Yonatan Galdamez MD;  Location:  [facility];  Service: Orthopedics   • SHOULDER ACROMIOPLASTY Right 2019   • WRIST FRACTURE SURGERY Right 2014      General Information     Row Name 11/20/21 1046          Physical Therapy Time and Intention    Document Type therapy note (daily note)  -AS     Mode of Treatment physical therapy  -AS     Row Name 11/20/21 1046          General Information    Patient Profile Reviewed yes  -AS     Existing Precautions/Restrictions fall; non-weight bearing; right; other (see comments)  R UE NWB w/sling, R LE WBAT with knee immobilizer  -AS     Barriers to Rehab medically complex  -AS     Row Name 11/20/21 1046          Cognition    Orientation Status (Cognition) oriented x 4  -AS     Row Name 11/20/21 1046          Safety Issues, Functional Mobility    Safety Issues Affecting Function (Mobility) safety precaution awareness; safety precautions follow-through/compliance; sequencing abilities  -AS     Impairments Affecting Function (Mobility) balance; endurance/activity tolerance; pain; strength; sensation/sensory awareness; motor control; range of motion (ROM)  -AS     Comment, Safety Issues/Impairments (Mobility) alert and following commands  -AS           User Key  (r) = Recorded  By, (t) = Taken By, (c) = Cosigned By    Initials Name Provider Type    AS Leda Gilman PTA Physical Therapy Assistant               Mobility     Row Name 11/20/21 1047          Bed Mobility    Supine-Sit Green (Bed Mobility) verbal cues; minimum assist (75% patient effort); 1 person assist  -AS     Assistive Device (Bed Mobility) head of bed elevated; draw sheet  -AS     Comment (Bed Mobility) increased time and effort to reach EOB, slow movement due to pain and inability to use R UE NWB  -AS     Row Name 11/20/21 1047          Transfers    Comment (Transfers) verbal cues for hand placement and to slide R LE out when sitting. Bed>BSC>recliner. B UE support given, nursing asisted with transfers.  -AS     Row Name 11/20/21 1047          Bed-Chair Transfer    Bed-Chair Green (Transfers) verbal cues; maximum assist (25% patient effort); 2 person assist  -AS     Assistive Device (Bed-Chair Transfers) other (see comments)  B UE support  -AS     Row Name 11/20/21 1047          Sit-Stand Transfer    Sit-Stand Green (Transfers) verbal cues; maximum assist (25% patient effort); 1 person assist  -AS     Assistive Device (Sit-Stand Transfers) other (see comments)  B UE support  -AS     Row Name 11/20/21 1047          Gait/Stairs (Locomotion)    Green Level (Gait) verbal cues; 2 person assist; maximum assist (25% patient effort)  -AS     Assistive Device (Gait) other (see comments)  B UE support  -AS     Distance in Feet (Gait) 4  -AS     Deviations/Abnormal Patterns (Gait) right sided deviations; antalgic; weight shifting decreased; stride length decreased; april decreased; base of support, narrow  -AS     Comment (Gait/Stairs) patient took steps to BSC then to recliner. Limited by pain.  -AS     Row Name 11/20/21 1047          Mobility    Extremity Weight-bearing Status right upper extremity; right lower extremity  -AS     Right Upper Extremity (Weight-bearing Status) non weight-bearing  (NWB)  with sling and nerve catheter  -AS     Right Lower Extremity (Weight-bearing Status) weight-bearing as tolerated (WBAT)  w/ knee immobilizer  -AS           User Key  (r) = Recorded By, (t) = Taken By, (c) = Cosigned By    Initials Name Provider Type    AS Leda Gilman PTA Physical Therapy Assistant               Obj/Interventions     Row Name 11/20/21 1051          Motor Skills    Therapeutic Exercise knee; ankle; hip  -AS     Row Name 11/20/21 1051          Knee (Therapeutic Exercise)    Knee (Therapeutic Exercise) isometric exercises; AROM (active range of motion)  -AS     Knee AROM (Therapeutic Exercise) bilateral; heel slides; supine; 10 repetitions  -AS     Knee Isometrics (Therapeutic Exercise) bilateral; gluteal sets; quad sets; supine; 10 repetitions; 5 second hold  -AS     Row Name 11/20/21 1051          Ankle (Therapeutic Exercise)    Ankle (Therapeutic Exercise) AROM (active range of motion)  -AS     Ankle AROM (Therapeutic Exercise) bilateral; dorsiflexion; plantarflexion; supine; 10 repetitions  -AS           User Key  (r) = Recorded By, (t) = Taken By, (c) = Cosigned By    Initials Name Provider Type    AS Leda Gilman PTA Physical Therapy Assistant               Goals/Plan    No documentation.                Clinical Impression     Row Name 11/20/21 1051          Pain    Additional Documentation Pain Scale: Numbers Pre/Post-Treatment (Group)  -AS     Row Name 11/20/21 1051          Pain Scale: Numbers Pre/Post-Treatment    Pretreatment Pain Rating 4/10  -AS     Posttreatment Pain Rating 5/10  -AS     Pain Location - Side Right  -AS     Pain Location - Orientation lower  -AS     Pain Location extremity  5/10 RUE as well  -AS     Pre/Posttreatment Pain Comment patient has R UE nerve catheter  -AS     Pain Intervention(s) Repositioned; Ambulation/increased activity  -AS     Row Name 11/20/21 1051          Plan of Care Review    Plan of Care Reviewed With patient  -AS     Progress  improving  -AS     Outcome Summary patient with improvement in bed mobility requiring only min assist x1, max assist x2 and B UE support for transfers, steps only with transfers bed>BSC>recliner due to pain and weakness. Completed R LE AROM and isometric exericses without complaints or difficulty.  -AS     Row Name 11/20/21 1051          Positioning and Restraints    Pre-Treatment Position in bed  -AS     Post Treatment Position chair  -AS     In Chair reclined; call light within reach; encouraged to call for assist; exit alarm on; waffle cushion; on mechanical lift sling; with brace  -AS           User Key  (r) = Recorded By, (t) = Taken By, (c) = Cosigned By    Initials Name Provider Type    AS Leda Gilman PTA Physical Therapy Assistant               Outcome Measures     Row Name 11/20/21 1055 11/20/21 0800       How much help from another person do you currently need...    Turning from your back to your side while in flat bed without using bedrails? 3  -AS 3  -AC    Moving from lying on back to sitting on the side of a flat bed without bedrails? 2  -AS 2  -AC    Moving to and from a bed to a chair (including a wheelchair)? 2  -AS 2  -AC    Standing up from a chair using your arms (e.g., wheelchair, bedside chair)? 2  -AS 1  -AC    Climbing 3-5 steps with a railing? 1  -AS 1  -AC    To walk in hospital room? 2  -AS 1  -AC    AM-PAC 6 Clicks Score (PT) 12  -AS 10  -AC    Row Name 11/20/21 1055          Functional Assessment    Outcome Measure Options AM-PAC 6 Clicks Basic Mobility (PT)  -AS           User Key  (r) = Recorded By, (t) = Taken By, (c) = Cosigned By    Initials Name Provider Type    AS Leda Gilman PTA Physical Therapy Assistant    AC Cheri Castillo RN Registered Nurse                             Physical Therapy Education                 Title: PT OT SLP Therapies (In Progress)     Topic: Physical Therapy (In Progress)     Point: Mobility training (In Progress)     Learning Progress  Summary           Patient Acceptance, E, NR by AS at 11/20/2021 1056    Eager, E, VU,DU by SC at 11/19/2021 1133    Comment: reviewed Benefits of mobility and precuations                   Point: Home exercise program (In Progress)     Learning Progress Summary           Patient Acceptance, E, NR by AS at 11/20/2021 1056    Eager, E, VU,DU by SC at 11/19/2021 1133    Comment: reviewed Benefits of mobility and precuations                   Point: Body mechanics (In Progress)     Learning Progress Summary           Patient Acceptance, E, NR by AS at 11/20/2021 1056    Eager, E, VU,DU by SC at 11/19/2021 1133    Comment: reviewed Benefits of mobility and precuations                   Point: Precautions (In Progress)     Learning Progress Summary           Patient Acceptance, E, NR by AS at 11/20/2021 1056    Eager, E, VU,DU by SC at 11/19/2021 1133    Comment: reviewed Benefits of mobility and precuations                               User Key     Initials Effective Dates Name Provider Type Discipline    SC 06/16/21 -  Blanquita Mcclure, PT Physical Therapist PT    AS 06/16/21 -  Leda Gilman, PTA Physical Therapy Assistant PT              PT Recommendation and Plan     Plan of Care Reviewed With: patient  Progress: improving  Outcome Summary: patient with improvement in bed mobility requiring only min assist x1, max assist x2 and B UE support for transfers, steps only with transfers bed>BSC>recliner due to pain and weakness. Completed R LE AROM and isometric exericses without complaints or difficulty.     Time Calculation:    PT Charges     Row Name 11/20/21 1056             Time Calculation    Start Time 1016  -AS      PT Received On 11/20/21  -AS      PT Goal Re-Cert Due Date 11/29/21  -AS              Timed Charges    39186 - PT Therapeutic Exercise Minutes 15  -AS      08817 - Gait Training Minutes  10  -AS              Total Minutes    Timed Charges Total Minutes 25  -AS       Total Minutes 25  -AS             User Key  (r) = Recorded By, (t) = Taken By, (c) = Cosigned By    Initials Name Provider Type    AS Leda Gilman PTA Physical Therapy Assistant              Therapy Charges for Today     Code Description Service Date Service Provider Modifiers Qty    67763983801 HC PT THER PROC EA 15 MIN 11/20/2021 Leda Gilman PTA GP 1    53175002317 HC GAIT TRAINING EA 15 MIN 11/20/2021 Leda Gilman PTA GP 1          PT G-Codes  Outcome Measure Options: AM-PAC 6 Clicks Basic Mobility (PT)  AM-PAC 6 Clicks Score (PT): 12  AM-PAC 6 Clicks Score (OT): 13    Leda Gilman PTA  11/20/2021

## 2021-11-20 NOTE — PLAN OF CARE
Goal Outcome Evaluation:  Plan of Care Reviewed With: patient        Progress: improving  Outcome Summary: Pt continuing to progress toward OT goals, noted improvement w/ transfers and bed mobility this date. Pt tolerated 10 reps of RUE HEP. Will continue to progress pt as tolerated per OT POC.

## 2021-11-20 NOTE — PROGRESS NOTES
[facility] Medicine Services  PROGRESS NOTE    Patient Name: Aleena Armstrong  : 1950  MRN: [id number]    Date of Admission: 11/15/2021  Primary Care Physician: Aniyah Goldberg MD    Subjective   Subjective     CC:  Right arm and right leg pain after fall    HPI:  I have seen and evaluated the patient this afternoon.  Sitting in the recliner and feeling much better today.  Reported that the pain in her right knee is improving and was able to walk some with physical therapy yesterday.  No other acute complaints    ROS:  10 point review of systems is negative except for what is mentioned in HPI    Objective   Objective     Vital Signs:   Temp:  [98.2 °F (36.8 °C)-98.3 °F (36.8 °C)] 98.3 °F (36.8 °C)  Heart Rate:  [61-70] 63  Resp:  [16] 16  BP: (110-130)/(66-80) 114/70     Physical Exam:  General: Comfortable, not in any acute distress, appears stated age, conversant and cooperative  Head: Atraumatic and normocephalic, without obvious abnormality  Eyes:   Conjunctivae and sclerae normal, no Icterus. No pallor  Ears:  Ears appear intact with no abnormalities noted  Throat: No oral lesions, no thrush, oral mucosa moist  Neck: Supple, trachea midline, no thyromegaly  Back:   No kyphoscoliosis present. No tenderness to palpation,   no sacral edema  Lungs: Clear to auscultation bilaterally, equal air entry, no wheezing or crackles  Heart:  Normal S1 and S2, no murmur, no gallop, No JVD, no lower extremity swelling  Abdomen:  Soft, no tenderness, no organomegaly, normal bowel sounds  Normal bowel sounds, no masses, no organomegaly. Soft, nontender, nondistended, no guarding, no rebound tenderness.  Extremities: Nerve block catheter in right upper shoulder.  Right shoulder wound is clean with no discharge or erythema.  Range of movement of the right knee is limited because of pain  Skin: No bleeding, bruising or rash, normal skin turgor and elasticity  Neurologic: Cranial nerves appear  intact with no evidence of facial asymmetry, normal motor and sensory functions in all 4 extremities  Psych: Alert and oriented x 3, normal mood    Results Reviewed:  LAB RESULTS:      Lab 11/20/21  0539 11/19/21  0900 11/18/21  1005 11/16/21  0633   WBC 4.70 5.69 4.35 5.85   HEMOGLOBIN 8.5* 8.5* 9.5* 9.8*   HEMATOCRIT 27.1* 27.1* 29.6* 31.0*   PLATELETS 194 181 154 182   NEUTROS ABS 2.77 3.79 2.64 3.57   IMMATURE GRANS (ABS) 0.02 0.02 0.01 0.01   LYMPHS ABS 1.34 1.25 1.18 1.55   MONOS ABS 0.49 0.54 0.43 0.63   EOS ABS 0.05 0.06 0.06 0.05   .4* 103.8* 101.0* 102.6*   PROTIME  --   --   --  13.6   APTT  --   --   --  26.2         Lab 11/19/21  0900 11/18/21  1005 11/16/21  0633   SODIUM 135* 137 139   POTASSIUM 4.4 4.3 5.3*   CHLORIDE 104 106 108*   CO2 23.0 23.0 22.0   ANION GAP 8.0 8.0 9.0   BUN 17 21 17   CREATININE 0.90 0.94 0.95   GLUCOSE 103* 91 94   CALCIUM 8.2* 8.5* 8.4*         Lab 11/16/21  0633   TOTAL PROTEIN 5.8*   ALBUMIN 3.40*   GLOBULIN 2.4   ALT (SGPT) 8   AST (SGOT) 18   BILIRUBIN 0.3   ALK PHOS 62         Lab 11/16/21  0633   PROTIME 13.6   INR 1.07             Lab 11/16/21  0633   ABO TYPING A   RH TYPING Positive   ANTIBODY SCREEN Negative         Brief Urine Lab Results     None          Microbiology Results Abnormal     Procedure Component Value - Date/Time    COVID PRE-OP / PRE-PROCEDURE SCREENING ORDER (NO ISOLATION) - Swab, Nasopharynx [[id number]]  (Normal) Collected: 11/16/21 0150    Lab Status: Final result Specimen: Swab from Nasopharynx Updated: 11/16/21 0243    Narrative:      The following orders were created for panel order COVID PRE-OP / PRE-PROCEDURE SCREENING ORDER (NO ISOLATION) - Swab, Nasopharynx.  Procedure                               Abnormality         Status                     ---------                               -----------         ------                     COVID-19, ABBOTT IN-HOUS...[[id number]]  Normal              Final result                 Please view  results for these tests on the individual orders.    COVID-19, ABBOTT IN-HOUSE,NASAL Swab (NO TRANSPORT MEDIA) 2 HR TAT - Swab, Nasopharynx [[id number]]  (Normal) Collected: 11/16/21 0150    Lab Status: Final result Specimen: Swab from Nasopharynx Updated: 11/16/21 0243     COVID19 Presumptive Negative    Narrative:      Fact sheet for providers: https://www.fda.gov/media/321948/download     Fact sheet for patients: https://www.fda.gov/media/295920/download    Test performed by PCR.  If inconsistent with clinical signs and symptoms patient should be tested with different authorized molecular test.          Right Interscalene Catheter re-block    Result Date: 11/19/2021  Nan Ibrahim CRNA     11/19/2021  5:18 PM Right Interscalene Catheter re-block Patient reassessed immediately prior to procedure Patient location during procedure: floor Reason for block: at surgeon's request and post-op pain management Performed by CRNA: Lesly Huynh CRNA Assisted by: Nan Ibrahim CRNA Preanesthetic Checklist Completed: patient identified, IV checked, site marked, risks and benefits discussed, surgical consent, monitors and equipment checked, pre-op evaluation and timeout performed Prep: Pt Position: left lateral decubitus Sterile barriers:cap, gloves, mask and sterile barriers Prep: ChloraPrep Patient monitoring: blood pressure monitoring, continuous pulse oximetry and EKG Procedure Sedation: no Performed under: local infiltration Guidance:ultrasound guided Images:still images obtained, printed/placed on chart Laterality:right Block Type:interscalene Injection Technique:catheter Needle Type:Tuohy and echogenic Needle Gauge:18 G Resistance on Injection: none Catheter Size:20 G (20g) Cath Depth at skin: 10 cm Medications Used: ropivacaine (NAROPIN) 0.2 % injection, 15 mL Med administered at 11/19/2021 5:12 PM Post Assessment Injection Assessment: negative aspiration for heme, no paresthesia on injection and  incremental injection Patient Tolerance:comfortable throughout block Complications:no Additional Notes Procedure:  APS called due to increased pain with no relief. Upon evaluation of the nerve catheter under the U/S, nerve catheter was noted to have migrated away from the brachial plexus. Nerve catheter was replaced after patient's consent.              The pt was placed in semifowlers position with a slight tilt of the thorax contralateral to the insertion site.  The Insertion Site was prepped and draped in sterile fashion.  Skin and cutaneous tissue was infiltrated and anesthetized with 1% Lidocaine 3 mls via a 25g needle.  Utilizing ultrasound guidance, a BBraun 4 inch 18 g Contiplex echogenic touhy needle was advanced in-plane.  Hydro dissection of tissue was achieved with Normal saline. Major vessels(carotid and Internal Jugular) where visualized as the brachial plexus was approached at the approximate level of C-7/ T-1.  Cervical 5 and Branches of Cervical 6 nerve roots where visualized and the needle tip was placed posterior at the level of C-6 roots.  LA spread was visualized and injection was made incrementally every 5 mls with aspiration. Injection pressure was normal or little, there was no intraneural injection, no vascular injection.    The BBraun 20 g wire stylet catheter was then placed under US guidance on the posterior aspect of the Brachial Plexus. The tuohy was removed and the location of catheter was confirmed with NS injection visualized with US . The skin was sealed with exofin tissue adhesive at catheter insertion site.  Skin was prepped with benzoin and the catheter was secured with steristrips and a CHG tegaderm. Appropriate labels applied. Thank You.     FL C Arm During Surgery    Result Date: 11/19/2021  EXAMINATION: FL C ARM DURING SURGERY-  INDICATION: ORIF R humerus; S42.201A-Unspecified fracture of upper end of right humerus, initial encounter for closed fracture; S82.141A-Displaced  bicondylar fracture of right tibia, initial encounter for closed fracture; Z79.01-Long term (current) use of anticoagulants  COMPARISON: NONE  FINDINGS: 1 minute 24 seconds of fluoroscopy time provided with 6 images saved during right humerus nail placement.      Impression: 1 minute 24 seconds of fluoroscopy time provided with 6 images saved during right humerus nail placement.  This report was finalized on 11/19/2021 10:52 AM by Gadiel Mendoza.        Results for orders placed during the hospital encounter of 06/20/17    Adult Transesophageal Echo    Interpretation Summary  · Left ventricular systolic function is normal. Estimated EF = 60%.  · The cardiac valves are anatomically and functionally normal.  · No evidence of a left atrial appendage thrombus was present      I have reviewed the medications:  Scheduled Meds:apixaban, 5 mg, Oral, Q12H  hydroxychloroquine, 200 mg, Oral, Daily  metoprolol succinate XL, 25 mg, Oral, Daily  senna-docusate sodium, 2 tablet, Oral, BID  sodium chloride, 10 mL, Intravenous, Q12H  traZODone, 100 mg, Oral, Nightly      Continuous Infusions:ropivacaine (NAROPIN) 0.2% peripheral nerve cath (moog), 6 mL/hr, Last Rate: 6 mL/hr (11/19/21 2027)  sodium chloride, 50 mL/hr, Last Rate: 50 mL/hr (11/19/21 0140)      PRN Meds:.•  acetaminophen **OR** acetaminophen  •  senna-docusate sodium **AND** polyethylene glycol **AND** bisacodyl **AND** bisacodyl  •  HYDROmorphone  •  Morphine **AND** naloxone  •  Morphine  •  ondansetron  •  sodium chloride    Assessment/Plan   Assessment & Plan     Active Hospital Problems    Diagnosis  POA   • **Fall [W19.XXXA]  Yes   • Fracture of right humerus [S42.301A]  Yes   • Fracture of right tibial plateau [S82.141A]  Yes   • HO Atrial fibrillation (CMS/HCC) [I48.91]  Yes   • Anxiety and depression [F41.9, F32.A]  Yes   • Rheumatoid arthritis (HCC) [M06.9]  Yes   • Dyslipidemia [E78.5]  Yes   • Essential hypertension [I10]  Yes      Resolved Hospital  Problems   No resolved problems to display.        Brief Hospital Course to date:  Aleena Armstrong is a 70 y.o. female patient with past medical history of atrial fibrillation on Eliquis, anxiety and depression, GERD, essential hypertension, dyslipidemia, rheumatoid arthritis, WPW who presented to the hospital status post mechanical fall that resulted in right tibial plateau fracture and right humeral fracture.    Assessment and Plan:  Right humerus fracture status post ORIF  Right tibial plateau fracture  · Underwent ORIF of right humerus by Dr Galdamez 11/18/2021  · Right knee (tibial plateau fracture) is inoperable and orthopedic service recommended conservative treatment with knee splinting.  · Continue PT and OT  · Plan to discharge home with home PT, likely on Monday once hospital bed and other supplies are delivered to her house    Essential hypertension  Atrial fibrillation  · Continue Eliquis  · Continue metoprolol    Rheumatoid arthritis  · Continue Plaquenil    Anxiety and depression  · Prozac    DVT prophylaxis:  Medical and mechanical DVT prophylaxis orders are present.       AM-PAC 6 Clicks Score (PT): 12 (11/19/21 1133)    Disposition: I expect the patient to be discharged home with PT on Monday.    CODE STATUS:   Code Status and Medical Interventions:   Ordered at: 11/18/21 6579     Level Of Support Discussed With:    Patient     Code Status (Patient has no pulse and is not breathing):    CPR (Attempt to Resuscitate)     Medical Interventions (Patient has pulse or is breathing):    Full Support       Chance Lopez MD  11/20/21

## 2021-11-20 NOTE — THERAPY TREATMENT NOTE
Patient Name: Aleena Armstrong  : 1950    MRN: [id number]                              Today's Date: 2021       Admit Date: 11/15/2021    Visit Dx:     ICD-10-CM ICD-9-CM   1. Closed fracture of proximal end of right humerus, unspecified fracture morphology, initial encounter  S42.201A 812.00   2. Closed fracture of right tibial plateau, initial encounter  S82.141A 823.00   3. Anticoagulated  Z79.01 V58.61   4. Acute postoperative pain  G89.18 338.18   5. Fall, initial encounter  W19.XXXA E888.9     Patient Active Problem List   Diagnosis   • WPW (Elodia-Parkinson-White syndrome)   • Chest pain   • Morbid obesity (HCC)   • Dyslipidemia   • Essential hypertension   • Atrial flutter (HCC)   • HO Atrial fibrillation (CMS/HCC)   • Acute kidney injury (HCC)   • Insomnia   • Anxiety and depression   • Vitamin D deficiency   • Rheumatoid arthritis (HCC)   • Glaucoma of left eye   • Peptic ulcer    • Bradycardia   • Paroxysmal atrial fibrillation (HCC)   • Hematoma of groin   • Proximal humerus fracture   • S/P ORIF right proximal humerus fracture   • Anemia   • Chronic pain   • Acute postoperative pain   • Fracture of right humerus   • Fracture of right tibial plateau   • Fall     Past Medical History:   Diagnosis Date   • A-fib ([facility])     HAD ABLATION FOR THIS IN    • Anemia    • Anxiety and depression 2016   • GERD (gastroesophageal reflux disease)    • Glaucoma of left eye 2016   • H/O migraine     LAST 3  YEARS AGO    • History of MRSA infection     APPROX 8 YEARS, TREATED WITH ORAL ABX, NEGATIVE CULTURES FOLLOWING TX    • History of transfusion    • Hyperlipidemia    • Hypertension     MEDS DC'D BY DR GOMEZ 2017   • Insomnia 2016   • Kidney disease    • Peptic ulcer - s/p gastric bypass surgery (7-8 years ago) 2016   • Rheumatoid arthritis (HCC) 2016   • Spinal headache     FOLLOWING SPINAL FOR CHILDBIRTH    • Vitamin D deficiency 2016   • Wears dentures     UPPER  PLATE    • Elodia-Parkinson-White (WPW) syndrome     1990s     Past Surgical History:   Procedure Laterality Date   • BREAST BIOPSY  ~1990   • CARDIAC CATHETERIZATION N/A 10/24/2016    Procedure: Left Heart Cath;  Surgeon: Rubens Morejon MD;  Location:  [facility];  Service:    • CARDIAC ELECTROPHYSIOLOGY PROCEDURE N/A 6/20/2017    Procedure: PVA;  Surgeon: Edvin Cook DO;  Location:  [facility] EP INVASIVE [facility];  Service:    • CHOLECYSTECTOMY  ~1990   • COLONOSCOPY  2007   • GASTRIC BYPASS     • GASTRIC BYPASS  2005   • HAMMER TOE REPAIR      LEFT--GARO IN PLACE    • HYSTERECTOMY  1993    Complete   • ORIF HUMERUS FRACTURE Right 2/8/2019    Procedure: ORIF RIGHT PROXIMAL HUMERUS;  Surgeon: Yonatan Galdamez MD;  Location:  [facility] OR;  Service: Orthopedics   • SHOULDER ACROMIOPLASTY Right 2019   • WRIST FRACTURE SURGERY Right 2014      General Information     Row Name 11/20/21 1500          OT Time and Intention    Document Type therapy note (daily note)  -MA     Mode of Treatment individual therapy; occupational therapy  -MA     Row Name 11/20/21 1500          General Information    Patient Profile Reviewed yes  -MA     Existing Precautions/Restrictions fall; non-weight bearing; right; other (see comments)  RUE NWB w/sling, RLE WBAT with knee immobilizer. R interscalene nerve catheter  -MA     Barriers to Rehab medically complex  -MA     Row Name 11/20/21 1500          Cognition    Orientation Status (Cognition) oriented x 4  -MA     Row Name 11/20/21 1500          Safety Issues, Functional Mobility    Impairments Affecting Function (Mobility) balance; endurance/activity tolerance; pain; strength; sensation/sensory awareness; motor control; range of motion (ROM)  -MA           User Key  (r) = Recorded By, (t) = Taken By, (c) = Cosigned By    Initials Name Provider Type    Farrah Deal OT Occupational Therapist                 Mobility/ADL's     Row Name 11/20/21 1501          Bed  Mobility    Bed Mobility sit-supine; scooting/bridging  -MA     Scooting/Bridging Greenback (Bed Mobility) dependent (less than 25% patient effort); 2 person assist; verbal cues  -MA     Sit-Supine [place] (Bed Mobility) minimum assist (75% patient effort); verbal cues; 2 person assist  -MA     Bed Mobility, Safety Issues decreased use of arms for pushing/pulling; decreased use of legs for bridging/pushing  -MA     Assistive Device (Bed Mobility) head of bed elevated; draw sheet  -MA     Row Name 11/20/21 1501          Transfers    Transfers sit-stand transfer; toilet transfer  -MA     Comment (Transfers) Pt mod Ax2 for STSx2 (1st from recliner, 2nd from BSC) & max Ax2 for SPT from [id number], BSC-to-bed & sidesteps to HOB w/ LUE support.  -MA     Sit-Stand Greenback (Transfers) moderate assist (50% patient effort); 2 person assist; verbal cues  -MA     Greenback Level (Toilet Transfer) maximum assist (25% patient effort); 2 person assist; verbal cues  -MA     Assistive Device (Toilet Transfer) other (see comments)  LUE support  -MA     Row Name 11/20/21 1501          Sit-Stand Transfer    Assistive Device (Sit-Stand Transfers) other (see comments)  LUE support  -MA     Row Name 11/20/21 1501          Toilet Transfer    Type (Toilet Transfer) stand pivot/stand step  -MA     Row Name 11/20/21 1501          Functional Mobility    Functional Mobility- Comment Deferred to PT  -MA     Row Name 11/20/21 1501          Activities of Daily Living    BADL Assessment/Intervention toileting  -MA     Row Name 11/20/21 1501          Mobility    Extremity Weight-bearing Status right upper extremity; right lower extremity  -MA     Right Upper Extremity (Weight-bearing Status) non weight-bearing (NWB)  -MA     Right Lower Extremity (Weight-bearing Status) weight-bearing as tolerated (WBAT)  -MA     Row Name 11/20/21 1501          Toileting Assessment/Training    Greenback Level (Toileting) adjust/manage clothing;  moderate assist (50% patient effort); perform perineal hygiene; supervision  -MA     Assistive Devices (Toileting) commode, bedside without drop arms  -MA     Position (Toileting) supported standing; supported sitting  -MA           User Key  (r) = Recorded By, (t) = Taken By, (c) = Cosigned By    Initials Name Provider Type    Farrah Deal OT Occupational Therapist               Obj/Interventions     Row Name 11/20/21 1506          Elbow/Forearm (Therapeutic Exercise)    Elbow/Forearm (Therapeutic Exercise) AAROM (active assistive range of motion)  -MA     Elbow/Forearm AROM (Therapeutic Exercise) right; supination; pronation; 10 repetitions; supine  -MA     Elbow/Forearm AAROM (Therapeutic Exercise) right; flexion; extension; supine; 10 repetitions  -MA     Row Name 11/20/21 1506          Wrist (Therapeutic Exercise)    Wrist (Therapeutic Exercise) AROM (active range of motion)  -MA     Wrist AROM (Therapeutic Exercise) right; flexion; extension; 10 repetitions  -MA     Row Name 11/20/21 1506          Hand (Therapeutic Exercise)    Hand (Therapeutic Exercise) AROM (active range of motion)  -MA     Hand AROM/AAROM (Therapeutic Exercise) right; AROM (active range of motion); finger flexion; finger extension; thumb palmar aBduction; 10 repetitions  -MA     Row Name 11/20/21 1506          Motor Skills    Motor Skills therapeutic exercise  -MA     Row Name 11/20/21 1503          Balance    Balance Assessment sitting static balance; sitting dynamic balance; standing static balance; standing dynamic balance  -MA     Static Sitting Balance WFL; sitting in chair; sitting, edge of bed; unsupported  -MA     Dynamic Sitting Balance WFL; supported; sitting in chair  -MA     Static Standing Balance moderate impairment; supported; standing  -MA     Dynamic Standing Balance severe impairment; supported; standing  -MA     Balance Interventions sit to stand; occupation based/functional task  -MA     Row Name 11/20/21 1501           Therapeutic Exercise    Therapeutic Exercise elbow/forearm; wrist; hand  -MA           User Key  (r) = Recorded By, (t) = Taken By, (c) = Cosigned By    Initials Name Provider Type    Farrah Deal, SONIA Occupational Therapist               Goals/Plan    No documentation.                Clinical Impression     Row Name 11/20/21 1502          Pain Assessment    Additional Documentation Pain Scale: Numbers Pre/Post-Treatment (Group)  -MA     Row Name 11/20/21 1505          Pain Scale: Numbers Pre/Post-Treatment    Pretreatment Pain Rating 0/10 - no pain  -MA     Posttreatment Pain Rating 0/10 - no pain  -MA     Row Name 11/20/21 1500          Plan of Care Review    Plan of Care Reviewed With patient  -MA     Progress improving  -MA     Outcome Summary Pt continuing to progress toward OT goals, noted improvement w/ transfers and bed mobility this date. Pt tolerated 10 reps of RUE HEP. Will continue to progress pt as tolerated per OT POC.  -MA     Row Name 11/20/21 1509          Therapy Assessment/Plan (OT)    Rehab Potential (OT) good, to achieve stated therapy goals  -MA     Criteria for Skilled Therapeutic Interventions Met (OT) yes; skilled treatment is necessary  -MA     Therapy Frequency (OT) daily  -MA     Row Name 11/20/21 1502          Therapy Plan Review/Discharge Plan (OT)    Anticipated Discharge Disposition (OT) inpatient rehabilitation facility  -MA     Row Name 11/20/21 1507          Vital Signs    Pre Systolic BP Rehab --  VSS - RN cleared OT treatment  -MA     O2 Delivery Pre Treatment room air  -MA     O2 Delivery Intra Treatment room air  -MA     O2 Delivery Post Treatment room air  -MA     Pre Patient Position Sitting  -MA     Intra Patient Position Standing  -MA     Post Patient Position Supine  -MA     Row Name 11/20/21 1507          Positioning and Restraints    Pre-Treatment Position sitting in chair/recliner  -MA     Post Treatment Position bed  -MA     In Bed supine; call light within  reach; encouraged to call for assist; exit alarm on; side rails up x3; RLE elevated; RUE elevated  -MA           User Key  (r) = Recorded By, (t) = Taken By, (c) = Cosigned By    Initials Name Provider Type    Farrah Deal OT Occupational Therapist               Outcome Measures     Row Name 11/20/21 1509          How much help from another is currently needed...    Putting on and taking off regular lower body clothing? 2  -MA     Bathing (including washing, rinsing, and drying) 2  -MA     Toileting (which includes using toilet bed pan or urinal) 3  -MA     Putting on and taking off regular upper body clothing 2  -MA     Taking care of personal grooming (such as brushing teeth) 3  -MA     Eating meals 3  -MA     AM-PAC 6 Clicks Score (OT) 15  -MA     Row Name 11/20/21 1055 11/20/21 0800       How much help from another person do you currently need...    Turning from your back to your side while in flat bed without using bedrails? 3  -AS 3  -AC    Moving from lying on back to sitting on the side of a flat bed without bedrails? 2  -AS 2  -AC    Moving to and from a bed to a chair (including a wheelchair)? 2  -AS 2  -AC    Standing up from a chair using your arms (e.g., wheelchair, bedside chair)? 2  -AS 1  -AC    Climbing 3-5 steps with a railing? 1  -AS 1  -AC    To walk in hospital room? 2  -AS 1  -AC    AM-PAC 6 Clicks Score (PT) 12  -AS 10  -AC    Row Name 11/20/21 1509 11/20/21 1055       Functional Assessment    Outcome Measure Options AM-PAC 6 Clicks Daily Activity (OT)  -MA AM-PAC 6 Clicks Basic Mobility (PT)  -AS          User Key  (r) = Recorded By, (t) = Taken By, (c) = Cosigned By    Initials Name Provider Type    AS Leda Gilman PTA Physical Therapy Assistant    Cheri Cabrera RN Registered Nurse    Farrah Deal OT Occupational Therapist                Occupational Therapy Education                 Title: PT OT SLP Therapies (In Progress)     Topic: Occupational Therapy (Done)      Point: ADL training (Done)     Description:   Instruct learner(s) on proper safety adaptation and remediation techniques during self care or transfers.   Instruct in proper use of assistive devices.              Learning Progress Summary           Patient Acceptance, E, VU by MA at 11/20/2021 1509    Acceptance, E, VU by MA at 11/19/2021 0859                   Point: Home exercise program (Done)     Description:   Instruct learner(s) on appropriate technique for monitoring, assisting and/or progressing therapeutic exercises/activities.              Learning Progress Summary           Patient Acceptance, E, VU by MA at 11/20/2021 1509    Acceptance, E, VU by MA at 11/19/2021 0859                   Point: Precautions (Done)     Description:   Instruct learner(s) on prescribed precautions during self-care and functional transfers.              Learning Progress Summary           Patient Acceptance, E, VU by MA at 11/20/2021 1509    Acceptance, E, VU by MA at 11/19/2021 0859                   Point: Body mechanics (Done)     Description:   Instruct learner(s) on proper positioning and spine alignment during self-care, functional mobility activities and/or exercises.              Learning Progress Summary           Patient Acceptance, E, VU by MA at 11/20/2021 1509    Acceptance, E, VU by MA at 11/19/2021 0859                               User Key     Initials Effective Dates Name Provider Type Discipline    MA 11/19/20 -  Farrah Mojica OT Occupational Therapist OT              OT Recommendation and Plan  Therapy Frequency (OT): daily  Plan of Care Review  Plan of Care Reviewed With: patient  Progress: improving  Outcome Summary: Pt continuing to progress toward OT goals, noted improvement w/ transfers and bed mobility this date. Pt tolerated 10 reps of RUE HEP. Will continue to progress pt as tolerated per OT POC.     Time Calculation:    Time Calculation- OT     Row Name 11/20/21 1510 11/20/21 1056          Time  Calculation- OT    OT Start Time 1422  -MA --     OT Stop Time 1448  -MA --     OT Time Calculation (min) 26 min  -MA --     OT Received On 11/20/21  -MA --     OT Goal Re-Cert Due Date 11/29/21  -MA --            Timed Charges    03587 - OT Therapeutic Exercise Minutes 9  -MA --     54531 - Gait Training Minutes  -- 10  -AS     84851 - OT Therapeutic Activity Minutes 6  -MA --     52877 - OT Self Care/Mgmt Minutes 11  -MA --            Total Minutes    Timed Charges Total Minutes 26  -MA 10  -AS      Total Minutes 26  -MA 10  -AS           User Key  (r) = Recorded By, (t) = Taken By, (c) = Cosigned By    Initials Name Provider Type    AS Leda Gilman PTA Physical Therapy Assistant    Farrah Deal OT Occupational Therapist              Therapy Charges for Today     Code Description Service Date Service Provider Modifiers Qty    74766649102 HC OT THER PROC EA 15 MIN 11/19/2021 Farrah Mojica, OT GO 1    69861872496 HC OT THERAPEUTIC ACT EA 15 MIN 11/19/2021 Farrah Mojica, OT GO 1    07562180761 HC OT SELF CARE/MGMT/TRAIN EA 15 MIN 11/19/2021 Farrah Mojica, OT GO 1    03404875176 HC OT EVAL MOD COMPLEXITY 3 11/19/2021 Farrah Mojica, OT GO 1    94669293252 HC OT THER SUPP EA 15 MIN 11/19/2021 Farrah Mojica, OT GO 2    57732351712 HC OT THER PROC EA 15 MIN 11/20/2021 Farrah Mojica, OT GO 1    77125670615 HC OT SELF CARE/MGMT/TRAIN EA 15 MIN 11/20/2021 Farrah Mojica, OT GO 1    31598751935 HC OT THER SUPP EA 15 MIN 11/20/2021 Farrah Mojica, OT GO 2               Farrah Mojica OT  11/20/2021

## 2021-11-20 NOTE — PROGRESS NOTES
[facility]    Acute pain service Inpatient Progress Note    Patient Name: Aleena Armstrong  :  1950  MRN:  [id number]        Acute Pain  Service Inpatient Progress Note:    Analgesia:Good  LOC: alert and awake  Resp Status: room air  Cardiac: VS stable  Side Effects:None  Catheter Site:clean, dressing intact and dry  Cath type: peripheral nerve cath with ON Q  Infusion rate: 6ml/hr  Catheter Plan:Catheter to remain Insitu and Continue catheter infusion rate unchanged

## 2021-11-20 NOTE — PLAN OF CARE
Goal Outcome Evaluation:  Plan of Care Reviewed With: patient        Progress: improving  Outcome Summary: patient with improvement in bed mobility requiring only min assist x1, max assist x2 and B UE support for transfers, steps only with transfers bed>BSC>recliner due to pain and weakness. Completed R LE AROM and isometric exericses without complaints or difficulty.

## 2021-11-20 NOTE — PLAN OF CARE
Goal Outcome Evaluation:  Plan of Care Reviewed With: patient        Progress: improving  Outcome Summary: Alert, oriented x 4, and pleasant. VSS. Up with assist x 1 to pivot to chair. PO medications controlling pain. Will continue to monitor.

## 2021-11-21 PROCEDURE — 25010000002 ROPIVACAINE PER 1 MG: Performed by: ORTHOPAEDIC SURGERY

## 2021-11-21 PROCEDURE — 97530 THERAPEUTIC ACTIVITIES: CPT

## 2021-11-21 PROCEDURE — 97110 THERAPEUTIC EXERCISES: CPT

## 2021-11-21 PROCEDURE — 99231 SBSQ HOSP IP/OBS SF/LOW 25: CPT | Performed by: INTERNAL MEDICINE

## 2021-11-21 RX ADMIN — HYDROXYCHLOROQUINE SULFATE 200 MG: 200 TABLET ORAL at 08:16

## 2021-11-21 RX ADMIN — HYDROMORPHONE HYDROCHLORIDE 2 MG: 2 TABLET ORAL at 18:09

## 2021-11-21 RX ADMIN — APIXABAN 5 MG: 5 TABLET, FILM COATED ORAL at 08:16

## 2021-11-21 RX ADMIN — SODIUM CHLORIDE, PRESERVATIVE FREE 10 ML: 5 INJECTION INTRAVENOUS at 20:32

## 2021-11-21 RX ADMIN — SODIUM CHLORIDE, PRESERVATIVE FREE 10 ML: 5 INJECTION INTRAVENOUS at 08:16

## 2021-11-21 RX ADMIN — SENNOSIDES AND DOCUSATE SODIUM 2 TABLET: 50; 8.6 TABLET ORAL at 20:32

## 2021-11-21 RX ADMIN — APIXABAN 5 MG: 5 TABLET, FILM COATED ORAL at 20:32

## 2021-11-21 RX ADMIN — TRAZODONE HYDROCHLORIDE 100 MG: 50 TABLET ORAL at 20:32

## 2021-11-21 RX ADMIN — HYDROCORTISONE 1 APPLICATION: 25 CREAM TOPICAL at 20:35

## 2021-11-21 RX ADMIN — SENNOSIDES AND DOCUSATE SODIUM 2 TABLET: 50; 8.6 TABLET ORAL at 08:16

## 2021-11-21 RX ADMIN — HYDROMORPHONE HYDROCHLORIDE 2 MG: 2 TABLET ORAL at 23:44

## 2021-11-21 RX ADMIN — ROPIVACAINE HYDROCHLORIDE 6 ML/HR: 2 INJECTION, SOLUTION EPIDURAL; INFILTRATION at 03:28

## 2021-11-21 NOTE — PLAN OF CARE
Goal Outcome Evaluation:  Plan of Care Reviewed With: patient        Progress: improving  Outcome Summary: Pt continuing to progress toward OT goals. Pt limited this date d/t nausea and fatigue. Pt min Ax2 for STS, mod Ax2 for SPT from bed-to-chair w/ LUE support, dep for donning/doffing LUE sling. Pt tolerated 10 reps of RUE HEP. Will continue to progress pt as tolerated per OT POC.

## 2021-11-21 NOTE — PLAN OF CARE
Goal Outcome Evaluation:  Plan of Care Reviewed With: patient        Progress: improving  Outcome Summary: Alert, oriented x 4, and pleasant. VSS. Up with assist x 2, and gait belt. RUE in sling, RLE in knee immobilizer when up. PO medication controlling pain. Voiding well per bedside commode. Will continue to monitor.

## 2021-11-21 NOTE — PLAN OF CARE
Goal Outcome Evaluation:  Plan of Care Reviewed With: patient        Progress: improving  Outcome Summary: Pt slept between nsg care. Ropiv infusing at 6 ml/hr.  PO dilaudid given prn for RUE pain.  Up to BSC x 2 assist.  Pt alert, oriented.  Anxious to go home.

## 2021-11-21 NOTE — THERAPY TREATMENT NOTE
Patient Name: Aleena Armstrong  : 1950    MRN: [id number]                              Today's Date: 2021       Admit Date: 11/15/2021    Visit Dx:     ICD-10-CM ICD-9-CM   1. Closed fracture of proximal end of right humerus, unspecified fracture morphology, initial encounter  S42.201A 812.00   2. Closed fracture of right tibial plateau, initial encounter  S82.141A 823.00   3. Anticoagulated  Z79.01 V58.61   4. Acute postoperative pain  G89.18 338.18   5. Fall, initial encounter  W19.XXXA E888.9     Patient Active Problem List   Diagnosis   • WPW (Elodia-Parkinson-White syndrome)   • Chest pain   • Morbid obesity (HCC)   • Dyslipidemia   • Essential hypertension   • Atrial flutter (HCC)   • HO Atrial fibrillation (CMS/HCC)   • Acute kidney injury (HCC)   • Insomnia   • Anxiety and depression   • Vitamin D deficiency   • Rheumatoid arthritis (HCC)   • Glaucoma of left eye   • Peptic ulcer    • Bradycardia   • Paroxysmal atrial fibrillation (HCC)   • Hematoma of groin   • Proximal humerus fracture   • S/P ORIF right proximal humerus fracture   • Anemia   • Chronic pain   • Acute postoperative pain   • Fracture of right humerus   • Fracture of right tibial plateau   • Fall     Past Medical History:   Diagnosis Date   • A-fib ([facility])     HAD ABLATION FOR THIS IN    • Anemia    • Anxiety and depression 2016   • GERD (gastroesophageal reflux disease)    • Glaucoma of left eye 2016   • H/O migraine     LAST 3  YEARS AGO    • History of MRSA infection     APPROX 8 YEARS, TREATED WITH ORAL ABX, NEGATIVE CULTURES FOLLOWING TX    • History of transfusion    • Hyperlipidemia    • Hypertension     MEDS DC'D BY DR GOMEZ 2017   • Insomnia 2016   • Kidney disease    • Peptic ulcer - s/p gastric bypass surgery (7-8 years ago) 2016   • Rheumatoid arthritis (HCC) 2016   • Spinal headache     FOLLOWING SPINAL FOR CHILDBIRTH    • Vitamin D deficiency 2016   • Wears dentures     UPPER  PLATE    • Elodia-Parkinson-White (WPW) syndrome     1990s     Past Surgical History:   Procedure Laterality Date   • BREAST BIOPSY  ~1990   • CARDIAC CATHETERIZATION N/A 10/24/2016    Procedure: Left Heart Cath;  Surgeon: Rubens Morejon MD;  Location:  [facility];  Service:    • CARDIAC ELECTROPHYSIOLOGY PROCEDURE N/A 6/20/2017    Procedure: PVA;  Surgeon: Edvin Cook DO;  Location:  [facility]VAS[facility];  Service:    • CHOLECYSTECTOMY  ~1990   • COLONOSCOPY  2007   • GASTRIC BYPASS     • GASTRIC BYPASS  2005   • HAMMER TOE REPAIR      LEFT--GARO IN PLACE    • HYSTERECTOMY  1993    Complete   • ORIF HUMERUS FRACTURE Right 2/8/2019    Procedure: ORIF RIGHT PROXIMAL HUMERUS;  Surgeon: oYnatan Galdamez MD;  Location:  [facility] OR;  Service: Orthopedics   • SHOULDER ACROMIOPLASTY Right 2019   • WRIST FRACTURE SURGERY Right 2014      General Information     Row Name 11/21/21 1140          OT Time and Intention    Document Type therapy note (daily note)  -MA     Mode of Treatment individual therapy; occupational therapy  -MA     Row Name 11/21/21 1140          General Information    Patient Profile Reviewed yes  -MA     Existing Precautions/Restrictions fall; non-weight bearing; right; other (see comments)  RUE NWB w/sling, RLE WBAT with knee immobilizer. R interscalene nerve catheter  -MA     Barriers to Rehab medically complex  -MA     Row Name 11/21/21 1140          Cognition    Orientation Status (Cognition) oriented x 4  -MA     Row Name 11/21/21 1140          Safety Issues, Functional Mobility    Safety Issues Affecting Function (Mobility) safety precaution awareness; safety precautions follow-through/compliance; sequencing abilities  -MA     Impairments Affecting Function (Mobility) balance; endurance/activity tolerance; pain; strength; sensation/sensory awareness; motor control; range of motion (ROM)  -MA           User Key  (r) = Recorded By, (t) = Taken By, (c) = Cosigned By     Initials Name Provider Type    MA Farrah Mojica OT Occupational Therapist                 Mobility/ADL's     Row Name 11/21/21 1141          Bed Mobility    Bed Mobility supine-sit  -MA     Supine-Sit [place] (Bed Mobility) verbal cues; minimum assist (75% patient effort); 1 person assist  -MA     Bed Mobility, Safety Issues decreased use of arms for pushing/pulling; decreased use of legs for bridging/pushing  -MA     Assistive Device (Bed Mobility) head of bed elevated; draw sheet  -MA     Row Name 11/21/21 1141          Transfers    Transfers sit-stand transfer; bed-chair transfer  -MA     Comment (Transfers) Pt min Ax2 for STS, mod Ax2 for SPT from bed-to-chair w/ LUE.  -MA     Bed-Chair [place]on (Transfers) moderate assist (50% patient effort); 2 person assist; verbal cues  -MA     Assistive Device (Bed-Chair Transfers) other (see comments)  LUE support  -MA     Sit-Stand [place] (Transfers) minimum assist (75% patient effort); 2 person assist; verbal cues  -MA     Row Name 11/21/21 1141          Sit-Stand Transfer    Assistive Device (Sit-Stand Transfers) other (see comments)  LUE support  -MA     Row Name 11/21/21 1141          Activities of Daily Living    BADL Assessment/Intervention upper body dressing  -MA     Row Name 11/21/21 1141          Mobility    Extremity Weight-bearing Status right upper extremity; right lower extremity  -MA     Left Upper Extremity (Weight-bearing Status) weight-bearing as tolerated (WBAT)  -MA     Right Upper Extremity (Weight-bearing Status) non weight-bearing (NWB)  -MA     Right Lower Extremity (Weight-bearing Status) weight-bearing as tolerated (WBAT)  -MA     Row Name 11/21/21 1141          Upper Body Dressing Assessment/Training    Weston Level (Upper Body Dressing) don; other (see comments)  LUE sling  -MA     Position (Upper Body Dressing) supine; supported sitting  -MA     Comment (Upper Body Dressing) Pt dep for donning/doffing LUE sling.  -MA            User Key  (r) = Recorded By, (t) = Taken By, (c) = Cosigned By    Initials Name Provider Type    Farrah Deal OT Occupational Therapist               Obj/Interventions     Row Name 11/21/21 1143          Elbow/Forearm (Therapeutic Exercise)    Elbow/Forearm (Therapeutic Exercise) AROM (active range of motion)  -MA     Elbow/Forearm AROM (Therapeutic Exercise) right; supination; pronation; sitting; 10 repetitions  -MA     Elbow/Forearm AAROM (Therapeutic Exercise) left assist right; flexion; extension; 10 repetitions; sitting  -MA     Row Name 11/21/21 1143          Wrist (Therapeutic Exercise)    Wrist (Therapeutic Exercise) AROM (active range of motion)  -MA     Wrist AROM (Therapeutic Exercise) right; flexion; extension; 10 repetitions  -MA     Row Name 11/21/21 114          Hand (Therapeutic Exercise)    Hand (Therapeutic Exercise) AROM (active range of motion)  -MA     Hand AROM/AAROM (Therapeutic Exercise) right; AROM (active range of motion); finger flexion; finger extension; thumb palmar aBduction; 10 repetitions  -MA     Row Name 11/21/21 1143          Motor Skills    Motor Skills therapeutic exercise  -MA     Row Name 11/21/21 1143          Balance    Balance Assessment sitting static balance; standing static balance; standing dynamic balance  -MA     Static Sitting Balance WFL; unsupported; sitting in chair; sitting, edge of bed  -MA     Static Standing Balance mild impairment; supported; standing  -MA     Dynamic Standing Balance moderate impairment; supported; standing  -MA     Balance Interventions sit to stand; occupation based/functional task  -MA     Row Name 11/21/21 1143          Therapeutic Exercise    Therapeutic Exercise elbow/forearm; wrist; hand  -MA           User Key  (r) = Recorded By, (t) = Taken By, (c) = Cosigned By    Initials Name Provider Type    Farrah Dael OT Occupational Therapist               Goals/Plan    No documentation.                Clinical  Impression     Row Name 11/21/21 1145          Pain Assessment    Additional Documentation Pain Scale: Numbers Pre/Post-Treatment (Group)  -MA     Row Name 11/21/21 1145          Pain Scale: Numbers Pre/Post-Treatment    Pretreatment Pain Rating 0/10 - no pain  -MA     Posttreatment Pain Rating 0/10 - no pain  -MA     Pre/Posttreatment Pain Comment Pt c/o soreness, no pain  -MA     Pain Intervention(s) Repositioned; Ambulation/increased activity; Cold applied  -MA     Row Name 11/21/21 1141          Plan of Care Review    Plan of Care Reviewed With patient  -MA     Progress improving  -MA     Outcome Summary Pt continuing to progress toward OT goals. Pt limited this date d/t nausea and fatigue. Pt min Ax2 for STS, mod Ax2 for SPT from bed-to-chair w/ LUE support, dep for donning/doffing LUE sling. Pt tolerated 10 reps of RUE HEP. Will continue to progress pt as tolerated per OT POC.  -MA     Row Name 11/21/21 114          Therapy Assessment/Plan (OT)    Rehab Potential (OT) good, to achieve stated therapy goals  -MA     Criteria for Skilled Therapeutic Interventions Met (OT) yes; skilled treatment is necessary  -MA     Therapy Frequency (OT) daily  -MA     Row Name 11/21/21 1141          Therapy Plan Review/Discharge Plan (OT)    Anticipated Discharge Disposition (OT) inpatient rehabilitation facility  -MA     Row Name 11/21/21 1140          Vital Signs    Pre Systolic BP Rehab --  VSS - RN cleared OT treatment  -MA     O2 Delivery Pre Treatment room air  -MA     O2 Delivery Intra Treatment room air  -MA     O2 Delivery Post Treatment room air  -MA     Pre Patient Position Supine  -MA     Intra Patient Position Standing  -MA     Post Patient Position Sitting  -MA     Row Name 11/21/21 1145          Positioning and Restraints    Pre-Treatment Position in bed  -MA     Post Treatment Position chair  -MA     In Chair reclined; call light within reach; encouraged to call for assist; exit alarm on; waffle cushion; on  mechanical lift sling; legs elevated; RLE elevated; RUE elevated  -MA           User Key  (r) = Recorded By, (t) = Taken By, (c) = Cosigned By    Initials Name Provider Type    Farrah Deal OT Occupational Therapist               Outcome Measures     Row Name 11/21/21 1147          How much help from another is currently needed...    Putting on and taking off regular lower body clothing? 2  -MA     Bathing (including washing, rinsing, and drying) 2  -MA     Toileting (which includes using toilet bed pan or urinal) 3  -MA     Putting on and taking off regular upper body clothing 3  -MA     Taking care of personal grooming (such as brushing teeth) 3  -MA     Eating meals 3  -MA     AM-PAC 6 Clicks Score (OT) 16  -MA     Row Name 11/21/21 0800          How much help from another person do you currently need...    Turning from your back to your side while in flat bed without using bedrails? 3  -AC     Moving from lying on back to sitting on the side of a flat bed without bedrails? 2  -AC     Moving to and from a bed to a chair (including a wheelchair)? 2  -AC     Standing up from a chair using your arms (e.g., wheelchair, bedside chair)? 2  -AC     Climbing 3-5 steps with a railing? 1  -AC     To walk in hospital room? 1  -AC     AM-PAC 6 Clicks Score (PT) 11  -AC     Row Name 11/21/21 1147          Functional Assessment    Outcome Measure Options AM-PAC 6 Clicks Daily Activity (OT)  -MA           User Key  (r) = Recorded By, (t) = Taken By, (c) = Cosigned By    Initials Name Provider Type    Cheri Cabrera, MARY Registered Nurse    Farrah Deal OT Occupational Therapist                Occupational Therapy Education                 Title: PT OT SLP Therapies (In Progress)     Topic: Occupational Therapy (Done)     Point: ADL training (Done)     Description:   Instruct learner(s) on proper safety adaptation and remediation techniques during self care or transfers.   Instruct in proper use of assistive devices.               Learning Progress Summary           Patient Acceptance, E, VU by MA at 11/21/2021 1147    Acceptance, E, VU by MA at 11/20/2021 1509    Acceptance, E, VU by MA at 11/19/2021 0859                   Point: Home exercise program (Done)     Description:   Instruct learner(s) on appropriate technique for monitoring, assisting and/or progressing therapeutic exercises/activities.              Learning Progress Summary           Patient Acceptance, E, VU by MA at 11/21/2021 1147    Acceptance, E, VU by MA at 11/20/2021 1509    Acceptance, E, VU by MA at 11/19/2021 0859                   Point: Precautions (Done)     Description:   Instruct learner(s) on prescribed precautions during self-care and functional transfers.              Learning Progress Summary           Patient Acceptance, E, VU by MA at 11/21/2021 1147    Acceptance, E, VU by MA at 11/20/2021 1509    Acceptance, E, VU by MA at 11/19/2021 0859                   Point: Body mechanics (Done)     Description:   Instruct learner(s) on proper positioning and spine alignment during self-care, functional mobility activities and/or exercises.              Learning Progress Summary           Patient Acceptance, E, VU by MA at 11/21/2021 1147    Acceptance, E, VU by MA at 11/20/2021 1509    Acceptance, E, VU by MA at 11/19/2021 0859                               User Key     Initials Effective Dates Name Provider Type Discipline    MA 11/19/20 -  Farrah Mojica OT Occupational Therapist OT              OT Recommendation and Plan  Therapy Frequency (OT): daily  Plan of Care Review  Plan of Care Reviewed With: patient  Progress: improving  Outcome Summary: Pt continuing to progress toward OT goals. Pt limited this date d/t nausea and fatigue. Pt min Ax2 for STS, mod Ax2 for SPT from bed-to-chair w/ LUE support, dep for donning/doffing LUE sling. Pt tolerated 10 reps of RUE HEP. Will continue to progress pt as tolerated per OT POC.     Time Calculation:     Time Calculation- OT     Row Name 11/21/21 1147             Time Calculation- OT    OT Start Time 1114  -MA      OT Received On 11/21/21  -MA      OT Goal Re-Cert Due Date 11/29/21  -MA              Timed Charges    28391 - OT Therapeutic Exercise Minutes 9  -MA      70355 - OT Therapeutic Activity Minutes 7  -MA      83250 - OT Self Care/Mgmt Minutes 8  -MA              Total Minutes    Timed Charges Total Minutes 24  -MA       Total Minutes 24  -MA            User Key  (r) = Recorded By, (t) = Taken By, (c) = Cosigned By    Initials Name Provider Type    Farrah Deal OT Occupational Therapist              Therapy Charges for Today     Code Description Service Date Service Provider Modifiers Qty    31563689094 HC OT THER PROC EA 15 MIN 11/20/2021 Farrah Moijca OT GO 1    97309823632 HC OT SELF CARE/MGMT/TRAIN EA 15 MIN 11/20/2021 Farrah Mojica, OT GO 1    75958738476 HC OT THER SUPP EA 15 MIN 11/20/2021 Farrah Mojica, OT GO 2    26436217550 HC OT THER PROC EA 15 MIN 11/21/2021 Farrah Mojica, OT GO 1    43388608778 HC OT THERAPEUTIC ACT EA 15 MIN 11/21/2021 Farrah Mojica OT GO 1    53993617659 HC OT THER SUPP EA 15 MIN 11/21/2021 Farrah Mojica, OT GO 2               Farrah Mojica OT  11/21/2021

## 2021-11-21 NOTE — PLAN OF CARE
Goal Outcome Evaluation:  Plan of Care Reviewed With: patient        Progress: improving  Outcome Summary: Less assistance needed with mobility. Min to mod A X 2 stand pivot bedside commode to bed. Min A sit to supine. Continue to progress as tolerated.

## 2021-11-21 NOTE — PROGRESS NOTES
Orthopedic Daily Progress Note      CC: s/p orif humerus, nonop tibia fx    Pain well controlled  General: no fevers, chills  Abdomen: no nausea, vomiting, or diarrhea    No other complaints    Physical Exam:  I have reviewed the vital signs.  Temp:  [98.1 °F (36.7 °C)-98.8 °F (37.1 °C)] 98.1 °F (36.7 °C)  Heart Rate:  [53-69] 69  Resp:  [16-18] 18  BP: (122-129)/(79-90) 122/90    Objective  General Appearance:    Alert, cooperative, no distress  Extremities: No clubbing, cyanosis, or edema to lower extremities  Pulses:  2+ in distal surgical extremity  Skin: Dressing Clean/dry/intact      Results Review:    I have reviewed the labs, radiology results and diagnostic studies:    Results from last 7 days   Lab Units 11/20/21  0539   WBC 10*3/mm3 4.70   HEMOGLOBIN g/dL 8.5*   PLATELETS 10*3/mm3 194     Results from last 7 days   Lab Units 11/19/21  0900   SODIUM mmol/L 135*   POTASSIUM mmol/L 4.4   CO2 mmol/L 23.0   CREATININE mg/dL 0.90   GLUCOSE mg/dL 103*       I have reviewed the medications.    POD# 3 Day Post-Op   S/p orif humerusfx, non op treatment of nondisplaced tibial plateau fx     Plan  1) NWB LUTIKI,may do gentle elbow wrist and hand ROM only, -- OT  2) sling  3) po pain meds, ISB block  4) dressing to remain in place until fu  5) WBAT r knee in knee immoblizer              Discharge Planning: I expect patient to be discharged to rehab when bed available     Fu at [facility] in 2 weeks with Xrays.             Yonatan Galdamez MD  11/21/21  12:15 EST

## 2021-11-21 NOTE — THERAPY TREATMENT NOTE
Patient Name: Aleena Armstrong  : 1950    MRN: [id number]                              Today's Date: 2021       Admit Date: 11/15/2021    Visit Dx:     ICD-10-CM ICD-9-CM   1. Closed fracture of proximal end of right humerus, unspecified fracture morphology, initial encounter  S42.201A 812.00   2. Closed fracture of right tibial plateau, initial encounter  S82.141A 823.00   3. Anticoagulated  Z79.01 V58.61   4. Acute postoperative pain  G89.18 338.18   5. Fall, initial encounter  W19.XXXA E888.9     Patient Active Problem List   Diagnosis   • WPW (Elodia-Parkinson-White syndrome)   • Chest pain   • Morbid obesity (HCC)   • Dyslipidemia   • Essential hypertension   • Atrial flutter (HCC)   • HO Atrial fibrillation (CMS/HCC)   • Acute kidney injury (HCC)   • Insomnia   • Anxiety and depression   • Vitamin D deficiency   • Rheumatoid arthritis (HCC)   • Glaucoma of left eye   • Peptic ulcer    • Bradycardia   • Paroxysmal atrial fibrillation (HCC)   • Hematoma of groin   • Proximal humerus fracture   • S/P ORIF right proximal humerus fracture   • Anemia   • Chronic pain   • Acute postoperative pain   • Fracture of right humerus   • Fracture of right tibial plateau   • Fall     Past Medical History:   Diagnosis Date   • A-fib ([facility])     HAD ABLATION FOR THIS IN    • Anemia    • Anxiety and depression 2016   • GERD (gastroesophageal reflux disease)    • Glaucoma of left eye 2016   • H/O migraine     LAST 3  YEARS AGO    • History of MRSA infection     APPROX 8 YEARS, TREATED WITH ORAL ABX, NEGATIVE CULTURES FOLLOWING TX    • History of transfusion    • Hyperlipidemia    • Hypertension     MEDS DC'D BY DR GOMEZ 2017   • Insomnia 2016   • Kidney disease    • Peptic ulcer - s/p gastric bypass surgery (7-8 years ago) 2016   • Rheumatoid arthritis (HCC) 2016   • Spinal headache     FOLLOWING SPINAL FOR CHILDBIRTH    • Vitamin D deficiency 2016   • Wears dentures     UPPER  PLATE    • Elodia-Parkinson-White (WPW) syndrome     1990s     Past Surgical History:   Procedure Laterality Date   • BREAST BIOPSY  ~1990   • CARDIAC CATHETERIZATION N/A 10/24/2016    Procedure: Left Heart Cath;  Surgeon: Rubens Morejon MD;  Location:  [facility];  Service:    • CARDIAC ELECTROPHYSIOLOGY PROCEDURE N/A 6/20/2017    Procedure: PVA;  Surgeon: Edvin Cook DO;  Location:  [facility]VAS[facility];  Service:    • CHOLECYSTECTOMY  ~1990   • COLONOSCOPY  2007   • GASTRIC BYPASS     • GASTRIC BYPASS  2005   • HAMMER TOE REPAIR      LEFT--GARO IN PLACE    • HYSTERECTOMY  1993    Complete   • ORIF HUMERUS FRACTURE Right 2/8/2019    Procedure: ORIF RIGHT PROXIMAL HUMERUS;  Surgeon: Yonatan Galdamez MD;  Location:  [facility] OR;  Service: Orthopedics   • SHOULDER ACROMIOPLASTY Right 2019   • WRIST FRACTURE SURGERY Right 2014      General Information     Row Name 11/21/21 1430          Physical Therapy Time and Intention    Document Type therapy note (daily note)  -     Mode of Treatment physical therapy  -     Row Name 11/21/21 1430          General Information    Patient Profile Reviewed yes  -LS     Existing Precautions/Restrictions fall; non-weight bearing; right; other (see comments)  RUE NWB w/sling, RLE WBAT with KI. R interscalene nerve catheter  -     Row Name 11/21/21 1430          Cognition    Orientation Status (Cognition) oriented x 4  -           User Key  (r) = Recorded By, (t) = Taken By, (c) = Cosigned By    Initials Name Provider Type     Amena Antonio, PT Physical Therapist               Mobility     Row Name 11/21/21 1430          Bed Mobility    Bed Mobility sit-supine  -LS     Scooting/Bridging Wilkinson (Bed Mobility) independent  -LS     Sit-Supine Wilkinson (Bed Mobility) verbal cues; minimum assist (75% patient effort)  -     Comment (Bed Mobility) vcs to use LLE to scoot up in bed; pt then able to scoot independently. needed min A to lift  RLE onto bed with sit to supine.  -     Row Name 11/21/21 1430          Transfers    Comment (Transfers) min to mod A X 2  stand pivot transfer bedside commode to bed with LUE support and gait belt  -     Row Name 11/21/21 1430          Bed-Chair Transfer    Bed-Chair Coke (Transfers) moderate assist (50% patient effort); 2 person assist  -     Row Name 11/21/21 1430          Sit-Stand Transfer    Sit-Stand Coke (Transfers) minimum assist (75% patient effort); 2 person assist  -     Row Name 11/21/21 1430          Gait/Stairs (Locomotion)    Distance in Feet (Gait) 2  -LS     Deviations/Abnormal Patterns (Gait) antalgic  -LS     Right Sided Gait Deviations weight shift ability decreased  -LS     Comment (Gait/Stairs) side-stepped to L toward HOB with mod A of 2  -     Row Name 11/21/21 1430          Mobility    Right Upper Extremity (Weight-bearing Status) non weight-bearing (NWB)  -     Right Lower Extremity (Weight-bearing Status) weight-bearing as tolerated (WBAT)  -           User Key  (r) = Recorded By, (t) = Taken By, (c) = Cosigned By    Initials Name Provider Type    Amena Schulte, PT Physical Therapist               Obj/Interventions     Row Name 11/21/21 1430          Knee (Therapeutic Exercise)    Knee AROM (Therapeutic Exercise) left; SLR (straight leg raise); 10 repetitions; 2 sets  -     Knee Isometrics (Therapeutic Exercise) bilateral; gluteal sets; quad sets; 10 repetitions; 2 sets  -[facility] Name 11/21/21 1430          Ankle (Therapeutic Exercise)    Ankle (Therapeutic Exercise) AROM (active range of motion)  -     Ankle AROM (Therapeutic Exercise) bilateral; dorsiflexion; plantarflexion; 10 repetitions; 2 sets  -     Row Name 11/21/21 1430          Balance    Balance Interventions standing; sit to stand; supported; weight shifting activity  -           User Key  (r) = Recorded By, (t) = Taken By, (c) = Cosigned By    Initials Name Provider Type     Amena Schulte, MICHELLE Physical Therapist               Goals/Plan    No documentation.                Clinical Impression     Row Name 11/21/21 1430          Pain Scale: Numbers Pre/Post-Treatment    Pretreatment Pain Rating 4/10  -LS     Posttreatment Pain Rating 4/10  -LS     Pain Location - Side Right  -LS     Pain Location - Orientation upper  -LS     Pain Location extremity  -LS     Pre/Posttreatment Pain Comment RUE AND RLE  -LS     Row Name 11/21/21 1430          Plan of Care Review    Plan of Care Reviewed With patient  -LS     Progress improving  -LS     Outcome Summary Less assistance needed with mobility. Min to mod A X 2 stand pivot bedside commode to bed. Min A sit to supine. Continue to progress as tolerated.  -LS     Row Name 11/21/21 1430          Positioning and Restraints    Pre-Treatment Position bedside commode  -LS     Post Treatment Position bed  -LS     In Bed notified nsg; exit alarm on; encouraged to call for assist; call light within reach; fowlers  -LS           User Key  (r) = Recorded By, (t) = Taken By, (c) = Cosigned By    Initials Name Provider Type    Amena Schulte, MICHELLE Physical Therapist               Outcome Measures     Row Name 11/21/21 1430 11/21/21 0800       How much help from another person do you currently need...    Turning from your back to your side while in flat bed without using bedrails? 3  -LS 3  -AC    Moving from lying on back to sitting on the side of a flat bed without bedrails? 3  -LS 2  -AC    Moving to and from a bed to a chair (including a wheelchair)? 2  -LS 2  -AC    Standing up from a chair using your arms (e.g., wheelchair, bedside chair)? -- 2  -AC    Climbing 3-5 steps with a railing? 1  -LS 1  -AC    To walk in hospital room? 1  -LS 1  -AC    AM-PAC 6 Clicks Score (PT) -- 11  -AC    Row Name 11/21/21 1430 11/21/21 1147       Functional Assessment    Outcome Measure Options AM-PAC 6 Clicks Basic Mobility (PT)  -LS AM-PAC 6 Clicks Daily  Activity (OT)  -MA          User Key  (r) = Recorded By, (t) = Taken By, (c) = Cosigned By    Initials Name Provider Type    Amena Schulte, PT Physical Therapist    Cheri Cabrera, RN Registered Nurse    Farrah Deal, OT Occupational Therapist                             Physical Therapy Education                 Title: PT OT SLP Therapies (In Progress)     Topic: Physical Therapy (In Progress)     Point: Mobility training (Done)     Learning Progress Summary           Patient Acceptance, E, VU,NR by  at 11/21/2021 1430    Acceptance, E, NR by AS at 11/20/2021 1056    Eager, E, VU,DU by SC at 11/19/2021 1133    Comment: reviewed Benefits of mobility and precuations                   Point: Home exercise program (In Progress)     Learning Progress Summary           Patient Acceptance, E, NR by AS at 11/20/2021 1056    Eager, E, VU,DU by SC at 11/19/2021 1133    Comment: reviewed Benefits of mobility and precuations                   Point: Body mechanics (In Progress)     Learning Progress Summary           Patient Acceptance, E, NR by AS at 11/20/2021 1056    Eager, E, VU,DU by SC at 11/19/2021 1133    Comment: reviewed Benefits of mobility and precuations                   Point: Precautions (In Progress)     Learning Progress Summary           Patient Acceptance, E, NR by AS at 11/20/2021 1056    Eager, E, VU,DU by SC at 11/19/2021 1133    Comment: reviewed Benefits of mobility and precuations                               User Key     Initials Effective Dates Name Provider Type Discipline    SC 06/16/21 -  Blanquita Mcclure, PT Physical Therapist PT    AS 06/16/21 -  Leda Gilman, PTA Physical Therapy Assistant PT     06/16/21 -  Amena Antonio, PT Physical Therapist PT              PT Recommendation and Plan     Plan of Care Reviewed With: patient  Progress: improving  Outcome Summary: Less assistance needed with mobility. Min to mod A X 2 stand pivot bedside commode to bed. Min A  sit to supine. Continue to progress as tolerated.     Time Calculation:    PT Charges     Row Name 11/21/21 1430             Time Calculation    Start Time 1430  -LS      PT Received On 11/21/21  -LS      PT Goal Re-Cert Due Date 11/29/21  -              Time Calculation- PT    Total Timed Code Minutes- PT 25 minute(s)  -LS              Timed Charges    43896 - PT Therapeutic Exercise Minutes 10  -LS      65033 - PT Therapeutic Activity Minutes 15  -LS              Total Minutes    Timed Charges Total Minutes 25  -LS       Total Minutes 25  -LS            User Key  (r) = Recorded By, (t) = Taken By, (c) = Cosigned By    Initials Name Provider Type    Amena Schulte, PT Physical Therapist              Therapy Charges for Today     Code Description Service Date Service Provider Modifiers Qty    75544499170 HC PT THER PROC EA 15 MIN 11/21/2021 Amena Antonio, PT GP 1    76477513277 HC PT THERAPEUTIC ACT EA 15 MIN 11/21/2021 Amena Antonio, PT GP 1          PT G-Codes  Outcome Measure Options: AM-PAC 6 Clicks Basic Mobility (PT)  AM-PAC 6 Clicks Score (PT): 11  AM-PAC 6 Clicks Score (OT): 16    Amena Antonio PT  11/21/2021

## 2021-11-21 NOTE — PROGRESS NOTES
[facility] Medicine Services  PROGRESS NOTE    Patient Name: Aleena Armstrong  : 1950  MRN: [id number]    Date of Admission: 11/15/2021  Primary Care Physician: Aniyah Goldberg MD    Subjective   Subjective     CC:  Right arm and right leg pain after fall    HPI:  I have seen and evaluated the patient this afternoon.  Comfortable in bed.  Pain in her right shoulder and right knee is improving.  Having mild nausea.  Otherwise no acute complaints     ROS:  10 point review of systems is negative except for what is mentioned in HPI    Objective   Objective     Vital Signs:   Temp:  [98.1 °F (36.7 °C)-98.8 °F (37.1 °C)] 98.1 °F (36.7 °C)  Heart Rate:  [53-69] 69  Resp:  [16] 16  BP: (114-129)/(70-84) 129/84     Physical Exam:  General: Comfortable, not in any acute distress, appears stated age, conversant and cooperative  Head: Atraumatic and normocephalic, without obvious abnormality  Eyes:   Conjunctivae and sclerae normal, no Icterus. No pallor  Ears:  Ears appear intact with no abnormalities noted  Throat: No oral lesions, no thrush, oral mucosa moist  Neck: Supple, trachea midline, no thyromegaly  Back:   No kyphoscoliosis present. No tenderness to palpation,   no sacral edema  Lungs: Clear to auscultation bilaterally, equal air entry, no wheezing or crackles  Heart:  Normal S1 and S2, no murmur, no gallop, No JVD, no lower extremity swelling  Abdomen:  Soft, no tenderness, no organomegaly, normal bowel sounds  Normal bowel sounds, no masses, no organomegaly. Soft, nontender, nondistended, no guarding, no rebound tenderness.  Extremities: Nerve block catheter in right upper shoulder.  Right shoulder wound is clean with no discharge or erythema.  Range of movement of the right knee is limited because of pain  Skin: No bleeding, bruising or rash, normal skin turgor and elasticity  Neurologic: Cranial nerves appear intact with no evidence of facial asymmetry, normal motor and sensory  functions in all 4 extremities  Psych: Alert and oriented x 3, normal mood    Results Reviewed:  LAB RESULTS:      Lab 11/20/21  0539 11/19/21  0900 11/18/21  1005 11/16/21  0633   WBC 4.70 5.69 4.35 5.85   HEMOGLOBIN 8.5* 8.5* 9.5* 9.8*   HEMATOCRIT 27.1* 27.1* 29.6* 31.0*   PLATELETS 194 181 154 182   NEUTROS ABS 2.77 3.79 2.64 3.57   IMMATURE GRANS (ABS) 0.02 0.02 0.01 0.01   LYMPHS ABS 1.34 1.25 1.18 1.55   MONOS ABS 0.49 0.54 0.43 0.63   EOS ABS 0.05 0.06 0.06 0.05   .4* 103.8* 101.0* 102.6*   PROTIME  --   --   --  13.6   APTT  --   --   --  26.2         Lab 11/19/21  0900 11/18/21  1005 11/16/21  0633   SODIUM 135* 137 139   POTASSIUM 4.4 4.3 5.3*   CHLORIDE 104 106 108*   CO2 23.0 23.0 22.0   ANION GAP 8.0 8.0 9.0   BUN 17 21 17   CREATININE 0.90 0.94 0.95   GLUCOSE 103* 91 94   CALCIUM 8.2* 8.5* 8.4*         Lab 11/16/21  0633   TOTAL PROTEIN 5.8*   ALBUMIN 3.40*   GLOBULIN 2.4   ALT (SGPT) 8   AST (SGOT) 18   BILIRUBIN 0.3   ALK PHOS 62         Lab 11/16/21  0633   PROTIME 13.6   INR 1.07             Lab 11/16/21  0633   ABO TYPING A   RH TYPING Positive   ANTIBODY SCREEN Negative         Brief Urine Lab Results     None          Microbiology Results Abnormal     Procedure Component Value - Date/Time    COVID PRE-OP / PRE-PROCEDURE SCREENING ORDER (NO ISOLATION) - Swab, Nasopharynx [[id number]]  (Normal) Collected: 11/16/21 0150    Lab Status: Final result Specimen: Swab from Nasopharynx Updated: 11/16/21 0243    Narrative:      The following orders were created for panel order COVID PRE-OP / PRE-PROCEDURE SCREENING ORDER (NO ISOLATION) - Swab, Nasopharynx.  Procedure                               Abnormality         Status                     ---------                               -----------         ------                     COVID-19, ABBOTT IN-HOUS...[[id number]]  Normal              Final result                 Please view results for these tests on the individual orders.    COVID-19, ABBOTT  IN-HOUSE,NASAL Swab (NO TRANSPORT MEDIA) 2 HR TAT - Swab, Nasopharynx [[id number]]  (Normal) Collected: 11/16/21 0150    Lab Status: Final result Specimen: Swab from Nasopharynx Updated: 11/16/21 0243     COVID19 Presumptive Negative    Narrative:      Fact sheet for providers: https://www.fda.gov/media/450574/download     Fact sheet for patients: https://www.fda.gov/media/794963/download    Test performed by PCR.  If inconsistent with clinical signs and symptoms patient should be tested with different authorized molecular test.          Right Interscalene Catheter re-block    Result Date: 11/19/2021  Nan Ibrahim CRNA     11/19/2021  5:18 PM Right Interscalene Catheter re-block Patient reassessed immediately prior to procedure Patient location during procedure: floor Reason for block: at surgeon's request and post-op pain management Performed by CRNA: Lesly Huynh CRNA Assisted by: Nan Ibrahim CRNA Preanesthetic Checklist Completed: patient identified, IV checked, site marked, risks and benefits discussed, surgical consent, monitors and equipment checked, pre-op evaluation and timeout performed Prep: Pt Position: left lateral decubitus Sterile barriers:cap, gloves, mask and sterile barriers Prep: ChloraPrep Patient monitoring: blood pressure monitoring, continuous pulse oximetry and EKG Procedure Sedation: no Performed under: local infiltration Guidance:ultrasound guided Images:still images obtained, printed/placed on chart Laterality:right Block Type:interscalene Injection Technique:catheter Needle Type:Tuohy and echogenic Needle Gauge:18 G Resistance on Injection: none Catheter Size:20 G (20g) Cath Depth at skin: 10 cm Medications Used: ropivacaine (NAROPIN) 0.2 % injection, 15 mL Med administered at 11/19/2021 5:12 PM Post Assessment Injection Assessment: negative aspiration for heme, no paresthesia on injection and incremental injection Patient Tolerance:comfortable throughout block  Complications:no Additional Notes Procedure:  APS called due to increased pain with no relief. Upon evaluation of the nerve catheter under the U/S, nerve catheter was noted to have migrated away from the brachial plexus. Nerve catheter was replaced after patient's consent.              The pt was placed in semifowlers position with a slight tilt of the thorax contralateral to the insertion site.  The Insertion Site was prepped and draped in sterile fashion.  Skin and cutaneous tissue was infiltrated and anesthetized with 1% Lidocaine 3 mls via a 25g needle.  Utilizing ultrasound guidance, a BBraun 4 inch 18 g Contiplex echogenic touhy needle was advanced in-plane.  Hydro dissection of tissue was achieved with Normal saline. Major vessels(carotid and Internal Jugular) where visualized as the brachial plexus was approached at the approximate level of C-7/ T-1.  Cervical 5 and Branches of Cervical 6 nerve roots where visualized and the needle tip was placed posterior at the level of C-6 roots.  LA spread was visualized and injection was made incrementally every 5 mls with aspiration. Injection pressure was normal or little, there was no intraneural injection, no vascular injection.    The BBraun 20 g wire stylet catheter was then placed under US guidance on the posterior aspect of the Brachial Plexus. The tuohy was removed and the location of catheter was confirmed with NS injection visualized with US . The skin was sealed with exofin tissue adhesive at catheter insertion site.  Skin was prepped with benzoin and the catheter was secured with steristrips and a CHG tegaderm. Appropriate labels applied. Thank You.       Results for orders placed during the hospital encounter of 06/20/17    Adult Transesophageal Echo    Interpretation Summary  · Left ventricular systolic function is normal. Estimated EF = 60%.  · The cardiac valves are anatomically and functionally normal.  · No evidence of a left atrial appendage thrombus  was present      I have reviewed the medications:  Scheduled Meds:apixaban, 5 mg, Oral, Q12H  hydrocortisone, 1 application, Topical, Q12H  hydroxychloroquine, 200 mg, Oral, Daily  metoprolol succinate XL, 25 mg, Oral, Daily  senna-docusate sodium, 2 tablet, Oral, BID  sodium chloride, 10 mL, Intravenous, Q12H  traZODone, 100 mg, Oral, Nightly      Continuous Infusions:ropivacaine (NAROPIN) 0.2% peripheral nerve cath (moog), 6 mL/hr, Last Rate: 6 mL/hr (11/21/21 0328)  sodium chloride, 50 mL/hr, Last Rate: 50 mL/hr (11/19/21 0140)      PRN Meds:.•  acetaminophen **OR** acetaminophen  •  senna-docusate sodium **AND** polyethylene glycol **AND** bisacodyl **AND** bisacodyl  •  HYDROmorphone  •  Morphine **AND** naloxone  •  Morphine  •  ondansetron  •  sodium chloride    Assessment/Plan   Assessment & Plan     Active Hospital Problems    Diagnosis  POA   • **Fall [W19.XXXA]  Yes   • Fracture of right humerus [S42.301A]  Yes   • Fracture of right tibial plateau [S82.141A]  Yes   • HO Atrial fibrillation (CMS/HCC) [I48.91]  Yes   • Anxiety and depression [F41.9, F32.A]  Yes   • Rheumatoid arthritis (HCC) [M06.9]  Yes   • Dyslipidemia [E78.5]  Yes   • Essential hypertension [I10]  Yes      Resolved Hospital Problems   No resolved problems to display.        Brief Hospital Course to date:  Aleena Armstrong is a 70 y.o. female patient with past medical history of atrial fibrillation on Eliquis, anxiety and depression, GERD, essential hypertension, dyslipidemia, rheumatoid arthritis, WPW who presented to the hospital status post mechanical fall that resulted in right tibial plateau fracture and right humeral fracture.    Assessment and Plan:  Right humerus fracture status post ORIF  Right tibial plateau fracture  · Underwent ORIF of right humerus by Dr Galdamez 11/18/2021  · Right knee (tibial plateau fracture) is inoperable and orthopedic service recommended conservative treatment with knee splinting.  · Continue PT and  OT  · Plan to discharge home with home PT, likely on tomorrow once hospital bed and other supplies/equipment are delivered to her house    Essential hypertension  Atrial fibrillation  · Continue Eliquis  · Continue metoprolol    Rheumatoid arthritis  · Continue Plaquenil    Anxiety and depression  · Prozac    DVT prophylaxis:  Medical and mechanical DVT prophylaxis orders are present.       AM-PAC 6 Clicks Score (PT): 12 (11/20/21 1055)    Disposition: I expect the patient to be discharged home with PT on Monday.    CODE STATUS:   Code Status and Medical Interventions:   Ordered at: 11/18/21 1439     Level Of Support Discussed With:    Patient     Code Status (Patient has no pulse and is not breathing):    CPR (Attempt to Resuscitate)     Medical Interventions (Patient has pulse or is breathing):    Full Support       Chance Lopez MD  11/21/21

## 2021-11-21 NOTE — PROGRESS NOTES
[facility]    Acute pain service Inpatient Progress Note    Patient Name: Aleena Armstrong  :  1950  MRN:  [id number]        Acute Pain  Service Inpatient Progress Note:    Analgesia:Good  Pain Score:2/10  LOC: alert and awake  Resp Status: room air  Cardiac: VS stable  Side Effects:None  Catheter Site:clean, dressing intact and dry  Cath type: peripheral nerve cath with ON Q  Infusion rate: 8ml/hr  Catheter Plan:Catheter to remain Insitu and Continue catheter infusion rate unchanged

## 2021-11-22 PROCEDURE — 99232 SBSQ HOSP IP/OBS MODERATE 35: CPT | Performed by: PHYSICIAN ASSISTANT

## 2021-11-22 PROCEDURE — 85014 HEMATOCRIT: CPT | Performed by: PHYSICIAN ASSISTANT

## 2021-11-22 PROCEDURE — 82747 ASSAY OF FOLIC ACID RBC: CPT | Performed by: PHYSICIAN ASSISTANT

## 2021-11-22 PROCEDURE — 97530 THERAPEUTIC ACTIVITIES: CPT

## 2021-11-22 PROCEDURE — 97110 THERAPEUTIC EXERCISES: CPT

## 2021-11-22 RX ORDER — IBUPROFEN 800 MG/1
800 TABLET ORAL EVERY 6 HOURS PRN
Status: DISCONTINUED | OUTPATIENT
Start: 2021-11-22 | End: 2021-11-23 | Stop reason: HOSPADM

## 2021-11-22 RX ORDER — HYDROMORPHONE HYDROCHLORIDE 2 MG/1
2 TABLET ORAL EVERY 6 HOURS PRN
Status: DISCONTINUED | OUTPATIENT
Start: 2021-11-22 | End: 2021-11-23 | Stop reason: HOSPADM

## 2021-11-22 RX ORDER — ONDANSETRON 4 MG/1
4 TABLET, FILM COATED ORAL EVERY 6 HOURS PRN
Status: DISCONTINUED | OUTPATIENT
Start: 2021-11-22 | End: 2021-11-23 | Stop reason: HOSPADM

## 2021-11-22 RX ORDER — IBUPROFEN 600 MG/1
600 TABLET ORAL EVERY 6 HOURS PRN
Status: DISCONTINUED | OUTPATIENT
Start: 2021-11-22 | End: 2021-11-22

## 2021-11-22 RX ORDER — PROMETHAZINE HYDROCHLORIDE 12.5 MG/1
12.5 TABLET ORAL EVERY 6 HOURS PRN
Status: DISCONTINUED | OUTPATIENT
Start: 2021-11-22 | End: 2021-11-23 | Stop reason: HOSPADM

## 2021-11-22 RX ADMIN — HYDROMORPHONE HYDROCHLORIDE 2 MG: 2 TABLET ORAL at 05:23

## 2021-11-22 RX ADMIN — SODIUM CHLORIDE, PRESERVATIVE FREE 10 ML: 5 INJECTION INTRAVENOUS at 20:12

## 2021-11-22 RX ADMIN — APIXABAN 5 MG: 5 TABLET, FILM COATED ORAL at 20:12

## 2021-11-22 RX ADMIN — METOPROLOL SUCCINATE 25 MG: 25 TABLET, EXTENDED RELEASE ORAL at 08:32

## 2021-11-22 RX ADMIN — SODIUM CHLORIDE, PRESERVATIVE FREE 10 ML: 5 INJECTION INTRAVENOUS at 08:38

## 2021-11-22 RX ADMIN — HYDROMORPHONE HYDROCHLORIDE 2 MG: 2 TABLET ORAL at 20:12

## 2021-11-22 RX ADMIN — HYDROCORTISONE 1 APPLICATION: 25 CREAM TOPICAL at 08:37

## 2021-11-22 RX ADMIN — APIXABAN 5 MG: 5 TABLET, FILM COATED ORAL at 08:31

## 2021-11-22 RX ADMIN — HYDROXYCHLOROQUINE SULFATE 200 MG: 200 TABLET ORAL at 08:32

## 2021-11-22 RX ADMIN — HYDROMORPHONE HYDROCHLORIDE 2 MG: 2 TABLET ORAL at 12:23

## 2021-11-22 RX ADMIN — SENNOSIDES AND DOCUSATE SODIUM 2 TABLET: 50; 8.6 TABLET ORAL at 08:31

## 2021-11-22 RX ADMIN — IBUPROFEN 800 MG: 800 TABLET, FILM COATED ORAL at 12:23

## 2021-11-22 RX ADMIN — IBUPROFEN 800 MG: 800 TABLET, FILM COATED ORAL at 20:12

## 2021-11-22 RX ADMIN — HYDROCORTISONE 1 APPLICATION: 25 CREAM TOPICAL at 20:12

## 2021-11-22 RX ADMIN — POLYETHYLENE GLYCOL 3350 17 G: 17 POWDER, FOR SOLUTION ORAL at 10:25

## 2021-11-22 RX ADMIN — SENNOSIDES AND DOCUSATE SODIUM 2 TABLET: 50; 8.6 TABLET ORAL at 20:12

## 2021-11-22 RX ADMIN — TRAZODONE HYDROCHLORIDE 100 MG: 50 TABLET ORAL at 20:12

## 2021-11-22 NOTE — THERAPY TREATMENT NOTE
Patient Name: Aleena Armstrong  : 1950    MRN: [id number]                              Today's Date: 2021       Admit Date: 11/15/2021    Visit Dx:     ICD-10-CM ICD-9-CM   1. Closed fracture of proximal end of right humerus, unspecified fracture morphology, initial encounter  S42.201A 812.00   2. Closed fracture of right tibial plateau, initial encounter  S82.141A 823.00   3. Anticoagulated  Z79.01 V58.61   4. Acute postoperative pain  G89.18 338.18   5. Fall, initial encounter  W19.XXXA E888.9     Patient Active Problem List   Diagnosis   • WPW (Elodia-Parkinson-White syndrome)   • Chest pain   • Morbid obesity (HCC)   • Dyslipidemia   • Essential hypertension   • Atrial flutter (HCC)   • HO Atrial fibrillation (CMS/HCC)   • Acute kidney injury (HCC)   • Insomnia   • Anxiety and depression   • Vitamin D deficiency   • Rheumatoid arthritis (HCC)   • Glaucoma of left eye   • Peptic ulcer    • Bradycardia   • Paroxysmal atrial fibrillation (HCC)   • Hematoma of groin   • Proximal humerus fracture   • S/P ORIF right proximal humerus fracture   • Anemia   • Chronic pain   • Acute postoperative pain   • Fracture of right humerus   • Fracture of right tibial plateau   • Fall     Past Medical History:   Diagnosis Date   • A-fib ([facility])     HAD ABLATION FOR THIS IN    • Anemia    • Anxiety and depression 2016   • GERD (gastroesophageal reflux disease)    • Glaucoma of left eye 2016   • H/O migraine     LAST 3  YEARS AGO    • History of MRSA infection     APPROX 8 YEARS, TREATED WITH ORAL ABX, NEGATIVE CULTURES FOLLOWING TX    • History of transfusion    • Hyperlipidemia    • Hypertension     MEDS DC'D BY DR GOMEZ 2017   • Insomnia 2016   • Kidney disease    • Peptic ulcer - s/p gastric bypass surgery (7-8 years ago) 2016   • Rheumatoid arthritis (HCC) 2016   • Spinal headache     FOLLOWING SPINAL FOR CHILDBIRTH    • Vitamin D deficiency 2016   • Wears dentures     UPPER  PLATE    • Elodia-Parkinson-White (WPW) syndrome     1990s     Past Surgical History:   Procedure Laterality Date   • BREAST BIOPSY  ~1990   • CARDIAC CATHETERIZATION N/A 10/24/2016    Procedure: Left Heart Cath;  Surgeon: Rubens Morejon MD;  Location:  [facility];  Service:    • CARDIAC ELECTROPHYSIOLOGY PROCEDURE N/A 6/20/2017    Procedure: PVA;  Surgeon: Edvin Cook DO;  Location:  [facility];  Service:    • CHOLECYSTECTOMY  ~1990   • COLONOSCOPY  2007   • GASTRIC BYPASS     • GASTRIC BYPASS  2005   • HAMMER TOE REPAIR      LEFT--GARO IN PLACE    • HYSTERECTOMY  1993    Complete   • ORIF HUMERUS FRACTURE Right 2/8/2019    Procedure: ORIF RIGHT PROXIMAL HUMERUS;  Surgeon: Yonatan Galdamez MD;  Location:  [facility];  Service: Orthopedics   • SHOULDER ACROMIOPLASTY Right 2019   • WRIST FRACTURE SURGERY Right 2014      General Information     Row Name 11/22/21 1100          OT Time and Intention    Document Type therapy note (daily note)  -MA     Mode of Treatment individual therapy; occupational therapy  -MA     Row Name 11/22/21 1100          General Information    Patient Profile Reviewed yes  -MA     Existing Precautions/Restrictions fall; non-weight bearing; right; other (see comments)  RUE NWB w/sling, RLE WBAT with KI.  -MA     Barriers to Rehab medically complex  -MA     Row Name 11/22/21 1100          Cognition    Orientation Status (Cognition) oriented x 4  -MA     Row Name 11/22/21 1100          Safety Issues, Functional Mobility    Safety Issues Affecting Function (Mobility) safety precaution awareness; safety precautions follow-through/compliance; sequencing abilities  -MA     Impairments Affecting Function (Mobility) balance; endurance/activity tolerance; pain; strength; sensation/sensory awareness; motor control; range of motion (ROM)  -MA           User Key  (r) = Recorded By, (t) = Taken By, (c) = Cosigned By    Initials Name Provider Type    Farrah Deal  OT Occupational Therapist                 Mobility/ADL's     Row Name 11/22/21 1102          Bed Mobility    Bed Mobility supine-sit; sit-supine  -MA     Supine-Sit [place] (Bed Mobility) contact guard; verbal cues  -MA     Sit-Supine [place] (Bed Mobility) contact guard; verbal cues  -MA     Bed Mobility, Safety Issues decreased use of arms for pushing/pulling; decreased use of legs for bridging/pushing  -MA     Assistive Device (Bed Mobility) head of bed elevated; bed rails  -MA     Row Name 11/22/21 1102          Transfers    Transfers sit-stand transfer  -MA     Comment (Transfers) Pt min Ax2 for STS from EOB w/ quad cane, VC's for hand placement.  -MA     Sit-Stand [place] (Transfers) minimum assist (75% patient effort); 2 person assist  -MA     Row Name 11/22/21 1102          Sit-Stand Transfer    Assistive Device (Sit-Stand Transfers) cane, quad  -MA     Row Name 11/22/21 1102          Functional Mobility    Functional Mobility- Ind. Level minimum assist (75% patient effort); 2 person assist required; verbal cues required  -MA     Functional Mobility- Device quad cane  -MA     Functional Mobility-Distance (Feet) 3  -MA     Functional Mobility- Safety Issues step length decreased; weight-shifting ability decreased  -MA     Functional Mobility- Comment Pt min Ax2 for sidesteps to HOB w/ quad cane  -MA     Row Name 11/22/21 1102          Activities of Daily Living    BADL Assessment/Intervention grooming; upper body dressing; feeding  -MA     Row Name 11/22/21 1102          Mobility    Extremity Weight-bearing Status right upper extremity; right lower extremity  -MA     Right Upper Extremity (Weight-bearing Status) non weight-bearing (NWB)  -MA     Right Lower Extremity (Weight-bearing Status) weight-bearing as tolerated (WBAT)  -MA     Row Name 11/22/21 1102          Upper Body Dressing Assessment/Training    [place] Level (Upper Body Dressing) don; doff; other (see comments); maximum assist  (25% patient effort); verbal cues  RUE sling  -MA     Position (Upper Body Dressing) supine  -MA     Row Name 11/22/21 1102          Self-Feeding Assessment/Training    [place] Level (Feeding) liquids to mouth; independent  -MA     Position (Self-Feeding) edge of bed sitting  -MA     Row Name 11/22/21 1102          Grooming Assessment/Training    [place] Level (Grooming) hair care, combing/brushing; other (see comments)  -MA     Position (Grooming) supine  -MA     Comment (Grooming) Pt mod A for washing hair w/ shower cap, SUA for brushing hair  -MA           User Key  (r) = Recorded By, (t) = Taken By, (c) = Cosigned By    Initials Name Provider Type    aFrrah Deal OT Occupational Therapist               Obj/Interventions     Row Name 11/22/21 1111          Elbow/Forearm (Therapeutic Exercise)    Elbow/Forearm (Therapeutic Exercise) AROM (active range of motion)  -MA     Elbow/Forearm AROM (Therapeutic Exercise) right; flexion; extension; supination; pronation; 10 repetitions; supine  -MA     Row Name 11/22/21 1111          Wrist (Therapeutic Exercise)    Wrist (Therapeutic Exercise) AROM (active range of motion)  -MA     Wrist AROM (Therapeutic Exercise) right; flexion; extension; 10 repetitions  -MA     Row Name 11/22/21 1111          Hand (Therapeutic Exercise)    Hand (Therapeutic Exercise) AROM (active range of motion)  -MA     Hand AROM/AAROM (Therapeutic Exercise) right; AROM (active range of motion); finger flexion; finger extension; thumb palmar aBduction; 10 repetitions  -MA     Row Name 11/22/21 1111          Motor Skills    Motor Skills therapeutic exercise  -MA     Row Name 11/22/21 1111          Balance    Balance Assessment sitting static balance; standing static balance; standing dynamic balance  -MA     Static Sitting Balance WFL; unsupported; sitting, edge of bed  -MA     Static Standing Balance mild impairment; supported; standing  -MA     Dynamic Standing Balance mild  impairment; supported; standing  -MA     Balance Interventions sit to stand; occupation based/functional task  -MA     Row Name 11/22/21 1111          Therapeutic Exercise    Therapeutic Exercise elbow/forearm; wrist; hand  -MA           User Key  (r) = Recorded By, (t) = Taken By, (c) = Cosigned By    Initials Name Provider Type    Farrah Deal, SONIA Occupational Therapist               Goals/Plan    No documentation.                Clinical Impression     Row Name 11/22/21 1111          Pain Assessment    Additional Documentation Pain Scale: FACES Pre/Post-Treatment (Group)  -MA     Row Name 11/22/21 1111          Pain Scale: FACES Pre/Post-Treatment    Pain: FACES Scale, Pretreatment 4-->hurts little more  -MA     Posttreatment Pain Rating 4-->hurts little more  -MA     Pain Location - Side Right  -MA     Pain Location - Orientation generalized  -MA     Pain Location elbow  -MA     Row Name 11/22/21 1111          Plan of Care Review    Plan of Care Reviewed With patient  -MA     Progress improving  -MA     Outcome Summary Pt continuing to progress toward OT goals. Noted good participation throughout session and increased independence w/ bed mobility & transfers. Pt tolerated 10 reps of BUE TE. Will continue to progress pt as tolerated per OT POC. Recommend IRF at discharge.  -MA     Row Name 11/22/21 1111          Therapy Assessment/Plan (OT)    Rehab Potential (OT) good, to achieve stated therapy goals  -MA     Criteria for Skilled Therapeutic Interventions Met (OT) yes; skilled treatment is necessary  -MA     Therapy Frequency (OT) daily  -MA     Row Name 11/22/21 1111          Therapy Plan Review/Discharge Plan (OT)    Anticipated Discharge Disposition (OT) inpatient rehabilitation facility  -MA     Row Name 11/22/21 1111          Vital Signs    Pre Systolic BP Rehab --  VSS - RN cleared OT treatment  -MA     O2 Delivery Pre Treatment room air  -MA     O2 Delivery Intra Treatment room air  -MA     O2  Delivery Post Treatment room air  -MA     Pre Patient Position Supine  -MA     Intra Patient Position Standing  -MA     Post Patient Position Supine  -MA     Row Name 11/22/21 1111          Positioning and Restraints    Pre-Treatment Position in bed  -MA     Post Treatment Position bed  -MA     In Bed supine; call light within reach; encouraged to call for assist; exit alarm on; side rails up x2; RUE elevated; RLE elevated  -MA           User Key  (r) = Recorded By, (t) = Taken By, (c) = Cosigned By    Initials Name Provider Type    Farrah Deal, OT Occupational Therapist               Outcome Measures     Row Name 11/22/21 1113          How much help from another is currently needed...    Putting on and taking off regular lower body clothing? 2  -MA     Bathing (including washing, rinsing, and drying) 2  -MA     Toileting (which includes using toilet bed pan or urinal) 3  -MA     Putting on and taking off regular upper body clothing 3  -MA     Taking care of personal grooming (such as brushing teeth) 3  -MA     Eating meals 3  -MA     AM-PAC 6 Clicks Score (OT) 16  -MA     Row Name 11/22/21 0815          How much help from another person do you currently need...    Turning from your back to your side while in flat bed without using bedrails? 3  -MK     Moving from lying on back to sitting on the side of a flat bed without bedrails? 3  -MK     Moving to and from a bed to a chair (including a wheelchair)? 2  -MK     Standing up from a chair using your arms (e.g., wheelchair, bedside chair)? 2  -MK     Climbing 3-5 steps with a railing? 1  -MK     To walk in hospital room? 1  -     AM-PAC 6 Clicks Score (PT) 12  -     Row Name 11/22/21 1113          Functional Assessment    Outcome Measure Options AM-PAC 6 Clicks Daily Activity (OT)  -MA           User Key  (r) = Recorded By, (t) = Taken By, (c) = Cosigned By    Initials Name Provider Type    Dayanara Flannery RN Registered Nurse    Farrah Deal,  OT Occupational Therapist                Occupational Therapy Education                 Title: PT OT SLP Therapies (In Progress)     Topic: Occupational Therapy (Done)     Point: ADL training (Done)     Description:   Instruct learner(s) on proper safety adaptation and remediation techniques during self care or transfers.   Instruct in proper use of assistive devices.              Learning Progress Summary           Patient Acceptance, E, VU by MA at 11/22/2021 1114    Acceptance, E, VU by MA at 11/21/2021 1147    Acceptance, E, VU by MA at 11/20/2021 1509    Acceptance, E, VU by MA at 11/19/2021 0859                   Point: Home exercise program (Done)     Description:   Instruct learner(s) on appropriate technique for monitoring, assisting and/or progressing therapeutic exercises/activities.              Learning Progress Summary           Patient Acceptance, E, VU by MA at 11/22/2021 1114    Acceptance, E, VU by MA at 11/21/2021 1147    Acceptance, E, VU by MA at 11/20/2021 1509    Acceptance, E, VU by MA at 11/19/2021 0859                   Point: Precautions (Done)     Description:   Instruct learner(s) on prescribed precautions during self-care and functional transfers.              Learning Progress Summary           Patient Acceptance, E, VU by MA at 11/22/2021 1114    Acceptance, E, VU by MA at 11/21/2021 1147    Acceptance, E, VU by MA at 11/20/2021 1509    Acceptance, E, VU by MA at 11/19/2021 0859                   Point: Body mechanics (Done)     Description:   Instruct learner(s) on proper positioning and spine alignment during self-care, functional mobility activities and/or exercises.              Learning Progress Summary           Patient Acceptance, E, VU by MA at 11/22/2021 1114    Acceptance, E, VU by MA at 11/21/2021 1147    Acceptance, E, VU by MA at 11/20/2021 1509    Acceptance, E, VU by MA at 11/19/2021 0859                               User Key     Initials Effective Dates Name Provider  Type Discipline    MA 11/19/20 -  Farrah Mojica OT Occupational Therapist OT              OT Recommendation and Plan  Therapy Frequency (OT): daily  Plan of Care Review  Plan of Care Reviewed With: patient  Progress: improving  Outcome Summary: Pt continuing to progress toward OT goals. Noted good participation throughout session and increased independence w/ bed mobility & transfers. Pt tolerated 10 reps of BUE TE. Will continue to progress pt as tolerated per OT POC. Recommend IRF at discharge.     Time Calculation:    Time Calculation- OT     Row Name 11/22/21 1114             Time Calculation- OT    OT Start Time 1020  -MA      OT Stop Time 1046  -MA      OT Time Calculation (min) 26 min  -MA      OT Received On 11/22/21  -MA      OT Goal Re-Cert Due Date 11/29/21  -MA              Timed Charges    42582 - OT Therapeutic Exercise Minutes 8  -MA      67464 - OT Therapeutic Activity Minutes 10  -MA      41674 - OT Self Care/Mgmt Minutes 8  -MA              Total Minutes    Timed Charges Total Minutes 26  -MA       Total Minutes 26  -MA            User Key  (r) = Recorded By, (t) = Taken By, (c) = Cosigned By    Initials Name Provider Type    MA Farrah Mojica OT Occupational Therapist              Therapy Charges for Today     Code Description Service Date Service Provider Modifiers Qty    44538017923 HC OT THER PROC EA 15 MIN 11/21/2021 Farrah Mojica, OT GO 1    72113971388 HC OT THERAPEUTIC ACT EA 15 MIN 11/21/2021 ArkeiryFarrah, OT GO 1    00284054390 HC OT THER SUPP EA 15 MIN 11/21/2021 Farrah Mojica, OT GO 2    38934094776 HC OT THER PROC EA 15 MIN 11/22/2021 ArkeiryFarrah, OT GO 1    54297862984 HC OT THERAPEUTIC ACT EA 15 MIN 11/22/2021 Farrah Mojica, OT GO 1    33114480244 HC OT THER SUPP EA 15 MIN 11/22/2021 Farrah Mojica, OT GO 2               Farrah Mojica, OT  11/22/2021

## 2021-11-22 NOTE — THERAPY TREATMENT NOTE
Patient Name: Aleena Armstrong  : 1950    MRN: [id number]                              Today's Date: 2021       Admit Date: 11/15/2021    Visit Dx:     ICD-10-CM ICD-9-CM   1. Closed fracture of proximal end of right humerus, unspecified fracture morphology, initial encounter  S42.201A 812.00   2. Closed fracture of right tibial plateau, initial encounter  S82.141A 823.00   3. Anticoagulated  Z79.01 V58.61   4. Acute postoperative pain  G89.18 338.18   5. Fall, initial encounter  W19.XXXA E888.9     Patient Active Problem List   Diagnosis   • WPW (Elodia-Parkinson-White syndrome)   • Chest pain   • Morbid obesity (HCC)   • Dyslipidemia   • Essential hypertension   • Atrial flutter (HCC)   • HO Atrial fibrillation (CMS/HCC)   • Acute kidney injury (HCC)   • Insomnia   • Anxiety and depression   • Vitamin D deficiency   • Rheumatoid arthritis (HCC)   • Glaucoma of left eye   • Peptic ulcer    • Bradycardia   • Paroxysmal atrial fibrillation (HCC)   • Hematoma of groin   • Proximal humerus fracture   • S/P ORIF right proximal humerus fracture   • Anemia   • Chronic pain   • Acute postoperative pain   • Fracture of right humerus   • Fracture of right tibial plateau   • Fall     Past Medical History:   Diagnosis Date   • A-fib ([facility])     HAD ABLATION FOR THIS IN    • Anemia    • Anxiety and depression 2016   • GERD (gastroesophageal reflux disease)    • Glaucoma of left eye 2016   • H/O migraine     LAST 3  YEARS AGO    • History of MRSA infection     APPROX 8 YEARS, TREATED WITH ORAL ABX, NEGATIVE CULTURES FOLLOWING TX    • History of transfusion    • Hyperlipidemia    • Hypertension     MEDS DC'D BY DR GOMEZ 2017   • Insomnia 2016   • Kidney disease    • Peptic ulcer - s/p gastric bypass surgery (7-8 years ago) 2016   • Rheumatoid arthritis (HCC) 2016   • Spinal headache     FOLLOWING SPINAL FOR CHILDBIRTH    • Vitamin D deficiency 2016   • Wears dentures     UPPER  PLATE    • Elodia-Parkinson-White (WPW) syndrome     1990s     Past Surgical History:   Procedure Laterality Date   • BREAST BIOPSY  ~1990   • CARDIAC CATHETERIZATION N/A 10/24/2016    Procedure: Left Heart Cath;  Surgeon: Rubens Morejon MD;  Location:  [facility];  Service:    • CARDIAC ELECTROPHYSIOLOGY PROCEDURE N/A 6/20/2017    Procedure: PVA;  Surgeon: Edvin Cook DO;  Location:  [facility] EP INVASIVE [facility];  Service:    • CHOLECYSTECTOMY  ~1990   • COLONOSCOPY  2007   • GASTRIC BYPASS     • GASTRIC BYPASS  2005   • HAMMER TOE REPAIR      LEFT--GARO IN PLACE    • HYSTERECTOMY  1993    Complete   • ORIF HUMERUS FRACTURE Right 2/8/2019    Procedure: ORIF RIGHT PROXIMAL HUMERUS;  Surgeon: Yonatan Galdamez MD;  Location:  [facility] OR;  Service: Orthopedics   • ORIF HUMERUS FRACTURE Right 11/18/2021    Procedure: HUMERUS PROXIMAL OPEN REDUCTION INTERNAL FIXATION RIGHT WITH HARDWARE REMOVAL AND NAIL PLACEMENT;  Surgeon: Yonatan Galdamez MD;  Location:  [facility];  Service: Orthopedics;  Laterality: Right;   • SHOULDER ACROMIOPLASTY Right 2019   • WRIST FRACTURE SURGERY Right 2014      General Information     [facility] Name 11/22/21 [facility]6          Physical Therapy Time and Intention    Document Type therapy note (daily note)  -SC     Mode of Treatment physical therapy  -[facility] Name 11/22/21 1226          General Information    Patient Profile Reviewed yes  -SC     Existing Precautions/Restrictions fall; non-weight bearing; right; other (see comments)  NWB R UE, WBAT L LE, ki on L knee  -SC     Row Name 11/22/21 1226          Cognition    Orientation Status (Cognition) oriented x 4  -SC     Row Name 11/22/21 1226          Safety Issues, Functional Mobility    Impairments Affecting Function (Mobility) balance; endurance/activity tolerance; pain; strength; sensation/sensory awareness; motor control; range of motion (ROM)  -SC     Comment, Safety Issues/Impairments (Mobility) alert , following  commands  -SC           User Key  (r) = Recorded By, (t) = Taken By, (c) = Cosigned By    Initials Name Provider Type    SC Blanquita Mcclure PT Physical Therapist               Mobility     Row Name 11/22/21 1227          Bed Mobility    Bed Mobility scooting/bridging; supine-sit  -SC     Scooting/Bridging [place] (Bed Mobility) independent  -SC     Supine-Sit [place] (Bed Mobility) contact guard; verbal cues  -SC     Sit-Supine [place] (Bed Mobility) contact guard; verbal cues  -SC     Assistive Device (Bed Mobility) head of bed elevated; bed rails  -SC     Comment (Bed Mobility) cues for sequencing. Good effort and extra time needed  -SC     Row Name 11/22/21 1227          Transfers    Comment (Transfers) Worked on multiple transfers. Bed to w/c, w/c to BSC, BSC to bed. Demonstrated by PT first and VC throughout session. Also worked on stand pivot from BSC to bed using quad cane. Patient demonstrated good technique with come contact assistance needed  -SC     Row Name 11/22/21 [facility]7          Bed-Chair Transfer    Bed-Chair [place] (Transfers) 1 person assist; minimum assist (75% patient effort)  -SC     Assistive Device (Bed-Chair Transfers) --  see transfers above  -SC     Row Name 11/22/21 1227          Sit-Stand Transfer    Sit-Stand [place] (Transfers) 1 person assist; contact guard  -SC     Assistive Device (Sit-Stand Transfers) cane, quad  -[facility] Name 11/22/21 [facility]          Gait/Stairs (Locomotion)    Comment (Gait/Stairs) transfers only- no gait. DID have a session with w/c training. Focus on propulsion x 100 feet, brake locking, leg rest set up and drop arm removal. Patient required min assist wtih leg rest and cues for sequencing w/c propulsion  -SC     Row Name 11/22/21 1227          Mobility    Extremity Weight-bearing Status right upper extremity; right lower extremity  -SC     Left Upper Extremity (Weight-bearing Status) weight-bearing as tolerated (WBAT)  -SC     Right  Upper Extremity (Weight-bearing Status) non weight-bearing (NWB)  -SC     Right Lower Extremity (Weight-bearing Status) weight-bearing as tolerated (WBAT)  -SC           User Key  (r) = Recorded By, (t) = Taken By, (c) = Cosigned By    Initials Name Provider Type    SC Blanquita Mcclure PT Physical Therapist               Obj/Interventions     Row Name 11/22/21 1231          Knee (Therapeutic Exercise)    Knee AROM (Therapeutic Exercise) SLR (straight leg raise); supine; 10 repetitions; right  -SC     Knee Isometrics (Therapeutic Exercise) right; quad sets; 10 repetitions  -SC     Row Name 11/22/21 1231          Ankle (Therapeutic Exercise)    Ankle AROM (Therapeutic Exercise) bilateral; dorsiflexion; plantarflexion  -SC     Row Name 11/22/21 1231          Balance    Balance Assessment sitting dynamic balance  -SC     Dynamic Sitting Balance WNL  -SC     Dynamic Standing Balance mild impairment; supported  -SC     Comment, Balance needs AD and assist in standing  -SC           User Key  (r) = Recorded By, (t) = Taken By, (c) = Cosigned By    Initials Name Provider Type    SC Blanquita Mcclure, PT Physical Therapist               Goals/Plan    No documentation.                Clinical Impression     Row Name 11/22/21 1232          Pain    Additional Documentation Pain Scale: FACES Pre/Post-Treatment (Group)  -[facility] Name 11/22/21 1232          Pain Scale: Numbers Pre/Post-Treatment    Pain Location - Side Right  -SC     Pain Location - Orientation lower  -SC     Pain Location extremity  -SC     Row Name 11/22/21 1232          Pain Scale: FACES Pre/Post-Treatment    Pain: FACES Scale, Pretreatment 2-->hurts little bit  -SC     Posttreatment Pain Rating 6-->hurts even more  -SC     Row Name 11/22/21 1232          Plan of Care Review    Plan of Care Reviewed With patient  -SC     Progress improving  -SC     Outcome Summary Patient demonstrated ability to tranfer into w/c and [place] [facility] [place] one [place] min assist. She is  still unable ot ambulate but was able to propell her w/c in hallway. Rehab option is still best for her.  -SC     Row Name 11/22/21 1232          Therapy Assessment/Plan (PT)    Rehab Potential (PT) good, to achieve stated therapy goals  -SC     Criteria for Skilled Interventions Met (PT) yes; skilled treatment is necessary  -SC     Row Name 11/22/21 1232          Positioning and Restraints    Pre-Treatment Position in bed  -SC     Post Treatment Position bed  -SC     In Bed notified nsg; supine; call light within reach; encouraged to call for assist; exit alarm on  -SC           User Key  (r) = Recorded By, (t) = Taken By, (c) = Cosigned By    Initials Name Provider Type    SC Blanquita Mcclure, PT Physical Therapist               Outcome Measures     Row Name 11/22/21 1234 11/22/21 0815       How much help from another person do you currently need...    Turning from your back to your side while in flat bed without using bedrails? 3  -SC 3  -MK    Moving from lying on back to sitting on the side of a flat bed without bedrails? 3  -SC 3  -MK    Moving to and from a bed to a chair (including a wheelchair)? 3  -SC 2  -MK    Standing up from a chair using your arms (e.g., wheelchair, bedside chair)? 3  -SC 2  -MK    Climbing 3-5 steps with a railing? 1  -SC 1  -MK    To walk in hospital room? 1  -SC 1  -MK    AM-PAC 6 Clicks Score (PT) 14  -SC 12  -    Row Name 11/22/21 1234 11/22/21 1113       Functional Assessment    Outcome Measure Options AM-PAC 6 Clicks Basic Mobility (PT)  -SC AM-PAC 6 Clicks Daily Activity (OT)  -MA          User Key  (r) = Recorded By, (t) = Taken By, (c) = Cosigned By    Initials Name Provider Type    SC Blanquita Mcclure, PT Physical Therapist    Dayanara Flannery, RN Registered Nurse    Farrah Deal, OT Occupational Therapist                             Physical Therapy Education                 Title: PT OT SLP Therapies (Done)     Topic: Physical Therapy (Done)     Point: Mobility  training (Done)     Learning Progress Summary           Patient Eager, E, VU by SC at 11/22/2021 1234    Comment: reviewed safe w/c transfers and propulsion    Acceptance, E, VU,NR by  at 11/21/2021 1430    Acceptance, E, NR by AS at 11/20/2021 1056    Eager, E, VU,DU by SC at 11/19/2021 1133    Comment: reviewed Benefits of mobility and precuations                   Point: Home exercise program (Done)     Learning Progress Summary           Patient Eager, E, VU by SC at 11/22/2021 1234    Comment: reviewed safe w/c transfers and propulsion    Acceptance, E, NR by AS at 11/20/2021 1056    Eager, E, VU,DU by SC at 11/19/2021 1133    Comment: reviewed Benefits of mobility and precuations                   Point: Body mechanics (Done)     Learning Progress Summary           Patient Eager, E, VU by SC at 11/22/2021 1234    Comment: reviewed safe w/c transfers and propulsion    Acceptance, E, NR by AS at 11/20/2021 1056    Eager, E, VU,DU by SC at 11/19/2021 1133    Comment: reviewed Benefits of mobility and precuations                   Point: Precautions (Done)     Learning Progress Summary           Patient Eager, E, VU by SC at 11/22/2021 1234    Comment: reviewed safe w/c transfers and propulsion    Acceptance, E, NR by AS at 11/20/2021 1056    Eager, E, VU,DU by SC at 11/19/2021 1133    Comment: reviewed Benefits of mobility and precuations                               User Key     Initials Effective Dates Name Provider Type Discipline    SC 06/16/21 -  Blanquita Mcclure, PT Physical Therapist PT    AS 06/16/21 -  Leda Gilman, PTA Physical Therapy Assistant PT     06/16/21 -  Amena Antonio, PT Physical Therapist PT              PT Recommendation and Plan     Plan of Care Reviewed With: patient  Progress: improving  Outcome Summary: Patient demonstrated ability to tranfer into w/c and onto BSC with one person min assist. She is still unable ot ambulate but was able to propell her w/c in hallway.  Rehab option is still best for her.     Time Calculation:    PT Charges     Row Name 11/22/21 0850             Time Calculation    Start Time 0850  -SC      PT Received On 11/22/21  -SC      PT Goal Re-Cert Due Date 11/29/21  -SC              Time Calculation- PT    Total Timed Code Minutes- PT 48 minute(s)  -SC              Timed Charges    25464 - PT Therapeutic Activity Minutes 48  -SC              Total Minutes    Timed Charges Total Minutes 48  -SC       Total Minutes 48  -SC            User Key  (r) = Recorded By, (t) = Taken By, (c) = Cosigned By    Initials Name Provider Type    SC Blanquita Mcclure PT Physical Therapist              Therapy Charges for Today     Code Description Service Date Service Provider Modifiers Qty    [id number] HC PT THERAPEUTIC ACT EA 15 MIN 11/22/2021 Blanquita Mcclure PT GP 3          PT G-Codes  Outcome Measure Options: AM-PAC 6 Clicks Basic Mobility (PT)  AM-PAC 6 Clicks Score (PT): 14  AM-PAC 6 Clicks Score (OT): 16    Blanquita Mcclure PT  11/22/2021

## 2021-11-22 NOTE — PLAN OF CARE
Problem: Fall Injury Risk  Goal: Absence of Fall and Fall-Related Injury  Outcome: Ongoing, Progressing  Intervention: Identify and Manage Contributors to Fall Injury Risk  Recent Flowsheet Documentation  Taken 11/22/2021 0400 by Lauryn Sprague RN  Self-Care Promotion: independence encouraged  Taken 11/22/2021 0200 by Lauryn Sprague RN  Self-Care Promotion: independence encouraged  Taken 11/22/2021 0000 by Lauryn Sprague RN  Self-Care Promotion: independence encouraged  Taken 11/21/2021 2200 by Lauryn Sprague RN  Self-Care Promotion: independence encouraged  Taken 11/21/2021 2000 by Lauryn Sprague RN  Medication Review/Management: medications reviewed  Self-Care Promotion: independence encouraged  Intervention: Promote Injury-Free Environment  Recent Flowsheet Documentation  Taken 11/22/2021 0400 by Lauryn Sprague RN  Safety Promotion/Fall Prevention:   activity supervised   assistive device/personal items within reach   safety round/check completed  Taken 11/22/2021 0200 by Lauryn Sprague RN  Safety Promotion/Fall Prevention:   activity supervised   assistive device/personal items within reach   safety round/check completed  Taken 11/22/2021 0000 by Lauryn Sprague RN  Safety Promotion/Fall Prevention:   activity supervised   assistive device/personal items within reach   safety round/check completed  Taken 11/21/2021 2200 by Lauryn Sprague RN  Safety Promotion/Fall Prevention:   activity supervised   assistive device/personal items within reach   safety round/check completed  Taken 11/21/2021 2000 by Lauryn Sprague RN  Safety Promotion/Fall Prevention:   activity supervised   assistive device/personal items within reach   safety round/check completed     Problem: Adult Inpatient Plan of Care  Goal: Plan of Care Review  Outcome: Ongoing, Progressing  Flowsheets (Taken 11/22/2021 0452)  Progress: improving  Plan of Care Reviewed With: patient  Goal: Patient-Specific Goal  (Individualized)  Outcome: Ongoing, Progressing  Goal: Absence of Hospital-Acquired Illness or Injury  Outcome: Ongoing, Progressing  Intervention: Identify and Manage Fall Risk  Recent Flowsheet Documentation  Taken 11/22/2021 0400 by Lauryn Sprague RN  Safety Promotion/Fall Prevention:   activity supervised   assistive device/personal items within reach   safety round/check completed  Taken 11/22/2021 0200 by Lauryn Sprague RN  Safety Promotion/Fall Prevention:   activity supervised   assistive device/personal items within reach   safety round/check completed  Taken 11/22/2021 0000 by Lauryn Sprague RN  Safety Promotion/Fall Prevention:   activity supervised   assistive device/personal items within reach   safety round/check completed  Taken 11/21/2021 2200 by Lauryn Sprague RN  Safety Promotion/Fall Prevention:   activity supervised   assistive device/personal items within reach   safety round/check completed  Taken 11/21/2021 2000 by Lauryn Sprague RN  Safety Promotion/Fall Prevention:   activity supervised   assistive device/personal items within reach   safety round/check completed  Intervention: Prevent Skin Injury  Recent Flowsheet Documentation  Taken 11/22/2021 0400 by Lauryn Sprague RN  Body Position: supine  Taken 11/22/2021 0200 by Lauryn Sprague RN  Body Position: position changed independently  Taken 11/22/2021 0000 by Lauryn Sprague RN  Body Position: position changed independently  Taken 11/21/2021 2200 by Lauryn Sprague RN  Body Position: side-lying, left  Taken 11/21/2021 2000 by Lauryn Sprague RN  Body Position: supine  Skin Protection:   incontinence pads utilized   adhesive use limited  Intervention: Prevent and Manage VTE (venous thromboembolism) Risk  Recent Flowsheet Documentation  Taken 11/22/2021 0400 by Lauryn Sprague RN  VTE Prevention/Management:   bilateral   sequential compression devices off  Taken 11/22/2021 0200 by Lauryn Sprague RN  VTE  Prevention/Management:   bilateral   sequential compression devices off  Taken 11/22/2021 0000 by Lauryn Sprague RN  VTE Prevention/Management:   bilateral   sequential compression devices off  Taken 11/21/2021 2200 by Lauryn Sprague RN  VTE Prevention/Management:   bilateral   sequential compression devices off  Taken 11/21/2021 2000 by Lauryn Sprague RN  VTE Prevention/Management:   bilateral   sequential compression devices off  Intervention: Prevent Infection  Recent Flowsheet Documentation  Taken 11/22/2021 0400 by Lauryn Sprague RN  Infection Prevention:   hand hygiene promoted   rest/sleep promoted  Taken 11/22/2021 0200 by Lauryn Sprague RN  Infection Prevention:   hand hygiene promoted   rest/sleep promoted  Taken 11/22/2021 0000 by Lauryn Sprague RN  Infection Prevention:   hand hygiene promoted   rest/sleep promoted  Taken 11/21/2021 2200 by Lauryn Sprague RN  Infection Prevention:   hand hygiene promoted   rest/sleep promoted  Taken 11/21/2021 2000 by Lauryn Sprague RN  Infection Prevention:   hand hygiene promoted   rest/sleep promoted  Goal: Optimal Comfort and Wellbeing  Outcome: Ongoing, Progressing  Intervention: Provide Person-Centered Care  Recent Flowsheet Documentation  Taken 11/22/2021 0400 by Lauryn Sprague RN  Trust Relationship/Rapport: care explained  Taken 11/22/2021 0200 by Lauryn Sprague RN  Trust Relationship/Rapport: care explained  Taken 11/22/2021 0000 by Lauryn Sprague RN  Trust Relationship/Rapport: care explained  Taken 11/21/2021 2200 by Lauryn Sprague RN  Trust Relationship/Rapport: care explained  Taken 11/21/2021 2000 by Lauryn Sprague RN  Trust Relationship/Rapport:   care explained   choices provided   questions answered   questions encouraged   reassurance provided  Goal: Readiness for Transition of Care  Outcome: Ongoing, Progressing     Problem: Skin Injury Risk Increased  Goal: Skin Health and Integrity  Outcome: Ongoing,  Progressing  Intervention: Optimize Skin Protection  Recent Flowsheet Documentation  Taken 11/22/2021 0400 by Lauryn Sprague RN  Head of Bed (HOB): HOB elevated  Taken 11/22/2021 0200 by Lauryn Sprague RN  Head of Bed (HOB): HOB elevated  Taken 11/22/2021 0000 by Lauryn Sprague RN  Head of Bed (HOB): HOB elevated  Taken 11/21/2021 2200 by Lauryn Sprague RN  Head of Bed (HOB): HOB elevated  Taken 11/21/2021 2000 by Lauryn Sprague RN  Pressure Reduction Techniques: frequent weight shift encouraged  Head of Bed (HOB): HOB at 30-45 degrees  Pressure Reduction Devices: pressure-redistributing mattress utilized  Skin Protection:   incontinence pads utilized   adhesive use limited     Problem: Adjustment to Decreased Mobility and [place] (Orthopaedic Rehabilitation)  Goal: Optimal Coping  Outcome: Ongoing, Progressing  Intervention: Support Psychosocial Response to Injury  Recent Flowsheet Documentation  Taken 11/21/2021 2000 by Lauryn Sprague RN  Family/Support System Care:   self-care encouraged   support provided     Problem: BADL (Basic Activity of Daily Living) Impairment (Orthopaedic Rehabilitation)  Goal: Optimal Safe BADL Performance  Outcome: Ongoing, Progressing     Problem: Bowel Elimination Impairment (Orthopaedic Rehabilitation)  Goal: Effective Bowel Elimination  Outcome: Ongoing, Progressing  Intervention: Promote Effective Bowel Elimination  Recent Flowsheet Documentation  Taken 11/21/2021 2000 by Lauryn Sprague RN  Bowel Elimination Management:   toileting offered   relaxation techniques promoted     Problem: IADL (Instrumental Activity of Daily Living) Impairment (Orthopaedic Rehabilitation)  Goal: Optimal Safe IADL Performance  Outcome: Ongoing, Progressing     Problem: Infection (Orthopaedic Rehabilitation)  Goal: Absence of Infection Signs/Symptoms  Outcome: Ongoing, Progressing     Problem: Mobility Impairment (Orthopaedic Rehabilitation)  Goal: Optimal Mobility [place] and  Safety  Outcome: Ongoing, Progressing     Problem: Bleeding (Orthopaedic Fracture)  Goal: Absence of Bleeding  Outcome: Ongoing, Progressing  Intervention: Monitor and Manage Bleeding  Recent Flowsheet Documentation  Taken 11/21/2021 2000 by Lauryn Sprague RN  Bleeding Management: dressing monitored     Problem: Bowel Elimination Impaired (Orthopaedic Fracture)  Goal: Effective Bowel Elimination  Outcome: Ongoing, Progressing  Intervention: Promote Effective Bowel Elimination  Recent Flowsheet Documentation  Taken 11/21/2021 2000 by Lauryn Sprague RN  Bowel Elimination Management:   toileting offered   relaxation techniques promoted  Bowel Elimination Promotion: privacy promoted     Problem: Delayed Union/Nonunion (Orthopaedic Fracture)  Goal: Fracture Stability  Outcome: Ongoing, Progressing     Problem: Embolism (Orthopaedic Fracture)  Goal: Absence of Embolism Signs and Symptoms  Outcome: Ongoing, Progressing  Intervention: Prevent and Manage Embolism Risk  Recent Flowsheet Documentation  Taken 11/22/2021 0400 by Lauryn Sprague RN  VTE Prevention/Management:   bilateral   sequential compression devices off  Taken 11/22/2021 0200 by Lauryn Sprague RN  VTE Prevention/Management:   bilateral   sequential compression devices off  Taken 11/22/2021 0000 by Lauryn Sprague RN  VTE Prevention/Management:   bilateral   sequential compression devices off  Taken 11/21/2021 2200 by Lauryn Sprague RN  VTE Prevention/Management:   bilateral   sequential compression devices off  Taken 11/21/2021 2000 by Lauryn Sprague RN  VTE Prevention/Management:   bilateral   sequential compression devices off     Problem: Functional Ability Impaired (Orthopaedic Fracture)  Goal: Optimal Functional Ability  Outcome: Ongoing, Progressing  Intervention: Optimize Functional Ability  Recent Flowsheet Documentation  Taken 11/22/2021 0400 by Lauryn Sprague RN  Positioning/Transfer Devices:   pillows   in use  Self-Care Promotion:  independence encouraged  Taken 11/22/2021 0200 by Lauryn Sprague RN  Positioning/Transfer Devices:   pillows   in use  Self-Care Promotion: independence encouraged  Taken 11/22/2021 0000 by Lauryn Sprague RN  Activity Management: activity adjusted per tolerance  Positioning/Transfer Devices:   pillows   in use  Self-Care Promotion: independence encouraged  Taken 11/21/2021 2200 by Lauryn Sprague RN  Activity Management: activity adjusted per tolerance  Positioning/Transfer Devices:   pillows   in use  Self-Care Promotion: independence encouraged  Taken 11/21/2021 2000 by Lauryn Sprague RN  Activity Management: activity adjusted per tolerance  Positioning/Transfer Devices:   pillows   in use  Self-Care Promotion: independence encouraged     Problem: Infection (Orthopaedic Fracture)  Goal: Absence of Infection Signs and Symptoms  Outcome: Ongoing, Progressing  Intervention: Prevent or Manage Infection  Recent Flowsheet Documentation  Taken 11/22/2021 0400 by Lauryn Sprague RN  Infection Prevention:   hand hygiene promoted   rest/sleep promoted  Taken 11/22/2021 0200 by Lauryn Sprague RN  Infection Prevention:   hand hygiene promoted   rest/sleep promoted  Taken 11/22/2021 0000 by Lauryn Sprague RN  Infection Prevention:   hand hygiene promoted   rest/sleep promoted  Taken 11/21/2021 2200 by Lauryn Sprague RN  Infection Prevention:   hand hygiene promoted   rest/sleep promoted  Taken 11/21/2021 2000 by Lauryn Sprague RN  Infection Prevention:   hand hygiene promoted   rest/sleep promoted     Problem: Neurovascular Compromise (Orthopaedic Fracture)  Goal: Effective Tissue Perfusion  Outcome: Ongoing, Progressing     Problem: Pain (Orthopaedic Fracture)  Goal: Acceptable Pain Control  Outcome: Ongoing, Progressing  Intervention: Manage Acute Orthopaedic-Related Pain  Recent Flowsheet Documentation  Taken 11/21/2021 2200 by Lauryn Sprague RN  Pain Management Interventions: see MAR  Taken 11/21/2021  2000 by Lauryn Sprague RN  Pain Management Interventions: quiet environment facilitated  Diversional Activities: television     Problem: Respiratory Compromise (Orthopaedic Fracture)  Goal: Effective Oxygenation and Ventilation  Outcome: Ongoing, Progressing  Intervention: Promote Airway Secretion Clearance  Recent Flowsheet Documentation  Taken 11/22/2021 0400 by Lauryn Sprague RN  Cough And Deep Breathing: done independently per patient  Taken 11/22/2021 0200 by Lauryn Sprague RN  Cough And Deep Breathing: done independently per patient  Taken 11/22/2021 0000 by Lauryn Sprague RN  Cough And Deep Breathing: done independently per patient  Taken 11/21/2021 2200 by Lauryn Sprague RN  Cough And Deep Breathing: done independently per patient  Taken 11/21/2021 2000 by Lauryn Sprague RN  Cough And Deep Breathing: done independently per patient     Problem: Skin Injury (Orthopaedic Fracture)  Goal: Skin Health and Integrity  Outcome: Ongoing, Progressing  Intervention: Promote Skin Integrity  Recent Flowsheet Documentation  Taken 11/22/2021 0400 by Lauryn Sprague RN  Head of Bed (HOB): HOB elevated  Taken 11/22/2021 0200 by Lauryn Sprague RN  Head of Bed (HOB): HOB elevated  Taken 11/22/2021 0000 by Lauryn Sprague RN  Head of Bed (HOB): HOB elevated  Taken 11/21/2021 2200 by Lauryn Sprague RN  Head of Bed (HOB): HOB elevated  Taken 11/21/2021 2000 by Lauryn Sprague RN  Pressure Reduction Techniques: frequent weight shift encouraged  Head of Bed (HOB): HOB at 30-45 degrees  Pressure Reduction Devices: pressure-redistributing mattress utilized     Problem: Urinary Retention (Orthopaedic Fracture)  Goal: Effective Urinary Elimination  Outcome: Ongoing, Progressing   Goal Outcome Evaluation:  Plan of Care Reviewed With: patient      Pt currently in bed resting quietly. No complaints of pain or discomfort at this time. Incision to right shoulder with aquacel. Wears sling with up out of bed. Right tibia  fracture with mild swelling, immobilizer on when up. Vitals WNL on room air. Up to bedside commode. No other observations at this time. Will continue to monitor, call bell in reach.  Progress: improving

## 2021-11-22 NOTE — CASE MANAGEMENT/SOCIAL WORK
Discharge Planning Assessment  [facility]     Patient Name: Aleena Armstrong  MRN: [id number]  Today's Date: 2021    Admit Date: 11/15/2021     Discharge Needs Assessment    No documentation.                Discharge Plan     Row Name 21 1428       Plan    Plan SNF rehab    Patient/Family in Agreement with Plan yes    Plan Comments Met with Ms. Armstrong at the bedside for discharge planning.  Ms. Armstrong has changed her mind this week and decided to go to inpatient rehab.  Last week, she declined rehab and wanted to go home.  Discussed facilities and Ms. Armstrong requested referrals to the followin. [facility]. [facility] 3. [facility].  Referrals given to facilities.  A prior auth with Ms. Armstrong's [employer] Medicare will be required for the rehab admision after the patient is accepted by a facility.    CM will follow up.    Final Discharge Disposition Code 03 - skilled nursing facility (SNF)              Continued Care and Services - Admitted Since 11/15/2021     Destination     Service Provider Request Status Selected Services Address Phone Fax Patient Preferred    [facility]  P[facility] - No [facility] N/A [address] [facility] [id number] [phone] --    [facility]  Pending - No Request Sent N/A [address] [facility] [id number] [phone] --    [facility]  Pending - No Request Sent N/A  [place] [id number] [phone] --              Expected Discharge Date and Time     Expected Discharge Date Expected Discharge Time    2021          Demographic Summary    No documentation.                Functional Status    No documentation.                Psychosocial    No documentation.                Abuse/Neglect    No documentation.                Legal    No documentation.                Substance Abuse    No documentation.                Patient Forms    No  documentation.                   Pastora Sotelo RN

## 2021-11-22 NOTE — PLAN OF CARE
Goal Outcome Evaluation:  Plan of Care Reviewed With: patient        Progress: improving  Outcome Summary: Pt continuing to progress toward OT goals. Noted good participation throughout session and increased independence w/ bed mobility & transfers. Pt tolerated 10 reps of BUE TE. Will continue to progress pt as tolerated per OT POC. Recommend IRF at discharge.

## 2021-11-22 NOTE — PLAN OF CARE
Goal Outcome Evaluation:  Plan of Care Reviewed With: patient        Progress: improving  Outcome Summary: Patient demonstrated ability to tranfer into w/c and onto BSC with one person min assist. She is still unable ot ambulate but was able to propell her w/c in hallway. Rehab option is still best for her.

## 2021-11-22 NOTE — PLAN OF CARE
Goal Outcome Evaluation:  Plan of Care Reviewed With: patient        Progress: no change  Outcome Summary: Worked with PT this morning. Up to  with assistance. Large BM today. Pain manged with oral medication. Sling in use as ordered. Immobilizer still in use for right lower extremity when up. Pt has decided that rehab would be the best option for her. Case Mangement working on placement. Will monitor.

## 2021-11-23 VITALS
RESPIRATION RATE: 16 BRPM | BODY MASS INDEX: 24.91 KG/M2 | OXYGEN SATURATION: 93 % | WEIGHT: 155 LBS | SYSTOLIC BLOOD PRESSURE: 133 MMHG | HEART RATE: 61 BPM | TEMPERATURE: 97.5 F | HEIGHT: 66 IN | DIASTOLIC BLOOD PRESSURE: 83 MMHG

## 2021-11-23 PROBLEM — D50.9 IRON DEFICIENCY ANEMIA: Status: ACTIVE | Noted: 2021-11-23

## 2021-11-23 LAB
ANION GAP SERPL CALCULATED.3IONS-SCNC: 9 MMOL/L (ref 5–15)
BUN SERPL-MCNC: 11 MG/DL (ref 8–23)
BUN/CREAT SERPL: 15.3 (ref 7–25)
CALCIUM SPEC-SCNC: 8.7 MG/DL (ref 8.6–10.5)
CHLORIDE SERPL-SCNC: 103 MMOL/L (ref 98–107)
CO2 SERPL-SCNC: 26 MMOL/L (ref 22–29)
CREAT SERPL-MCNC: 0.72 MG/DL (ref 0.57–1)
DEPRECATED RDW RBC AUTO: 44.7 FL (ref 37–54)
ERYTHROCYTE [DISTWIDTH] IN BLOOD BY AUTOMATED COUNT: 12.2 % (ref 12.3–15.4)
FERRITIN SERPL-MCNC: 202.8 NG/ML (ref 13–150)
FOLATE BLD-MCNC: 280 NG/ML
FOLATE RBC-MCNC: 1111 NG/ML
GFR SERPL CREATININE-BSD FRML MDRD: 80 ML/MIN/1.73
GLUCOSE SERPL-MCNC: 118 MG/DL (ref 65–99)
HCT VFR BLD AUTO: 24.6 % (ref 34–46.6)
HCT VFR BLD AUTO: 25.2 % (ref 34–46.6)
HGB BLD-MCNC: 8.1 G/DL (ref 12–15.9)
IRON 24H UR-MRATE: 41 MCG/DL (ref 37–145)
IRON SATN MFR SERPL: 14 % (ref 20–50)
MCH RBC QN AUTO: 32.7 PG (ref 26.6–33)
MCHC RBC AUTO-ENTMCNC: 32.9 G/DL (ref 31.5–35.7)
MCV RBC AUTO: 99.2 FL (ref 79–97)
PLATELET # BLD AUTO: 242 10*3/MM3 (ref 140–450)
PMV BLD AUTO: 10 FL (ref 6–12)
POTASSIUM SERPL-SCNC: 3.9 MMOL/L (ref 3.5–5.2)
RBC # BLD AUTO: 2.48 10*6/MM3 (ref 3.77–5.28)
RETICS # AUTO: 0.08 10*6/MM3 (ref 0.02–0.13)
RETICS/RBC NFR AUTO: 3.27 % (ref 0.7–1.9)
SARS-COV-2 RDRP RESP QL NAA+PROBE: NORMAL
SODIUM SERPL-SCNC: 138 MMOL/L (ref 136–145)
TIBC SERPL-MCNC: 286 MCG/DL (ref 298–536)
TRANSFERRIN SERPL-MCNC: 192 MG/DL (ref 200–360)
VIT B12 BLD-MCNC: 608 PG/ML (ref 211–946)
WBC NRBC COR # BLD: 3.69 10*3/MM3 (ref 3.4–10.8)

## 2021-11-23 PROCEDURE — 83540 ASSAY OF IRON: CPT | Performed by: PHYSICIAN ASSISTANT

## 2021-11-23 PROCEDURE — 84466 ASSAY OF TRANSFERRIN: CPT | Performed by: PHYSICIAN ASSISTANT

## 2021-11-23 PROCEDURE — 80048 BASIC METABOLIC PNL TOTAL CA: CPT | Performed by: PHYSICIAN ASSISTANT

## 2021-11-23 PROCEDURE — 87635 SARS-COV-2 COVID-19 AMP PRB: CPT | Performed by: INTERNAL MEDICINE

## 2021-11-23 PROCEDURE — 82607 VITAMIN B-12: CPT | Performed by: PHYSICIAN ASSISTANT

## 2021-11-23 PROCEDURE — 97110 THERAPEUTIC EXERCISES: CPT

## 2021-11-23 PROCEDURE — 82728 ASSAY OF FERRITIN: CPT | Performed by: PHYSICIAN ASSISTANT

## 2021-11-23 PROCEDURE — 99239 HOSP IP/OBS DSCHRG MGMT >30: CPT | Performed by: PHYSICIAN ASSISTANT

## 2021-11-23 PROCEDURE — 85045 AUTOMATED RETICULOCYTE COUNT: CPT | Performed by: PHYSICIAN ASSISTANT

## 2021-11-23 PROCEDURE — 85027 COMPLETE CBC AUTOMATED: CPT | Performed by: PHYSICIAN ASSISTANT

## 2021-11-23 PROCEDURE — 97535 SELF CARE MNGMENT TRAINING: CPT

## 2021-11-23 RX ORDER — TIMOLOL MALEATE 5 MG/ML
1 SOLUTION/ DROPS OPHTHALMIC EVERY 12 HOURS SCHEDULED
Refills: 12
Start: 2021-11-23

## 2021-11-23 RX ORDER — ASCORBIC ACID 500 MG
500 TABLET ORAL
Status: DISCONTINUED | OUTPATIENT
Start: 2021-11-23 | End: 2021-11-23 | Stop reason: HOSPADM

## 2021-11-23 RX ORDER — BISACODYL 10 MG
10 SUPPOSITORY, RECTAL RECTAL DAILY PRN
Start: 2021-11-23 | End: 2022-05-19

## 2021-11-23 RX ORDER — FERROUS SULFATE 325(65) MG
325 TABLET ORAL
Start: 2021-11-24 | End: 2022-05-19

## 2021-11-23 RX ORDER — FLUOXETINE HYDROCHLORIDE 20 MG/1
40 CAPSULE ORAL DAILY
Status: DISCONTINUED | OUTPATIENT
Start: 2021-11-23 | End: 2021-11-23 | Stop reason: HOSPADM

## 2021-11-23 RX ORDER — DOCUSATE SODIUM 100 MG/1
200 CAPSULE, LIQUID FILLED ORAL DAILY
Start: 2021-11-23 | End: 2022-05-19

## 2021-11-23 RX ORDER — POLYETHYLENE GLYCOL 3350 17 G/17G
17 POWDER, FOR SOLUTION ORAL DAILY
Start: 2021-11-23 | End: 2022-05-19

## 2021-11-23 RX ORDER — METOPROLOL SUCCINATE 25 MG/1
25 TABLET, EXTENDED RELEASE ORAL DAILY
Qty: 90 TABLET | Refills: 1
Start: 2021-11-23 | End: 2022-05-19 | Stop reason: ALTCHOICE

## 2021-11-23 RX ORDER — PANTOPRAZOLE SODIUM 40 MG/1
40 TABLET, DELAYED RELEASE ORAL
Status: DISCONTINUED | OUTPATIENT
Start: 2021-11-23 | End: 2021-11-23 | Stop reason: HOSPADM

## 2021-11-23 RX ORDER — FERROUS SULFATE 325(65) MG
325 TABLET ORAL
Status: DISCONTINUED | OUTPATIENT
Start: 2021-11-23 | End: 2021-11-23 | Stop reason: HOSPADM

## 2021-11-23 RX ORDER — ASCORBIC ACID 500 MG
500 TABLET ORAL
Start: 2021-11-24 | End: 2022-05-19

## 2021-11-23 RX ORDER — BISACODYL 5 MG/1
5 TABLET, DELAYED RELEASE ORAL DAILY PRN
Start: 2021-11-23 | End: 2022-05-19

## 2021-11-23 RX ORDER — IBUPROFEN 800 MG/1
800 TABLET ORAL EVERY 6 HOURS PRN
Start: 2021-11-23

## 2021-11-23 RX ORDER — ACETAMINOPHEN 325 MG/1
650 TABLET ORAL EVERY 4 HOURS PRN
Start: 2021-11-23

## 2021-11-23 RX ORDER — PROMETHAZINE HYDROCHLORIDE 25 MG/1
12.5 TABLET ORAL EVERY 6 HOURS PRN
Start: 2021-11-23

## 2021-11-23 RX ORDER — HYDROMORPHONE HYDROCHLORIDE 2 MG/1
2 TABLET ORAL EVERY 6 HOURS PRN
Qty: 12 TABLET | Refills: 0 | Status: SHIPPED | OUTPATIENT
Start: 2021-11-23 | End: 2022-05-19 | Stop reason: ALTCHOICE

## 2021-11-23 RX ADMIN — HYDROXYCHLOROQUINE SULFATE 200 MG: 200 TABLET ORAL at 09:28

## 2021-11-23 RX ADMIN — FLUOXETINE HYDROCHLORIDE 40 MG: 20 CAPSULE ORAL at 12:28

## 2021-11-23 RX ADMIN — HYDROCORTISONE 1 APPLICATION: 25 CREAM TOPICAL at 09:32

## 2021-11-23 RX ADMIN — OXYCODONE HYDROCHLORIDE AND ACETAMINOPHEN 500 MG: 500 TABLET ORAL at 09:28

## 2021-11-23 RX ADMIN — APIXABAN 5 MG: 5 TABLET, FILM COATED ORAL at 09:28

## 2021-11-23 RX ADMIN — FERROUS SULFATE TAB 325 MG (65 MG ELEMENTAL FE) 325 MG: 325 (65 FE) TAB at 09:28

## 2021-11-23 RX ADMIN — IBUPROFEN 800 MG: 800 TABLET, FILM COATED ORAL at 04:15

## 2021-11-23 RX ADMIN — HYDROMORPHONE HYDROCHLORIDE 2 MG: 2 TABLET ORAL at 13:37

## 2021-11-23 RX ADMIN — SENNOSIDES AND DOCUSATE SODIUM 2 TABLET: 50; 8.6 TABLET ORAL at 09:28

## 2021-11-23 RX ADMIN — PANTOPRAZOLE SODIUM 40 MG: 40 TABLET, DELAYED RELEASE ORAL at 12:28

## 2021-11-23 RX ADMIN — HYDROMORPHONE HYDROCHLORIDE 2 MG: 2 TABLET ORAL at 04:15

## 2021-11-23 RX ADMIN — IBUPROFEN 800 MG: 800 TABLET, FILM COATED ORAL at 13:37

## 2021-11-23 RX ADMIN — SODIUM CHLORIDE, PRESERVATIVE FREE 10 ML: 5 INJECTION INTRAVENOUS at 09:33

## 2021-11-23 NOTE — PLAN OF CARE
Problem: Fall Injury Risk  Goal: Absence of Fall and Fall-Related Injury  Outcome: Ongoing, Progressing  Intervention: Identify and Manage Contributors to Fall Injury Risk  Recent Flowsheet Documentation  Taken 11/23/2021 0200 by Lauryn Sprague RN  Self-Care Promotion: independence encouraged  Taken 11/23/2021 0000 by Lauryn Sprague RN  Self-Care Promotion: independence encouraged  Taken 11/22/2021 2200 by Lauryn Sprague RN  Self-Care Promotion: independence encouraged  Taken 11/22/2021 2000 by Lauryn Sprague RN  Medication Review/Management: medications reviewed  Self-Care Promotion: independence encouraged  Intervention: Promote Injury-Free Environment  Recent Flowsheet Documentation  Taken 11/23/2021 0400 by Lauryn Sprague RN  Safety Promotion/Fall Prevention:   activity supervised   assistive device/personal items within reach   safety round/check completed  Taken 11/23/2021 0200 by Lauryn Sprague RN  Safety Promotion/Fall Prevention:   activity supervised   assistive device/personal items within reach   safety round/check completed  Taken 11/23/2021 0000 by Lauryn Sprague RN  Safety Promotion/Fall Prevention:   activity supervised   assistive device/personal items within reach   safety round/check completed  Taken 11/22/2021 2200 by Lauryn Sprague RN  Safety Promotion/Fall Prevention:   activity supervised   assistive device/personal items within reach   safety round/check completed  Taken 11/22/2021 2000 by Lauryn Sprague RN  Safety Promotion/Fall Prevention:   activity supervised   assistive device/personal items within reach   clutter free environment maintained   fall prevention program maintained   lighting adjusted   muscle strengthening facilitated   nonskid shoes/slippers when out of bed   room organization consistent   safety round/check completed     Problem: Adult Inpatient Plan of Care  Goal: Plan of Care Review  Outcome: Ongoing, Progressing  Flowsheets (Taken 11/23/2021  0434)  Progress: improving  Plan of Care Reviewed With: patient  Goal: Patient-Specific Goal (Individualized)  Outcome: Ongoing, Progressing  Goal: Absence of Hospital-Acquired Illness or Injury  Outcome: Ongoing, Progressing  Intervention: Identify and Manage Fall Risk  Recent Flowsheet Documentation  Taken 11/23/2021 0400 by Lauryn Sprague RN  Safety Promotion/Fall Prevention:   activity supervised   assistive device/personal items within reach   safety round/check completed  Taken 11/23/2021 0200 by Lauryn Sprague RN  Safety Promotion/Fall Prevention:   activity supervised   assistive device/personal items within reach   safety round/check completed  Taken 11/23/2021 0000 by Lauryn Sprague RN  Safety Promotion/Fall Prevention:   activity supervised   assistive device/personal items within reach   safety round/check completed  Taken 11/22/2021 2200 by Lauryn Sprague RN  Safety Promotion/Fall Prevention:   activity supervised   assistive device/personal items within reach   safety round/check completed  Taken 11/22/2021 2000 by Lauryn Sprague RN  Safety Promotion/Fall Prevention:   activity supervised   assistive device/personal items within reach   clutter free environment maintained   fall prevention program maintained   lighting adjusted   muscle strengthening facilitated   nonskid shoes/slippers when out of bed   room organization consistent   safety round/check completed  Intervention: Prevent Skin Injury  Recent Flowsheet Documentation  Taken 11/23/2021 0400 by Lauryn Sprague RN  Body Position: position changed independently  Taken 11/23/2021 0200 by Lauryn Sprague RN  Body Position: supine  Taken 11/23/2021 0000 by Lauryn Sprague RN  Body Position: position changed independently  Taken 11/22/2021 2200 by Lauryn Sprague RN  Body Position: position changed independently  Taken 11/22/2021 2000 by Lauryn Sprague RN  Body Position: sitting up in bed  Skin Protection:   adhesive use limited    incontinence pads utilized  Intervention: Prevent and Manage VTE (venous thromboembolism) Risk  Recent Flowsheet Documentation  Taken 11/23/2021 0400 by Lauryn Sprague RN  VTE Prevention/Management: patient refused intervention  Taken 11/23/2021 0200 by Lauryn Sprague RN  VTE Prevention/Management: patient refused intervention  Taken 11/23/2021 0000 by Lauryn Sprgaue RN  VTE Prevention/Management: patient refused intervention  Taken 11/22/2021 2200 by Lauryn Sprague RN  VTE Prevention/Management: patient refused intervention  Taken 11/22/2021 2000 by Lauryn Sprague RN  VTE Prevention/Management:   bilateral   sequential compression devices off   patient refused intervention  Intervention: Prevent Infection  Recent Flowsheet Documentation  Taken 11/23/2021 0400 by Lauryn Sprague RN  Infection Prevention:   hand hygiene promoted   rest/sleep promoted  Taken 11/23/2021 0200 by Lauryn Sprague RN  Infection Prevention:   hand hygiene promoted   rest/sleep promoted  Taken 11/23/2021 0000 by Lauryn Sprague RN  Infection Prevention:   hand hygiene promoted   rest/sleep promoted  Taken 11/22/2021 2200 by Lauryn Sprageu RN  Infection Prevention:   hand hygiene promoted   rest/sleep promoted  Taken 11/22/2021 2000 by Lauryn Sprague RN  Infection Prevention:   hand hygiene promoted   rest/sleep promoted  Goal: Optimal Comfort and Wellbeing  Outcome: Ongoing, Progressing  Intervention: Provide Person-Centered Care  Recent Flowsheet Documentation  Taken 11/23/2021 0400 by Lauryn Sprague RN  Trust Relationship/Rapport: care explained  Taken 11/23/2021 0200 by Laruyn Sprague RN  Trust Relationship/Rapport: care explained  Taken 11/23/2021 0000 by Lauryn Sprague RN  Trust Relationship/Rapport: care explained  Taken 11/22/2021 2200 by Lauryn Sprague RN  Trust Relationship/Rapport: care explained  Taken 11/22/2021 2000 by Lauryn Sprague RN  Trust Relationship/Rapport:   care explained   choices provided    questions answered   questions encouraged   reassurance provided  Goal: Readiness for Transition of Care  Outcome: Ongoing, Progressing     Problem: Skin Injury Risk Increased  Goal: Skin Health and Integrity  Outcome: Ongoing, Progressing  Intervention: Optimize Skin Protection  Recent Flowsheet Documentation  Taken 11/23/2021 0400 by Lauryn Sprague RN  Head of Bed (HOB): HOB elevated  Taken 11/23/2021 0200 by Lauryn Sprague RN  Head of Bed (HOB): HOB elevated  Taken 11/23/2021 0000 by Lauryn Sprague RN  Head of Bed (HOB): HOB elevated  Taken 11/22/2021 2200 by Lauryn Sprague RN  Head of Bed (HOB): HOB elevated  Taken 11/22/2021 2000 by Lauryn Sprague RN  Pressure Reduction Techniques:   frequent weight shift encouraged   weight shift assistance provided  Head of Bed (HOB): HOB elevated  Pressure Reduction Devices: pressure-redistributing mattress utilized  Skin Protection:   adhesive use limited   incontinence pads utilized     Problem: Adjustment to Decreased Mobility and [place] (Orthopaedic Rehabilitation)  Goal: Optimal Coping  Outcome: Ongoing, Progressing  Intervention: Support Psychosocial Response to Injury  Recent Flowsheet Documentation  Taken 11/22/2021 2000 by Lauryn Sprague RN  Family/Support System Care:   self-care encouraged   support provided     Problem: BADL (Basic Activity of Daily Living) Impairment (Orthopaedic Rehabilitation)  Goal: Optimal Safe BADL Performance  Outcome: Ongoing, Progressing     Problem: Bowel Elimination Impairment (Orthopaedic Rehabilitation)  Goal: Effective Bowel Elimination  Outcome: Ongoing, Progressing     Problem: IADL (Instrumental Activity of Daily Living) Impairment (Orthopaedic Rehabilitation)  Goal: Optimal Safe IADL Performance  Outcome: Ongoing, Progressing     Problem: Infection (Orthopaedic Rehabilitation)  Goal: Absence of Infection Signs/Symptoms  Outcome: Ongoing, Progressing  Intervention: Prevent or Manage Infection  Recent Flowsheet  Documentation  Taken 11/22/2021 2000 by Lauryn Sprague RN  Infection Management: aseptic technique maintained     Problem: Mobility Impairment (Orthopaedic Rehabilitation)  Goal: Optimal Mobility [place] and Safety  Outcome: Ongoing, Progressing     Problem: Bleeding (Orthopaedic Fracture)  Goal: Absence of Bleeding  Outcome: Ongoing, Progressing  Intervention: Monitor and Manage Bleeding  Recent Flowsheet Documentation  Taken 11/22/2021 2000 by Lauryn Sprague RN  Fracture Immobilization: immobilization device maintained  Bleeding Management: dressing monitored     Problem: Bowel Elimination Impaired (Orthopaedic Fracture)  Goal: Effective Bowel Elimination  Outcome: Ongoing, Progressing     Problem: Delayed Union/Nonunion (Orthopaedic Fracture)  Goal: Fracture Stability  Outcome: Ongoing, Progressing  Intervention: Promote Fracture Stability and Bone Healing  Recent Flowsheet Documentation  Taken 11/22/2021 2000 by Lauryn Sprague RN  Fracture Immobilization: immobilization device maintained     Problem: Embolism (Orthopaedic Fracture)  Goal: Absence of Embolism Signs and Symptoms  Outcome: Ongoing, Progressing  Intervention: Prevent and Manage Embolism Risk  Recent Flowsheet Documentation  Taken 11/23/2021 0400 by Lauryn Sprague RN  VTE Prevention/Management: patient refused intervention  Taken 11/23/2021 0200 by Lauryn Sprague RN  VTE Prevention/Management: patient refused intervention  Taken 11/23/2021 0000 by Lauryn Sprague RN  VTE Prevention/Management: patient refused intervention  Taken 11/22/2021 2200 by Lauryn Sprague RN  VTE Prevention/Management: patient refused intervention  Taken 11/22/2021 2000 by Lauryn Sprague RN  VTE Prevention/Management:   bilateral   sequential compression devices off   patient refused intervention     Problem: Functional Ability Impaired (Orthopaedic Fracture)  Goal: Optimal Functional Ability  Outcome: Ongoing, Progressing  Intervention: Optimize Functional  Ability  Recent Flowsheet Documentation  Taken 11/23/2021 0400 by Lauryn Sprague RN  Activity Management: activity adjusted per tolerance  Positioning/Transfer Devices:   pillows   in use  Taken 11/23/2021 0200 by Lauryn Sprague RN  Activity Management: activity adjusted per tolerance  Positioning/Transfer Devices:   pillows   in use  Self-Care Promotion: independence encouraged  Taken 11/23/2021 0000 by Lauryn Sprague RN  Activity Management: activity adjusted per tolerance  Positioning/Transfer Devices:   pillows   in use  Self-Care Promotion: independence encouraged  Taken 11/22/2021 2200 by Lauryn Sprague RN  Activity Management: activity adjusted per tolerance  Positioning/Transfer Devices:   pillows   in use  Self-Care Promotion: independence encouraged  Taken 11/22/2021 2000 by Lauryn Sprague RN  Activity Management: activity adjusted per tolerance  Positioning/Transfer Devices:   pillows   in use  Self-Care Promotion: independence encouraged  Range of Motion: active ROM (range of motion) encouraged     Problem: Infection (Orthopaedic Fracture)  Goal: Absence of Infection Signs and Symptoms  Outcome: Ongoing, Progressing  Intervention: Prevent or Manage Infection  Recent Flowsheet Documentation  Taken 11/23/2021 0400 by Lauryn Sprague RN  Infection Prevention:   hand hygiene promoted   rest/sleep promoted  Taken 11/23/2021 0200 by Lauryn Sprague RN  Infection Prevention:   hand hygiene promoted   rest/sleep promoted  Taken 11/23/2021 0000 by Lauryn Sprague RN  Infection Prevention:   hand hygiene promoted   rest/sleep promoted  Taken 11/22/2021 2200 by Lauryn Sprague RN  Infection Prevention:   hand hygiene promoted   rest/sleep promoted  Taken 11/22/2021 2000 by Lauryn Sprague RN  Infection Management: aseptic technique maintained  Infection Prevention:   hand hygiene promoted   rest/sleep promoted     Problem: Neurovascular Compromise (Orthopaedic Fracture)  Goal: Effective Tissue  Perfusion  Outcome: Ongoing, Progressing     Problem: Pain (Orthopaedic Fracture)  Goal: Acceptable Pain Control  Outcome: Ongoing, Progressing  Intervention: Manage Acute Orthopaedic-Related Pain  Recent Flowsheet Documentation  Taken 11/23/2021 0400 by Lauryn Sprague RN  Pain Management Interventions: see MAR  Taken 11/22/2021 2200 by Lauryn Sprague RN  Pain Management Interventions:   quiet environment facilitated   cold applied  Taken 11/22/2021 2000 by Lauryn Sprague RN  Pain Management Interventions:   see MAR   quiet environment facilitated   cold applied  Diversional Activities:   television   smartphone     Problem: Respiratory Compromise (Orthopaedic Fracture)  Goal: Effective Oxygenation and Ventilation  Outcome: Ongoing, Progressing  Intervention: Promote Airway Secretion Clearance  Recent Flowsheet Documentation  Taken 11/23/2021 0400 by Lauryn Sprague RN  Cough And Deep Breathing: done independently per patient  Taken 11/23/2021 0200 by Lauryn Sprague RN  Cough And Deep Breathing: done independently per patient  Taken 11/23/2021 0000 by Lauryn Sprague RN  Cough And Deep Breathing: done independently per patient  Taken 11/22/2021 2200 by Lauryn Sprague RN  Cough And Deep Breathing: done independently per patient  Taken 11/22/2021 2000 by Lauryn Sprague RN  Cough And Deep Breathing: done independently per patient     Problem: Skin Injury (Orthopaedic Fracture)  Goal: Skin Health and Integrity  Outcome: Ongoing, Progressing  Intervention: Promote Skin Integrity  Recent Flowsheet Documentation  Taken 11/23/2021 0400 by Lauryn Sprague RN  Head of Bed (HOB): HOB elevated  Taken 11/23/2021 0200 by Lauryn Sprague RN  Head of Bed (HOB): HOB elevated  Taken 11/23/2021 0000 by Lauryn Sprague RN  Head of Bed (HOB): HOB elevated  Taken 11/22/2021 2200 by Lauryn Sprague RN  Head of Bed (HOB): HOB elevated  Taken 11/22/2021 2000 by Lauryn Sprague RN  Pressure Reduction Techniques:   frequent  weight shift encouraged   weight shift assistance provided  Head of Bed (HOB): HOB elevated  Pressure Reduction Devices: pressure-redistributing mattress utilized     Problem: Urinary Retention (Orthopaedic Fracture)  Goal: Effective Urinary Elimination  Outcome: Ongoing, Progressing   Goal Outcome Evaluation:  Plan of Care Reviewed With: patient      Pt currently in bed resting quietly. No complaints of pain or discomfort at this time. Dressing to RUE CDI. Sling and Immobilizer utilized when up to bedside commode. Voiding well. VSS on room air. Neuro checks WNL. No other observations at this time. Will continue to monitor, call bell in reach.  Progress: improving

## 2021-11-23 NOTE — PLAN OF CARE
Problem: Fall Injury Risk  Goal: Absence of Fall and Fall-Related Injury  Outcome: Met  Intervention: Promote Injury-Free Environment  Recent Flowsheet Documentation  Taken 11/23/2021 1215 by Dayanara Sanon RN  Safety Promotion/Fall Prevention:   assistive device/personal items within reach   clutter free environment maintained   toileting scheduled   safety round/check completed  Taken 11/23/2021 1010 by Dayanara Sanon RN  Safety Promotion/Fall Prevention:   assistive device/personal items within reach   clutter free environment maintained   safety round/check completed   room organization consistent  Taken 11/23/2021 0800 by Dayanara Sanon RN  Safety Promotion/Fall Prevention:   assistive device/personal items within reach   clutter free environment maintained   toileting scheduled   safety round/check completed     Problem: Adult Inpatient Plan of Care  Goal: Plan of Care Review  Outcome: Met  Goal: Patient-Specific Goal (Individualized)  Outcome: Met  Goal: Absence of Hospital-Acquired Illness or Injury  Outcome: Met  Intervention: Identify and Manage Fall Risk  Recent Flowsheet Documentation  Taken 11/23/2021 1215 by Dayanara Sanon RN  Safety Promotion/Fall Prevention:   assistive device/personal items within reach   clutter free environment maintained   toileting scheduled   safety round/check completed  Taken 11/23/2021 1010 by Dayanara Sanon RN  Safety Promotion/Fall Prevention:   assistive device/personal items within reach   clutter free environment maintained   safety round/check completed   room organization consistent  Taken 11/23/2021 0800 by Dayanara Sanon RN  Safety Promotion/Fall Prevention:   assistive device/personal items within reach   clutter free environment maintained   toileting scheduled   safety round/check completed  Intervention: Prevent Skin Injury  Recent Flowsheet Documentation  Taken 11/23/2021 1215 by Dayanara Sanon RN  Skin Protection:    adhesive use limited   incontinence pads utilized  Taken 11/23/2021 1010 by Dayanara Sanon RN  Body Position: supine  Skin Protection:   adhesive use limited   incontinence pads utilized  Taken 11/23/2021 0800 by Dayanara Sanon RN  Body Position: supine  Skin Protection:   adhesive use limited   incontinence pads utilized  Goal: Optimal Comfort and Wellbeing  Outcome: Met  Goal: Readiness for Transition of Care  Outcome: Met     Problem: Skin Injury Risk Increased  Goal: Skin Health and Integrity  Outcome: Met  Intervention: Optimize Skin Protection  Recent Flowsheet Documentation  Taken 11/23/2021 1215 by Dayanara Sanon RN  Pressure Reduction Techniques: frequent weight shift encouraged  Pressure Reduction Devices: pressure-redistributing mattress utilized  Skin Protection:   adhesive use limited   incontinence pads utilized  Taken 11/23/2021 1010 by Dayanara Sanon RN  Pressure Reduction Techniques: frequent weight shift encouraged  Head of Bed (HOB): HOB at 30-45 degrees  Pressure Reduction Devices: specialty bed utilized  Skin Protection:   adhesive use limited   incontinence pads utilized  Taken 11/23/2021 0800 by Dayanara Sanon RN  Pressure Reduction Techniques: frequent weight shift encouraged  Head of Bed (HOB): HOB at 20-30 degrees  Pressure Reduction Devices: pressure-redistributing mattress utilized  Skin Protection:   adhesive use limited   incontinence pads utilized     Problem: Adjustment to Decreased Mobility and [place] (Orthopaedic Rehabilitation)  Goal: Optimal Coping  Outcome: Met     Problem: BADL (Basic Activity of Daily Living) Impairment (Orthopaedic Rehabilitation)  Goal: Optimal Safe BADL Performance  Outcome: Met     Problem: Bowel Elimination Impairment (Orthopaedic Rehabilitation)  Goal: Effective Bowel Elimination  Outcome: Met     Problem: IADL (Instrumental Activity of Daily Living) Impairment (Orthopaedic Rehabilitation)  Goal: Optimal Safe IADL  Performance  Outcome: Met     Problem: Infection (Orthopaedic Rehabilitation)  Goal: Absence of Infection Signs/Symptoms  Outcome: Met     Problem: Mobility Impairment (Orthopaedic Rehabilitation)  Goal: Optimal Mobility [place] and Safety  Outcome: Met     Problem: Bleeding (Orthopaedic Fracture)  Goal: Absence of Bleeding  Outcome: Met     Problem: Bowel Elimination Impaired (Orthopaedic Fracture)  Goal: Effective Bowel Elimination  Outcome: Met     Problem: Delayed Union/Nonunion (Orthopaedic Fracture)  Goal: Fracture Stability  Outcome: Met     Problem: Embolism (Orthopaedic Fracture)  Goal: Absence of Embolism Signs and Symptoms  Outcome: Met     Problem: Functional Ability Impaired (Orthopaedic Fracture)  Goal: Optimal Functional Ability  Outcome: Met  Intervention: Optimize Functional Ability  Recent Flowsheet Documentation  Taken 11/23/2021 1215 by Dayanara Sanon RN  Activity Management: up in chair  Taken 11/23/2021 1010 by Dayanara Sanon RN  Activity Management: bedrest  Taken 11/23/2021 0800 by Dayanara Sanon RN  Activity Management: bedrest     Problem: Infection (Orthopaedic Fracture)  Goal: Absence of Infection Signs and Symptoms  Outcome: Met     Problem: Neurovascular Compromise (Orthopaedic Fracture)  Goal: Effective Tissue Perfusion  Outcome: Met     Problem: Pain (Orthopaedic Fracture)  Goal: Acceptable Pain Control  Outcome: Met     Problem: Respiratory Compromise (Orthopaedic Fracture)  Goal: Effective Oxygenation and Ventilation  Outcome: Met     Problem: Skin Injury (Orthopaedic Fracture)  Goal: Skin Health and Integrity  Outcome: Met  Intervention: Promote Skin Integrity  Recent Flowsheet Documentation  Taken 11/23/2021 1215 by Dayanara Sanon RN  Pressure Reduction Techniques: frequent weight shift encouraged  Pressure Reduction Devices: pressure-redistributing mattress utilized  Taken 11/23/2021 1010 by Dayanara Sanon RN  Pressure Reduction Techniques: frequent  weight shift encouraged  Head of Bed (HOB): HOB at 30-45 degrees  Pressure Reduction Devices: specialty bed utilized  Taken 11/23/2021 0800 by Dayanara Sanon RN  Pressure Reduction Techniques: frequent weight shift encouraged  Head of Bed (HOB): HOB at 20-30 degrees  Pressure Reduction Devices: pressure-redistributing mattress utilized     Problem: Urinary Retention (Orthopaedic Fracture)  Goal: Effective Urinary Elimination  Outcome: Met   Goal Outcome Evaluation:  Plan of Care Reviewed With: patient        Progress: no change  Outcome Summary: Worked with PT this morning. Up to WC with assistance. Large BM today. Pain manged with oral medication. Sling in use as ordered. Immobilizer still in use for right lower extremity when up. Pt has decided that rehab would be the best option for her. Case Mangement working on placement. Will monitor.

## 2021-11-23 NOTE — CASE MANAGEMENT/SOCIAL WORK
Case Management Discharge Note      Final Note: Ms. Armstrong has a skilled bed at [facility] today, if medically ready.  Insurance approval received by [employer] for rehab admission.  Notified Ms. Armstrong and she is agreeable to transfer and states that her  can transport her to the facility.  Covid test ordered and results are pending.  Facility pharmacy provider is noted in EPIC for e-scribing.  Please call report to [facility] at [phone], and have a copy of the DC summary and AVS in the DC packet.  Thank you.         Selected Continued Care - Admitted Since 11/15/2021     Destination Coordination complete.    Service Provider Selected Services Address Phone Fax Patient Preferred    [facility]  Skilled Nursing [address], [facility] [address] [phone] --          Durable Medical Equipment    No services have been selected for the patient.              Dialysis/Infusion    No services have been selected for the patient.              Home Medical Care    No services have been selected for the patient.              Therapy    No services have been selected for the patient.              Community Resources    No services have been selected for the patient.              Community & DME    No services have been selected for the patient.                       Final Discharge Disposition Code: 03 - skilled nursing facility (SNF)

## 2021-11-23 NOTE — DISCHARGE SUMMARY
[facility] Medicine Services  DISCHARGE SUMMARY    Patient Name: Aleena Armstrong  : 1950  MRN: [id number]    Date of Admission: 11/15/2021  9:29 PM  Date of Discharge: 2021  Primary Care Physician: Aniyah Goldberg MD    Consults     Date and Time Order Name Status Description    2021 12:18 AM Inpatient Hospitalist Consult      2021  2:39 PM Inpatient Consult to Hospitalist      2021  5:50 AM Inpatient Orthopedic Surgery Consult Completed         Hospital Course     Presenting Problem:   Closed fracture of proximal end of right humerus, unspecified fracture morphology, initial encounter [S42.201A]    Active Hospital Problems    Diagnosis  POA   • **Fracture of right humerus [S42.301A]  Yes   • Iron deficiency anemia [D50.9]  Yes   • Fracture of right tibial plateau [S82.141A]  Yes   • Fall [W19.XXXA]  Yes   • HO Atrial fibrillation (CMS/HCC) [I48.91]  Yes   • Anxiety and depression [F41.9, F32.A]  Yes   • Rheumatoid arthritis (HCC) [M06.9]  Yes   • Dyslipidemia [E78.5]  Yes   • Essential hypertension [I10]  Yes      Resolved Hospital Problems   No resolved problems to display.      Hospital Course:  Aleena Armstrong is a 70 y.o. female with PMH significant for HTN, HLD, atrial fibrillation (Eliquis), WPW s/p ablation at [place], RA, GERD, anxiety and depression. She is s/p gastric bypass surgery. She was admitted to [facility]15[facility]21 after a mechanical fall that resulted in R tibial plateau fracture and R humerus fracture.      Right humerus fracture   Right tibial plateau fracture  - s/p ORIF of R humerus on 21 by Dr. Galdamez  - Non-operative management of R tibial plateau fracture  - NWB to LUE - may do gentle elbow, wrist and hand ROM  - WBAT to RLE in knee immobilizer  - Patient may shower. Do not soak or submerge incision. Post-op dressing to remain in place until follow up  - PRN pain control (history of adverse reactions to PO  Oxycodone / Hydrocodone - pain has been well-controlled on low-dose PO Dilaudid)  - Bowel regimen   - Follow up with Dr. Galdamez in 2 weeks   - PT/OT following - to [facility] for rehab today     Anemia  - 9.8 on admission, now stable in the 8's  - Iron studies do show iron deficiency. Started PO supplement  - Patient reports long history of anemia following gastric bypass surgery  - B12 OK / folate RBC pending, will notify patient of abnormal results     Essential hypertension  Atrial fibrillation  - Continue Eliquis / Metoprolol      Rheumatoid arthritis, continue Plaquenil   Anxiety and depression, continue Prozac    Day of Discharge     HPI:   In bed. Pain manageable. No chest pain or dyspnea. Intermittent nausea, thinks it is worse because she is not getting PPI. No vomiting. Had a good BM yesterday. Looking forward to leaving the hospital today.     Review of Systems  Gen- No fevers, chills  CV- No chest pain, palpitations  Resp- No cough, dyspnea  GI- as above     Vital Signs:   Temp:  [97.5 °F (36.4 °C)-98.1 °F (36.7 °C)] 97.5 °F (36.4 °C)  Heart Rate:  [61-75] 61  Resp:  [16-18] 16  BP: (110-134)/(56-83) 133/83     Physical Exam:  Constitutional: No acute distress, awake, alert and conversant. Laying in bed   HENT: NCAT, mucous membranes moist  Respiratory: Clear to auscultation bilaterally, respiratory effort normal on room air   Cardiovascular: RRR, no murmurs, rubs, or gallops  Gastrointestinal: Positive bowel sounds, soft, nontender, nondistended  Musculoskeletal: RLE in knee immobilizer. RUE in splint.   Psychiatric: Appropriate affect, cooperative  Neurologic: Oriented x 3, moves all extremities spontaneously, speech clear    Pertinent  and/or Most Recent Results     LAB RESULTS:      Lab 11/23/21  0522 11/20/21  0539 11/19/21  0900 11/18/21  1005   WBC 3.69 4.70 5.69 4.35   HEMOGLOBIN 8.1* 8.5* 8.5* 9.5*   HEMATOCRIT 24.6* 27.1* 27.1* 29.6*   PLATELETS 242 194 181 154   NEUTROS ABS  --  2.77 3.79 2.64    IMMATURE GRANS (ABS)  --  0.02 0.02 0.01   LYMPHS ABS  --  1.34 1.25 1.18   MONOS ABS  --  0.49 0.54 0.43   EOS ABS  --  0.05 0.06 0.06   MCV 99.2* 103.4* 103.8* 101.0*         Lab 11/23/21  0522 11/19/21  0900 11/18/21  1005   SODIUM 138 135* 137   POTASSIUM 3.9 4.4 4.3   CHLORIDE 103 104 106   CO2 26.0 23.0 23.0   ANION GAP 9.0 8.0 8.0   BUN 11 17 21   CREATININE 0.72 0.90 0.94   GLUCOSE 118* 103* 91   CALCIUM 8.7 8.2* 8.5*         Lab 11/23/21 0522   IRON 41   IRON SATURATION 14*   TIBC 286*   TRANSFERRIN 192*   FERRITIN 202.80*   VITAMIN B 12 608     Brief Urine Lab Results     None        Microbiology Results (last 10 days)     Procedure Component Value - Date/Time    COVID PRE-OP / PRE-PROCEDURE SCREENING ORDER (NO ISOLATION) - Swab, Nasopharynx [[id number]]  (Normal) Collected: 11/16/21 0150    Lab Status: Final result Specimen: Swab from Nasopharynx Updated: 11/16/21 0243    Narrative:      The following orders were created for panel order COVID PRE-OP / PRE-PROCEDURE SCREENING ORDER (NO ISOLATION) - Swab, Nasopharynx.  Procedure                               Abnormality         Status                     ---------                               -----------         ------                     COVID-19, ABBOTT IN-HOUS...[[id number]]  Normal              Final result                 Please view results for these tests on the individual orders.    COVID-19, ABBOTT IN-HOUSE,NASAL Swab (NO TRANSPORT MEDIA) 2 HR TAT - Swab, Nasopharynx [[id number]]  (Normal) Collected: 11/16/21 0150    Lab Status: Final result Specimen: Swab from Nasopharynx Updated: 11/16/21 0243     COVID19 Presumptive Negative    Narrative:      Fact sheet for providers: https://www.fda.gov/media/901947/download     Fact sheet for patients: https://www.fda.gov/media/698896/download    Test performed by PCR.  If inconsistent with clinical signs and symptoms patient should be tested with different authorized molecular test.        XR Shoulder 2+  View Right    Result Date: 11/15/2021  CR Shoulder Comp Min 2 Vws RT INDICATION: Fall, right lateral shoulder pain COMPARISON: None available. FINDINGS: AP internal rotation, AP external rotation, and scapular Y views of the right shoulder.  Gross fracture deformity is seen involving the proximal humerus. Acromioclavicular and glenohumeral joints are intact.     Gross fracture deformity seen involving the proximal humerus. Signer Name: Gabriella Arce MD  Signed: 11/15/2021 11:36 PM  Workstation Name: [id number]  [facility]    XR Humerus Right    Result Date: 11/15/2021  CR Humerus Min 2 Vws RT INDICATION: Fall, right upper arm pain COMPARISON: None available. FINDINGS: AP, oblique and lateral views of the right humerus.  Fracture deformity is seen involving the patient's proximal humeral shaft. The patient's fixation hardware involves the proximal fragment. Glenohumeral joint and acromioclavicular joint appear intact.     Severe fracture deformity is seen involving the patient's proximal humeral shaft. Signer Name: Gabriella Arce MD  Signed: 11/15/2021 11:23 PM  Workstation Name: [id number]  [facility]    XR Elbow 2 View Right    Result Date: 11/15/2021  CR Elbow 2 Vws RT INDICATION: Fall, pain COMPARISON: None available FINDINGS: AP, lateral, and oblique views of the right elbow.  No definite acute fracture or subluxation. There may be a small joint effusion. There is an olecranon spur..  No bone erosion or destruction.  No foreign body.     No acute fracture or subluxation involving the right elbow. Signer Name: Gabriella Arce MD  Signed: 11/15/2021 11:37 PM  Workstation Name: [id number]  [facility]    XR Knee 1 or 2 View Right    Result Date: 11/15/2021  CR Knee 1 or 2 Vws RT INDICATION: Fall, pain in lateral knee COMPARISON: None available. FINDINGS: AP, lateral view(s) of the right knee. Findings suggest fracture involving the lateral tibial plateau  that may extend to the intercondylar region. . There is a prominent suprapatellar joint effusion. No bone erosion or destruction.  No foreign body.     Findings suggest fracture involving the lateral tibial plateau that may extend to the intercondylar region. Signer Name: Gabriella Arce MD  Signed: 11/15/2021 11:20 PM  Workstation Name: [id number]  [facility]    CT Head Without Contrast    Result Date: 11/15/2021  CT Head WO: 11/15/2021 11:30 PM HISTORY: Fall TECHNIQUE: Axial unenhanced head CT. Radiation dose reduction techniques included automated exposure control or exposure modulation based on body size. Radiation audit for number of CT and nuclear cardiology exams performed in the last year: 0.  COMPARISON: 10/15/2020 FINDINGS: No intracranial hemorrhage, mass, or infarct. No hydrocephalus or extra-axial fluid collection. The skull base, calvarium, and extracranial soft tissues are normal heart from opacification of the left maxillary sinus.     No acute intracranial abnormality. Signer Name: Gabreilla Arce MD  Signed: 11/15/2021 10:34 PM  Workstation Name: [id number]  [facility]    Right Interscalene Catheter re-block    Result Date: 11/19/2021  Nan Ibrahim CRNA     11/19/2021  5:18 PM Right Interscalene Catheter re-block Patient reassessed immediately prior to procedure Patient location during procedure: floor Reason for block: at surgeon's request and post-op pain management Performed by CRNA: Lesly Huynh CRNA Assisted by: Nan Ibrahim CRNA Preanesthetic Checklist Completed: patient identified, IV checked, site marked, risks and benefits discussed, surgical consent, monitors and equipment checked, pre-op evaluation and timeout performed Prep: Pt Position: left lateral decubitus Sterile barriers:cap, gloves, mask and sterile barriers Prep: ChloraPrep Patient monitoring: blood pressure monitoring, continuous pulse oximetry and EKG Procedure  Sedation: no Performed under: local infiltration Guidance:ultrasound guided Images:still images obtained, printed/placed on chart Laterality:right Block Type:interscalene Injection Technique:catheter Needle Type:Tuohy and echogenic Needle Gauge:18 G Resistance on Injection: none Catheter Size:20 G (20g) Cath Depth at skin: 10 cm Medications Used: ropivacaine (NAROPIN) 0.2 % injection, 15 mL Med administered at 11/19/2021 5:12 PM Post Assessment Injection Assessment: negative aspiration for heme, no paresthesia on injection and incremental injection Patient Tolerance:comfortable throughout block Complications:no Additional Notes Procedure:  APS called due to increased pain with no relief. Upon evaluation of the nerve catheter under the U/S, nerve catheter was noted to have migrated away from the brachial plexus. Nerve catheter was replaced after patient's consent.              The pt was placed in semifowlers position with a slight tilt of the thorax contralateral to the insertion site.  The Insertion Site was prepped and draped in sterile fashion.  Skin and cutaneous tissue was infiltrated and anesthetized with 1% Lidocaine 3 mls via a 25g needle.  Utilizing ultrasound guidance, a BBraun 4 inch 18 g Contiplex echogenic touhy needle was advanced in-plane.  Hydro dissection of tissue was achieved with Normal saline. Major vessels(carotid and Internal Jugular) where visualized as the brachial plexus was approached at the approximate level of C-7/ T-1.  Cervical 5 and Branches of Cervical 6 nerve roots where visualized and the needle tip was placed posterior at the level of C-6 roots.  LA spread was visualized and injection was made incrementally every 5 mls with aspiration. Injection pressure was normal or little, there was no intraneural injection, no vascular injection.    The BBraun 20 g wire stylet catheter was then placed under US guidance on the posterior aspect of the Brachial Plexus. The tuohy was removed and  the location of catheter was confirmed with NS injection visualized with US . The skin was sealed with exofin tissue adhesive at catheter insertion site.  Skin was prepped with benzoin and the catheter was secured with steristrips and a CHG tegaderm. Appropriate labels applied. Thank You.     Right Femoral SS block    Result Date: 11/16/2021  Nan Ibrahim CRNA     11/16/2021 11:23 AM Right Femoral SS block Patient reassessed immediately prior to procedure Patient location during procedure: floor Reason for block: post-op pain management Performed by CRNA: Nan Ibrahim CRNA Assisted by: Guillermina Crews RN Preanesthetic Checklist Completed: patient identified, IV checked, site marked, surgical consent, monitors and equipment checked, pre-op evaluation and timeout performed Prep: Pt Position: supine Sterile barriers:gloves, cap, sterile barriers and mask Prep: ChloraPrep Patient monitoring: blood pressure monitoring, continuous pulse oximetry and EKG Procedure Sedation: no Performed under: local infiltration Guidance:ultrasound guided, nerve stimulator and landmark technique Images:still images not obtained Laterality:right Block Type:femoral Injection Technique:single-shot Needle Type:short-bevel Needle Gauge:20 G Resistance on Injection: none Medications Used: bupivacaine PF (MARCAINE) 0.25 % injection, 15 mL Med administered at 11/16/2021 11:23 AM Post Assessment Injection Assessment: negative aspiration for heme, no paresthesia on injection and incremental injection Patient Tolerance:comfortable throughout block Complications:no Additional Notes The BBRaun 360 degree echogenic needle was introduced in plane, in a lateral to medial direction at the level of the inguinal crease.  Under ultrasound guidance, the femoral artery and vein where located.  The needle was then directed below Fascia Iliacus towards the Femoral nerve.  NS was utilized to hydro dissect and narda needle advancement towards the  target structure.   LA was injected incrementally in 3-5 ml aliquots with negative aspirate.  LA spread was visualized around the nerve, negative intraneural injection, low injection pressures.  Thank you     Right Interscalene Catheter    Result Date: 11/16/2021  Nan Ibrahim CRNA     11/16/2021 11:10 AM Right Interscalene Catheter Patient reassessed immediately prior to procedure Patient location during procedure: floor Reason for block: at surgeon's request and post-op pain management Performed by CRNA: Nna Ibrahim CRNA Assisted by: Guillermina Crews RN Preanesthetic Checklist Completed: patient identified, IV checked, site marked, risks and benefits discussed, surgical consent, monitors and equipment checked, pre-op evaluation and timeout performed Prep: Pt Position: left lateral decubitus Sterile barriers:cap, gloves, mask and sterile barriers Prep: ChloraPrep Patient monitoring: blood pressure monitoring, continuous pulse oximetry and EKG Procedure Sedation: no Performed under: local infiltration Guidance:ultrasound guided Images:still images obtained, printed/placed on chart Laterality:right Block Type:interscalene Injection Technique:catheter Needle Type:Tuohy and echogenic Needle Gauge:18 G Resistance on Injection: none Catheter Size:20 G (20g) Cath Depth at skin: 13 cm Medications Used: bupivacaine PF (MARCAINE) 0.25 % injection, 15 mL Med administered at 11/16/2021 11:08 AM Post Assessment Injection Assessment: negative aspiration for heme, no paresthesia on injection and incremental injection Patient Tolerance:comfortable throughout block Complications:no Additional Notes Procedure:               The pt was placed in semifowlers position with a slight tilt of the thorax contralateral to the insertion site.  The Insertion Site was prepped and draped in sterile fashion.  The pt was anesthetized with  IV Sedation( see meds) and  Skin and cutaneous tissue was infiltrated and anesthetized with 1%  Lidocaine 3 mls via a 25g needle.  Utilizing ultrasound guidance, a BBraun 4 inch 18 g Contiplex echogenic touhy needle was advanced in-plane.  Hydro dissection of tissue was achieved with Normal saline. Major vessels(carotid and Internal Jugular) where visualized as the brachial plexus was approached at the approximate level of C-7/ T-1.  Cervical 5 and Branches of Cervical 6 nerve roots where visualized and the needle tip was placed posterior at the level of C-6 roots.  LA spread was visualized and injection was made incrementally every 5 mls with aspiration. Injection pressure was normal or little, there was no intraneural injection, no vascular injection.    The BBraun 20 g wire stylet catheter was then placed under US guidance on the posterior aspect of the Brachial Plexus. The tuohy was removed and the location of catheter was confirmed with NS injection visualized with US . The skin was sealed with exofin tissue adhesive at catheter insertion site.  Skin was prepped with benzoin and the catheter was secured with steristrips and a CHG tegaderm. Appropriate labels applied. Thank You.     FL C Arm During Surgery    Result Date: 11/19/2021  EXAMINATION: FL C ARM DURING SURGERY-  INDICATION: ORIF R humerus; S42.201A-Unspecified fracture of upper end of right humerus, initial encounter for closed fracture; S82.141A-Displaced bicondylar fracture of right tibia, initial encounter for closed fracture; Z79.01-Long term (current) use of anticoagulants  COMPARISON: NONE  FINDINGS: 1 minute 24 seconds of fluoroscopy time provided with 6 images saved during right humerus nail placement.      1 minute 24 seconds of fluoroscopy time provided with 6 images saved during right humerus nail placement.  This report was finalized on 11/19/2021 10:52 AM by Gadiel Mendoza.      CT Lower Extremity Right Without Contrast    Result Date: 11/15/2021  CT OF THE RIGHT KNEE W/O CONTRAST   HISTORY: Trauma  COMPARISON: X-ray from earlier  same day TECHNIQUE: CT images of the right knee Radiation dose reduction techniques included automated exposure control. Radiation audit for CT and nuclear cardiology exams in the last 12 months: .  FINDINGS: Compression fracture is identified involving the patient's lateral tibial plateau. Fracture extends to the intercondylar region. Fracture is also done finding involving the medial portion of the medial plateau. Femur and fibula appear intact. There is a lipohemarthrosis joint effusion. Patella is intact. No foreign body.     There are fractures involving both tibial plateaus with extension into the intercondylar notch. Signer Name: Gabriella Arce MD  Signed: 11/15/2021 11:51 PM  Workstation Name: [id number]  [facility]    CT Upper Extremity Right Without Contrast    Result Date: 11/17/2021  EXAMINATION: CT UPPER EXTREMITY RIGHT WO CONTRAST-  INDICATION: Fracture, shoulder  TECHNIQUE: Axial noncontrast CT of the proximal right upper extremity with multiplanar reconstruction  The radiation dose reduction device was turned on for each scan per the ALARA (As Low as Reasonably Achievable) protocol.  COMPARISON: Radiographs one day prior  FINDINGS: Prior plate and screw fixation of the proximal right humerus noted. There is acute comminuted oblique fracture of the proximal humeral diaphysis with intervening butterfly fragment, with mild lateral displacement and foreshortening. The existing fixation hardware is not displaced, however fracture fragment less than this portion of the proximal humerus. The acromioclavicular and glenohumeral articulations appear preserved. There is a small joint effusion and intramuscular edema and hemorrhage. No suspicious unexpected lytic or sclerotic osseous lesions. The partially imaged right lung field demonstrates some dependent atelectasis.      Prior plate and screw fixation of the proximal right humerus noted. There is acute comminuted oblique fracture of the  proximal humeral diaphysis with intervening butterfly fragment, with mild lateral displacement and foreshortening. The existing fixation hardware is not displaced, however fracture fragment less than this portion of the proximal humerus.   This report was finalized on 11/17/2021 9:57 AM by Gadiel Mendoza.      XR Hip With or Without Pelvis 2 - 3 View Right    Result Date: 11/15/2021  CR Hip Uni Comp Min 2 Vws RT INDICATION: Pain after trauma COMPARISON: None. FINDINGS: AP and frog-leg lateral view(s) of the right hip.  No fracture or dislocation. There is bilateral femoral acetabular joint space narrowing. No bone erosion or destruction.      No acute fracture or subluxation. Signer Name: Gabriella Arce MD  Signed: 11/15/2021 11:21 PM  Workstation Name: [id number]  [facility]    Results for orders placed during the hospital encounter of 06/20/17    Duplex Groin Pseudoaneurysm unilateral CAR    Interpretation Summary  · The right common femoral artery shows no evidence of AV fistula. The right superficial femoral artery shows no evidence of an AV fistula. The right profunda femoral artery shows no evidenceof AV fistula.    Results for orders placed during the hospital encounter of 06/20/17    Adult Transesophageal Echo    Interpretation Summary  · Left ventricular systolic function is normal. Estimated EF = 60%.  · The cardiac valves are anatomically and functionally normal.  · No evidence of a left atrial appendage thrombus was present    Discharge Details        Discharge Medications      New Medications      Instructions Start Date   ascorbic acid 500 MG tablet  Commonly known as: VITAMIN C   500 mg, Oral, Daily With Breakfast   Start Date: November 24, 2021     bisacodyl 5 MG EC tablet  Commonly known as: DULCOLAX   5 mg, Oral, Daily PRN      bisacodyl 10 MG suppository  Commonly known as: DULCOLAX   10 mg, Rectal, Daily PRN      docusate sodium 100 MG capsule  Commonly known as: Colace    200 mg, Oral, Daily      ferrous sulfate 325 (65 FE) MG tablet   325 mg, Oral, Daily With Breakfast   Start Date: November 24, 2021     hydrocortisone 2.5 % cream   Apply topically to right upper chest wall rash BID for 7 days      HYDROmorphone 2 MG tablet  Commonly known as: DILAUDID   2 mg, Oral, Every 6 Hours PRN      ibuprofen 800 MG tablet  Commonly known as: ADVIL,MOTRIN   800 mg, Oral, Every 6 Hours PRN      polyethylene glycol 17 g packet  Commonly known as: MIRALAX   17 g, Oral, Daily         Changes to Medications      Instructions Start Date   acetaminophen 325 MG tablet  Commonly known as: TYLENOL  What changed:   · medication strength  · how much to take  · when to take this   650 mg, Oral, Every 4 Hours PRN      metoprolol succinate XL 25 MG 24 hr tablet  Commonly known as: TOPROL-XL  What changed: additional instructions   25 mg, Oral, Daily, Hold for SBP < 110 or HR < 60      promethazine 25 MG tablet  Commonly known as: PHENERGAN  What changed: how much to take   12.5 mg, Oral, Every 6 Hours PRN      timolol 0.5 % ophthalmic solution  Commonly known as: TIMOPTIC  What changed:   · how much to take  · how to take this   1 drop, Both Eyes, Every 12 Hours Scheduled         Continue These Medications      Instructions Start Date   apixaban 5 MG tablet tablet  Commonly known as: ELIQUIS   5 mg, Oral, Every 12 Hours Scheduled      Dexilant 60 MG capsule  Generic drug: dexlansoprazole   60 mg, Oral, Daily      hydroxychloroquine 200 MG tablet  Commonly known as: PLAQUENIL   200 mg, Oral, 2 Times Daily      ondansetron 4 MG tablet  Commonly known as: ZOFRAN   Take 1 tablet by mouth Every 8 (Eight) Hours As Needed for post op nausea      PROzac 40 MG capsule  Generic drug: FLUoxetine   1 capsule, Oral, Daily      traZODone 100 MG tablet  Commonly known as: DESYREL   100 mg, Oral, Nightly           Allergies   Allergen Reactions   • Bactrim [Sulfamethoxazole-Trimethoprim] Other (See Comments)     MOUTH SORES    • Percocet [Oxycodone-Acetaminophen] Other (See Comments)     NIGHT TERRORS    • Statins Myalgia     MYALGIA    • Sulfa Antibiotics Other (See Comments)     MOUTH SORES AND RASH    • Erythromycin Rash     RASH      Discharge Disposition:  Skilled Nursing Facility (DC - External)    Diet:  Diet Order   Procedures   • Diet Regular     Activity:  Activity Instructions     Other Activity Instructions      NWB to LUE - may do gentle elbow, wrist and hand ROM  WBAT to RLE in knee immobilizer   Additional Activity Instructions:    Activity as instructed by your physician         CODE STATUS:    Code Status and Medical Interventions:   Ordered at: 11/18/21 1439     Level Of Support Discussed With:    Patient     Code Status (Patient has no pulse and is not breathing):    CPR (Attempt to Resuscitate)     Medical Interventions (Patient has pulse or is breathing):    Full Support     Future Appointments   Date Time Provider Department Center   5/19/2022  9:45 AM Dominic Wolff MD [facility]     Additional Instructions for the Follow-ups that You Need to Schedule     Ambulatory Referral to Physical Therapy Evaluate and treat, Ortho; (NWB LUE, knee immobilizer to LLE)   As directed      Specialty needed: Evaluate and treat Ortho    Weight Bearing Status:  (NWB LUE, knee immobilizer to LLE)         Discharge Follow-up with Specified Provider: Rudolph in 2 weeks   As directed      To: Rudolph in 2 weeks             Ngoc Adam PA-C  11/23/21    Time Spent on Discharge:  I spent 35 minutes on this discharge activity which included: face-to-face encounter with the patient, reviewing the data in the system, coordination of the care with the nursing staff as well as consultants, documentation, and entering orders.

## 2021-11-23 NOTE — THERAPY DISCHARGE NOTE
Acute Care - Occupational Therapy Discharge  [facility]    Patient Name: Aleena Armstrong  : 1950    MRN: [id number]                              Today's Date: 2021       Admit Date: 11/15/2021    Visit Dx:     ICD-10-CM ICD-9-CM   1. Closed fracture of proximal end of right humerus, unspecified fracture morphology, initial encounter  S42.201A 812.00   2. Closed fracture of right tibial plateau, initial encounter  S82.141A 823.00   3. Anticoagulated  Z79.01 V58.61   4. Acute postoperative pain  G89.18 338.18   5. Fall, initial encounter  W19.XXXA E888.9   6. S/P ORIF right proximal humerus fracture  Z98.890 V45.89    Z87.81 V15.51     Patient Active Problem List   Diagnosis   • WPW (Elodia-Parkinson-White syndrome)   • Chest pain   • Morbid obesity (HCC)   • Dyslipidemia   • Essential hypertension   • Atrial flutter (HCC)   • HO Atrial fibrillation (CMS/HCC)   • Acute kidney injury (HCC)   • Insomnia   • Anxiety and depression   • Vitamin D deficiency   • Rheumatoid arthritis (HCC)   • Glaucoma of left eye   • Peptic ulcer    • Bradycardia   • Paroxysmal atrial fibrillation (HCC)   • Hematoma of groin   • Proximal humerus fracture   • S/P ORIF right proximal humerus fracture   • Anemia   • Chronic pain   • Acute postoperative pain   • Fracture of right humerus   • Fracture of right tibial plateau   • Fall   • Iron deficiency anemia     Past Medical History:   Diagnosis Date   • A-fib ([facility])     HAD ABLATION FOR THIS IN    • Anemia    • Anxiety and depression 2016   • GERD (gastroesophageal reflux disease)    • Glaucoma of left eye 2016   • H/O migraine     LAST 3  YEARS AGO    • History of MRSA infection     APPROX 8 YEARS, TREATED WITH ORAL ABX, NEGATIVE CULTURES FOLLOWING TX    • History of transfusion    • Hyperlipidemia    • Hypertension     MEDS DC'D BY DR GOMEZ 2017   • Insomnia 2016   • Kidney disease    • Peptic ulcer - s/p gastric bypass surgery (7-8 years ago)  12/11/2016   • Rheumatoid arthritis (HCC) 12/11/2016   • Spinal headache     FOLLOWING SPINAL FOR CHILDBIRTH    • Vitamin D deficiency 12/11/2016   • Wears dentures     UPPER PLATE    • Elodia-Parkinson-White (WPW) syndrome     1990s     Past Surgical History:   Procedure Laterality Date   • BREAST BIOPSY  ~1990   • CARDIAC CATHETERIZATION N/A 10/24/2016    Procedure: Left Heart Cath;  Surgeon: Rubens Morejon MD;  Location:  [facility] CATH INVASIVE LOCATION;  Service:    • CARDIAC ELECTROPHYSIOLOGY PROCEDURE N/A 6/20/2017    Procedure: PVA;  Surgeon: Edvni Cook DO;  Location: [facility] INVASIVE LOCATION;  Service:    • CHOLECYSTECTOMY  ~1990   • COLONOSCOPY  2007   • GASTRIC BYPASS     • GASTRIC BYPASS  2005   • HAMMER TOE REPAIR      LEFT--GARO IN PLACE    • HYSTERECTOMY  1993    Complete   • ORIF HUMERUS FRACTURE Right 2/8/2019    Procedure: ORIF RIGHT PROXIMAL HUMERUS;  Surgeon: Yonatan Galdamez MD;  Location: [email or web address] OR;  Service: Orthopedics   • ORIF HUMERUS FRACTURE Right 11/18/2021    Procedure: HUMERUS PROXIMAL OPEN REDUCTION INTERNAL FIXATION RIGHT WITH HARDWARE REMOVAL AND NAIL PLACEMENT;  Surgeon: Yonatan Galdamez MD;  Location:  [facility];  Service: Orthopedics;  Laterality: Right;   • SHOULDER ACROMIOPLASTY Right 2019   • WRIST FRACTURE SURGERY Right 2014      General Information     Row Name 11/23/21 1524          OT Time and Intention    Document Type discharge treatment  -TB     Mode of Treatment occupational therapy; individual therapy  -TB     Row Name 11/23/21 1524          General Information    Patient Profile Reviewed yes  -TB     Existing Precautions/Restrictions fall; right; non-weight bearing; other (see comments)  s/p R humeral ORIF with NWB precautions, WBAT LLE with KI for transfers  -TB     Barriers to Rehab medically complex  -TB     Row Name 11/23/21 1524          Occupational Profile    Reason for Services/Referral (Occupational Profile) to advance occupational independence   "-TB     Row Name 11/23/21 1524          Cognition    Orientation Status (Cognition) oriented x 4  -TB     Row Name 11/23/21 1524          Safety Issues, Functional Mobility    Safety Issues Affecting Function (Mobility) safety precautions follow-through/compliance; sequencing abilities; awareness of need for assistance  -TB     Impairments Affecting Function (Mobility) balance; endurance/activity tolerance; pain; strength; postural/trunk control; range of motion (ROM); sensation/sensory awareness  -TB     Comment, Safety Issues/Impairments (Mobility) Pt transfers with Mod Ax1  -TB           User Key  (r) = Recorded By, (t) = Taken By, (c) = Cosigned By    Initials Name Provider Type    TB Lissy Costa, OT Occupational Therapist               Mobility/ADL's     Row Name 11/23/21 1526          Bed Mobility    Bed Mobility supine-sit; scooting/bridging  -TB     Scooting/Bridging [place] (Bed Mobility) supervision  -TB     Supine-Sit [place] (Bed Mobility) supervision  -TB     Bed Mobility, Safety Issues decreased use of arms for pushing/pulling; decreased use of legs for bridging/pushing  -TB     Assistive Device (Bed Mobility) head of bed elevated  -TB     Comment (Bed Mobility) Pt transitions to EOB with encouragment and increased time. Cues to maintain RUE NWB precautions.  -TB     Row Name 11/23/21 1526          Transfers    Transfers sit-stand transfer; toilet transfer; bed-chair transfer  -TB     Comment (Transfers) Education, cues and assist for hand placement, sequencing, and safety. Pt reports \"fatigued today.\" Mod A all transfers this session.  -TB     Bed-Chair [place] (Transfers) moderate assist (50% patient effort); verbal cues  -TB     Sit-Stand [place] (Transfers) moderate assist (50% patient effort); verbal cues  -TB     [place] Level (Toilet Transfer) moderate assist (50% patient effort); verbal cues  -TB     Assistive Device (Toilet Transfer) commode chair  -TB     " Row Name 11/23/21 1526          Toilet Transfer    Type (Toilet Transfer) sit-stand; stand-sit  -TB     Row Name 11/23/21 1526          Functional Mobility    Functional Mobility- Comment Defer to PT  -TB     Row Name 11/23/21 1526          Activities of Daily Living    BADL Assessment/Intervention bathing; upper body dressing; lower body dressing; grooming; feeding; toileting  -TB     Row Name 11/23/21 1526          Mobility    Extremity Weight-bearing Status right upper extremity; right lower extremity  -TB     Right Upper Extremity (Weight-bearing Status) non weight-bearing (NWB)   -TB     Right Lower Extremity (Weight-bearing Status) weight-bearing as tolerated (WBAT); other (see comments)  with KI donned  -TB     Row Name 11/23/21 1526          Lower Body Dressing Assessment/Training    [place] Level (Lower Body Dressing) don; pants/bottoms; socks; shoes/slippers; maximum assist (25% patient effort); verbal cues  -TB     Position (Lower Body Dressing) unsupported sitting; supported standing  -TB     Comment (Lower Body Dressing) Dependent to don KI  -TB     Row Name 11/23/21 1526          Upper Body Dressing Assessment/Training    [place] Level (Upper Body Dressing) doff; pajama/robe; don; pull-over garment; moderate assist (50% patient effort); verbal cues  sling mgmt/proper fit  -TB     Position (Upper Body Dressing) unsupported sitting  -TB     Comment (Upper Body Dressing) Education completed for RUE precautions, sling mgmt, and ADL retraining.  -TB     Row Name 11/23/21 1526          Self-Feeding Assessment/Training    [place] Level (Feeding) independent; liquids to mouth  -TB     Position (Self-Feeding) supported sitting  -TB     Row Name 11/23/21 1526          Toileting Assessment/Training    [place] Level (Toileting) adjust/manage clothing; moderate assist (50% patient effort); set up; perform perineal hygiene  -TB     Assistive Devices (Toileting) commode chair  -TB     Position  (Toileting) unsupported sitting; supported standing  -     Row Name 11/23/21 1526          Grooming Assessment/Training    [facility] Level (Grooming) set up; wash face, hands  -     Position (Grooming) edge of bed sitting  -     Row Name 11/23/21 1526          Bathing Assessment/Intervention    [facility] Level (Bathing) moderate assist (50% patient effort); upper body; lower body; verbal cues  -     Position (Bathing) edge of bed sitting  -           User Key  (r) = Recorded By, (t) = Taken By, (c) = Cosigned By    Initials Name Provider Type     Lissy Costa, OT Occupational Therapist               Obj/Interventions     Row Name 11/23/21 1533          Elbow/Forearm (Therapeutic Exercise)    Elbow/Forearm (Therapeutic Exercise) AROM (active range of motion)  -     Elbow/Forearm AROM (Therapeutic Exercise) right; flexion; extension; supination; pronation; sitting; 10 repetitions  -[facility] Name 11/23/21 1533          Wrist (Therapeutic Exercise)    Wrist (Therapeutic Exercise) AROM (active range of motion)  -     Wrist AROM (Therapeutic Exercise) right; flexion; extension; 10 repetitions  -     Row Name 11/23/21 1533          Hand (Therapeutic Exercise)    Hand (Therapeutic Exercise) AROM (active range of motion)  -     Hand AROM/AAROM (Therapeutic Exercise) right; AROM (active range of motion); finger flexion; finger extension; 10 repetitions  -     Row Name 11/23/21 1533          Motor Skills    Motor Skills therapeutic exercise  -     Row Name 11/23/21 1533          Balance    Balance Assessment sitting dynamic balance; sit to stand dynamic balance; standing dynamic balance  -     Dynamic Sitting Balance WNL; unsupported; sitting in chair; sitting, edge of bed  BSC  -TB     Sit to Stand Dynamic Balance moderate impairment; supported; standing  -TB     Static Standing Balance mild impairment; supported; standing  -TB     Dynamic Standing Balance moderate impairment;  "supported; standing  -TB     Balance Interventions sitting; standing; sit to stand; supported; static; dynamic; occupation based/functional task; UE activity with balance activity; weight shifting activity  -TB     Comment, Balance Mod Ax1 transfers this session.  -     Row Name 11/23/21 1533          Therapeutic Exercise    Therapeutic Exercise hand; wrist; elbow/forearm  Education completed for RUE HEP per MD parameters at hand, wrist, and elbow only.  -TB           User Key  (r) = Recorded By, (t) = Taken By, (c) = Cosigned By    Initials Name Provider Type    TB Lissy Costa, SONIA Occupational Therapist               Goals/Plan    No documentation.                Clinical Impression     Row Name 11/23/21 1535          Pain Assessment    Additional Documentation Pain Scale: Numbers Pre/Post-Treatment (Group)  -     Row Name 11/23/21 1535          Pain Scale: Numbers Pre/Post-Treatment    Pretreatment Pain Rating 3/10  -TB     Posttreatment Pain Rating 4/10  -TB     Pain Location - Side Right  -TB     Pain Location shoulder  -TB     Pre/Posttreatment Pain Comment \"twinges of pain\"  -TB     Pain Intervention(s) Ambulation/increased activity; Repositioned  -     Row Name 11/23/21 1535          Plan of Care Review    Plan of Care Reviewed With patient  -TB     Outcome Summary Pt is A&Ox4 and participates with good effort. Pt reports increased fatigue today and required Mod Ax1 all transfers. Education reinforced for RUE precautions, sling teaching and ADL retraining. Mod A UB bathing, dressing, and sling application. Max A LB dressing and Dependent KI application. RUE HEP completed with AROM hand, wrist and elbow only. Pt is set to d/c to SNF today. OT will d/c at this time.  -TB     Row Name 11/23/21 1535          Therapy Plan Review/Discharge Plan (OT)    Anticipated Discharge Disposition (OT) skilled nursing facility  -     Row Name 11/23/21 1535          Vital Signs    Pre Systolic BP Rehab --  RN " cleared OT  -TB     O2 Delivery Pre Treatment room air  -TB     O2 Delivery Intra Treatment room air  -TB     O2 Delivery Post Treatment room air  -TB     Pre Patient Position Supine  -TB     Intra Patient Position Standing  -TB     Post Patient Position Sitting  -TB     Row Name 11/23/21 1535          Positioning and Restraints    Pre-Treatment Position in bed  -TB     Post Treatment Position chair  -TB     In Chair notified nsg; reclined; call light within reach; encouraged to call for assist; with nsg; legs elevated; with brace  -TB           User Key  (r) = Recorded By, (t) = Taken By, (c) = Cosigned By    Initials Name Provider Type    TB Lissy Costa, OT Occupational Therapist               Outcome Measures     Row Name 11/23/21 1539          How much help from another is currently needed...    Putting on and taking off regular lower body clothing? 2  -TB     Bathing (including washing, rinsing, and drying) 2  -TB     Toileting (which includes using toilet bed pan or urinal) 2  -TB     Putting on and taking off regular upper body clothing 2  -TB     Taking care of personal grooming (such as brushing teeth) 3  -TB     Eating meals 3  -TB     AM-PAC 6 Clicks Score (OT) 14  -TB     Row Name 11/23/21 0800          How much help from another person do you currently need...    Turning from your back to your side while in flat bed without using bedrails? 3  -MK     Moving from lying on back to sitting on the side of a flat bed without bedrails? 3  -MK     Moving to and from a bed to a chair (including a wheelchair)? 2  -MK     Standing up from a chair using your arms (e.g., wheelchair, bedside chair)? 2  -MK     Climbing 3-5 steps with a railing? 1  -MK     To walk in hospital room? 1  -MK     AM-PAC 6 Clicks Score (PT) 12  -MK     Row Name 11/23/21 1539          Functional Assessment    Outcome Measure Options AM-PAC 6 Clicks Daily Activity (OT)  -TB           User Key  (r) = Recorded By, (t) = Taken By,  (c) = Cosigned By    Initials Name Provider Type    Lissy Negrete, OT Occupational Therapist    Dayanara Flannery, RN Registered Nurse              Occupational Therapy Education                 Title: PT OT SLP Therapies (Done)     Topic: Occupational Therapy (Done)     Point: ADL training (Done)     Description:   Instruct learner(s) on proper safety adaptation and remediation techniques during self care or transfers.   Instruct in proper use of assistive devices.              Learning Progress Summary           Patient Acceptance, E,D,TB, VU,DU,NR by TB at 11/23/2021 1539    Acceptance, E, VU by MA at 11/22/2021 1114    Acceptance, E, VU by MA at 11/21/2021 1147    Acceptance, E, VU by MA at 11/20/2021 1509    Acceptance, E, VU by MA at 11/19/2021 0859                   Point: Home exercise program (Done)     Description:   Instruct learner(s) on appropriate technique for monitoring, assisting and/or progressing therapeutic exercises/activities.              Learning Progress Summary           Patient Acceptance, E,D,TB, VU,DU,NR by TB at 11/23/2021 1539    Acceptance, E, VU by MA at 11/22/2021 1114    Acceptance, E, VU by MA at 11/21/2021 1147    Acceptance, E, VU by MA at 11/20/2021 1509    Acceptance, E, VU by MA at 11/19/2021 0859                   Point: Precautions (Done)     Description:   Instruct learner(s) on prescribed precautions during self-care and functional transfers.              Learning Progress Summary           Patient Acceptance, E,D,TB, VU,DU,NR by TB at 11/23/2021 1539    Acceptance, E, VU by MA at 11/22/2021 1114    Acceptance, E, VU by MA at 11/21/2021 1147    Acceptance, E, VU by MA at 11/20/2021 1509    Acceptance, E, VU by MA at 11/19/2021 0859                   Point: Body mechanics (Done)     Description:   Instruct learner(s) on proper positioning and spine alignment during self-care, functional mobility activities and/or exercises.              Learning Progress  Summary           Patient Acceptance, E, VU by MA at 11/22/2021 1114    Acceptance, E, VU by MA at 11/21/2021 1147    Acceptance, E, VU by MA at 11/20/2021 1509    Acceptance, E, VU by MA at 11/19/2021 0859                               User Key     Initials Effective Dates Name Provider Type Discipline     06/16/21 -  Lissy Costa, OT Occupational Therapist OT    MA 11/19/20 -  Farrah Mojica OT Occupational Therapist OT              OT Recommendation and Plan  Retired Outcome Summary/Treatment Plan (OT)  Anticipated Discharge Disposition (OT): skilled nursing facility  Plan of Care Review  Plan of Care Reviewed With: patient  Outcome Summary: Pt is A&Ox4 and participates with good effort. Pt reports increased fatigue today and required Mod Ax1 all transfers. Education reinforced for RUE precautions, sling teaching and ADL retraining. Mod A UB bathing, dressing, and sling application. Max A LB dressing and Dependent KI application. RUE HEP completed with AROM hand, wrist and elbow only. Pt is set to d/c to SNF today. OT will d/c at this time.  Plan of Care Reviewed With: patient  Outcome Summary: Pt is A&Ox4 and participates with good effort. Pt reports increased fatigue today and required Mod Ax1 all transfers. Education reinforced for RUE precautions, sling teaching and ADL retraining. Mod A UB bathing, dressing, and sling application. Max A LB dressing and Dependent KI application. RUE HEP completed with AROM hand, wrist and elbow only. Pt is set to d/c to SNF today. OT will d/c at this time.     Time Calculation:    Time Calculation- OT     Row Name 11/23/21 1303             Time Calculation- OT    OT Start Time 1303  -TB      OT Received On 11/23/21  -TB              Timed Charges    00589 - OT Therapeutic Exercise Minutes 10  -TB      70217 - OT Self Care/Mgmt Minutes 44  -TB              Total Minutes    Timed Charges Total Minutes 54  -TB       Total Minutes 54  -TB            User Key  (r) =  Recorded By, (t) = Taken By, (c) = Cosigned By    Initials Name Provider Type    TB Lissy Costa OT Occupational Therapist              Therapy Charges for Today     Code Description Service Date Service Provider Modifiers Qty    99653155455 HC OT THER PROC EA 15 MIN 11/23/2021 Lissy Costa OT GO 1    [id number] HC OT SELF CARE/MGMT/TRAIN EA 15 MIN 11/23/2021 Lissy Costa OT GO 3               Lissy Costa OT  11/23/2021

## 2021-11-23 NOTE — PLAN OF CARE
Problem: Adult Inpatient Plan of Care  Goal: Plan of Care Review  Recent Flowsheet Documentation  Taken 11/23/2021 6375 by Lissy Costa OT  Plan of Care Reviewed With: patient  Outcome Summary: Pt is A&Ox4 and participates with good effort. Pt reports increased fatigue today and required Mod Ax1 all transfers. Education reinforced for RUE precautions, sling teaching and ADL retraining. Mod A UB bathing, dressing, and sling application. Max A LB dressing and Dependent KI application. RUE HEP completed with AROM hand, wrist and elbow only. Pt is set to d/c to SNF today. OT will d/c at this time.

## 2022-03-15 RX ORDER — APIXABAN 5 MG/1
TABLET, FILM COATED ORAL
Qty: 60 TABLET | Refills: 5 | Status: SHIPPED | OUTPATIENT
Start: 2022-03-15 | End: 2022-05-19 | Stop reason: SDUPTHER

## 2022-03-15 NOTE — TELEPHONE ENCOUNTER
appt 5/2022  Lab Results   Component Value Date    GLUCOSE 118 (H) 11/23/2021    CALCIUM 8.7 11/23/2021     11/23/2021    K 3.9 11/23/2021    CO2 26.0 11/23/2021     11/23/2021    BUN 11 11/23/2021    CREATININE 0.72 11/23/2021    EGFRIFNONA 80 11/23/2021    BCR 15.3 11/23/2021    ANIONGAP 9.0 11/23/2021
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
## 2022-05-19 ENCOUNTER — OFFICE VISIT (OUTPATIENT)
Dept: CARDIOLOGY | Facility: CLINIC | Age: 72
End: 2022-05-19

## 2022-05-19 VITALS
SYSTOLIC BLOOD PRESSURE: 100 MMHG | HEIGHT: 66 IN | OXYGEN SATURATION: 98 % | BODY MASS INDEX: 23.14 KG/M2 | WEIGHT: 144 LBS | DIASTOLIC BLOOD PRESSURE: 68 MMHG | HEART RATE: 74 BPM

## 2022-05-19 DIAGNOSIS — I48.0 PAROXYSMAL ATRIAL FIBRILLATION: Primary | ICD-10-CM

## 2022-05-19 DIAGNOSIS — I25.10 CORONARY ARTERY DISEASE INVOLVING NATIVE CORONARY ARTERY OF NATIVE HEART WITHOUT ANGINA PECTORIS: ICD-10-CM

## 2022-05-19 DIAGNOSIS — E78.2 MIXED HYPERLIPIDEMIA: ICD-10-CM

## 2022-05-19 PROCEDURE — 99214 OFFICE O/P EST MOD 30 MIN: CPT | Performed by: INTERNAL MEDICINE

## 2022-05-19 RX ORDER — LORAZEPAM 0.5 MG/1
1 TABLET ORAL EVERY 12 HOURS
Status: ON HOLD | COMMUNITY
Start: 2022-04-21 | End: 2022-05-29

## 2022-05-19 RX ORDER — METOCLOPRAMIDE 5 MG/1
TABLET ORAL 3 TIMES DAILY
COMMUNITY
Start: 2022-04-21

## 2022-05-19 RX ORDER — MISOPROSTOL 200 UG/1
200 TABLET ORAL 3 TIMES DAILY
COMMUNITY
Start: 2022-05-16

## 2022-05-28 ENCOUNTER — HOSPITAL ENCOUNTER (OUTPATIENT)
Facility: HOSPITAL | Age: 72
Setting detail: OBSERVATION
Discharge: HOME | End: 2022-05-30
Attending: STUDENT IN AN ORGANIZED HEALTH CARE EDUCATION/TRAINING PROGRAM | Admitting: INTERNAL MEDICINE

## 2022-05-28 ENCOUNTER — APPOINTMENT (OUTPATIENT)
Dept: GENERAL RADIOLOGY | Facility: HOSPITAL | Age: 72
End: 2022-05-28

## 2022-05-28 DIAGNOSIS — R07.89 ATYPICAL CHEST PAIN: Primary | ICD-10-CM

## 2022-05-28 DIAGNOSIS — I48.0 PAROXYSMAL ATRIAL FIBRILLATION: ICD-10-CM

## 2022-05-28 DIAGNOSIS — R79.89 ELEVATED BRAIN NATRIURETIC PEPTIDE (BNP) LEVEL: ICD-10-CM

## 2022-05-28 DIAGNOSIS — R06.02 SHORTNESS OF BREATH: ICD-10-CM

## 2022-05-28 DIAGNOSIS — R42 DIZZINESS: ICD-10-CM

## 2022-05-28 DIAGNOSIS — Z74.09 IMPAIRED FUNCTIONAL MOBILITY, BALANCE, GAIT, AND ENDURANCE: ICD-10-CM

## 2022-05-28 DIAGNOSIS — Z86.79 HISTORY OF HYPERTENSION: ICD-10-CM

## 2022-05-28 DIAGNOSIS — R60.0 PEDAL EDEMA: ICD-10-CM

## 2022-05-28 DIAGNOSIS — Z79.01 CHRONIC ANTICOAGULATION: ICD-10-CM

## 2022-05-28 DIAGNOSIS — Z87.39 HISTORY OF RHEUMATOID ARTHRITIS: ICD-10-CM

## 2022-05-28 DIAGNOSIS — D53.9 MACROCYTIC ANEMIA: ICD-10-CM

## 2022-05-28 DIAGNOSIS — Z86.79 HISTORY OF WOLFF-PARKINSON-WHITE (WPW) SYNDROME: ICD-10-CM

## 2022-05-28 LAB
ALBUMIN SERPL-MCNC: 2.5 G/DL (ref 3.5–5.2)
ALBUMIN/GLOB SERPL: 1 G/DL
ALP SERPL-CCNC: 63 U/L (ref 39–117)
ALT SERPL W P-5'-P-CCNC: 13 U/L (ref 1–33)
ANION GAP SERPL CALCULATED.3IONS-SCNC: 8 MMOL/L (ref 5–15)
AST SERPL-CCNC: 25 U/L (ref 1–32)
BASOPHILS # BLD AUTO: 0.04 10*3/MM3 (ref 0–0.2)
BASOPHILS NFR BLD AUTO: 0.8 % (ref 0–1.5)
BILIRUB SERPL-MCNC: 0.2 MG/DL (ref 0–1.2)
BUN SERPL-MCNC: 7 MG/DL (ref 8–23)
BUN/CREAT SERPL: 7.8 (ref 7–25)
CALCIUM SPEC-SCNC: 8.2 MG/DL (ref 8.6–10.5)
CHLORIDE SERPL-SCNC: 102 MMOL/L (ref 98–107)
CO2 SERPL-SCNC: 26 MMOL/L (ref 22–29)
CREAT SERPL-MCNC: 0.9 MG/DL (ref 0.57–1)
DEPRECATED RDW RBC AUTO: 49.1 FL (ref 37–54)
EGFRCR SERPLBLD CKD-EPI 2021: 67.6 ML/MIN/1.73
EOSINOPHIL # BLD AUTO: 0.04 10*3/MM3 (ref 0–0.4)
EOSINOPHIL NFR BLD AUTO: 0.8 % (ref 0.3–6.2)
ERYTHROCYTE [DISTWIDTH] IN BLOOD BY AUTOMATED COUNT: 13.2 % (ref 12.3–15.4)
GLOBULIN UR ELPH-MCNC: 2.6 GM/DL
GLUCOSE SERPL-MCNC: 84 MG/DL (ref 65–99)
HCT VFR BLD AUTO: 29.5 % (ref 34–46.6)
HGB BLD-MCNC: 9.7 G/DL (ref 12–15.9)
IMM GRANULOCYTES # BLD AUTO: 0 10*3/MM3 (ref 0–0.05)
IMM GRANULOCYTES NFR BLD AUTO: 0 % (ref 0–0.5)
LIPASE SERPL-CCNC: 20 U/L (ref 13–60)
LYMPHOCYTES # BLD AUTO: 2.03 10*3/MM3 (ref 0.7–3.1)
LYMPHOCYTES NFR BLD AUTO: 38.7 % (ref 19.6–45.3)
MCH RBC QN AUTO: 33.2 PG (ref 26.6–33)
MCHC RBC AUTO-ENTMCNC: 32.9 G/DL (ref 31.5–35.7)
MCV RBC AUTO: 101 FL (ref 79–97)
MONOCYTES # BLD AUTO: 0.52 10*3/MM3 (ref 0.1–0.9)
MONOCYTES NFR BLD AUTO: 9.9 % (ref 5–12)
NEUTROPHILS NFR BLD AUTO: 2.61 10*3/MM3 (ref 1.7–7)
NEUTROPHILS NFR BLD AUTO: 49.8 % (ref 42.7–76)
NRBC BLD AUTO-RTO: 0 /100 WBC (ref 0–0.2)
NT-PROBNP SERPL-MCNC: 1653 PG/ML (ref 0–900)
PLATELET # BLD AUTO: 197 10*3/MM3 (ref 140–450)
PMV BLD AUTO: 9.9 FL (ref 6–12)
POTASSIUM SERPL-SCNC: 3.3 MMOL/L (ref 3.5–5.2)
PROT SERPL-MCNC: 5.1 G/DL (ref 6–8.5)
RBC # BLD AUTO: 2.92 10*6/MM3 (ref 3.77–5.28)
SODIUM SERPL-SCNC: 136 MMOL/L (ref 136–145)
TROPONIN T SERPL-MCNC: <0.01 NG/ML (ref 0–0.03)
WBC NRBC COR # BLD: 5.24 10*3/MM3 (ref 3.4–10.8)

## 2022-05-28 PROCEDURE — 84443 ASSAY THYROID STIM HORMONE: CPT | Performed by: PHYSICIAN ASSISTANT

## 2022-05-28 PROCEDURE — 82746 ASSAY OF FOLIC ACID SERUM: CPT | Performed by: PHYSICIAN ASSISTANT

## 2022-05-28 PROCEDURE — 80053 COMPREHEN METABOLIC PANEL: CPT | Performed by: STUDENT IN AN ORGANIZED HEALTH CARE EDUCATION/TRAINING PROGRAM

## 2022-05-28 PROCEDURE — 85379 FIBRIN DEGRADATION QUANT: CPT | Performed by: PHYSICIAN ASSISTANT

## 2022-05-28 PROCEDURE — 84484 ASSAY OF TROPONIN QUANT: CPT | Performed by: STUDENT IN AN ORGANIZED HEALTH CARE EDUCATION/TRAINING PROGRAM

## 2022-05-28 PROCEDURE — 82607 VITAMIN B-12: CPT | Performed by: PHYSICIAN ASSISTANT

## 2022-05-28 PROCEDURE — 71045 X-RAY EXAM CHEST 1 VIEW: CPT

## 2022-05-28 PROCEDURE — 84439 ASSAY OF FREE THYROXINE: CPT | Performed by: NURSE PRACTITIONER

## 2022-05-28 PROCEDURE — 93005 ELECTROCARDIOGRAM TRACING: CPT | Performed by: STUDENT IN AN ORGANIZED HEALTH CARE EDUCATION/TRAINING PROGRAM

## 2022-05-28 PROCEDURE — 99284 EMERGENCY DEPT VISIT MOD MDM: CPT

## 2022-05-28 PROCEDURE — 83880 ASSAY OF NATRIURETIC PEPTIDE: CPT | Performed by: STUDENT IN AN ORGANIZED HEALTH CARE EDUCATION/TRAINING PROGRAM

## 2022-05-28 PROCEDURE — 85025 COMPLETE CBC W/AUTO DIFF WBC: CPT | Performed by: STUDENT IN AN ORGANIZED HEALTH CARE EDUCATION/TRAINING PROGRAM

## 2022-05-28 PROCEDURE — 83690 ASSAY OF LIPASE: CPT | Performed by: STUDENT IN AN ORGANIZED HEALTH CARE EDUCATION/TRAINING PROGRAM

## 2022-05-28 RX ORDER — ASPIRIN 81 MG/1
324 TABLET, CHEWABLE ORAL ONCE
Status: DISCONTINUED | OUTPATIENT
Start: 2022-05-28 | End: 2022-05-29

## 2022-05-28 RX ORDER — SODIUM CHLORIDE 0.9 % (FLUSH) 0.9 %
10 SYRINGE (ML) INJECTION AS NEEDED
Status: DISCONTINUED | OUTPATIENT
Start: 2022-05-28 | End: 2022-05-30 | Stop reason: HOSPADM

## 2022-05-29 ENCOUNTER — APPOINTMENT (OUTPATIENT)
Dept: GENERAL RADIOLOGY | Facility: HOSPITAL | Age: 72
End: 2022-05-29

## 2022-05-29 ENCOUNTER — APPOINTMENT (OUTPATIENT)
Dept: CARDIOLOGY | Facility: HOSPITAL | Age: 72
End: 2022-05-29

## 2022-05-29 PROBLEM — K21.9 GERD WITHOUT ESOPHAGITIS: Status: ACTIVE | Noted: 2022-05-29

## 2022-05-29 LAB
ABO GROUP BLD: NORMAL
ANION GAP SERPL CALCULATED.3IONS-SCNC: 8 MMOL/L (ref 5–15)
BACTERIA UR QL AUTO: ABNORMAL /HPF
BASOPHILS # BLD AUTO: 0.04 10*3/MM3 (ref 0–0.2)
BASOPHILS NFR BLD AUTO: 0.7 % (ref 0–1.5)
BH CV ECHO MEAS - AO MAX PG: 6.3 MMHG
BH CV ECHO MEAS - AO MEAN PG: 3.4 MMHG
BH CV ECHO MEAS - AO ROOT DIAM: 3.1 CM
BH CV ECHO MEAS - AO V2 MAX: 125.1 CM/SEC
BH CV ECHO MEAS - AO V2 VTI: 28.8 CM
BH CV ECHO MEAS - AVA(I,D): 2.13 CM2
BH CV ECHO MEAS - EDV(CUBED): 118.6 ML
BH CV ECHO MEAS - EDV(MOD-SP2): 68.4 ML
BH CV ECHO MEAS - EDV(MOD-SP4): 74.5 ML
BH CV ECHO MEAS - EF(MOD-BP): 59 %
BH CV ECHO MEAS - EF(MOD-SP2): 55.7 %
BH CV ECHO MEAS - EF(MOD-SP4): 66.6 %
BH CV ECHO MEAS - ESV(CUBED): 34.1 ML
BH CV ECHO MEAS - ESV(MOD-SP2): 30.3 ML
BH CV ECHO MEAS - ESV(MOD-SP4): 24.9 ML
BH CV ECHO MEAS - FS: 34 %
BH CV ECHO MEAS - IVS/LVPW: 0.86 CM
BH CV ECHO MEAS - IVSD: 0.75 CM
BH CV ECHO MEAS - LA DIMENSION: 4.9 CM
BH CV ECHO MEAS - LAT PEAK E' VEL: 12.2 CM/SEC
BH CV ECHO MEAS - LV MASS(C)D: 135.5 GRAMS
BH CV ECHO MEAS - LV MAX PG: 3.7 MMHG
BH CV ECHO MEAS - LV MEAN PG: 1.75 MMHG
BH CV ECHO MEAS - LV V1 MAX: 95.7 CM/SEC
BH CV ECHO MEAS - LV V1 VTI: 20.5 CM
BH CV ECHO MEAS - LVIDD: 4.9 CM
BH CV ECHO MEAS - LVIDS: 3.2 CM
BH CV ECHO MEAS - LVOT AREA: 3 CM2
BH CV ECHO MEAS - LVOT DIAM: 1.95 CM
BH CV ECHO MEAS - LVPWD: 0.88 CM
BH CV ECHO MEAS - MED PEAK E' VEL: 6.6 CM/SEC
BH CV ECHO MEAS - MV A MAX VEL: 49.2 CM/SEC
BH CV ECHO MEAS - MV DEC SLOPE: 395.4 CM/SEC2
BH CV ECHO MEAS - MV DEC TIME: 0.21 MSEC
BH CV ECHO MEAS - MV E MAX VEL: 87 CM/SEC
BH CV ECHO MEAS - MV E/A: 1.77
BH CV ECHO MEAS - MV MAX PG: 4.3 MMHG
BH CV ECHO MEAS - MV MEAN PG: 1.82 MMHG
BH CV ECHO MEAS - MV P1/2T: 79.5 MSEC
BH CV ECHO MEAS - MV V2 VTI: 27.5 CM
BH CV ECHO MEAS - MVA(P1/2T): 2.8 CM2
BH CV ECHO MEAS - MVA(VTI): 2.23 CM2
BH CV ECHO MEAS - PA ACC TIME: 0.14 SEC
BH CV ECHO MEAS - PA PR(ACCEL): 15.6 MMHG
BH CV ECHO MEAS - PA V2 MAX: 93.1 CM/SEC
BH CV ECHO MEAS - RAP SYSTOLE: 3 MMHG
BH CV ECHO MEAS - RVSP: 53 MMHG
BH CV ECHO MEAS - SV(LVOT): 61.3 ML
BH CV ECHO MEAS - SV(MOD-SP2): 38.1 ML
BH CV ECHO MEAS - SV(MOD-SP4): 49.6 ML
BH CV ECHO MEAS - TAPSE (>1.6): 1.81 CM
BH CV ECHO MEAS - TR MAX PG: 50 MMHG
BH CV ECHO MEAS - TR MAX VEL: 354 CM/SEC
BH CV ECHO MEASUREMENTS AVERAGE E/E' RATIO: 9.26
BH CV VAS BP LEFT ARM: NORMAL MMHG
BH CV XLRA - RV BASE: 3 CM
BH CV XLRA - RV LENGTH: 4.6 CM
BH CV XLRA - RV MID: 1.99 CM
BH CV XLRA - TDI S': 21.4 CM/SEC
BH CV XLRA MEAS LEFT DIST CCA EDV: -18.5 CM/SEC
BH CV XLRA MEAS LEFT DIST CCA PSV: -73.5 CM/SEC
BH CV XLRA MEAS LEFT DIST ICA EDV: 34 CM/SEC
BH CV XLRA MEAS LEFT DIST ICA PSV: 99 CM/SEC
BH CV XLRA MEAS LEFT ICA/CCA RATIO: 1
BH CV XLRA MEAS LEFT MID CCA EDV: -17.3 CM/SEC
BH CV XLRA MEAS LEFT MID CCA PSV: -71.9 CM/SEC
BH CV XLRA MEAS LEFT MID ICA EDV: -35.8 CM/SEC
BH CV XLRA MEAS LEFT MID ICA PSV: -82.5 CM/SEC
BH CV XLRA MEAS LEFT PROX CCA EDV: 16.9 CM/SEC
BH CV XLRA MEAS LEFT PROX CCA PSV: 66.8 CM/SEC
BH CV XLRA MEAS LEFT PROX ECA EDV: -15.4 CM/SEC
BH CV XLRA MEAS LEFT PROX ECA PSV: -81.7 CM/SEC
BH CV XLRA MEAS LEFT PROX ICA EDV: 18.5 CM/SEC
BH CV XLRA MEAS LEFT PROX ICA PSV: 73.1 CM/SEC
BH CV XLRA MEAS LEFT PROX SCLA PSV: 129.6 CM/SEC
BH CV XLRA MEAS LEFT VERTEBRAL A EDV: 20 CM/SEC
BH CV XLRA MEAS LEFT VERTEBRAL A PSV: 64 CM/SEC
BH CV XLRA MEAS RIGHT DIST CCA EDV: -25.1 CM/SEC
BH CV XLRA MEAS RIGHT DIST CCA PSV: -82.5 CM/SEC
BH CV XLRA MEAS RIGHT DIST ICA EDV: -22.8 CM/SEC
BH CV XLRA MEAS RIGHT DIST ICA PSV: -57.8 CM/SEC
BH CV XLRA MEAS RIGHT ICA/CCA RATIO: 1
BH CV XLRA MEAS RIGHT MID CCA EDV: -22.8 CM/SEC
BH CV XLRA MEAS RIGHT MID CCA PSV: -80.9 CM/SEC
BH CV XLRA MEAS RIGHT MID ICA EDV: -18.1 CM/SEC
BH CV XLRA MEAS RIGHT MID ICA PSV: -51.9 CM/SEC
BH CV XLRA MEAS RIGHT PROX CCA EDV: 23.6 CM/SEC
BH CV XLRA MEAS RIGHT PROX CCA PSV: 89.6 CM/SEC
BH CV XLRA MEAS RIGHT PROX ECA EDV: -11.4 CM/SEC
BH CV XLRA MEAS RIGHT PROX ECA PSV: -55.8 CM/SEC
BH CV XLRA MEAS RIGHT PROX ICA EDV: 29.9 CM/SEC
BH CV XLRA MEAS RIGHT PROX ICA PSV: 84.1 CM/SEC
BH CV XLRA MEAS RIGHT PROX SCLA PSV: 111.6 CM/SEC
BH CV XLRA MEAS RIGHT VERTEBRAL A EDV: -10.6 CM/SEC
BH CV XLRA MEAS RIGHT VERTEBRAL A PSV: -37.3 CM/SEC
BILIRUB UR QL STRIP: NEGATIVE
BLD GP AB SCN SERPL QL: NEGATIVE
BUN SERPL-MCNC: 8 MG/DL (ref 8–23)
BUN/CREAT SERPL: 9.6 (ref 7–25)
CALCIUM SPEC-SCNC: 8.3 MG/DL (ref 8.6–10.5)
CHLORIDE SERPL-SCNC: 102 MMOL/L (ref 98–107)
CHOLEST SERPL-MCNC: 164 MG/DL (ref 0–200)
CLARITY UR: CLEAR
CO2 SERPL-SCNC: 27 MMOL/L (ref 22–29)
COD CRY URNS QL: ABNORMAL /HPF
COLOR UR: YELLOW
CREAT SERPL-MCNC: 0.83 MG/DL (ref 0.57–1)
D DIMER PPP FEU-MCNC: 0.3 MCGFEU/ML (ref 0.01–0.5)
DEPRECATED RDW RBC AUTO: 46.4 FL (ref 37–54)
EGFRCR SERPLBLD CKD-EPI 2021: 74.5 ML/MIN/1.73
EOSINOPHIL # BLD AUTO: 0.04 10*3/MM3 (ref 0–0.4)
EOSINOPHIL NFR BLD AUTO: 0.7 % (ref 0.3–6.2)
ERYTHROCYTE [DISTWIDTH] IN BLOOD BY AUTOMATED COUNT: 13.1 % (ref 12.3–15.4)
FLUAV SUBTYP SPEC NAA+PROBE: NOT DETECTED
FLUBV RNA ISLT QL NAA+PROBE: NOT DETECTED
FOLATE SERPL-MCNC: 7.6 NG/ML (ref 4.78–24.2)
GLUCOSE SERPL-MCNC: 84 MG/DL (ref 65–99)
GLUCOSE UR STRIP-MCNC: NEGATIVE MG/DL
GRAN CASTS URNS QL MICRO: ABNORMAL /LPF
HBA1C MFR BLD: 4.6 % (ref 4.8–5.6)
HCT VFR BLD AUTO: 27.4 % (ref 34–46.6)
HDLC SERPL-MCNC: 98 MG/DL (ref 40–60)
HEMOCCULT STL QL: NEGATIVE
HGB BLD-MCNC: 9.5 G/DL (ref 12–15.9)
HGB UR QL STRIP.AUTO: NEGATIVE
HOLD SPECIMEN: NORMAL
HYALINE CASTS UR QL AUTO: ABNORMAL /LPF
IMM GRANULOCYTES # BLD AUTO: 0.01 10*3/MM3 (ref 0–0.05)
IMM GRANULOCYTES NFR BLD AUTO: 0.2 % (ref 0–0.5)
KETONES UR QL STRIP: NEGATIVE
LDLC SERPL CALC-MCNC: 54 MG/DL (ref 0–100)
LDLC/HDLC SERPL: 0.54 {RATIO}
LEFT ATRIUM VOLUME INDEX: 48.5 ML/M2
LEUKOCYTE ESTERASE UR QL STRIP.AUTO: ABNORMAL
LYMPHOCYTES # BLD AUTO: 2.48 10*3/MM3 (ref 0.7–3.1)
LYMPHOCYTES NFR BLD AUTO: 45.6 % (ref 19.6–45.3)
MAGNESIUM SERPL-MCNC: 1.6 MG/DL (ref 1.6–2.4)
MAXIMAL PREDICTED HEART RATE: 147 BPM
MAXIMAL PREDICTED HEART RATE: 147 BPM
MCH RBC QN AUTO: 33.1 PG (ref 26.6–33)
MCHC RBC AUTO-ENTMCNC: 34.7 G/DL (ref 31.5–35.7)
MCV RBC AUTO: 95.5 FL (ref 79–97)
MONOCYTES # BLD AUTO: 0.52 10*3/MM3 (ref 0.1–0.9)
MONOCYTES NFR BLD AUTO: 9.6 % (ref 5–12)
NEUTROPHILS NFR BLD AUTO: 2.35 10*3/MM3 (ref 1.7–7)
NEUTROPHILS NFR BLD AUTO: 43.2 % (ref 42.7–76)
NITRITE UR QL STRIP: NEGATIVE
NRBC BLD AUTO-RTO: 0 /100 WBC (ref 0–0.2)
PH UR STRIP.AUTO: 6.5 [PH] (ref 5–8)
PLATELET # BLD AUTO: 188 10*3/MM3 (ref 140–450)
PMV BLD AUTO: 10.4 FL (ref 6–12)
POTASSIUM SERPL-SCNC: 3.1 MMOL/L (ref 3.5–5.2)
POTASSIUM SERPL-SCNC: 4 MMOL/L (ref 3.5–5.2)
PROT UR QL STRIP: NEGATIVE
QT INTERVAL: 448 MS
QT INTERVAL: 470 MS
QTC INTERVAL: 484 MS
QTC INTERVAL: 533 MS
RBC # BLD AUTO: 2.87 10*6/MM3 (ref 3.77–5.28)
RBC # UR STRIP: ABNORMAL /HPF
REF LAB TEST METHOD: ABNORMAL
RH BLD: POSITIVE
RIGHT ARM BP: NORMAL MMHG
SARS-COV-2 RNA PNL SPEC NAA+PROBE: NOT DETECTED
SODIUM SERPL-SCNC: 137 MMOL/L (ref 136–145)
SP GR UR STRIP: 1.01 (ref 1–1.03)
SQUAMOUS #/AREA URNS HPF: ABNORMAL /HPF
STRESS TARGET HR: 125 BPM
STRESS TARGET HR: 125 BPM
T&S EXPIRATION DATE: NORMAL
T4 FREE SERPL-MCNC: 0.86 NG/DL (ref 0.93–1.7)
T4 FREE SERPL-MCNC: 0.88 NG/DL (ref 0.93–1.7)
TRIGL SERPL-MCNC: 63 MG/DL (ref 0–150)
TROPONIN T SERPL-MCNC: <0.01 NG/ML (ref 0–0.03)
TROPONIN T SERPL-MCNC: <0.01 NG/ML (ref 0–0.03)
TSH SERPL DL<=0.05 MIU/L-ACNC: 7.64 UIU/ML (ref 0.27–4.2)
UROBILINOGEN UR QL STRIP: ABNORMAL
VIT B12 BLD-MCNC: 532 PG/ML (ref 211–946)
VLDLC SERPL-MCNC: 12 MG/DL (ref 5–40)
WBC # UR STRIP: ABNORMAL /HPF
WBC NRBC COR # BLD: 5.44 10*3/MM3 (ref 3.4–10.8)
WHOLE BLOOD HOLD COAG: NORMAL
WHOLE BLOOD HOLD SPECIMEN: NORMAL

## 2022-05-29 PROCEDURE — G0378 HOSPITAL OBSERVATION PER HR: HCPCS

## 2022-05-29 PROCEDURE — 87636 SARSCOV2 & INF A&B AMP PRB: CPT | Performed by: PHYSICIAN ASSISTANT

## 2022-05-29 PROCEDURE — 83735 ASSAY OF MAGNESIUM: CPT | Performed by: NURSE PRACTITIONER

## 2022-05-29 PROCEDURE — 83036 HEMOGLOBIN GLYCOSYLATED A1C: CPT | Performed by: NURSE PRACTITIONER

## 2022-05-29 PROCEDURE — 80048 BASIC METABOLIC PNL TOTAL CA: CPT | Performed by: NURSE PRACTITIONER

## 2022-05-29 PROCEDURE — 0 POTASSIUM CHLORIDE 10 MEQ/100ML SOLUTION: Performed by: NURSE PRACTITIONER

## 2022-05-29 PROCEDURE — 93005 ELECTROCARDIOGRAM TRACING: CPT | Performed by: NURSE PRACTITIONER

## 2022-05-29 PROCEDURE — 93306 TTE W/DOPPLER COMPLETE: CPT | Performed by: INTERNAL MEDICINE

## 2022-05-29 PROCEDURE — 93005 ELECTROCARDIOGRAM TRACING: CPT | Performed by: STUDENT IN AN ORGANIZED HEALTH CARE EDUCATION/TRAINING PROGRAM

## 2022-05-29 PROCEDURE — 99214 OFFICE O/P EST MOD 30 MIN: CPT | Performed by: HOSPITALIST

## 2022-05-29 PROCEDURE — 84132 ASSAY OF SERUM POTASSIUM: CPT | Performed by: INTERNAL MEDICINE

## 2022-05-29 PROCEDURE — 96365 THER/PROPH/DIAG IV INF INIT: CPT

## 2022-05-29 PROCEDURE — 86901 BLOOD TYPING SEROLOGIC RH(D): CPT | Performed by: INTERNAL MEDICINE

## 2022-05-29 PROCEDURE — 86900 BLOOD TYPING SEROLOGIC ABO: CPT | Performed by: INTERNAL MEDICINE

## 2022-05-29 PROCEDURE — 82272 OCCULT BLD FECES 1-3 TESTS: CPT | Performed by: INTERNAL MEDICINE

## 2022-05-29 PROCEDURE — 86850 RBC ANTIBODY SCREEN: CPT | Performed by: INTERNAL MEDICINE

## 2022-05-29 PROCEDURE — 93005 ELECTROCARDIOGRAM TRACING: CPT | Performed by: PHYSICIAN ASSISTANT

## 2022-05-29 PROCEDURE — 93880 EXTRACRANIAL BILAT STUDY: CPT

## 2022-05-29 PROCEDURE — 99220 PR INITIAL OBSERVATION CARE/DAY 70 MINUTES: CPT | Performed by: INTERNAL MEDICINE

## 2022-05-29 PROCEDURE — 93010 ELECTROCARDIOGRAM REPORT: CPT | Performed by: INTERNAL MEDICINE

## 2022-05-29 PROCEDURE — 84484 ASSAY OF TROPONIN QUANT: CPT | Performed by: STUDENT IN AN ORGANIZED HEALTH CARE EDUCATION/TRAINING PROGRAM

## 2022-05-29 PROCEDURE — 96366 THER/PROPH/DIAG IV INF ADDON: CPT

## 2022-05-29 PROCEDURE — 84439 ASSAY OF FREE THYROXINE: CPT | Performed by: NURSE PRACTITIONER

## 2022-05-29 PROCEDURE — 80061 LIPID PANEL: CPT | Performed by: NURSE PRACTITIONER

## 2022-05-29 PROCEDURE — 97161 PT EVAL LOW COMPLEX 20 MIN: CPT

## 2022-05-29 PROCEDURE — 93880 EXTRACRANIAL BILAT STUDY: CPT | Performed by: INTERNAL MEDICINE

## 2022-05-29 PROCEDURE — 71046 X-RAY EXAM CHEST 2 VIEWS: CPT

## 2022-05-29 PROCEDURE — 0 MAGNESIUM SULFATE 4 GM/100ML SOLUTION: Performed by: NURSE PRACTITIONER

## 2022-05-29 PROCEDURE — 81001 URINALYSIS AUTO W/SCOPE: CPT | Performed by: PHYSICIAN ASSISTANT

## 2022-05-29 PROCEDURE — 96368 THER/DIAG CONCURRENT INF: CPT

## 2022-05-29 PROCEDURE — 85025 COMPLETE CBC W/AUTO DIFF WBC: CPT | Performed by: NURSE PRACTITIONER

## 2022-05-29 PROCEDURE — 93306 TTE W/DOPPLER COMPLETE: CPT

## 2022-05-29 PROCEDURE — 84484 ASSAY OF TROPONIN QUANT: CPT | Performed by: NURSE PRACTITIONER

## 2022-05-29 RX ORDER — ACETAMINOPHEN 325 MG/1
650 TABLET ORAL EVERY 4 HOURS PRN
Status: DISCONTINUED | OUTPATIENT
Start: 2022-05-29 | End: 2022-05-30 | Stop reason: HOSPADM

## 2022-05-29 RX ORDER — ASPIRIN 81 MG/1
81 TABLET ORAL ONCE
Status: DISCONTINUED | OUTPATIENT
Start: 2022-05-29 | End: 2022-05-29

## 2022-05-29 RX ORDER — HYDROXYCHLOROQUINE SULFATE 200 MG/1
200 TABLET, FILM COATED ORAL EVERY 12 HOURS SCHEDULED
Status: DISCONTINUED | OUTPATIENT
Start: 2022-05-29 | End: 2022-05-30 | Stop reason: HOSPADM

## 2022-05-29 RX ORDER — MAGNESIUM SULFATE HEPTAHYDRATE 40 MG/ML
2 INJECTION, SOLUTION INTRAVENOUS AS NEEDED
Status: DISCONTINUED | OUTPATIENT
Start: 2022-05-29 | End: 2022-05-30 | Stop reason: HOSPADM

## 2022-05-29 RX ORDER — FOLIC ACID 1 MG/1
1 TABLET ORAL DAILY
COMMUNITY

## 2022-05-29 RX ORDER — LORAZEPAM 0.5 MG/1
0.5 TABLET ORAL EVERY 12 HOURS
Status: DISCONTINUED | OUTPATIENT
Start: 2022-05-29 | End: 2022-05-29

## 2022-05-29 RX ORDER — SODIUM CHLORIDE 0.9 % (FLUSH) 0.9 %
10 SYRINGE (ML) INJECTION EVERY 12 HOURS SCHEDULED
Status: DISCONTINUED | OUTPATIENT
Start: 2022-05-29 | End: 2022-05-30 | Stop reason: HOSPADM

## 2022-05-29 RX ORDER — SODIUM CHLORIDE 0.9 % (FLUSH) 0.9 %
10 SYRINGE (ML) INJECTION AS NEEDED
Status: DISCONTINUED | OUTPATIENT
Start: 2022-05-29 | End: 2022-05-30 | Stop reason: HOSPADM

## 2022-05-29 RX ORDER — ASPIRIN 81 MG/1
81 TABLET ORAL DAILY
Status: DISCONTINUED | OUTPATIENT
Start: 2022-05-30 | End: 2022-05-30 | Stop reason: HOSPADM

## 2022-05-29 RX ORDER — TIMOLOL MALEATE 5 MG/ML
1 SOLUTION/ DROPS OPHTHALMIC EVERY 12 HOURS SCHEDULED
Status: DISCONTINUED | OUTPATIENT
Start: 2022-05-29 | End: 2022-05-30 | Stop reason: HOSPADM

## 2022-05-29 RX ORDER — MAGNESIUM SULFATE HEPTAHYDRATE 40 MG/ML
4 INJECTION, SOLUTION INTRAVENOUS AS NEEDED
Status: DISCONTINUED | OUTPATIENT
Start: 2022-05-29 | End: 2022-05-30 | Stop reason: HOSPADM

## 2022-05-29 RX ORDER — ASPIRIN 81 MG/1
324 TABLET, CHEWABLE ORAL ONCE
Status: COMPLETED | OUTPATIENT
Start: 2022-05-29 | End: 2022-05-29

## 2022-05-29 RX ORDER — NITROGLYCERIN 20 MG/100ML
5-200 INJECTION INTRAVENOUS
Status: DISCONTINUED | OUTPATIENT
Start: 2022-05-29 | End: 2022-05-29

## 2022-05-29 RX ORDER — LORAZEPAM 0.5 MG/1
0.5 TABLET ORAL 2 TIMES DAILY PRN
Status: DISCONTINUED | OUTPATIENT
Start: 2022-05-29 | End: 2022-05-30 | Stop reason: HOSPADM

## 2022-05-29 RX ORDER — MISOPROSTOL 200 UG/1
200 TABLET ORAL 3 TIMES DAILY
Status: DISCONTINUED | OUTPATIENT
Start: 2022-05-29 | End: 2022-05-30 | Stop reason: HOSPADM

## 2022-05-29 RX ORDER — POTASSIUM CHLORIDE 7.45 MG/ML
10 INJECTION INTRAVENOUS
Status: DISCONTINUED | OUTPATIENT
Start: 2022-05-29 | End: 2022-05-30 | Stop reason: HOSPADM

## 2022-05-29 RX ORDER — POTASSIUM CHLORIDE 750 MG/1
40 CAPSULE, EXTENDED RELEASE ORAL AS NEEDED
Status: DISCONTINUED | OUTPATIENT
Start: 2022-05-29 | End: 2022-05-30 | Stop reason: HOSPADM

## 2022-05-29 RX ORDER — METOCLOPRAMIDE 10 MG/1
5 TABLET ORAL
Status: DISCONTINUED | OUTPATIENT
Start: 2022-05-29 | End: 2022-05-30 | Stop reason: HOSPADM

## 2022-05-29 RX ORDER — POTASSIUM CHLORIDE 1.5 G/1.77G
40 POWDER, FOR SOLUTION ORAL AS NEEDED
Status: DISCONTINUED | OUTPATIENT
Start: 2022-05-29 | End: 2022-05-30 | Stop reason: HOSPADM

## 2022-05-29 RX ORDER — LEVOTHYROXINE SODIUM 0.03 MG/1
25 TABLET ORAL
Status: DISCONTINUED | OUTPATIENT
Start: 2022-05-29 | End: 2022-05-30 | Stop reason: HOSPADM

## 2022-05-29 RX ORDER — FLUOXETINE HYDROCHLORIDE 20 MG/1
40 CAPSULE ORAL DAILY
Status: DISCONTINUED | OUTPATIENT
Start: 2022-05-29 | End: 2022-05-30 | Stop reason: HOSPADM

## 2022-05-29 RX ORDER — MULTIPLE VITAMINS W/ MINERALS TAB 9MG-400MCG
1 TAB ORAL DAILY
COMMUNITY

## 2022-05-29 RX ORDER — TRAZODONE HYDROCHLORIDE 100 MG/1
100 TABLET ORAL NIGHTLY
Status: DISCONTINUED | OUTPATIENT
Start: 2022-05-29 | End: 2022-05-30 | Stop reason: HOSPADM

## 2022-05-29 RX ADMIN — POTASSIUM CHLORIDE 10 MEQ: 10 INJECTION, SOLUTION INTRAVENOUS at 11:31

## 2022-05-29 RX ADMIN — ACETAMINOPHEN 650 MG: 325 TABLET ORAL at 13:16

## 2022-05-29 RX ADMIN — MISOPROSTOL 200 MCG: 200 TABLET ORAL at 17:20

## 2022-05-29 RX ADMIN — MAGNESIUM SULFATE HEPTAHYDRATE 4 G: 40 INJECTION, SOLUTION INTRAVENOUS at 05:00

## 2022-05-29 RX ADMIN — POTASSIUM CHLORIDE 10 MEQ: 10 INJECTION, SOLUTION INTRAVENOUS at 10:19

## 2022-05-29 RX ADMIN — POTASSIUM CHLORIDE 10 MEQ: 10 INJECTION, SOLUTION INTRAVENOUS at 12:24

## 2022-05-29 RX ADMIN — HYDROXYCHLOROQUINE SULFATE 200 MG: 200 TABLET, FILM COATED ORAL at 20:29

## 2022-05-29 RX ADMIN — TIMOLOL MALEATE 1 DROP: 5 SOLUTION/ DROPS OPHTHALMIC at 10:24

## 2022-05-29 RX ADMIN — FLUOXETINE HYDROCHLORIDE 40 MG: 20 CAPSULE ORAL at 10:21

## 2022-05-29 RX ADMIN — Medication 10 ML: at 20:32

## 2022-05-29 RX ADMIN — METOCLOPRAMIDE 5 MG: 10 TABLET ORAL at 17:20

## 2022-05-29 RX ADMIN — POTASSIUM CHLORIDE 10 MEQ: 10 INJECTION, SOLUTION INTRAVENOUS at 07:37

## 2022-05-29 RX ADMIN — APIXABAN 5 MG: 5 TABLET, FILM COATED ORAL at 10:20

## 2022-05-29 RX ADMIN — MISOPROSTOL 200 MCG: 200 TABLET ORAL at 20:30

## 2022-05-29 RX ADMIN — ACETAMINOPHEN 650 MG: 325 TABLET ORAL at 05:06

## 2022-05-29 RX ADMIN — ASPIRIN 81 MG 324 MG: 81 TABLET ORAL at 04:00

## 2022-05-29 RX ADMIN — TRAZODONE HYDROCHLORIDE 100 MG: 100 TABLET ORAL at 20:29

## 2022-05-29 RX ADMIN — POTASSIUM CHLORIDE 10 MEQ: 10 INJECTION, SOLUTION INTRAVENOUS at 05:07

## 2022-05-29 RX ADMIN — NITROGLYCERIN 5 MCG/MIN: 20 INJECTION INTRAVENOUS at 04:00

## 2022-05-29 RX ADMIN — TIMOLOL MALEATE 1 DROP: 5 SOLUTION/ DROPS OPHTHALMIC at 20:30

## 2022-05-29 RX ADMIN — LEVOTHYROXINE SODIUM 25 MCG: 25 TABLET ORAL at 10:20

## 2022-05-29 RX ADMIN — HYDROXYCHLOROQUINE SULFATE 200 MG: 200 TABLET, FILM COATED ORAL at 10:21

## 2022-05-29 RX ADMIN — MISOPROSTOL 200 MCG: 200 TABLET ORAL at 10:11

## 2022-05-29 RX ADMIN — APIXABAN 5 MG: 5 TABLET, FILM COATED ORAL at 20:29

## 2022-05-29 RX ADMIN — POTASSIUM CHLORIDE 10 MEQ: 10 INJECTION, SOLUTION INTRAVENOUS at 06:14

## 2022-05-30 VITALS
RESPIRATION RATE: 16 BRPM | OXYGEN SATURATION: 98 % | HEIGHT: 66 IN | SYSTOLIC BLOOD PRESSURE: 123 MMHG | HEART RATE: 61 BPM | WEIGHT: 146 LBS | DIASTOLIC BLOOD PRESSURE: 75 MMHG | TEMPERATURE: 98 F | BODY MASS INDEX: 23.46 KG/M2

## 2022-05-30 PROCEDURE — 99217 PR OBSERVATION CARE DISCHARGE MANAGEMENT: CPT | Performed by: INTERNAL MEDICINE

## 2022-05-30 PROCEDURE — G0378 HOSPITAL OBSERVATION PER HR: HCPCS

## 2022-05-30 PROCEDURE — 99214 OFFICE O/P EST MOD 30 MIN: CPT | Performed by: HOSPITALIST

## 2022-05-30 RX ORDER — METOPROLOL SUCCINATE 25 MG/1
25 TABLET, EXTENDED RELEASE ORAL
Qty: 90 TABLET | Refills: 3 | Status: SHIPPED | OUTPATIENT
Start: 2022-05-30

## 2022-05-30 RX ORDER — METOPROLOL SUCCINATE 25 MG/1
25 TABLET, EXTENDED RELEASE ORAL
Status: DISCONTINUED | OUTPATIENT
Start: 2022-05-30 | End: 2022-05-30 | Stop reason: HOSPADM

## 2022-05-30 RX ORDER — LEVOTHYROXINE SODIUM 0.03 MG/1
25 TABLET ORAL
Qty: 30 TABLET | Refills: 0 | Status: SHIPPED | OUTPATIENT
Start: 2022-05-31

## 2022-05-30 RX ADMIN — LEVOTHYROXINE SODIUM 25 MCG: 25 TABLET ORAL at 07:03

## 2022-05-30 RX ADMIN — FLUOXETINE HYDROCHLORIDE 40 MG: 20 CAPSULE ORAL at 09:56

## 2022-05-30 RX ADMIN — METOCLOPRAMIDE 5 MG: 10 TABLET ORAL at 09:56

## 2022-05-30 RX ADMIN — MISOPROSTOL 200 MCG: 200 TABLET ORAL at 09:56

## 2022-05-30 RX ADMIN — ACETAMINOPHEN 650 MG: 325 TABLET ORAL at 10:33

## 2022-05-30 RX ADMIN — ASPIRIN 81 MG: 81 TABLET, COATED ORAL at 09:56

## 2022-05-30 RX ADMIN — TIMOLOL MALEATE 1 DROP: 5 SOLUTION/ DROPS OPHTHALMIC at 09:58

## 2022-05-30 RX ADMIN — METOPROLOL SUCCINATE 25 MG: 25 TABLET, EXTENDED RELEASE ORAL at 09:56

## 2022-05-30 RX ADMIN — HYDROXYCHLOROQUINE SULFATE 200 MG: 200 TABLET, FILM COATED ORAL at 09:56

## 2022-05-30 RX ADMIN — APIXABAN 5 MG: 5 TABLET, FILM COATED ORAL at 09:56

## 2022-05-30 RX ADMIN — Medication 10 ML: at 11:44

## 2022-06-01 LAB
QT INTERVAL: 476 MS
QTC INTERVAL: 521 MS

## 2022-06-24 ENCOUNTER — HOSPITAL ENCOUNTER (OUTPATIENT)
Dept: CARDIOLOGY | Facility: HOSPITAL | Age: 72
End: 2022-06-24

## 2022-06-24 ENCOUNTER — TRANSCRIBE ORDERS (OUTPATIENT)
Dept: ADMINISTRATIVE | Facility: HOSPITAL | Age: 72
End: 2022-06-24

## 2022-06-24 DIAGNOSIS — R60.1 GENERALIZED EDEMA: Primary | ICD-10-CM

## 2022-07-06 ENCOUNTER — HOSPITAL ENCOUNTER (OUTPATIENT)
Dept: CARDIOLOGY | Facility: HOSPITAL | Age: 72
Discharge: HOME | End: 2022-07-06
Admitting: INTERNAL MEDICINE

## 2022-07-06 VITALS — BODY MASS INDEX: 23.38 KG/M2 | HEIGHT: 66 IN | WEIGHT: 145.5 LBS

## 2022-07-06 DIAGNOSIS — R60.1 GENERALIZED EDEMA: ICD-10-CM

## 2022-07-06 PROCEDURE — 93971 EXTREMITY STUDY: CPT

## 2022-07-08 LAB

## 2022-07-16 ENCOUNTER — APPOINTMENT (OUTPATIENT)
Dept: CT IMAGING | Facility: HOSPITAL | Age: 72
End: 2022-07-16

## 2022-07-16 ENCOUNTER — HOSPITAL ENCOUNTER (EMERGENCY)
Facility: HOSPITAL | Age: 72
Discharge: HOME | End: 2022-07-17
Attending: EMERGENCY MEDICINE | Admitting: EMERGENCY MEDICINE
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
## 2022-07-16 DIAGNOSIS — S00.03XA SCALP HEMATOMA, INITIAL ENCOUNTER: ICD-10-CM

## 2022-07-16 DIAGNOSIS — S61.412A LACERATION OF LEFT HAND WITHOUT FOREIGN BODY, INITIAL ENCOUNTER: ICD-10-CM

## 2022-07-16 DIAGNOSIS — S51.012A ELBOW LACERATION, LEFT, INITIAL ENCOUNTER: Primary | ICD-10-CM

## 2022-07-16 LAB
BASOPHILS # BLD AUTO: 0.03 10*3/MM3 (ref 0–0.2)
BASOPHILS NFR BLD AUTO: 0.4 % (ref 0–1.5)
DEPRECATED RDW RBC AUTO: 50.4 FL (ref 37–54)
EOSINOPHIL # BLD AUTO: 0.18 10*3/MM3 (ref 0–0.4)
EOSINOPHIL NFR BLD AUTO: 2.7 % (ref 0.3–6.2)
ERYTHROCYTE [DISTWIDTH] IN BLOOD BY AUTOMATED COUNT: 13.7 % (ref 12.3–15.4)
HCT VFR BLD AUTO: 30.3 % (ref 34–46.6)
HGB BLD-MCNC: 9.8 G/DL (ref 12–15.9)
IMM GRANULOCYTES # BLD AUTO: 0.02 10*3/MM3 (ref 0–0.05)
IMM GRANULOCYTES NFR BLD AUTO: 0.3 % (ref 0–0.5)
LYMPHOCYTES # BLD AUTO: 1.08 10*3/MM3 (ref 0.7–3.1)
LYMPHOCYTES NFR BLD AUTO: 16 % (ref 19.6–45.3)
MCH RBC QN AUTO: 32.7 PG (ref 26.6–33)
MCHC RBC AUTO-ENTMCNC: 32.3 G/DL (ref 31.5–35.7)
MCV RBC AUTO: 101 FL (ref 79–97)
MONOCYTES # BLD AUTO: 0.76 10*3/MM3 (ref 0.1–0.9)
MONOCYTES NFR BLD AUTO: 11.2 % (ref 5–12)
NEUTROPHILS NFR BLD AUTO: 4.69 10*3/MM3 (ref 1.7–7)
NEUTROPHILS NFR BLD AUTO: 69.4 % (ref 42.7–76)
NRBC BLD AUTO-RTO: 0 /100 WBC (ref 0–0.2)
PLATELET # BLD AUTO: 184 10*3/MM3 (ref 140–450)
PMV BLD AUTO: 10.7 FL (ref 6–12)
RBC # BLD AUTO: 3 10*6/MM3 (ref 3.77–5.28)
WBC NRBC COR # BLD: 6.76 10*3/MM3 (ref 3.4–10.8)

## 2022-07-16 PROCEDURE — 93005 ELECTROCARDIOGRAM TRACING: CPT | Performed by: EMERGENCY MEDICINE

## 2022-07-16 PROCEDURE — 99284 EMERGENCY DEPT VISIT MOD MDM: CPT

## 2022-07-16 PROCEDURE — 80053 COMPREHEN METABOLIC PANEL: CPT | Performed by: EMERGENCY MEDICINE

## 2022-07-16 PROCEDURE — 72125 CT NECK SPINE W/O DYE: CPT

## 2022-07-16 PROCEDURE — 36415 COLL VENOUS BLD VENIPUNCTURE: CPT

## 2022-07-16 PROCEDURE — 99283 EMERGENCY DEPT VISIT LOW MDM: CPT

## 2022-07-16 PROCEDURE — 70450 CT HEAD/BRAIN W/O DYE: CPT

## 2022-07-16 PROCEDURE — 85025 COMPLETE CBC W/AUTO DIFF WBC: CPT | Performed by: EMERGENCY MEDICINE

## 2022-07-17 ENCOUNTER — APPOINTMENT (OUTPATIENT)
Dept: GENERAL RADIOLOGY | Facility: HOSPITAL | Age: 72
End: 2022-07-17

## 2022-07-17 VITALS
DIASTOLIC BLOOD PRESSURE: 80 MMHG | TEMPERATURE: 97.9 F | BODY MASS INDEX: 21.53 KG/M2 | RESPIRATION RATE: 16 BRPM | SYSTOLIC BLOOD PRESSURE: 140 MMHG | OXYGEN SATURATION: 100 % | HEART RATE: 76 BPM | HEIGHT: 66 IN | WEIGHT: 134 LBS

## 2022-07-17 LAB
ALBUMIN SERPL-MCNC: 2.7 G/DL (ref 3.5–5.2)
ALBUMIN/GLOB SERPL: 1 G/DL
ALP SERPL-CCNC: 78 U/L (ref 39–117)
ALT SERPL W P-5'-P-CCNC: 19 U/L (ref 1–33)
ANION GAP SERPL CALCULATED.3IONS-SCNC: 8 MMOL/L (ref 5–15)
AST SERPL-CCNC: 35 U/L (ref 1–32)
BACTERIA UR QL AUTO: ABNORMAL /HPF
BILIRUB SERPL-MCNC: 0.4 MG/DL (ref 0–1.2)
BILIRUB UR QL STRIP: NEGATIVE
BUN SERPL-MCNC: 13 MG/DL (ref 8–23)
BUN/CREAT SERPL: 14.4 (ref 7–25)
CALCIUM SPEC-SCNC: 8.8 MG/DL (ref 8.6–10.5)
CHLORIDE SERPL-SCNC: 102 MMOL/L (ref 98–107)
CLARITY UR: CLEAR
CO2 SERPL-SCNC: 25 MMOL/L (ref 22–29)
COLOR UR: YELLOW
CREAT SERPL-MCNC: 0.9 MG/DL (ref 0.57–1)
EGFRCR SERPLBLD CKD-EPI 2021: 67.6 ML/MIN/1.73
GLOBULIN UR ELPH-MCNC: 2.8 GM/DL
GLUCOSE SERPL-MCNC: 111 MG/DL (ref 65–99)
GLUCOSE UR STRIP-MCNC: NEGATIVE MG/DL
HGB UR QL STRIP.AUTO: NEGATIVE
HYALINE CASTS UR QL AUTO: ABNORMAL /LPF
KETONES UR QL STRIP: NEGATIVE
LEUKOCYTE ESTERASE UR QL STRIP.AUTO: ABNORMAL
NITRITE UR QL STRIP: NEGATIVE
PH UR STRIP.AUTO: 6 [PH] (ref 5–8)
POTASSIUM SERPL-SCNC: 4.8 MMOL/L (ref 3.5–5.2)
PROT SERPL-MCNC: 5.5 G/DL (ref 6–8.5)
PROT UR QL STRIP: ABNORMAL
RBC # UR STRIP: ABNORMAL /HPF
REF LAB TEST METHOD: ABNORMAL
SODIUM SERPL-SCNC: 135 MMOL/L (ref 136–145)
SP GR UR STRIP: 1.01 (ref 1–1.03)
SQUAMOUS #/AREA URNS HPF: ABNORMAL /HPF
UROBILINOGEN UR QL STRIP: ABNORMAL
WBC # UR STRIP: ABNORMAL /HPF

## 2022-07-17 PROCEDURE — 96374 THER/PROPH/DIAG INJ IV PUSH: CPT

## 2022-07-17 PROCEDURE — 73070 X-RAY EXAM OF ELBOW: CPT

## 2022-07-17 PROCEDURE — 73130 X-RAY EXAM OF HAND: CPT

## 2022-07-17 PROCEDURE — 81001 URINALYSIS AUTO W/SCOPE: CPT | Performed by: EMERGENCY MEDICINE

## 2022-07-17 PROCEDURE — 25010000002 MORPHINE PER 10 MG: Performed by: EMERGENCY MEDICINE

## 2022-07-17 RX ORDER — MORPHINE SULFATE 2 MG/ML
2 INJECTION, SOLUTION INTRAMUSCULAR; INTRAVENOUS ONCE
Status: COMPLETED | OUTPATIENT
Start: 2022-07-17 | End: 2022-07-17

## 2022-07-17 RX ORDER — LIDOCAINE HYDROCHLORIDE 10 MG/ML
5 INJECTION, SOLUTION EPIDURAL; INFILTRATION; INTRACAUDAL; PERINEURAL ONCE
Status: COMPLETED | OUTPATIENT
Start: 2022-07-17 | End: 2022-07-17

## 2022-07-17 RX ORDER — TRAMADOL HYDROCHLORIDE 50 MG/1
50 TABLET ORAL EVERY 8 HOURS PRN
Qty: 10 TABLET | Refills: 0 | Status: SHIPPED | OUTPATIENT
Start: 2022-07-17

## 2022-07-17 RX ORDER — GINSENG 100 MG
1 CAPSULE ORAL EVERY 12 HOURS SCHEDULED
Status: DISCONTINUED | OUTPATIENT
Start: 2022-07-17 | End: 2022-07-17 | Stop reason: HOSPADM

## 2022-07-17 RX ADMIN — MORPHINE SULFATE 2 MG: 2 INJECTION, SOLUTION INTRAMUSCULAR; INTRAVENOUS at 00:33

## 2022-07-17 RX ADMIN — LIDOCAINE HYDROCHLORIDE 5 ML: 10 INJECTION, SOLUTION EPIDURAL; INFILTRATION; INTRACAUDAL; PERINEURAL at 02:47

## 2022-07-17 RX ADMIN — BACITRACIN 1 APPLICATION: 500 OINTMENT TOPICAL at 02:47

## 2022-07-20 LAB
QT INTERVAL: 468 MS
QTC INTERVAL: 475 MS

## 2022-11-28 PROCEDURE — 88305 TISSUE EXAM BY PATHOLOGIST: CPT

## 2022-11-29 ENCOUNTER — LAB REQUISITION (OUTPATIENT)
Dept: LAB | Facility: HOSPITAL | Age: 72
End: 2022-11-29

## 2022-11-29 DIAGNOSIS — M19.042 PRIMARY OSTEOARTHRITIS, LEFT HAND: ICD-10-CM

## 2022-11-29 PROCEDURE — 88305 TISSUE EXAM BY PATHOLOGIST: CPT

## 2022-11-29 PROCEDURE — 87205 SMEAR GRAM STAIN: CPT | Performed by: PLASTIC SURGERY

## 2022-11-29 PROCEDURE — 87075 CULTR BACTERIA EXCEPT BLOOD: CPT | Performed by: PLASTIC SURGERY

## 2022-11-29 PROCEDURE — 87176 TISSUE HOMOGENIZATION CULTR: CPT | Performed by: PLASTIC SURGERY

## 2022-11-29 PROCEDURE — 87070 CULTURE OTHR SPECIMN AEROBIC: CPT | Performed by: PLASTIC SURGERY

## 2022-11-30 LAB — REF LAB TEST METHOD: NORMAL

## 2022-12-02 LAB
BACTERIA SPEC AEROBE CULT: NORMAL
GRAM STN SPEC: NORMAL
GRAM STN SPEC: NORMAL

## 2022-12-04 LAB — BACTERIA SPEC ANAEROBE CULT: NORMAL

## 2022-12-09 ENCOUNTER — TREATMENT (OUTPATIENT)
Dept: PHYSICAL THERAPY | Facility: CLINIC | Age: 72
End: 2022-12-09

## 2022-12-09 ENCOUNTER — TRANSCRIBE ORDERS (OUTPATIENT)
Dept: PHYSICAL THERAPY | Facility: CLINIC | Age: 72
End: 2022-12-09

## 2022-12-09 DIAGNOSIS — M15.8 OSTEOARTHRITIS OF INTERPHALANGEAL JOINT OF LEFT THUMB: Primary | ICD-10-CM

## 2022-12-09 DIAGNOSIS — Z96.698 S/P FINGER JOINT REPLACEMENT: Primary | ICD-10-CM

## 2022-12-09 DIAGNOSIS — Z47.89 ORTHOPEDIC AFTERCARE: ICD-10-CM

## 2022-12-09 PROCEDURE — L3913 HFO W/O JOINTS CF: HCPCS | Performed by: PHYSICAL THERAPIST

## 2022-12-09 NOTE — PROGRESS NOTES
Aleena Armstrong 1950   Diagnosis/ Surgery: left thumb IPJ OA               Date Of Injury: chronic     Date Of Surgery: 11/29/22    Hand Dominance: left   History of Present Condition: 40 years of progressive arthritis and issues with nail following door hitting thumb.   Medical/Vocational History/ Medications: retired RN    Pain: 2/10    Edema: mod-sev DIPJ   Sensibility: WNL   Wound Status:dorsal IPJ   ROM/ Strength: NT    Splinting:  · Patient was measure and fit with a custom fabricated hand based thumb immobilization past tip.   · Patient was instructed in wearing schedule, precautions and care of the splint during this visit.   · Patient was instructed in proper donning/doffing of splint.   Assessment:  · Patient was fitted and appropriate splint was fabricated this date.  · Patient reported that splint was comfortable and had no complications with the fit of the splint.  · Patient was instructed and patient verbalized understanding of precautions, wear and care of the splint.   · Patient demonstrated independent donning/doffing of splint during treatment today.  Goals:  · Patient was fitted properly with appropriate splint for diagnosis  · Patient was educated on precautions, wear schedule and care of splint  · Patient demonstrated independence with donning/doffing of the splint.  · Splint was provided to Protect Healing Structures, Restrict Mobility, Improve joint alignment.  Plan:  · No additional treatment is required for this patient at this time. The patient is therefore discharged from therapy.  · Patient advised to contact therapist with any additional questions or concerns regarding the fit and function of the splint.  · Patient will be seen for splint issues as needed   · Wear Instructions: Off for hygiene           PT SIGNATURE: Pepper Ny, PT   DATE TREATMENT INITIATED: 12/9/2022    Initial Certification  Certification Period: 3/9/2023  I certify that the therapy services are furnished while  this patient is under my care.  The services outlined above are required by this patient, and will be reviewed every 90 days.     PHYSICIAN: Kristie Moreno MD      DATE:     Please sign and return via fax to [phone].. Thank you, [facility].
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
## 2023-12-12 ENCOUNTER — TRANSCRIBE ORDERS (OUTPATIENT)
Dept: ADMINISTRATIVE | Facility: HOSPITAL | Age: 73
End: 2023-12-12
Payer: MEDICARE

## 2023-12-12 ENCOUNTER — HOSPITAL ENCOUNTER (OUTPATIENT)
Dept: GENERAL RADIOLOGY | Facility: HOSPITAL | Age: 73
Discharge: HOME | End: 2023-12-12
Admitting: PHYSICIAN ASSISTANT
Payer: MEDICARE

## 2023-12-12 DIAGNOSIS — S29.9XXA TRAUMATIC INJURY OF RIB: Primary | ICD-10-CM

## 2023-12-12 DIAGNOSIS — S29.9XXA TRAUMATIC INJURY OF RIB: ICD-10-CM

## 2023-12-12 PROCEDURE — 71101 X-RAY EXAM UNILAT RIBS/CHEST: CPT

## 2023-12-17 ENCOUNTER — HOSPITAL ENCOUNTER (INPATIENT)
Facility: HOSPITAL | Age: 73
LOS: 2 days | Discharge: HOME | End: 2023-12-20
Attending: EMERGENCY MEDICINE | Admitting: INTERNAL MEDICINE
Payer: MEDICARE

## 2023-12-17 ENCOUNTER — APPOINTMENT (OUTPATIENT)
Dept: CT IMAGING | Facility: HOSPITAL | Age: 73
End: 2023-12-17
Payer: MEDICARE

## 2023-12-17 DIAGNOSIS — K92.2 ACUTE LOWER GI BLEEDING: ICD-10-CM

## 2023-12-17 DIAGNOSIS — D64.9 ACUTE ANEMIA: ICD-10-CM

## 2023-12-17 DIAGNOSIS — K92.2 GASTROINTESTINAL HEMORRHAGE, UNSPECIFIED GASTROINTESTINAL HEMORRHAGE TYPE: Primary | ICD-10-CM

## 2023-12-17 LAB
ALBUMIN SERPL-MCNC: 3.3 G/DL (ref 3.5–5.2)
ALBUMIN/GLOB SERPL: 1.7 G/DL
ALP SERPL-CCNC: 64 U/L (ref 39–117)
ALT SERPL W P-5'-P-CCNC: 7 U/L (ref 1–33)
ANION GAP SERPL CALCULATED.3IONS-SCNC: 7 MMOL/L (ref 5–15)
APTT PPP: 23.3 SECONDS (ref 60–90)
AST SERPL-CCNC: 15 U/L (ref 1–32)
BASOPHILS # BLD AUTO: 0.04 10*3/MM3 (ref 0–0.2)
BASOPHILS NFR BLD AUTO: 0.6 % (ref 0–1.5)
BILIRUB SERPL-MCNC: 0.3 MG/DL (ref 0–1.2)
BUN SERPL-MCNC: 25 MG/DL (ref 8–23)
BUN/CREAT SERPL: 24.5 (ref 7–25)
CALCIUM SPEC-SCNC: 8.1 MG/DL (ref 8.6–10.5)
CHLORIDE SERPL-SCNC: 109 MMOL/L (ref 98–107)
CO2 SERPL-SCNC: 24 MMOL/L (ref 22–29)
CREAT SERPL-MCNC: 1.02 MG/DL (ref 0.57–1)
D-LACTATE SERPL-SCNC: 2.2 MMOL/L (ref 0.5–2)
DEPRECATED RDW RBC AUTO: 48.4 FL (ref 37–54)
EGFRCR SERPLBLD CKD-EPI 2021: 57.5 ML/MIN/1.73
EOSINOPHIL # BLD AUTO: 0 10*3/MM3 (ref 0–0.4)
EOSINOPHIL NFR BLD AUTO: 0 % (ref 0.3–6.2)
ERYTHROCYTE [DISTWIDTH] IN BLOOD BY AUTOMATED COUNT: 12.8 % (ref 12.3–15.4)
GLOBULIN UR ELPH-MCNC: 1.9 GM/DL
GLUCOSE SERPL-MCNC: 175 MG/DL (ref 65–99)
HCT VFR BLD AUTO: 26.2 % (ref 34–46.6)
HGB BLD-MCNC: 7.8 G/DL (ref 12–15.9)
IMM GRANULOCYTES # BLD AUTO: 0.03 10*3/MM3 (ref 0–0.05)
IMM GRANULOCYTES NFR BLD AUTO: 0.5 % (ref 0–0.5)
INR PPP: 1.26 (ref 0.89–1.12)
LIPASE SERPL-CCNC: 27 U/L (ref 13–60)
LYMPHOCYTES # BLD AUTO: 1.07 10*3/MM3 (ref 0.7–3.1)
LYMPHOCYTES NFR BLD AUTO: 16.5 % (ref 19.6–45.3)
MAGNESIUM SERPL-MCNC: 1.8 MG/DL (ref 1.6–2.4)
MCH RBC QN AUTO: 30.7 PG (ref 26.6–33)
MCHC RBC AUTO-ENTMCNC: 29.8 G/DL (ref 31.5–35.7)
MCV RBC AUTO: 103.1 FL (ref 79–97)
MONOCYTES # BLD AUTO: 0.32 10*3/MM3 (ref 0.1–0.9)
MONOCYTES NFR BLD AUTO: 4.9 % (ref 5–12)
NEUTROPHILS NFR BLD AUTO: 5.03 10*3/MM3 (ref 1.7–7)
NEUTROPHILS NFR BLD AUTO: 77.5 % (ref 42.7–76)
NRBC BLD AUTO-RTO: 0 /100 WBC (ref 0–0.2)
NT-PROBNP SERPL-MCNC: 1311 PG/ML (ref 0–1800)
PLATELET # BLD AUTO: 228 10*3/MM3 (ref 140–450)
PMV BLD AUTO: 10 FL (ref 6–12)
POTASSIUM SERPL-SCNC: 5 MMOL/L (ref 3.5–5.2)
PROT SERPL-MCNC: 5.2 G/DL (ref 6–8.5)
PROTHROMBIN TIME: 15.9 SECONDS (ref 12.2–14.5)
RBC # BLD AUTO: 2.54 10*6/MM3 (ref 3.77–5.28)
SODIUM SERPL-SCNC: 140 MMOL/L (ref 136–145)
TROPONIN T SERPL HS-MCNC: 20 NG/L
TSH SERPL DL<=0.05 MIU/L-ACNC: 4.51 UIU/ML (ref 0.27–4.2)
WBC NRBC COR # BLD AUTO: 6.49 10*3/MM3 (ref 3.4–10.8)

## 2023-12-17 PROCEDURE — 85610 PROTHROMBIN TIME: CPT | Performed by: EMERGENCY MEDICINE

## 2023-12-17 PROCEDURE — 83690 ASSAY OF LIPASE: CPT | Performed by: EMERGENCY MEDICINE

## 2023-12-17 PROCEDURE — 74178 CT ABD&PLV WO CNTR FLWD CNTR: CPT

## 2023-12-17 PROCEDURE — 83880 ASSAY OF NATRIURETIC PEPTIDE: CPT | Performed by: EMERGENCY MEDICINE

## 2023-12-17 PROCEDURE — 85025 COMPLETE CBC W/AUTO DIFF WBC: CPT | Performed by: EMERGENCY MEDICINE

## 2023-12-17 PROCEDURE — 83735 ASSAY OF MAGNESIUM: CPT | Performed by: EMERGENCY MEDICINE

## 2023-12-17 PROCEDURE — 93005 ELECTROCARDIOGRAM TRACING: CPT | Performed by: EMERGENCY MEDICINE

## 2023-12-17 PROCEDURE — 87637 SARSCOV2&INF A&B&RSV AMP PRB: CPT | Performed by: EMERGENCY MEDICINE

## 2023-12-17 PROCEDURE — 84443 ASSAY THYROID STIM HORMONE: CPT | Performed by: EMERGENCY MEDICINE

## 2023-12-17 PROCEDURE — 25510000001 IOPAMIDOL 61 % SOLUTION: Performed by: EMERGENCY MEDICINE

## 2023-12-17 PROCEDURE — 99285 EMERGENCY DEPT VISIT HI MDM: CPT

## 2023-12-17 PROCEDURE — 83605 ASSAY OF LACTIC ACID: CPT | Performed by: EMERGENCY MEDICINE

## 2023-12-17 PROCEDURE — 82077 ASSAY SPEC XCP UR&BREATH IA: CPT | Performed by: NURSE PRACTITIONER

## 2023-12-17 PROCEDURE — 80053 COMPREHEN METABOLIC PANEL: CPT | Performed by: EMERGENCY MEDICINE

## 2023-12-17 PROCEDURE — 85730 THROMBOPLASTIN TIME PARTIAL: CPT | Performed by: EMERGENCY MEDICINE

## 2023-12-17 PROCEDURE — 84484 ASSAY OF TROPONIN QUANT: CPT | Performed by: EMERGENCY MEDICINE

## 2023-12-17 RX ORDER — ONDANSETRON 2 MG/ML
4 INJECTION INTRAMUSCULAR; INTRAVENOUS ONCE
Status: COMPLETED | OUTPATIENT
Start: 2023-12-17 | End: 2023-12-18

## 2023-12-17 RX ORDER — SODIUM CHLORIDE 0.9 % (FLUSH) 0.9 %
10 SYRINGE (ML) INJECTION AS NEEDED
Status: DISCONTINUED | OUTPATIENT
Start: 2023-12-17 | End: 2023-12-18

## 2023-12-17 RX ORDER — PANTOPRAZOLE SODIUM 40 MG/10ML
80 INJECTION, POWDER, LYOPHILIZED, FOR SOLUTION INTRAVENOUS ONCE
Status: COMPLETED | OUTPATIENT
Start: 2023-12-17 | End: 2023-12-18

## 2023-12-17 RX ADMIN — IOPAMIDOL 100 ML: 612 INJECTION, SOLUTION INTRAVENOUS at 23:52

## 2023-12-17 NOTE — Clinical Note
Level of Care: Telemetry [5]   Diagnosis: GI bleed [[id number]]   Admitting Physician: TAJ ANTONIO [[id number]]   Attending Physician: TAJ ANTONIO [[id number]]

## 2023-12-18 ENCOUNTER — ANESTHESIA (OUTPATIENT)
Dept: GASTROENTEROLOGY | Facility: HOSPITAL | Age: 73
End: 2023-12-18
Payer: MEDICARE

## 2023-12-18 ENCOUNTER — ANESTHESIA EVENT (OUTPATIENT)
Dept: GASTROENTEROLOGY | Facility: HOSPITAL | Age: 73
End: 2023-12-18
Payer: MEDICARE

## 2023-12-18 PROBLEM — Z98.84 HISTORY OF ROUX-EN-Y GASTRIC BYPASS: Status: ACTIVE | Noted: 2023-12-18

## 2023-12-18 PROBLEM — Z98.84 HISTORY OF ROUX-EN-Y GASTRIC BYPASS: Chronic | Status: ACTIVE | Noted: 2023-12-18

## 2023-12-18 PROBLEM — D62 ACUTE BLOOD LOSS ANEMIA: Status: ACTIVE | Noted: 2023-12-18

## 2023-12-18 PROBLEM — K92.2 GI BLEED: Status: ACTIVE | Noted: 2023-12-18

## 2023-12-18 PROBLEM — K21.9 GERD WITHOUT ESOPHAGITIS: Chronic | Status: ACTIVE | Noted: 2022-05-29

## 2023-12-18 PROBLEM — K92.1 HEMATOCHEZIA: Status: ACTIVE | Noted: 2023-12-18

## 2023-12-18 PROBLEM — K92.0 HEMATEMESIS: Status: ACTIVE | Noted: 2023-12-18

## 2023-12-18 PROBLEM — L40.50 PSORIATIC ARTHRITIS: Status: ACTIVE | Noted: 2023-12-18

## 2023-12-18 PROBLEM — Z79.01 CHRONIC ANTICOAGULATION: Chronic | Status: ACTIVE | Noted: 2023-12-18

## 2023-12-18 PROBLEM — R57.8 HEMORRHAGIC SHOCK: Status: ACTIVE | Noted: 2023-12-18

## 2023-12-18 PROBLEM — E03.9 HYPOTHYROIDISM: Chronic | Status: ACTIVE | Noted: 2023-12-18

## 2023-12-18 PROBLEM — R57.8 HEMORRHAGIC SHOCK: Status: RESOLVED | Noted: 2023-12-18 | Resolved: 2023-12-18

## 2023-12-18 PROBLEM — E03.9 HYPOTHYROIDISM: Status: ACTIVE | Noted: 2023-12-18

## 2023-12-18 PROBLEM — L40.50 PSORIATIC ARTHRITIS: Chronic | Status: ACTIVE | Noted: 2023-12-18

## 2023-12-18 PROBLEM — Z79.01 CHRONIC ANTICOAGULATION: Status: ACTIVE | Noted: 2023-12-18

## 2023-12-18 LAB
ABO GROUP BLD: NORMAL
ANION GAP SERPL CALCULATED.3IONS-SCNC: 7 MMOL/L (ref 5–15)
BACTERIA UR QL AUTO: ABNORMAL /HPF
BILIRUB UR QL STRIP: NEGATIVE
BLD GP AB SCN SERPL QL: NEGATIVE
BUN SERPL-MCNC: 29 MG/DL (ref 8–23)
BUN/CREAT SERPL: 36.7 (ref 7–25)
CALCIUM SPEC-SCNC: 7.4 MG/DL (ref 8.6–10.5)
CHLORIDE SERPL-SCNC: 112 MMOL/L (ref 98–107)
CLARITY UR: CLEAR
CO2 SERPL-SCNC: 22 MMOL/L (ref 22–29)
COLOR UR: YELLOW
CREAT SERPL-MCNC: 0.79 MG/DL (ref 0.57–1)
D-LACTATE SERPL-SCNC: 1.4 MMOL/L (ref 0.5–2)
DEPRECATED RDW RBC AUTO: 54.9 FL (ref 37–54)
EGFRCR SERPLBLD CKD-EPI 2021: 78.1 ML/MIN/1.73
ERYTHROCYTE [DISTWIDTH] IN BLOOD BY AUTOMATED COUNT: 16 % (ref 12.3–15.4)
ETHANOL BLD-MCNC: <10 MG/DL (ref 0–10)
FLUAV RNA RESP QL NAA+PROBE: NOT DETECTED
FLUBV RNA RESP QL NAA+PROBE: NOT DETECTED
GLUCOSE BLDC GLUCOMTR-MCNC: 109 MG/DL (ref 70–130)
GLUCOSE BLDC GLUCOMTR-MCNC: 136 MG/DL (ref 70–130)
GLUCOSE BLDC GLUCOMTR-MCNC: 79 MG/DL (ref 70–130)
GLUCOSE SERPL-MCNC: 127 MG/DL (ref 65–99)
GLUCOSE UR STRIP-MCNC: NEGATIVE MG/DL
HCT VFR BLD AUTO: 21.4 % (ref 34–46.6)
HCT VFR BLD AUTO: 22.7 % (ref 34–46.6)
HCT VFR BLD AUTO: 25.6 % (ref 34–46.6)
HCT VFR BLD AUTO: 28.6 % (ref 34–46.6)
HGB BLD-MCNC: 7.1 G/DL (ref 12–15.9)
HGB BLD-MCNC: 7.5 G/DL (ref 12–15.9)
HGB BLD-MCNC: 8.5 G/DL (ref 12–15.9)
HGB BLD-MCNC: 9.5 G/DL (ref 12–15.9)
HGB UR QL STRIP.AUTO: ABNORMAL
HYALINE CASTS UR QL AUTO: ABNORMAL /LPF
KETONES UR QL STRIP: NEGATIVE
LEUKOCYTE ESTERASE UR QL STRIP.AUTO: NEGATIVE
MAGNESIUM SERPL-MCNC: 1.8 MG/DL (ref 1.6–2.4)
MCH RBC QN AUTO: 31.3 PG (ref 26.6–33)
MCHC RBC AUTO-ENTMCNC: 33.2 G/DL (ref 31.5–35.7)
MCV RBC AUTO: 94.1 FL (ref 79–97)
NITRITE UR QL STRIP: NEGATIVE
PH UR STRIP.AUTO: <=5 [PH] (ref 5–8)
PHOSPHATE SERPL-MCNC: 4.3 MG/DL (ref 2.5–4.5)
PLATELET # BLD AUTO: 136 10*3/MM3 (ref 140–450)
PMV BLD AUTO: 10.7 FL (ref 6–12)
POTASSIUM SERPL-SCNC: 4.8 MMOL/L (ref 3.5–5.2)
PROT UR QL STRIP: NEGATIVE
RBC # BLD AUTO: 3.04 10*6/MM3 (ref 3.77–5.28)
RBC # UR STRIP: ABNORMAL /HPF
REF LAB TEST METHOD: ABNORMAL
RH BLD: POSITIVE
RSV RNA NPH QL NAA+NON-PROBE: NOT DETECTED
SARS-COV-2 RNA RESP QL NAA+PROBE: NOT DETECTED
SODIUM SERPL-SCNC: 141 MMOL/L (ref 136–145)
SP GR UR STRIP: 1.05 (ref 1–1.03)
SQUAMOUS #/AREA URNS HPF: ABNORMAL /HPF
T&S EXPIRATION DATE: NORMAL
UROBILINOGEN UR QL STRIP: ABNORMAL
WBC # UR STRIP: ABNORMAL /HPF
WBC NRBC COR # BLD AUTO: 10.41 10*3/MM3 (ref 3.4–10.8)

## 2023-12-18 PROCEDURE — 25010000002 PROPOFOL 10 MG/ML EMULSION: Performed by: NURSE ANESTHETIST, CERTIFIED REGISTERED

## 2023-12-18 PROCEDURE — P9016 RBC LEUKOCYTES REDUCED: HCPCS

## 2023-12-18 PROCEDURE — 85014 HEMATOCRIT: CPT | Performed by: NURSE PRACTITIONER

## 2023-12-18 PROCEDURE — 86923 COMPATIBILITY TEST ELECTRIC: CPT

## 2023-12-18 PROCEDURE — 81001 URINALYSIS AUTO W/SCOPE: CPT | Performed by: EMERGENCY MEDICINE

## 2023-12-18 PROCEDURE — 85018 HEMOGLOBIN: CPT | Performed by: INTERNAL MEDICINE

## 2023-12-18 PROCEDURE — 83605 ASSAY OF LACTIC ACID: CPT | Performed by: EMERGENCY MEDICINE

## 2023-12-18 PROCEDURE — 25010000002 PROCHLORPERAZINE 10 MG/2ML SOLUTION: Performed by: NURSE PRACTITIONER

## 2023-12-18 PROCEDURE — 86901 BLOOD TYPING SEROLOGIC RH(D): CPT | Performed by: EMERGENCY MEDICINE

## 2023-12-18 PROCEDURE — 85027 COMPLETE CBC AUTOMATED: CPT | Performed by: NURSE PRACTITIONER

## 2023-12-18 PROCEDURE — 25810000003 SODIUM CHLORIDE 0.9 % SOLUTION: Performed by: EMERGENCY MEDICINE

## 2023-12-18 PROCEDURE — 43255 EGD CONTROL BLEEDING ANY: CPT | Performed by: INTERNAL MEDICINE

## 2023-12-18 PROCEDURE — 86850 RBC ANTIBODY SCREEN: CPT | Performed by: EMERGENCY MEDICINE

## 2023-12-18 PROCEDURE — 0W3P8ZZ CONTROL BLEEDING IN GASTROINTESTINAL TRACT, VIA NATURAL OR ARTIFICIAL OPENING ENDOSCOPIC: ICD-10-PCS | Performed by: INTERNAL MEDICINE

## 2023-12-18 PROCEDURE — 25810000003 SODIUM CHLORIDE 0.9 % SOLUTION: Performed by: NURSE PRACTITIONER

## 2023-12-18 PROCEDURE — 82948 REAGENT STRIP/BLOOD GLUCOSE: CPT

## 2023-12-18 PROCEDURE — 25010000002 EPINEPHRINE 1 MG/10ML SOLUTION PREFILLED SYRINGE: Performed by: INTERNAL MEDICINE

## 2023-12-18 PROCEDURE — 86900 BLOOD TYPING SEROLOGIC ABO: CPT | Performed by: EMERGENCY MEDICINE

## 2023-12-18 PROCEDURE — 84100 ASSAY OF PHOSPHORUS: CPT | Performed by: NURSE PRACTITIONER

## 2023-12-18 PROCEDURE — 86900 BLOOD TYPING SEROLOGIC ABO: CPT

## 2023-12-18 PROCEDURE — 3E0G8GC INTRODUCTION OF OTHER THERAPEUTIC SUBSTANCE INTO UPPER GI, VIA NATURAL OR ARTIFICIAL OPENING ENDOSCOPIC: ICD-10-PCS | Performed by: INTERNAL MEDICINE

## 2023-12-18 PROCEDURE — 85014 HEMATOCRIT: CPT | Performed by: INTERNAL MEDICINE

## 2023-12-18 PROCEDURE — 25010000002 ONDANSETRON PER 1 MG: Performed by: EMERGENCY MEDICINE

## 2023-12-18 PROCEDURE — 99291 CRITICAL CARE FIRST HOUR: CPT | Performed by: INTERNAL MEDICINE

## 2023-12-18 PROCEDURE — 99223 1ST HOSP IP/OBS HIGH 75: CPT | Performed by: NURSE PRACTITIONER

## 2023-12-18 PROCEDURE — 36430 TRANSFUSION BLD/BLD COMPNT: CPT

## 2023-12-18 PROCEDURE — 83735 ASSAY OF MAGNESIUM: CPT | Performed by: NURSE PRACTITIONER

## 2023-12-18 PROCEDURE — 25010000002 ONDANSETRON PER 1 MG: Performed by: NURSE PRACTITIONER

## 2023-12-18 PROCEDURE — 80048 BASIC METABOLIC PNL TOTAL CA: CPT | Performed by: NURSE PRACTITIONER

## 2023-12-18 PROCEDURE — 85018 HEMOGLOBIN: CPT | Performed by: NURSE PRACTITIONER

## 2023-12-18 DEVICE — DEV CLIP ENDO RESOLUTION360 CONTRL ROT 235CM: Type: IMPLANTABLE DEVICE | Site: JEJUNUM | Status: FUNCTIONAL

## 2023-12-18 RX ORDER — ONDANSETRON 2 MG/ML
4 INJECTION INTRAMUSCULAR; INTRAVENOUS EVERY 6 HOURS PRN
Status: DISCONTINUED | OUTPATIENT
Start: 2023-12-18 | End: 2023-12-20

## 2023-12-18 RX ORDER — ONDANSETRON 2 MG/ML
4 INJECTION INTRAMUSCULAR; INTRAVENOUS ONCE AS NEEDED
Status: DISCONTINUED | OUTPATIENT
Start: 2023-12-18 | End: 2023-12-18

## 2023-12-18 RX ORDER — METOPROLOL SUCCINATE 50 MG/1
50 TABLET, EXTENDED RELEASE ORAL DAILY
COMMUNITY
End: 2023-12-20 | Stop reason: HOSPADM

## 2023-12-18 RX ORDER — SUCRALFATE 1 G/1
1 TABLET ORAL
Status: DISCONTINUED | OUTPATIENT
Start: 2023-12-18 | End: 2023-12-20 | Stop reason: HOSPADM

## 2023-12-18 RX ORDER — SODIUM CHLORIDE 9 MG/ML
INJECTION, SOLUTION INTRAVENOUS CONTINUOUS PRN
Status: DISCONTINUED | OUTPATIENT
Start: 2023-12-18 | End: 2023-12-18 | Stop reason: SURG

## 2023-12-18 RX ORDER — TIMOLOL MALEATE 5 MG/ML
1 SOLUTION/ DROPS OPHTHALMIC 2 TIMES DAILY
COMMUNITY

## 2023-12-18 RX ORDER — PANTOPRAZOLE SODIUM 40 MG/10ML
40 INJECTION, POWDER, LYOPHILIZED, FOR SOLUTION INTRAVENOUS EVERY 12 HOURS SCHEDULED
Status: DISCONTINUED | OUTPATIENT
Start: 2023-12-18 | End: 2023-12-18

## 2023-12-18 RX ORDER — LIDOCAINE HYDROCHLORIDE 10 MG/ML
INJECTION, SOLUTION EPIDURAL; INFILTRATION; INTRACAUDAL; PERINEURAL AS NEEDED
Status: DISCONTINUED | OUTPATIENT
Start: 2023-12-18 | End: 2023-12-18 | Stop reason: SURG

## 2023-12-18 RX ORDER — PROCHLORPERAZINE EDISYLATE 5 MG/ML
5 INJECTION INTRAMUSCULAR; INTRAVENOUS EVERY 6 HOURS PRN
Status: DISCONTINUED | OUTPATIENT
Start: 2023-12-18 | End: 2023-12-20

## 2023-12-18 RX ORDER — FLECAINIDE ACETATE 50 MG/1
50 TABLET ORAL EVERY 12 HOURS
COMMUNITY

## 2023-12-18 RX ORDER — EPINEPHRINE IN SOD CHLOR,ISO 1 MG/10 ML
SYRINGE (ML) INTRAVENOUS AS NEEDED
Status: DISCONTINUED | OUTPATIENT
Start: 2023-12-18 | End: 2023-12-18 | Stop reason: HOSPADM

## 2023-12-18 RX ORDER — IPRATROPIUM BROMIDE AND ALBUTEROL SULFATE 2.5; .5 MG/3ML; MG/3ML
3 SOLUTION RESPIRATORY (INHALATION) ONCE AS NEEDED
Status: DISCONTINUED | OUTPATIENT
Start: 2023-12-18 | End: 2023-12-19

## 2023-12-18 RX ORDER — AMITRIPTYLINE HYDROCHLORIDE 25 MG/1
25 TABLET, FILM COATED ORAL DAILY
COMMUNITY

## 2023-12-18 RX ORDER — PROPOFOL 10 MG/ML
VIAL (ML) INTRAVENOUS AS NEEDED
Status: DISCONTINUED | OUTPATIENT
Start: 2023-12-18 | End: 2023-12-18 | Stop reason: SURG

## 2023-12-18 RX ORDER — PHENYLEPHRINE HCL IN 0.9% NACL 1 MG/10 ML
SYRINGE (ML) INTRAVENOUS AS NEEDED
Status: DISCONTINUED | OUTPATIENT
Start: 2023-12-18 | End: 2023-12-18 | Stop reason: SURG

## 2023-12-18 RX ORDER — NITROGLYCERIN 0.4 MG/1
0.4 TABLET SUBLINGUAL
Status: DISCONTINUED | OUTPATIENT
Start: 2023-12-18 | End: 2023-12-18

## 2023-12-18 RX ADMIN — SODIUM CHLORIDE: 900 INJECTION INTRAVENOUS at 13:40

## 2023-12-18 RX ADMIN — PROPOFOL 50 MG: 10 INJECTION, EMULSION INTRAVENOUS at 13:45

## 2023-12-18 RX ADMIN — PANTOPRAZOLE SODIUM 80 MG: 40 INJECTION, POWDER, LYOPHILIZED, FOR SOLUTION INTRAVENOUS at 00:30

## 2023-12-18 RX ADMIN — SODIUM CHLORIDE 8 MG/HR: 900 INJECTION INTRAVENOUS at 19:48

## 2023-12-18 RX ADMIN — PROCHLORPERAZINE EDISYLATE 5 MG: 5 INJECTION, SOLUTION INTRAMUSCULAR; INTRAVENOUS at 14:43

## 2023-12-18 RX ADMIN — Medication 200 MCG: at 14:13

## 2023-12-18 RX ADMIN — PROPOFOL 50 MG: 10 INJECTION, EMULSION INTRAVENOUS at 13:50

## 2023-12-18 RX ADMIN — PROPOFOL 30 MG: 10 INJECTION, EMULSION INTRAVENOUS at 13:47

## 2023-12-18 RX ADMIN — PROCHLORPERAZINE EDISYLATE 5 MG: 5 INJECTION, SOLUTION INTRAMUSCULAR; INTRAVENOUS at 03:02

## 2023-12-18 RX ADMIN — SODIUM CHLORIDE 500 ML: 9 INJECTION, SOLUTION INTRAVENOUS at 02:22

## 2023-12-18 RX ADMIN — LIDOCAINE HYDROCHLORIDE 30 MG: 10 INJECTION, SOLUTION EPIDURAL; INFILTRATION; INTRACAUDAL; PERINEURAL at 13:45

## 2023-12-18 RX ADMIN — SODIUM CHLORIDE 1000 ML: 9 INJECTION, SOLUTION INTRAVENOUS at 00:29

## 2023-12-18 RX ADMIN — PROPOFOL 20 MG: 10 INJECTION, EMULSION INTRAVENOUS at 13:57

## 2023-12-18 RX ADMIN — PROPOFOL 50 MG: 10 INJECTION, EMULSION INTRAVENOUS at 13:54

## 2023-12-18 RX ADMIN — PROPOFOL 20 MG: 10 INJECTION, EMULSION INTRAVENOUS at 13:59

## 2023-12-18 RX ADMIN — SUCRALFATE 1 G: 1 TABLET ORAL at 16:51

## 2023-12-18 RX ADMIN — PROPOFOL 20 MG: 10 INJECTION, EMULSION INTRAVENOUS at 14:01

## 2023-12-18 RX ADMIN — ONDANSETRON 4 MG: 2 INJECTION INTRAMUSCULAR; INTRAVENOUS at 00:30

## 2023-12-18 RX ADMIN — PANTOPRAZOLE SODIUM 40 MG: 40 INJECTION, POWDER, FOR SOLUTION INTRAVENOUS at 08:04

## 2023-12-18 RX ADMIN — SODIUM CHLORIDE 8 MG/HR: 900 INJECTION INTRAVENOUS at 16:24

## 2023-12-18 RX ADMIN — ONDANSETRON 4 MG: 2 INJECTION INTRAMUSCULAR; INTRAVENOUS at 06:17

## 2023-12-18 NOTE — H&P
Pulmonary/Critical Care History and Physical Exam     LOS: 0 days   Patient Care Team:  Aniyah Goldberg MD as PCP - General (Internal Medicine)  Dominic Wolff MD as Consulting Physician (Cardiology)    Chief Complaint:    Chief Complaint   Patient presents with    Vomiting       Subjective     HPI:    Aleena Armstrong is a 73 y.o. female with PMH of HTN, dyslipidemia, hypothyroidism, psoriatic arthritis on hydroxychloroquine, GERD with prior gastric ulcer, s/p flo-en-Y bypass, WPW (data deficit), and AF chronically anticoagulated on Eliquis who presents to [facility] ED on 12/17/23 for evaluation of hematemesis and hematochezia found to be anemic and hypotensive.     The patient had complaints of myalgias, abdominal discomfort, and nausea with vomiting that appeared bloody at home around 9PM. She has been on a prednisone taper over the past week for urticaria which she thinks she developed secondary to eating almonds or shrimp. Denies alcohol or NSAID use. While in the ED she had numerous bloody stools with clots accompanied by hypotension given 1L NS bolus. She is bradycardic and is on metoprolol XL and Tikosyn at home. H&H is 7.8/26.2 compared to 10.3/33.6 in February. Abdominal CT is unrevealing for acute process. She was given PPI load and ordered 2 units PRBCs. She will be admitted to the ICU for higher level of care.     Patient's  reports she has had some issues with weight loss over the past 2 years.  She has had a poor appetite recently.  Had nausea earlier today prior to developing the hematemesis around 9 PM.    Time spent: 10 minutes  Electronically signed by YULI Lund, 12/18/23, 1:11 AM EST.    I performed an independent history and physical examination. Portions of the history were obtained by YULI and were modified by me according to my findings. The above note reflects my findings, assessment, and plan.    Duane Briones MD    History taken from: patient/spouse/chart     Past Medical  History:   Diagnosis Date    A-fib     HAD ABLATION FOR THIS IN 2017    Anemia     Anxiety and depression 12/11/2016    GERD (gastroesophageal reflux disease)     Glaucoma of left eye 12/11/2016    H/O migraine     LAST 3  YEARS AGO     History of MRSA infection     APPROX 8 YEARS, TREATED WITH ORAL ABX, NEGATIVE CULTURES FOLLOWING TX     History of transfusion     Hyperlipidemia     Hypertension     MEDS DC'D BY DR GOMEZ 1/2017    Insomnia 12/11/2016    Kidney disease     Peptic ulcer - s/p gastric bypass surgery (7-8 years ago) 12/11/2016    Rheumatoid arthritis 12/11/2016    Spinal headache     FOLLOWING SPINAL FOR CHILDBIRTH     Vitamin D deficiency 12/11/2016    Wears dentures     UPPER PLATE     Elodia-Parkinson-White (WPW) syndrome     1990s       Past Surgical History:   Procedure Laterality Date    BREAST BIOPSY  ~1990    CARDIAC CATHETERIZATION N/A 10/24/2016    Procedure: Left Heart Cath;  Surgeon: Rubens Morejon MD;  Location:  [facility] CATH INVASIVE LOCATION;  Service:     CARDIAC ELECTROPHYSIOLOGY PROCEDURE N/A 6/20/2017    Procedure: PVA;  Surgeon: Edvin Gomez DO;  Location:  [facility] INVASIVE LOCATION;  Service:     CHOLECYSTECTOMY  ~1990    COLONOSCOPY  2007    GASTRIC BYPASS      GASTRIC BYPASS  2005    HAMMER TOE REPAIR      LEFT--GARO IN PLACE     HYSTERECTOMY  1993    Complete    ORIF HUMERUS FRACTURE Right 2/8/2019    Procedure: ORIF RIGHT PROXIMAL HUMERUS;  Surgeon: Yonatan Galdamez MD;  Location:  [facility];  Service: Orthopedics    ORIF HUMERUS FRACTURE Right 11/18/2021    Procedure: HUMERUS PROXIMAL OPEN REDUCTION INTERNAL FIXATION RIGHT WITH HARDWARE REMOVAL AND NAIL PLACEMENT;  Surgeon: Yonatan Galdamez MD;  Location:  [facility];  Service: Orthopedics;  Laterality: Right;    SHOULDER ACROMIOPLASTY Right 2019    WRIST FRACTURE SURGERY Right 2014       Family History   Problem Relation Age of Onset    Arrhythmia Mother     Hypertension Mother     Stroke Mother     Diabetes Mother      Lung cancer Father     Breast cancer Sister     Breast cancer Maternal Aunt     Diabetes Son         Type 1    Breast cancer Cousin     Ovarian cancer Cousin        Social History     Socioeconomic History    Marital status:    Tobacco Use    Smoking status: Former     Packs/day: 1     Types: Cigarettes     Quit date:      Years since quittin.9    Smokeless tobacco: Never   Vaping Use    Vaping Use: Never used   Substance and Sexual Activity    Alcohol use: Yes     Alcohol/week: 5.0 standard drinks of alcohol     Types: 5 Glasses of wine per week     Comment: RARE    Drug use: No    Sexual activity: Defer       Allergies   Allergen Reactions    Bactrim [Sulfamethoxazole-Trimethoprim] Other (See Comments)     MOUTH SORES    Beta Adrenergic Blockers Other (See Comments)     bradycardia    Percocet [Oxycodone-Acetaminophen] Other (See Comments)     NIGHT TERRORS     Statins Myalgia     MYALGIA     Sulfa Antibiotics Other (See Comments)     MOUTH SORES AND RASH     Erythromycin Rash     RASH        PMH/FH/SocH were reviewed by me and updates were made.     Review of Systems:    Review of 14 systems was completed with positives and pertinent negatives noted in the subjective section.  All other systems reviewed and are negative.       Objective     Vital Signs  Temp:  [97.5 °F (36.4 °C)] 97.5 °F (36.4 °C)  Heart Rate:  [50-56] 51  Resp:  [16-22] 18  BP: ()/(27-53) 96/32  Body mass index is 24.37 kg/m².       Physical Exam:     General Appearance:    Alert, cooperative, ill-appearing.   Head:    Normocephalic, without obvious abnormality, atraumatic   Eyes:            Lids and lashes normal, conjunctivae and sclerae normal, no   icterus, no pallor, corneas clear, PER   ENMT:   Ears appear intact with no abnormalities noted         Neck: Trachea midline, no thyromegaly, no   carotid bruit, no JVD       Lungs/resp:     Normal effort, symmetric chest rise, no crepitus, clear to      auscultation  bilaterally, no chest wall tenderness                  Heart/CV:  Mildly bradycardic, normal S1 and S2, no murmur   Abdomen/GI:   Soft, mild diffuse tenderness to palpation, non-distended   G/U:     Deferred   Extremities/MSK:   No clubbing, cyanosis or edema.  Normal tone.  No deformities.  Extremities are cool to touch.   Pulses:   Pulses palpable and equal bilaterally   Skin:   No bleeding, bruising or rash   Hem/Lymph:   No cervical or supraclavicular adenopathy.    Neurologic:   Moves all extremities with no obvious focal motor deficit.  Cranial nerves 2 - 12 grossly intact            Psychiatric:  Normal mood and affect, oriented x 3.   The above findings are documentation my personal physical examination from today.  Electronically signed by:Duane Briones MD 12/18/23 03:20 EST     Results Review:     I reviewed the patient's new clinical results.   Results from last 7 days   Lab Units 12/17/23  2227   SODIUM mmol/L 140   POTASSIUM mmol/L 5.0   CHLORIDE mmol/L 109*   CO2 mmol/L 24.0   BUN mg/dL 25*   CREATININE mg/dL 1.02*   CALCIUM mg/dL 8.1*   BILIRUBIN mg/dL 0.3   ALK PHOS U/L 64   ALT (SGPT) U/L 7   AST (SGOT) U/L 15   GLUCOSE mg/dL 175*     Results from last 7 days   Lab Units 12/17/23  2227   WBC 10*3/mm3 6.49   HEMOGLOBIN g/dL 7.8*   HEMATOCRIT % 26.2*   PLATELETS 10*3/mm3 228           I reviewed the patient's new imaging including images and reports.  CT abdomen pelvis with contrast 12/16/2023 was reviewed.  No significant abnormalities noted.      Medication Review:   pantoprazole, 40 mg, Intravenous, Q12H  sodium chloride, 500 mL, Intravenous, Once           Assessment & Plan     Active Hospital Problems    Diagnosis     **GI bleed     Hematemesis     Hematochezia     Acute blood loss anemia     History of Emilie-en-Y gastric bypass     Hypothyroidism     Psoriatic arthritis on Plaquenil     Chronic anticoagulation (Eliquis)     Hemorrhagic shock     GERD     Atrial fib/flutter      73 y.o.  former smoker quit 1992 with history of hypothyroidism, hyperlipidemia, psoriatic arthritis on Plaquenil, GERD, prior reported gastric ulcer, history of Emilie-en-Y gastric bypass, atrial fibrillation/flutter on Eliquis, some issues with poor appetite/weight loss over the past 2 years, who had some nausea earlier today followed by hematemesis this evening.  This was followed by melena also with some bright red blood per rectum.  She was significantly hypotensive with some improvement after fluid bolus.  She is not tachycardic currently in the setting of chronic beta-blocker therapy.    Plan:  1.  For GI bleeding/GERD: Suspect upper GI bleed.  N.p.o. for now.  Gastroenterology consultation.  Fluid resuscitation.  Blood transfusion 2 units now.  Hold anticoagulation.  Twice daily IV PPI.  2.  For atrial fibrillation: Hold anticoagulation.  For now hold metoprolol.  3.  Hypothyroidism: Apparently no longer taking levothyroxine.  4.  For psoriatic arthritis on Plaquenil: Hold for now.  5.  For DVT prophylaxis: Mechanical.    Patient is critically ill secondary to hemorrhagic shock and has high risk of life-threatening decline in condition.  As such, the patient requires continuous monitoring frequent reassessment for consideration of adjustment management to minimize this risk.  I personally reassessed her multiple times today.    Critical care time : 55 minutes spent by me personally (This excludes time spent performing separately reportable procedures and services). including high complexity decision making to assess, manipulate, and support vital organ system failure in this individual who has impairment of one or more vital organ systems such that there is a high probability of imminent or life threatening deterioration in the patient’s condition.    Electronically signed by:  Duane Briones MD  12/18/23  03:22 EST

## 2023-12-18 NOTE — ED PROVIDER NOTES
Subjective   History of Present Illness  Is a 73-year-old female with history of atrial fibrillation and GERD presenting to the emergency department with numerous complaints.  The patient states that she has not felt well all day.  She woke up was feeling very nauseous.  She has had some full muscle pain throughout her body.  Patient states that her nausea got worse this evening and she proceeded to vomit.  She stated she had an episode of bright red vomiting.  Patient does take anticoagulation secondary for atrial fibrillation.  Patient is complaining of epigastric discomfort now.  Move abdominal cramping area.  She has had some diarrhea, however no blood in the stool.  She denies any fevers or chills.  No headache or change in vision.  No focal weakness.  No chest pain or shortness of breath.    History provided by:  Patient   used: No        Review of Systems   Constitutional:  Positive for fatigue. Negative for chills and fever.   HENT:  Negative for congestion, ear pain and sore throat.    Eyes:  Negative for visual disturbance.   Respiratory:  Negative for shortness of breath.    Cardiovascular:  Negative for chest pain.   Gastrointestinal:  Positive for abdominal pain, nausea and vomiting.   Genitourinary:  Negative for difficulty urinating.   Musculoskeletal:  Positive for myalgias. Negative for arthralgias.   Skin:  Negative for rash.   Neurological:  Negative for dizziness, weakness and numbness.   Psychiatric/Behavioral:  Negative for agitation.        Past Medical History:   Diagnosis Date    A-fib     HAD ABLATION FOR THIS IN 2017    Anemia     Anxiety and depression 12/11/2016    GERD (gastroesophageal reflux disease)     Glaucoma of left eye 12/11/2016    H/O migraine     LAST 3  YEARS AGO     History of MRSA infection     APPROX 8 YEARS, TREATED WITH ORAL ABX, NEGATIVE CULTURES FOLLOWING TX     History of transfusion     Hyperlipidemia     Hypertension     MEDS DC'D BY DR GOMEZ  1/2017    Insomnia 12/11/2016    Kidney disease     Peptic ulcer - s/p gastric bypass surgery (7-8 years ago) 12/11/2016    Rheumatoid arthritis 12/11/2016    Spinal headache     FOLLOWING SPINAL FOR CHILDBIRTH     Vitamin D deficiency 12/11/2016    Wears dentures     UPPER PLATE     Elodia-Parkinson-White (WPW) syndrome     1990s       Allergies   Allergen Reactions    Bactrim [Sulfamethoxazole-Trimethoprim] Other (See Comments)     MOUTH SORES    Beta Adrenergic Blockers Other (See Comments)     bradycardia    Percocet [Oxycodone-Acetaminophen] Other (See Comments)     NIGHT TERRORS     Statins Myalgia     MYALGIA     Sulfa Antibiotics Other (See Comments)     MOUTH SORES AND RASH     Erythromycin Rash     RASH        Past Surgical History:   Procedure Laterality Date    BREAST BIOPSY  ~1990    CARDIAC CATHETERIZATION N/A 10/24/2016    Procedure: Left Heart Cath;  Surgeon: Rubens Morejon MD;  Location:  [facility] IN[facility];  Service:     CARDIAC ELECTROPHYSIOLOGY PROCEDURE N/A 6/20/2017    Procedure: PVA;  Surgeon: Edvin Cook DO;  Location:  [facility];  Service:     CHOLECYSTECTOMY  ~1990    COLONOSCOPY  2007    GASTRIC BYPASS      GASTRIC BYPASS  2005    HAMMER TOE REPAIR      LEFT--GARO IN PLACE     HYSTERECTOMY  1993    Complete    ORIF HUMERUS FRACTURE Right 2/8/2019    Procedure: ORIF RIGHT PROXIMAL HUMERUS;  Surgeon: Yonatan Galdamez MD;  Location:  [facility];  Service: Orthopedics    ORIF HUMERUS FRACTURE Right 11/18/2021    Procedure: HUMERUS PROXIMAL OPEN REDUCTION INTERNAL FIXATION RIGHT WITH HARDWARE REMOVAL AND NAIL PLACEMENT;  Surgeon: Yonatan Galdamez MD;  Location:  [facility];  Service: Orthopedics;  Laterality: Right;    SHOULDER ACROMIOPLASTY Right 2019    WRIST FRACTURE SURGERY Right 2014       Family History   Problem Relation Age of Onset    Arrhythmia Mother     Hypertension Mother     Stroke Mother     Diabetes Mother     Lung cancer Father     Breast  cancer Sister     Breast cancer Maternal Aunt     Diabetes Son         Type 1    Breast cancer Cousin     Ovarian cancer Cousin        Social History     Socioeconomic History    Marital status:    Tobacco Use    Smoking status: Former     Packs/day: 1     Types: Cigarettes     Quit date:      Years since quittin.9    Smokeless tobacco: Never   Vaping Use    Vaping Use: Never used   Substance and Sexual Activity    Alcohol use: Yes     Alcohol/week: 5.0 standard drinks of alcohol     Types: 5 Glasses of wine per week     Comment: RARE    Drug use: No    Sexual activity: Defer           Objective   Physical Exam  Vitals and nursing note reviewed.   Constitutional:       General: She is not in acute distress.     Appearance: She is not ill-appearing or toxic-appearing.   HENT:      Mouth/Throat:      Pharynx: No posterior oropharyngeal erythema.   Eyes:      Conjunctiva/sclera: Conjunctivae normal.      Pupils: Pupils are equal, round, and reactive to light.   Cardiovascular:      Rate and Rhythm: Regular rhythm. Bradycardia present.   Pulmonary:      Effort: Pulmonary effort is normal. No respiratory distress.   Abdominal:      General: Abdomen is flat. There is no distension.      Palpations: There is no mass.      Tenderness: There is abdominal tenderness. There is no guarding or rebound.      Comments: Minimal tenderness in the epigastric region.   Musculoskeletal:         General: No deformity. Normal range of motion.   Skin:     General: Skin is warm.      Findings: No rash.   Neurological:      General: No focal deficit present.      Mental Status: She is alert and oriented to person, place, and time.      Motor: No weakness.         ECG 12 Lead      Date/Time: 2023 11:13 PM    Performed by: Duane Canales MD  Authorized by: Duane Canales MD  Interpreted by physician  Comparison: compared with previous ECG   Similar to previous ECG  Rhythm: sinus bradycardia  Rate:  bradycardic  BPM: 52  QRS axis: normal  Conduction: conduction normal  Other findings: prolonged QTc interval  Clinical impression: non-specific ECG  Comments: EKG was directly visualized by myself, interpretations as documented in hospital course.               ED Course  ED Course as of 12/18/23 0059   Sun Dec 17, 2023   2156 BP(!): 110/37 [JK]   2156 Heart Rate: 54 [JK]   2156 SpO2: 96 % [JK]   2156 Resp: 22 [JK]   2156 Device (Oxygen Therapy): room air  Patient's repeat vitals, telemetry tracing, and pulse oximetry tracing were directly viewed and interpreted by myself.  Patient slightly bradycardic with a heart rate of 54 bpm [JK]   2314 Protime-INR(!) [JK]   2314 aPTT(!) [JK]   2314 Lipase [JK]   2314 Single High Sensitivity Troponin T(!) [JK]   2314 TSH(!) [JK]   2314 Magnesium [JK]   2314 Lactic Acid, Plasma(!!) [JK]   2314 CBC & Differential(!) [JK]   2314 Comprehensive Metabolic Panel(!) [JK]   2314 BNP  Laboratory studies were reviewed and interpreted directly by myself.  CMP was unremarkable, BNP was slightly elevated 1311, TSH elevated 4.5, magnesium normal, lactic acid slightly elevated at 2.2, CBC showed some anemia with a hemoglobin of 7.8, initial troponin marginal at 20, lipase normal, INR slightly elevated 1.26 [JK]   Mon Dec 18, 2023   0009 Patient did have a large volume bright red blood per rectum while in the emergency department.  I do believe this consistent with both an upper and lower GI bleed. [JK]   0010 I have discussed the proposed blood product transfusion with the patient. I have informed the patient regarding the potential risks of blood product transfusions which, though rare, include transfusion reaction, hepatitis, and AIDS. The patient has been given the opportunity to ask questions about the need to be transfused and the possible results of not receiving this treatment. The patient has agreed to undergo the transfusion. [JK]   0058 CT Abdomen Pelvis With & Without  Contrast  Imaging was directly visualized by myself, per my interpretations, CT abdomen pelvis was unremarkable. [JK]   0058 Patient's blood pressure has been slightly low for large bowel movement which was bloody.  We will continue fluid resuscitation and blood infusion.  Her MAPs appear to be normal at this time.  Patient be admitted to the ICU in critical condition [JK]   0058 Based on the patient's presentation, history and diffuse work-up in the emergency department, the patient is deemed appropriate for admission to the hospital for further evaluation and treatment.  This was discussed with the patient at bedside.  They are in agreement with the current medical management.    Admitting physician: Dr. Briones    Discussion was had with admitting physician regarding the laboratory and imaging findings.  We did discuss current therapeutics in the emergency department and progression of the patient.  Working diagnosis was conveyed to the admitting physician, as well as current status and prognosis for the patient.  They are in agreement with these findings and have accepted admission.    Shared decision making:   After full review of the patient's clinical presentation, review of any work-up including but not limited to laboratory studies and radiology obtained, I had a discussion with the patient.  Treatment options were discussed as well as the risks, benefits and consequences.  I discussed all findings with the patient and family members if available.  During the discussion, treatment goals were understood by all as well as any misconceptions which were addressed with the patient.  Ample time was given for any questions they may have had.  They are in agreement with the treatment plan as well as final disposition. [JK]      ED Course User Index  [JK] Duane Canales MD                                             Medical Decision Making  This is a 73-year-old female with a history of atrial fibrillation  presenting to the emergency department with some myalgias, nausea vomiting abdominal discomfort.  The patient apparently had 1 episode of hematemesis.  I do believe is likely related to gastritis and her anticoagulation use.  Overall the patient appears hemodynamically stable.  She is nontoxic.  There is no evidence of acute abdomen or peritonitis.  IV access established and the patient.  Placed on continuous telemetry monitoring.  Workup initiated.      Differential diagnosis: Peptic ulcer disease, dyspepsia, GERD, pancreatitis, biliary colic, electrolyte abnormality, acute kidney injury, anemia, GI bleeding, viral syndrome      Amount and/or Complexity of Data Reviewed  Independent Historian: EMS  External Data Reviewed: labs, radiology, ECG and notes.     Details: External laboratories, imaging as well as notes were reviewed personally by myself.  All relevant studies were used to guide decision making.     Date of previous record: 2/22/2023    Source of note: Bariatrics    Summary: Patient was seen evaluated by primary bariatrics for follow-up.  I did review diffuse laboratory studies on file as well as a previous CT of the abdomen pelvis and EKGs.  Records reviewed.    Labs: ordered. Decision-making details documented in ED Course.  Radiology: ordered and independent interpretation performed. Decision-making details documented in ED Course.  ECG/medicine tests: ordered and independent interpretation performed. Decision-making details documented in ED Course.    Risk  Prescription drug management.  Decision regarding hospitalization.    Critical Care  Total time providing critical care: 68 minutes (Authorized and performed by: Duane Canales MD  I personally spent a total of 68 minutes of critical care time with the patient.  Due to the high probability of clinically significant, life-threatening deterioration, the patient required my highest level of care to intervene emergently.  These interventions,  including, but not limited to, establishing IV access, continuous pulse oximeter and telemetry monitoring, frequent monitoring and reevaluations, management the patient's airway and cardiovascular system, discussion with other consultants as needed, which bear directly on the management the patient.  This also includes obtaining history, examining the patient, frequent reevaluations and coordinating high level of care.  Failure to emergently initiate these interventions would carry a high probability of resulting in sudden, clinically significant or life threatening deterioration in the patient's condition.  This does not include time spent on separately reported billable procedures.)        Final diagnoses:   Acute lower GI bleeding   Acute anemia       ED Disposition  ED Disposition       ED Disposition   Decision to Admit    Condition   --    Comment   Level of Care: Critical Care [6]   Admitting Physician: ED TAM [[id number]   Attending Physician: ED TAM [[id number]]                 No follow-up provider specified.       Medication List      No changes were made to your prescriptions during this visit.            Ed Canales MD  12/18/23 0059

## 2023-12-18 NOTE — PLAN OF CARE
Goal Outcome Evaluation:               Patient arrived from the ED around 0200. Patient A&O x4, on RA. Patient hypotensive 500ml NS bolus given, 2 units of PRBC given. Complaints of nausea, PRN medication given x2.  updated. GI consult placed for today.

## 2023-12-18 NOTE — ANESTHESIA POSTPROCEDURE EVALUATION
Patient: Aleena Armstrong    Procedure Summary       Date: 12/18/23 Room / Location:  [facility] /  [facility]    Anesthesia Start: 1340 Anesthesia Stop: 1418    Procedure: ESOPHAGOGASTRODUODENOSCOPY Diagnosis:       Gastrointestinal hemorrhage, unspecified gastrointestinal hemorrhage type      (Gastrointestinal hemorrhage, unspecified gastrointestinal hemorrhage type [K92.2])    Surgeons: Adeel Reyes MD Provider: Natan Mac MD    Anesthesia Type: general, MAC ASA Status: 3            Anesthesia Type: general, MAC    Vitals  Vitals Value Taken Time   /89 12/18/23 1418   Temp 97 °F (36.1 °C) 12/18/23 1418   Pulse 74 12/18/23 1418   Resp 16 12/18/23 1418   SpO2 96 % 12/18/23 1418           Post Anesthesia Care and Evaluation    Patient location during evaluation: ICU  Patient participation: complete - patient participated  Level of consciousness: sleepy but conscious  Pain score: 0    Airway patency: patent  Anesthetic complications: No anesthetic complications  PONV Status: none  Cardiovascular status: blood pressure returned to baseline  Respiratory status: spontaneous ventilation and nasal cannula  Hydration status: acceptable    Comments: Report to ICU RN at endo procedure room.  No anesthesia care post op

## 2023-12-18 NOTE — PLAN OF CARE
Goal Outcome Evaluation:         Patient had one episode of bloody stool following EGD procedure. Stool was dark, and clotted. BP stable. Remains on RA. No SOA reported. Patient reports relief of abd pain. Tolerated clear liquids. Afebrile. Up with standby assist. Adequate urine output. Protonix gtt initiated per GI. H and H repeated following procedure, and remains stable. Continue to monitor patient.

## 2023-12-18 NOTE — ED NOTES
Aleena Armstrong    Nursing Report ED to Floor:  Mental status: A&Ox4  Ambulatory status: weak  Oxygen Therapy:  RA  Cardiac Rhythm: NS  Admitted from: home  Safety Concerns:  fall risk  Social Issues: n/a  ED Room #:  16    ED Nurse Phone Extension - [phone] or may call [phone].      HPI:   Chief Complaint   Patient presents with    Vomiting       Past Medical History:  Past Medical History:   Diagnosis Date    DAVID-fib     HAD ABLATION FOR THIS IN 2017    Anemia     Anxiety and depression 12/11/2016    GERD (gastroesophageal reflux disease)     Glaucoma of left eye 12/11/2016    H/O migraine     LAST 3  YEARS AGO     History of MRSA infection     APPROX 8 YEARS, TREATED WITH ORAL ABX, NEGATIVE CULTURES FOLLOWING TX     History of transfusion     Hyperlipidemia     Hypertension     MEDS DC'D BY DR GOMEZ 1/2017    Insomnia 12/11/2016    Kidney disease     Peptic ulcer - s/p gastric bypass surgery (7-8 years ago) 12/11/2016    Rheumatoid arthritis 12/11/2016    Spinal headache     FOLLOWING SPINAL FOR CHILDBIRTH     Vitamin D deficiency 12/11/2016    Wears dentures     UPPER PLATE     Elodia-Parkinson-White (WPW) syndrome     1990s        Past Surgical History:  Past Surgical History:   Procedure Laterality Date    BREAST BIOPSY  ~1990    CARDIAC CATHETERIZATION N/A 10/24/2016    Procedure: Left Heart Cath;  Surgeon: Rubens Morejon MD;  Location:  [facility];  Service:     CARDIAC ELECTROPHYSIOLOGY PROCEDURE N/A 6/20/2017    Procedure: PVA;  Surgeon: Edvin Gomez DO;  Location:  [facility];  Service:     CHOLECYSTECTOMY  ~1990    COLONOSCOPY  2007    GASTRIC BYPASS      GASTRIC BYPASS  2005    HAMMER TOE REPAIR      LEFT--GARO IN PLACE     HYSTERECTOMY  1993    Complete    ORIF HUMERUS FRACTURE Right 2/8/2019    Procedure: ORIF RIGHT PROXIMAL HUMERUS;  Surgeon: Yonatan Galdamez MD;  Location:  [facility];  Service: Orthopedics    ORIF HUMERUS FRACTURE Right 11/18/2021    Procedure: HUMERUS  "PROXIMAL OPEN REDUCTION INTERNAL FIXATION RIGHT WITH HARDWARE REMOVAL AND NAIL PLACEMENT;  Surgeon: Yonatan Galdamez MD;  Location: [facility];  Service: Orthopedics;  Laterality: Right;    SHOULDER ACROMIOPLASTY Right 2019    WRIST FRACTURE SURGERY Right 2014        Admitting Doctor:   Duane Briones MD    Consulting Provider(s):  Consults       Date and Time Order Name Status Description    12/18/2023  1:16 AM Inpatient Gastroenterology Consult               Admitting Diagnosis:   The primary encounter diagnosis was Acute lower GI bleeding. A diagnosis of Acute anemia was also pertinent to this visit.    Most Recent Vitals:   Vitals:    12/17/23 2146 12/18/23 0045   BP: (!) 110/37 (!) 77/46   BP Location: Left arm Right arm   Patient Position: Lying Lying   Pulse: 54 50   Resp: 22 18   SpO2: 96% 100%   Weight: 64.2 kg (141 lb 8 oz) 64 kg (141 lb)   Height: 167.6 cm (66\") 167.6 cm (65.98\")       Active LDAs/IV Access:   Lines, Drains & Airways       Active LDAs       Name Placement date Placement time Site Days    Peripheral IV 12/17/23 2227 Left;Posterior Forearm 12/17/23 2227  Forearm  less than 1                    Labs (abnormal labs have a star):   Labs Reviewed   COMPREHENSIVE METABOLIC PANEL - Abnormal; Notable for the following components:       Result Value    Glucose 175 (*)     BUN 25 (*)     Creatinine 1.02 (*)     Chloride 109 (*)     Calcium 8.1 (*)     Total Protein 5.2 (*)     Albumin 3.3 (*)     eGFR 57.5 (*)     All other components within normal limits    Narrative:     GFR Normal >60  Chronic Kidney Disease <60  Kidney Failure <15    The GFR formula is only valid for adults with stable renal function between ages 18 and 70.   PROTIME-INR - Abnormal; Notable for the following components:    Protime 15.9 (*)     INR 1.26 (*)     All other components within normal limits   APTT - Abnormal; Notable for the following components:    PTT 23.3 (*)     All other components within normal limits "    Narrative:     PTT = The equivalent PTT values for the therapeutic range of heparin levels at 0.3 to 0.5 U/ml are 60 to 70 seconds.   SINGLE HSTROPONIN T - Abnormal; Notable for the following components:    HS Troponin T 20 (*)     All other components within normal limits    Narrative:     High Sensitive Troponin T Reference Range:  <14.0 ng/L- Negative Female for AMI  <22.0 ng/L- Negative Male for AMI  >=14 - Abnormal Female indicating possible myocardial injury.  >=22 - Abnormal Male indicating possible myocardial injury.   Clinicians would have to utilize clinical acumen, EKG, Troponin, and serial changes to determine if it is an Acute Myocardial Infarction or myocardial injury due to an underlying chronic condition.        LACTIC ACID, PLASMA - Abnormal; Notable for the following components:    Lactate 2.2 (*)     All other components within normal limits   TSH - Abnormal; Notable for the following components:    TSH 4.510 (*)     All other components within normal limits    Narrative:     Due to abnormal TSH results, suggest ordering Free T4.   CBC WITH AUTO DIFFERENTIAL - Abnormal; Notable for the following components:    RBC 2.54 (*)     Hemoglobin 7.8 (*)     Hematocrit 26.2 (*)     .1 (*)     MCHC 29.8 (*)     Neutrophil % 77.5 (*)     Lymphocyte % 16.5 (*)     Monocyte % 4.9 (*)     Eosinophil % 0.0 (*)     All other components within normal limits   COVID-19/FLUA&B/RSV, NP SWAB IN TRANSPORT MEDIA 1 HR TAT - Normal    Narrative:     Fact sheet for providers: https://www.fda.gov/media/302276/download    Fact sheet for patients: https://www.fda.gov/media/882232/download    Test performed by PCR.   LIPASE - Normal   BNP (IN-HOUSE) - Normal    Narrative:     This assay is used as an aid in the diagnosis of individuals suspected of having heart failure. It can be used as an aid in the diagnosis of acute decompensated heart failure (ADHF) in patients presenting with signs and symptoms of ADHF to the  emergency department (ED). In addition, NT-proBNP of <300 pg/mL indicates ADHF is not likely.    Age Range Result Interpretation  NT-proBNP Concentration (pg/mL:      <50             Positive            >450                   Gray                 300-450                    Negative             <300    50-75           Positive            >900                  Gray                300-900                  Negative            <300      >75             Positive            >1800                  Gray                300-1800                  Negative            <300   MAGNESIUM - Normal   LACTIC ACID, REFLEX - Normal   URINALYSIS W/ CULTURE IF INDICATED   ETHANOL   PREPARE RBC   TYPE AND SCREEN   CBC AND DIFFERENTIAL    Narrative:     The following orders were created for panel order CBC & Differential.  Procedure                               Abnormality         Status                     ---------                               -----------         ------                     CBC Auto Differential[[id number]]        Abnormal            Final result                 Please view results for these tests on the individual orders.       Meds Given in ED:   Medications   sodium chloride 0.9 % flush 10 mL (has no administration in time range)   nitroglycerin (NITROSTAT) SL tablet 0.4 mg (has no administration in time range)   pantoprazole (PROTONIX) injection 40 mg (has no administration in time range)   sodium chloride 0.9 % bolus 1,000 mL (1,000 mL Intravenous New Bag 12/18/23 0029)   ondansetron (ZOFRAN) injection 4 mg (4 mg Intravenous Given 12/18/23 0030)   pantoprazole (PROTONIX) injection 80 mg (80 mg Intravenous Given 12/18/23 0030)   iopamidol (ISOVUE-300) 61 % injection 100 mL (100 mL Intravenous Given 12/17/23 1532)

## 2023-12-18 NOTE — ANESTHESIA PREPROCEDURE EVALUATION
Anesthesia Evaluation     Patient summary reviewed and Nursing notes reviewed   history of anesthetic complications:   NPO Solid Status: > 8 hours  NPO Liquid Status: > 2 hours           Airway   Mallampati: I  TM distance: >3 FB  Neck ROM: full  No difficulty expected  Dental    (+) upper dentures and edentulous    Pulmonary    (-) asthma, shortness of breath, recent URI, sleep apnea, not a smoker  Cardiovascular     ECG reviewed    (+) hypertension, dysrhythmias (WPWablated  sp PVA for Afib) Atrial Fib, hyperlipidemia  (-) past MI, angina, cardiac stents    ROS comment: ECG SB HR  52 prolonged QTc interval   ECHO EF >56% · Moderate TI mild MVR   RVSP moderately elevated >45  Cath 2016 minimal CAD         Neuro/Psych  (+) headaches  (-) seizures, CVA  GI/Hepatic/Renal/Endo    (+) obesity, morbid obesity, GERD, PUD, GI bleeding , thyroid problem   (-) no renal disease (creat <1), diabetes    Musculoskeletal     Abdominal    Substance History      OB/GYN          Other   arthritis (Rhuematoid),     ROS/Med Hx Other: HCT 28   creat K OK       Phys Exam Other: Mac 3 grade 1 view 2021                  Anesthesia Plan    ASA 3     general and MAC     (PFL  Ett ready (slight nausea))  intravenous induction     Anesthetic plan, risks, benefits, and alternatives have been provided, discussed and informed consent has been obtained with: patient.    Plan discussed with CRNA.        CODE STATUS:    Code Status (Patient has no pulse and is not breathing): CPR (Attempt to Resuscitate)  Medical Interventions (Patient has pulse or is breathing): Full Support

## 2023-12-18 NOTE — CONSULTS
[facility] Consult    Referring Provider: No ref. provider found    PCP: Aniyah Goldberg MD    Reason for Consultation: Hematemesis/hematochezia, history of gastric ulcer    Chief complaint: Bloody stool and epigastric pain    History of present illness:    Aleena Armstrong is a 73 y.o. female who is admitted with acute gastrointestinal bleeding.  Patient notes she was at her usual state of health until yesterday when she began to experience significant epigastric pain, hematemesis, and hematochezia.  Patient states her symptoms feel very similar to prior issues with anastomotic ulcers which was noted on her most recent EGD from last year.  Has a history of Emilie-en-Y gastric bypass; her bariatric surgeon is Dr. Parks at .  Reports significant nausea, vomiting, hematemesis, hematochezia, and abdominal pain with onset.  Denies any shortness of breath, chest pain, or fever/chills.  No ill contacts.  Denies any use of NSAIDs.  Is chronically anticoagulated on Eliquis for atrial fibrillation.  Patient did recently complete a steroid taper for allergic urticaria. Past medical, surgical, social, and family histories are reviewed for accuracy.  No documented alleviating or exacerbating factors.  Does not endorse pain at time of exam.    Allergies:  Bactrim [sulfamethoxazole-trimethoprim], Beta adrenergic blockers, Percocet [oxycodone-acetaminophen], Statins, Sulfa antibiotics, and Erythromycin    Scheduled Meds:  pantoprazole, 40 mg, Intravenous, Q12H         Infusions:       PRN Meds:    nitroglycerin    ondansetron    prochlorperazine    [COMPLETED] Insert Peripheral IV **AND** sodium chloride    Home Meds:  Medications Prior to Admission   Medication Sig Dispense Refill Last Dose    apixaban (Eliquis) 5 MG tablet tablet Take 1 tablet by mouth Every 12 (Twelve) Hours. 60 tablet 5 12/17/2023    FLUoxetine (PROzac) 40 MG capsule Take 1 capsule by mouth Daily.   12/17/2023    folic acid (FOLVITE) 1 MG tablet  Take 1 tablet by mouth Daily.   Past Week    hydroxychloroquine (PLAQUENIL) 200 MG tablet Take 1 tablet by mouth 2 (Two) Times a Day.   Past Week    metoclopramide (REGLAN) 5 MG tablet 3 (Three) Times a Day.   Past Week    metoprolol succinate XL (TOPROL-XL) 25 MG 24 hr tablet Take 1 tablet by mouth Daily. 90 tablet 3 12/17/2023    miSOPROStol (CYTOTEC) 200 MCG tablet Take 1 tablet by mouth 3 (Three) Times a Day.   Past Week    multivitamin with minerals (ONE-A-DAY WOMENS PO) Take 1 tablet by mouth Daily.   Past Week    timolol (TIMOPTIC) 0.5 % ophthalmic solution Administer 1 drop to both eyes Every 12 (Twelve) Hours. (Patient taking differently: Administer 1 drop into the left eye Every 12 (Twelve) Hours.)  12 Past Week    traMADol (ULTRAM) 50 MG tablet Take 1 tablet by mouth Every 8 (Eight) Hours As Needed for Moderate Pain  or Severe Pain . 10 tablet 0 Past Week    traMADol (ULTRAM) 50 MG tablet Take 1 tablet by mouth Every 6 (Six) Hours As Needed for pain. 10 tablet 0 Past Week    traZODone (DESYREL) 100 MG tablet Take 1 tablet by mouth Every Night.   Past Week    acetaminophen (TYLENOL) 325 MG tablet Take 2 tablets by mouth Every 4 (Four) Hours As Needed for Mild Pain .       ibuprofen (ADVIL,MOTRIN) 800 MG tablet Take 1 tablet by mouth Every 6 (Six) Hours As Needed for Mild Pain , Moderate Pain  or Headache (Do not exceed 2,400mg in 24 hours). (Patient not taking: Reported on 12/18/2023)   Not Taking    levothyroxine (SYNTHROID, LEVOTHROID) 25 MCG tablet Take 1 tablet by mouth Every Morning. 30 tablet 0     promethazine (PHENERGAN) 25 MG tablet Take 0.5 tablets by mouth Every 6 (Six) Hours As Needed for Nausea or Vomiting.   More than a month    thiamine (VITAMIN B-1) 100 MG tablet  tablet Take 1 tablet by mouth Daily.   More than a month       ROS: Review of Systems   Constitutional:  Positive for activity change, appetite change and fatigue. Negative for chills, diaphoresis, fever and unexpected weight  change.   HENT:  Negative for sore throat, trouble swallowing and voice change.    Eyes: Negative.    Respiratory:  Negative for apnea, cough, choking, chest tightness, shortness of breath, wheezing and stridor.    Cardiovascular:  Negative for chest pain, palpitations and leg swelling.   Gastrointestinal:  Positive for abdominal distention, abdominal pain, blood in stool, nausea and vomiting. Negative for anal bleeding, constipation, diarrhea and rectal pain.   Endocrine: Negative.    Genitourinary: Negative.    Musculoskeletal: Negative.    Skin:  Positive for pallor.   Allergic/Immunologic: Negative.    Neurological: Negative.    Hematological:  Bruises/bleeds easily.   All other systems reviewed and are negative.      PAST MED HX:  Past Medical History:   Diagnosis Date    A-fib     HAD ABLATION FOR THIS IN 2017    Anemia     Anxiety and depression 12/11/2016    GERD (gastroesophageal reflux disease)     Glaucoma of left eye 12/11/2016    H/O migraine     LAST 3  YEARS AGO     History of MRSA infection     APPROX 8 YEARS, TREATED WITH ORAL ABX, NEGATIVE CULTURES FOLLOWING TX     History of transfusion     Hyperlipidemia     Hypertension     MEDS DC'D BY DR GOMEZ 1/2017    Insomnia 12/11/2016    Kidney disease     Peptic ulcer - s/p gastric bypass surgery (7-8 years ago) 12/11/2016    Rheumatoid arthritis 12/11/2016    Spinal headache     FOLLOWING SPINAL FOR CHILDBIRTH     Vitamin D deficiency 12/11/2016    Wears dentures     UPPER PLATE     Elodia-Parkinson-White (WPW) syndrome     1990s       PAST SURG HX:  Past Surgical History:   Procedure Laterality Date    BREAST BIOPSY  ~1990    CARDIAC CATHETERIZATION N/A 10/24/2016    Procedure: Left Heart Cath;  Surgeon: Rubens Morejon MD;  Location:  [facility];  Service:     CARDIAC ELECTROPHYSIOLOGY PROCEDURE N/A 6/20/2017    Procedure: PVA;  Surgeon: Edvin Gomez DO;  Location:  [facility];  Service:     CHOLECYSTECTOMY  ~1990     "COLONOSCOPY      GASTRIC BYPASS      GASTRIC BYPASS      HAMMER TOE REPAIR      LEFT--GARO IN PLACE     HYSTERECTOMY  1993    Complete    ORIF HUMERUS FRACTURE Right 2019    Procedure: ORIF RIGHT PROXIMAL HUMERUS;  Surgeon: Yonatan Galdamez MD;  Location:  [facility];  Service: Orthopedics    ORIF HUMERUS FRACTURE Right 2021    Procedure: HUMERUS PROXIMAL OPEN REDUCTION INTERNAL FIXATION RIGHT WITH HARDWARE REMOVAL AND NAIL PLACEMENT;  Surgeon: Yonatan Galdamez MD;  Location:  [facility];  Service: Orthopedics;  Laterality: Right;    SHOULDER ACROMIOPLASTY Right 2019    WRIST FRACTURE SURGERY Right        FAM HX:  Family History   Problem Relation Age of Onset    Arrhythmia Mother     Hypertension Mother     Stroke Mother     Diabetes Mother     Lung cancer Father     Breast cancer Sister     Breast cancer Maternal Aunt     Diabetes Son         Type 1    Breast cancer Cousin     Ovarian cancer Cousin        SOC HX:  Social History     Socioeconomic History    Marital status:    Tobacco Use    Smoking status: Former     Packs/day: 1     Types: Cigarettes     Quit date:      Years since quittin.9    Smokeless tobacco: Never   Vaping Use    Vaping Use: Never used   Substance and Sexual Activity    Alcohol use: Yes     Alcohol/week: 5.0 standard drinks of alcohol     Types: 5 Glasses of wine per week     Comment: RARE    Drug use: No    Sexual activity: Defer       PHYSICAL EXAM  BP 99/44   Pulse 51   Temp 97.9 °F (36.6 °C) (Oral)   Resp 14   Ht 167.6 cm (66\")   Wt 68.5 kg (151 lb 0.2 oz)   LMP  (LMP Unknown)   SpO2 98%   BMI 24.37 kg/m²   Wt Readings from Last 3 Encounters:   23 68.5 kg (151 lb 0.2 oz)   22 60.8 kg (134 lb)   22 66 kg (145 lb 8.1 oz)   ,body mass index is 24.37 kg/m².  Physical Exam  Vitals and nursing note reviewed.   Constitutional:       General: She is not in acute distress.     Appearance: Normal appearance. She is normal " weight. She is ill-appearing. She is not toxic-appearing.   HENT:      Head: Normocephalic and atraumatic.   Eyes:      General: No scleral icterus.     Extraocular Movements: Extraocular movements intact.      Conjunctiva/sclera: Conjunctivae normal.      Pupils: Pupils are equal, round, and reactive to light.   Cardiovascular:      Rate and Rhythm: Normal rate and regular rhythm.      Pulses: Normal pulses.      Heart sounds: Normal heart sounds.   Pulmonary:      Effort: Pulmonary effort is normal. No respiratory distress.      Breath sounds: Normal breath sounds.   Abdominal:      General: Abdomen is flat. Bowel sounds are normal. There is no distension.      Palpations: Abdomen is soft. There is no mass.      Tenderness: There is abdominal tenderness. There is no guarding or rebound.      Hernia: No hernia is present.   Genitourinary:     Rectum: Guaiac result positive.   Musculoskeletal:         General: Normal range of motion.      Right lower leg: No edema.      Left lower leg: No edema.   Skin:     General: Skin is warm and dry.      Capillary Refill: Capillary refill takes less than 2 seconds.      Coloration: Skin is pale. Skin is not jaundiced.   Neurological:      General: No focal deficit present.      Mental Status: She is alert and oriented to person, place, and time.   Psychiatric:         Mood and Affect: Mood normal.         Behavior: Behavior normal.         Thought Content: Thought content normal.         Judgment: Judgment normal.         Results Review:   I reviewed the patient's new clinical results.  I reviewed the patient's new imaging results and agree with the interpretation.  I reviewed the patient's other test results and agree with the interpretation  I personally viewed and interpreted the patient's EKG/Telemetry data    Lab Results   Component Value Date    WBC 6.49 12/17/2023    HGB 7.8 (L) 12/17/2023    HGB 10.3 (L) 02/22/2023    HGB 10.0 (L) 10/19/2022    HCT 26.2 (L) 12/17/2023     .1 (H) 12/17/2023     12/17/2023       Lab Results   Component Value Date    INR 1.26 (H) 12/17/2023    INR 1.07 11/16/2021    INR 0.94 02/04/2019       Lab Results   Component Value Date    GLUCOSE 175 (H) 12/17/2023    BUN 25 (H) 12/17/2023    CREATININE 1.02 (H) 12/17/2023    EGFRIFNONA >60 05/16/2022    EGFRIFAFRI >60 05/16/2022    BCR 24.5 12/17/2023     12/17/2023    K 5.0 12/17/2023    CO2 24.0 12/17/2023    CALCIUM 8.1 (L) 12/17/2023    PROTENTOTREF 6.0 03/08/2019    ALBUMIN 3.3 (L) 12/17/2023    ALKPHOS 64 12/17/2023    BILITOT 0.3 12/17/2023    ALT 7 12/17/2023    AST 15 12/17/2023       CT Abdomen Pelvis With & Without Contrast    Result Date: 12/18/2023  CT ABDOMEN PELVIS W WO CONTRAST Date of Exam: 12/17/2023 11:44 PM EST Indication: Abdominal pain, acute, nonlocalized Nausea/vomiting hematemesis. Comparison: 11/15/2021. Technique: Axial CT images were obtained of the abdomen and pelvis before and after the uneventful intravenous administration of intravenous contrast . Sagittal and coronal reconstructions were performed.  Automated exposure control and iterative reconstruction methods were used. Findings: Lung Bases:   The visualized lung bases and lower mediastinal structures are unremarkable. Liver: Liver is normal in size and CT density. No focal lesions. Biliary/Gallbladder:  The gallbladder has been resected.. The biliary tree is nondilated. Spleen: Spleen is normal in size and CT density. Pancreas:  Pancreas is normal. There is no evidence of pancreatic mass or peripancreatic fluid. Kidneys:  Kidneys are normal in size. There are no stones or hydronephrosis. Adrenals:  Adrenal glands are unremarkable. Retroperitoneal/Lymph Nodes/Vasculature:  No retroperitoneal adenopathy is identified. Gastrointestinal/Mesentery:  The bowel loops are non-dilated without wall thickening or mass. No significant stool burden identified. No significant inflammatory changes. Postsurgical  changes are seen related to previous partial colonic resection. Postsurgical changes are seen related to previous gastric bypass. The appendix appears within normal limits. No evidence of obstruction. No free air. No mesenteric fluid collections identified. Stimulator device seen within the subcutaneous tissues of the right flank. No evidence of contrast extravasation. No significant wall thickening identified. Bladder:  The bladder is normal. Genital:   Unremarkable       Bony Structures:   Visualized bony structures are consistent with the patient's age.     Impression: 1.No acute intra-abdominal or intrapelvic process. 2.Ancillary findings as described above. Electronically Signed: Rachell Mondragon MD  12/18/2023 12:02 AM EST  Workstation ID: [id number]    XR Ribs Left With PA Chest    Result Date: 12/13/2023  XR RIBS LEFT W PA CHEST Date of Exam: 12/12/2023 12:09 PM EST Indication: traumatic injury of rib Comparison: 5/29/2022 Findings: The heart size is normal and the lungs are clear. There is a neurostimulator in the lower thoracic region. There are postoperative changes of open reduction internal fixation of an old right humeral fracture. The left ribs appear intact. No fractures or destructive bone lesions are identified.     Impression: 1. No active disease. 2. Negative left rib series Electronically Signed: Adeel Rodriguez MD  12/13/2023 3:07 PM EST  Workstation ID: [id number]      COVID19   Date Value Ref Range Status   12/17/2023 Not Detected Not Detected - Ref. Range Final     ASSESSMENTS/PLANS  1.  Acute gastrointestinal bleeding with melena and BRBPR  2.  Acute blood loss anemia, symptomatic anemia  3.  Anastomotic ulcer, most recently noted on last year's EGD  4.  Emilie-en-Y gastric bypass history  5.  Atrial fibrillation, anticoagulated on Eliquis.  6.  Recent allergic urticaria, prednisone taper as such  7.  Delayed gastric emptying, on chronic metoclopramide therapy    Aleena Armstrong is a 73 y.o. female who  presents to hospital acute onset of epigastric abdominal pain and bloody stools.  Patient recently ordered a methylprednisone taper secondary to allergic response to either almonds or shellfish; began having symptoms within 3 days following initiation of this taper.  Symptoms strongly concerning for recurrent anastomotic ulcer in setting of prior Emilie-en-Y gastric bypass.  As such, recommend EGD today for further evaluation.    >>> Maintain n.p.o.  >>> Obtain informed consent for esophagogastroduodenoscopy  >>> IV PPI twice daily  >>> Will begin Carafate slurry following EGD  >>> Trend H&H; transfuse for hemoglobins less than 7 or symptomatic anemia per protocol.  >>> Hold Eliquis in setting of acute bleed  >>> Patient will need to follow-up with her bariatric surgeon, Dr. Parks at , at discharge; will need repeat EGD in a few weeks to evaluate for ulcer healing.    I discussed the patient's findings and my recommen at dischargedations with patient and consulting provider    Marcio Underwood, YULI  12/18/23  09:32 EST

## 2023-12-18 NOTE — CASE MANAGEMENT/SOCIAL WORK
Discharge Planning Assessment  [facility]     Patient Name: Aleena Armstrong  MRN: [id number]  Today's Date: 12/18/2023    Admit Date: 12/17/2023    Plan: Home   Discharge Needs Assessment       Row Name 12/18/23 1358       Living Environment    People in Home spouse    Current Living Arrangements home    Primary Care Provided by self    Provides Primary Care For no one    Family Caregiver if Needed spouse       Transition Planning    Patient/Family Anticipates Transition to home    Transportation Anticipated family or friend will provide       Discharge Needs Assessment    Readmission Within the Last 30 Days no previous admission in last 30 days    Equipment Currently Used at Home none                   Discharge Plan       Row Name 12/18/23 1359       Plan    Plan Home    Patient/Family in Agreement with Plan yes    Plan Comments I met with Mrs. Armstrong at the bedside. She lives with her  in [facility]. She is independent with mobility and activities of daily living. She drives herself when leaving the home and is not current with any home or outpatient services. She has no medical equipment at home and denies any difficulty affording her medications. She anticipates that she will return home at the time of discharge and her  will transport her at that time. No discharge needs identified at this time. CM continues to follow.    Final Discharge Disposition Code 01 - home or self-care                  Continued Care and Services - Admitted Since 12/17/2023    Coordination has not been started for this encounter.       Expected Discharge Date and Time       Expected Discharge Date Expected Discharge Time    Dec 22, 2023            Demographic Summary       Row Name 12/18/23 1352       General Information    General Information Comments Confirmed PCP to be Aniyah Goldberg and [employer] to be insurer.                   Functional Status       Row Name 12/18/23 1358       Functional Status, IADL     Medications independent    Meal Preparation independent    Housekeeping independent    Laundry independent    Shopping independent       Employment/    Employment Status retired                   Psychosocial    No documentation.                  Abuse/Neglect    No documentation.                  Legal    No documentation.                  Substance Abuse    No documentation.                  Patient Forms    No documentation.                     Zia Abad RN

## 2023-12-18 NOTE — PROGRESS NOTES
Clinical Nutrition   Nutrition Support Assessment  Reason for Visit: Identified at risk by screening criteria      Patient Name: Aleena Armstrong  YOB: 1950  MRN: [id number]  Date of Encounter: 12/18/23 08:39 EST  Admission date: 12/17/2023    Comments:  -Recommend restart Reglan (home dose) with started PO  -Monitor GI status and diet progression     Nutrition Assessment   Admission Diagnosis:  GI bleed [K92.2]  Hemorrhagic shock [R57.8]    Problem List:    GI bleed    Atrial fib/flutter    GERD    Hematemesis    Hematochezia    Acute blood loss anemia    History of Emilie-en-Y gastric bypass    Hypothyroidism    Psoriatic arthritis on Plaquenil    Chronic anticoagulation (Eliquis)    Hemorrhagic shock      PMH:   She  has a past medical history of A-fib, Anemia, Anxiety and depression (12/11/2016), GERD (gastroesophageal reflux disease), Glaucoma of left eye (12/11/2016), H/O migraine, History of MRSA infection, History of transfusion, Hyperlipidemia, Hypertension, Insomnia (12/11/2016), Kidney disease, Peptic ulcer - s/p gastric bypass surgery (7-8 years ago) (12/11/2016), Rheumatoid arthritis (12/11/2016), Spinal headache, Vitamin D deficiency (12/11/2016), Wears dentures, and Elodia-Parkinson-White (WPW) syndrome.    PSH:  She  has a past surgical history that includes Gastric bypass; Cardiac catheterization (N/A, 10/24/2016); Colonoscopy (2007); Hammer Toe Repair; Cardiac electrophysiology procedure (N/A, 6/20/2017); Wrist fracture surgery (Right, 2014); ORIF humerus fracture (Right, 2/8/2019); Breast biopsy (~1990); Cholecystectomy (~1990); Hysterectomy (1993); Gastric bypass (2005); Acromioplasty (Right, 2019); and ORIF humerus fracture (Right, 11/18/2021).    Applicable Nutrition Concerns:   Skin:  Oral:  GI:  history of bariatric surgery (Dr. Parks @ ); history of delayed gastric emptying on Reglan     Applicable Interval History:         Reported/Observed/Food/Nutrition  "Related History:   12/18  Suspected GI bleed.  S/P transfusion.  Plan for EGD later today.      Patient reports history of gastric bypass in 2009.  No issues with PO tolerance; able to eat most foods, not able to eat big portions.  History of delayed gastric emptying that is managed with Reglan.   Lost some weight at the begging of the year, was down to 123lb; now back to up to 147lb.     Anthropometrics     Flowsheet Rows      Flowsheet Row First Filed Value   Admission Height 167.6 cm (66\") Documented at 12/17/2023 2146   Admission Weight 64.2 kg (141 lb 8 oz) Documented at 12/17/2023 2146     Height: Height: 167.6 cm (66\")  Last Filed Weight: Weight: 68.5 kg (151 lb 0.2 oz) (12/18/23 0208)  Method: Weight Method: Bed scale  BMI: BMI (Calculated): 24.4  BMI classification: Normal: 18.5-24.9kg/m2  IBW:  130lb    UBW: 130-150lb   Weight change:   recent weight gain     Nutrition Focused Physical Exam     Date:   12/18      NFPE completed, patient does not meet criteria for MSA at this time.     Current Nutrition Prescription   PO: NPO Diet NPO Type: Strict NPO  Oral Nutrition Supplement:   Intake: Insufficient data    Nutrition Diagnosis   Date:  12/18            Updated:    Problem Altered GI function   Etiology GI bleed    Signs/Symptoms epigastric pain, hematemesis, and hematochezia    Status:     Goal:   General: Nutrition support treatment  PO: Tolerate PO, Advace diet as medically feasible/appropriate  EN/PN: N/A    Nutrition Intervention      Follow treatment progress, Care plan reviewed    -Recommend restart Reglan (home dose) with started PO  -Monitor GI status and diet progression       Monitoring/Evaluation:   Per protocol, I&O, PO intake, Pertinent labs, GI status      Jessica Kapadia RD  Time Spent: 20min    "

## 2023-12-19 LAB
ANION GAP SERPL CALCULATED.3IONS-SCNC: 6 MMOL/L (ref 5–15)
BASOPHILS # BLD AUTO: 0.03 10*3/MM3 (ref 0–0.2)
BASOPHILS NFR BLD AUTO: 0.4 % (ref 0–1.5)
BH BB BLOOD EXPIRATION DATE: NORMAL
BH BB BLOOD EXPIRATION DATE: NORMAL
BH BB BLOOD TYPE BARCODE: 6200
BH BB BLOOD TYPE BARCODE: 6200
BH BB DISPENSE STATUS: NORMAL
BH BB DISPENSE STATUS: NORMAL
BH BB PRODUCT CODE: NORMAL
BH BB PRODUCT CODE: NORMAL
BH BB UNIT NUMBER: NORMAL
BH BB UNIT NUMBER: NORMAL
BUN SERPL-MCNC: 26 MG/DL (ref 8–23)
BUN/CREAT SERPL: 32.9 (ref 7–25)
CALCIUM SPEC-SCNC: 7.6 MG/DL (ref 8.6–10.5)
CHLORIDE SERPL-SCNC: 113 MMOL/L (ref 98–107)
CO2 SERPL-SCNC: 23 MMOL/L (ref 22–29)
CREAT SERPL-MCNC: 0.79 MG/DL (ref 0.57–1)
CROSSMATCH INTERPRETATION: NORMAL
CROSSMATCH INTERPRETATION: NORMAL
DEPRECATED RDW RBC AUTO: 59.5 FL (ref 37–54)
EGFRCR SERPLBLD CKD-EPI 2021: 78.1 ML/MIN/1.73
EOSINOPHIL # BLD AUTO: 0.03 10*3/MM3 (ref 0–0.4)
EOSINOPHIL NFR BLD AUTO: 0.4 % (ref 0.3–6.2)
ERYTHROCYTE [DISTWIDTH] IN BLOOD BY AUTOMATED COUNT: 17.9 % (ref 12.3–15.4)
GLUCOSE SERPL-MCNC: 101 MG/DL (ref 65–99)
HCT VFR BLD AUTO: 21.9 % (ref 34–46.6)
HCT VFR BLD AUTO: 31 % (ref 34–46.6)
HGB BLD-MCNC: 10.3 G/DL (ref 12–15.9)
HGB BLD-MCNC: 7.2 G/DL (ref 12–15.9)
IMM GRANULOCYTES # BLD AUTO: 0.06 10*3/MM3 (ref 0–0.05)
IMM GRANULOCYTES NFR BLD AUTO: 0.8 % (ref 0–0.5)
LYMPHOCYTES # BLD AUTO: 2.04 10*3/MM3 (ref 0.7–3.1)
LYMPHOCYTES NFR BLD AUTO: 27.5 % (ref 19.6–45.3)
MCH RBC QN AUTO: 30.9 PG (ref 26.6–33)
MCHC RBC AUTO-ENTMCNC: 32.9 G/DL (ref 31.5–35.7)
MCV RBC AUTO: 94 FL (ref 79–97)
MONOCYTES # BLD AUTO: 0.47 10*3/MM3 (ref 0.1–0.9)
MONOCYTES NFR BLD AUTO: 6.3 % (ref 5–12)
NEUTROPHILS NFR BLD AUTO: 4.79 10*3/MM3 (ref 1.7–7)
NEUTROPHILS NFR BLD AUTO: 64.6 % (ref 42.7–76)
NRBC BLD AUTO-RTO: 0.3 /100 WBC (ref 0–0.2)
PLATELET # BLD AUTO: 123 10*3/MM3 (ref 140–450)
PMV BLD AUTO: 10.3 FL (ref 6–12)
POTASSIUM SERPL-SCNC: 4.2 MMOL/L (ref 3.5–5.2)
QT INTERVAL: 534 MS
QTC INTERVAL: 496 MS
RBC # BLD AUTO: 2.33 10*6/MM3 (ref 3.77–5.28)
SODIUM SERPL-SCNC: 142 MMOL/L (ref 136–145)
TSH SERPL DL<=0.05 MIU/L-ACNC: 2.1 UIU/ML (ref 0.27–4.2)
UNIT  ABO: NORMAL
UNIT  ABO: NORMAL
UNIT  RH: NORMAL
UNIT  RH: NORMAL
WBC NRBC COR # BLD AUTO: 7.42 10*3/MM3 (ref 3.4–10.8)

## 2023-12-19 PROCEDURE — P9016 RBC LEUKOCYTES REDUCED: HCPCS

## 2023-12-19 PROCEDURE — 85014 HEMATOCRIT: CPT | Performed by: INTERNAL MEDICINE

## 2023-12-19 PROCEDURE — 99232 SBSQ HOSP IP/OBS MODERATE 35: CPT | Performed by: INTERNAL MEDICINE

## 2023-12-19 PROCEDURE — 85018 HEMOGLOBIN: CPT | Performed by: INTERNAL MEDICINE

## 2023-12-19 PROCEDURE — 86900 BLOOD TYPING SEROLOGIC ABO: CPT

## 2023-12-19 PROCEDURE — 80048 BASIC METABOLIC PNL TOTAL CA: CPT | Performed by: INTERNAL MEDICINE

## 2023-12-19 PROCEDURE — 99232 SBSQ HOSP IP/OBS MODERATE 35: CPT | Performed by: NURSE PRACTITIONER

## 2023-12-19 PROCEDURE — 36430 TRANSFUSION BLD/BLD COMPNT: CPT

## 2023-12-19 PROCEDURE — 84443 ASSAY THYROID STIM HORMONE: CPT | Performed by: INTERNAL MEDICINE

## 2023-12-19 PROCEDURE — 85025 COMPLETE CBC W/AUTO DIFF WBC: CPT | Performed by: INTERNAL MEDICINE

## 2023-12-19 RX ORDER — FLUOXETINE HYDROCHLORIDE 20 MG/1
40 CAPSULE ORAL DAILY
Status: DISCONTINUED | OUTPATIENT
Start: 2023-12-19 | End: 2023-12-20 | Stop reason: HOSPADM

## 2023-12-19 RX ADMIN — SODIUM CHLORIDE 8 MG/HR: 900 INJECTION INTRAVENOUS at 10:11

## 2023-12-19 RX ADMIN — SODIUM CHLORIDE 8 MG/HR: 900 INJECTION INTRAVENOUS at 19:52

## 2023-12-19 RX ADMIN — SUCRALFATE 1 G: 1 TABLET ORAL at 12:04

## 2023-12-19 RX ADMIN — SUCRALFATE 1 G: 1 TABLET ORAL at 08:14

## 2023-12-19 RX ADMIN — SODIUM CHLORIDE 8 MG/HR: 900 INJECTION INTRAVENOUS at 00:50

## 2023-12-19 RX ADMIN — SUCRALFATE 1 G: 1 TABLET ORAL at 17:48

## 2023-12-19 RX ADMIN — FLUOXETINE HYDROCHLORIDE 40 MG: 20 CAPSULE ORAL at 08:14

## 2023-12-19 RX ADMIN — SODIUM CHLORIDE 8 MG/HR: 900 INJECTION INTRAVENOUS at 15:06

## 2023-12-19 RX ADMIN — SODIUM CHLORIDE 8 MG/HR: 900 INJECTION INTRAVENOUS at 04:59

## 2023-12-19 NOTE — PROGRESS NOTES
"INTENSIVIST   PROGRESS NOTE        SUBJECTIVE     Aleena 73 y.o. female is followed for: Vomiting       Atrial fib/flutter    GERD    GI bleed    Hypothyroidism    As an Intensivist, we provide an integrated approach to the ICU patient and family, medical management of comorbid conditions, including but not limited to electrolytes, glycemic control, organ dysfunction, lead interdisciplinary rounds and coordinate the care with all other services, including those from other specialists.     Interval History:  POD: 1 Day Post-Op (EGD 23)    Much improved.  No complaints.  BM dark blood yesterday, but it seems it was \"old blood\".    Temp  Min: 98 °F (36.7 °C)  Max: 99.2 °F (37.3 °C)       History     Last Reviewed by Reagan Phoenix MD on 2023 at  7:07 PM    Sections Reviewed    Medical, Surgical, Family, Tobacco, Alcohol, Drug Use, Sexual Activity,   Social Documentation    Problem list reviewed by Reagan Phoenix MD on 2023 at  7:07 PM  Medicines reviewed by Reagan Phoenix MD on 2023 at  7:07 PM  Allergies reviewed by Reagan Phoenix MD on 2023 at  7:07 PM       The patient's relevant past medical, surgical and social history were reviewed and updated in Epic as appropriate.        OBJECTIVE     Vitals:  Temp: 98.8 °F (37.1 °C) (23 1600) Temp  Min: 98 °F (36.7 °C)  Max: 99.2 °F (37.3 °C)   Temp core:      BP: 138/65 (23 1600) BP  Min: 91/47  Max: 138/65   MAP (non-invasive) Noninvasive MAP (mmHg): 93 (23 1545) Noninvasive MAP (mmHg)  Av.9  Min: 45  Max: 103   Pulse: 60 (23 1600) Pulse  Min: 53  Max: 78   Resp: 16 (23 1600) Resp  Min: 14  Max: 16   SpO2: 100 % (23 1600) SpO2  Min: 94 %  Max: 100 %   Device: room air (23 1600)    Flow Rate: 6 (23 1400) No data recorded         23  0045 12/18/23  0208   Weight: 64 kg (141 lb) 68.5 kg (151 lb 0.2 oz)        Intake/Ouptut 24 hrs (7:00AM - 6:59 AM)  Intake & Output (last 3 days)         " 12/16 0701 12/17 0700 12/17 0701 12/18 0700 12/18 0701 12/19 0700 12/19 0701 12/20 0700    I.V. (mL/kg)  504.5 (7.4) 867 (12.7) 134 (2)    Blood  373 366 380    Total Intake(mL/kg)  877.5 (12.8) 1233 (18) 514 (7.5)    Urine (mL/kg/hr)   700 (0.4) 350 (0.5)    Total Output   700 350    Net  +877.5 +533 +164            Urine Unmeasured Occurrence   1 x     Stool Unmeasured Occurrence   1 x             Medications (drips):  pantoprazole, Last Rate: 8 mg/hr (12/19/23 1506)      Physical Examination  Telemetry:  Rhythm: normal sinus rhythm (12/19/23 1600)         Constitutional:  No acute distress.   Cardiovascular: RRR.    Respiratory: Normal breath sounds  No adventitious sounds   Abdominal:  Soft with no tenderness.   Extremities: No Edema   Neurological:   Alert, Oriented, Cooperative.  Best Eye Response: 4-->(E4) spontaneous (12/19/23 1600)  Best Motor Response: 6-->(M6) obeys commands (12/19/23 1600)  Best Verbal Response: 5-->(V5) oriented (12/19/23 1600)  Patty Coma Scale Score: 15 (12/19/23 1600)     Results Reviewed:  Laboratory  Microbiology  Radiology  Pathology    Hematology:  Results from last 7 days   Lab Units 12/19/23  0459 12/18/23  2338 12/18/23  1858 12/18/23  1529 12/18/23  0837 12/17/23  2227   WBC 10*3/mm3 7.42  --   --   --  10.41 6.49   HEMOGLOBIN g/dL 7.2* 7.1* 7.5*   < > 9.5* 7.8*   MCV fL 94.0  --   --   --  94.1 103.1*   PLATELETS 10*3/mm3 123*  --   --   --  136* 228    < > = values in this interval not displayed.     Results from last 7 days   Lab Units 12/19/23  0459 12/17/23  2227   IMM GRAN % % 0.8* 0.5   NEUTROS ABS 10*3/mm3 4.79 5.03   LYMPHS ABS 10*3/mm3 2.04 1.07   MONOS ABS 10*3/mm3 0.47 0.32   EOS ABS 10*3/mm3 0.03 0.00   BASOS ABS 10*3/mm3 0.03 0.04     Chemistry:  Estimated Creatinine Clearance: 66.5 mL/min (by C-G formula based on SCr of 0.79 mg/dL).    Results from last 7 days   Lab Units 12/19/23  0459 12/18/23  0837   SODIUM mmol/L 142 141   POTASSIUM mmol/L 4.2 4.8    CHLORIDE mmol/L 113* 112*   CO2 mmol/L 23.0 22.0   BUN mg/dL 26* 29*   CREATININE mg/dL 0.79 0.79   GLUCOSE mg/dL 101* 127*     Results from last 7 days   Lab Units 12/19/23  0459 12/18/23  0837 12/17/23  2227   CALCIUM mg/dL 7.6* 7.4* 8.1*   MAGNESIUM mg/dL  --  1.8 1.8   PHOSPHORUS mg/dL  --  4.3  --      Hepatic Panel:  Results from last 7 days   Lab Units 12/17/23  2227   ALBUMIN g/dL 3.3*   BILIRUBIN mg/dL 0.3   AST (SGOT) U/L 15   ALT (SGPT) U/L 7   ALK PHOS U/L 64      Coagulation Labs:  Results from last 7 days   Lab Units 12/17/23  2227   PROTIME Seconds 15.9*   INR  1.26*   APTT seconds 23.3*      Cardiac Labs:  Results from last 7 days   Lab Units 12/17/23  2227   PROBNP pg/mL 1,311.0   HSTROP T ng/L 20*     Biomarkers:  Results from last 7 days   Lab Units 12/18/23  0056 12/17/23  2227   LACTATE mmol/L 1.4 2.2*       U/A  Results from last 7 days   Lab Units 12/18/23  1220   COLOR UA  Yellow   CLARITY UA  Clear   PH, URINE  <=5.0   SPECIFIC GRAVITY, URINE  1.054*   GLUCOSE UA  Negative   KETONES UA  Negative   BILIRUBIN UA  Negative   PROTEIN UA  Negative   BLOOD UA  Large (3+)*   LEUKOCYTES UA  Negative   NITRITE UA  Negative   UROBILINOGEN UA  0.2 E.U./dL     Results from last 7 days   Lab Units 12/18/23  1220   RBC UA /HPF 6-10*   WBC UA /HPF 0-2   BACTERIA UA /HPF None Seen   SQUAM EPITHEL UA /HPF 3-6*   HYALINE CASTS UA /LPF 0-6     COVID-19  Lab Results   Component Value Date    COVID19 Not Detected 12/17/2023    COVID19 Not Detected 06/13/2022    COVID19 Not Detected 05/29/2022    COVID19 Presumptive Negative 11/23/2021         Images:  CT Abdomen Pelvis With & Without Contrast    Result Date: 12/18/2023  Impression: 1.No acute intra-abdominal or intrapelvic process. 2.Ancillary findings as described above. Electronically Signed: Rachell Mondragon MD  12/18/2023 12:02 AM EST  Workstation ID: [id number]     Echo:  Results for orders placed during the hospital encounter of 05/28/22    Adult Transthoracic  Echo Complete w/ Color, Spectral and Contrast if necessary per protocol    Interpretation Summary  · Left ventricular ejection fraction appears to be 56 - 60%. Left ventricular systolic function is normal.  · Mild mitral valve regurgitation is present.  · Moderate tricuspid valve regurgitation is present.  · Estimated right ventricular systolic pressure from tricuspid regurgitation is moderately elevated (45-55 mmHg).      Results: Reviewed.  I reviewed the patient's new laboratory and imaging results.  I independently reviewed the patient's new images.    Medications: Reviewed.    Assessment   A/P     Hospital:  LOS: 1 day   ICU: 1d 14h     Active Hospital Problems    Diagnosis  POA    GI bleed [K92.2]  Yes    Hypothyroidism [E03.9]  Yes    GERD [K21.9]  Yes    Atrial fib/flutter [I48.91]  Yes     1. Atrial flutter, atypical in nature  A) [facility] ED presentation, 10/23/2016, with rapid atrial flutter converted after IV diltiazem.    B) Normal LVEF by catheterization, 10/24/2016.    C) Echocardiogram, 10/23/2016, revealing LVEF of 60%, mild MR, mild TR, LV diastolic dysfunction, LA enlarged (vol index 47.4) Dominic Wolff MD.   D)Recurrent symptomatic Afib.  E)Chadsvasc=3  F)PVA 6/20/17       Aleena is a 73 y.o. female admitted on 12/17/2023 with GI bleed [K92.2]  Hemorrhagic shock [R57.8]    Assessment/Management/Treatment Plan:    GIB (Hematemesis, Hematochezia)  EGD 12/18/23; LA grade A reflux esophagitis with no bleeding. Gastrojejunal anastomosis with ulceration on the jejunal side of the anastomosis. Clips were placed.  GERD  Gastric Bypass (Dr. Parks)  Cardiovascular  A Fib. Anticoagulation  Endocrine   Body mass index is 24.37 kg/m². Normal: 18.5-24.9kg/m2  Hypothyroidism  No history of Diabetes    Lab Results   Lab Value Date/Time    HGBA1C 4.60 (L) 05/29/2022 0327    HGBA1C 5.50 12/11/2016 0351     Results from last 7 days   Lab Units 12/18/23  2310 12/18/23  1735 12/18/23  1144   GLUCOSE  mg/dL 109 136* 79       Diet: Diet: Liquid Diets; Full Liquid; Fluid Consistency: Thin (IDDSI 0)   Advance Directives: Code Status and Medical Interventions:   Ordered at: 12/18/23 0116     Code Status (Patient has no pulse and is not breathing):    CPR (Attempt to Resuscitate)     Medical Interventions (Patient has pulse or is breathing):    Full Support        DVT prophylaxis:  Mechanical DVT prophylaxis orders are present.     In brief:  PPI continuous intravenous infusion per GI request.  Transfusion 2 PRBCs today and follow up Hb.  Disposition: Transfer to Telemetry Unit soon - Keep in ICU at least one more day, until Hb is stable.    Plan of care and goals reviewed during interdisciplinary rounds.  I discussed the patient's findings and my recommendations with patient and nursing staff    MDM:    Problem(s) High due to: Acute or Chronic illness or injury that may poses a threat to life or bodily function  Data: Moderate due to: Review of prior external records from each unique source, Review or results of each unique test, and Ordering of each unique test    Moderate    [x] Primary Attending Intensive Care Medicine - Nutrition Support   [] Consultant    Copied text in this note has been reviewed and is accurate as of 12/19/23

## 2023-12-19 NOTE — PROGRESS NOTES
INTENSIVIST   PROGRESS NOTE        SUBJECTIVE     Aleena 73 y.o. female is followed for: Vomiting       Atrial fib/flutter    GERD    GI bleed    Hypothyroidism    As an Intensivist, we provide an integrated approach to the ICU patient and family, medical management of comorbid conditions, including but not limited to electrolytes, glycemic control, organ dysfunction, lead interdisciplinary rounds and coordinate the care with all other services, including those from other specialists.     Interval History:  POD: Day of Surgery (EGD 23)    Doing better.  No signs of bleeding.  Cold.    Temp  Min: 96.6 °F (35.9 °C)  Max: 97.9 °F (36.6 °C)       History     Last Reviewed by Reagan Phoenix MD on 2023 at  7:07 PM    Sections Reviewed    Medical, Surgical, Family, Tobacco, Alcohol, Drug Use, Sexual Activity,   Social Documentation    Problem list reviewed by Reagan Phoenix MD on 2023 at  7:07 PM  Medicines reviewed by Reagan Phoenix MD on 2023 at  7:07 PM  Allergies reviewed by Reagan Phoenix MD on 2023 at  7:07 PM       The patient's relevant past medical, surgical and social history were reviewed and updated in Epic as appropriate.        OBJECTIVE     Vitals:  Temp: 97 °F (36.1 °C) (23 1418) Temp  Min: 96.6 °F (35.9 °C)  Max: 97.9 °F (36.6 °C)   Temp core:      BP: 102/51 (23) BP  Min: 71/28  Max: 141/89   MAP (non-invasive) Noninvasive MAP (mmHg): 67 (23) Noninvasive MAP (mmHg)  Av.4  Min: 45  Max: 81   Pulse: 60 (23) Pulse  Min: 50  Max: 74   Resp: 16 (23 1800) Resp  Min: 14  Max: 22   SpO2: 100 % (23) SpO2  Min: 77 %  Max: 100 %   Device: room air (23 1800)    Flow Rate: 6 (23 1400) Flow (L/min)  Min: 6  Max: 6         23  0045 23  0208   Weight: 64 kg (141 lb) 68.5 kg (151 lb 0.2 oz)        Intake/Ouptut 24 hrs (7:00AM - 6:59 AM)  Intake & Output (last 3 days)          0701   0700   0701  12/18 0700 12/18 0701 12/19 0700    I.V. (mL/kg)  504.5 (7.4) 635 (9.3)    Blood  373 366    Total Intake(mL/kg)  877.5 (12.8) 1001 (14.6)    Net  +877.5 +1001           Urine Unmeasured Occurrence   1 x    Stool Unmeasured Occurrence   1 x            Medications (drips):  pantoprazole, Last Rate: 8 mg/hr (12/18/23 1624)      Physical Examination  Telemetry:  Rhythm: normal sinus rhythm (12/18/23 1800)         Constitutional:  No acute distress.   Cardiovascular: RRR.    Respiratory: Normal breath sounds  No adventitious sounds   Abdominal:  Soft with no tenderness.   Extremities: No Edema   Neurological:   Alert, Oriented, Cooperative.  Best Eye Response: 4-->(E4) spontaneous (12/18/23 1800)  Best Motor Response: 6-->(M6) obeys commands (12/18/23 1800)  Best Verbal Response: 5-->(V5) oriented (12/18/23 1800)  Patty Coma Scale Score: 15 (12/18/23 1800)     Results Reviewed:  Laboratory  Microbiology  Radiology  Pathology    Hematology:  Results from last 7 days   Lab Units 12/18/23  1858 12/18/23  1529 12/18/23  0837 12/17/23  2227   WBC 10*3/mm3  --   --  10.41 6.49   HEMOGLOBIN g/dL 7.5* 8.5* 9.5* 7.8*   MCV fL  --   --  94.1 103.1*   PLATELETS 10*3/mm3  --   --  136* 228     Results from last 7 days   Lab Units 12/17/23  2227   IMM GRAN % % 0.5   NEUTROS ABS 10*3/mm3 5.03   LYMPHS ABS 10*3/mm3 1.07   MONOS ABS 10*3/mm3 0.32   EOS ABS 10*3/mm3 0.00   BASOS ABS 10*3/mm3 0.04     Chemistry:  Estimated Creatinine Clearance: 66.5 mL/min (by C-G formula based on SCr of 0.79 mg/dL).    Results from last 7 days   Lab Units 12/18/23  0837 12/17/23  2227   SODIUM mmol/L 141 140   POTASSIUM mmol/L 4.8 5.0   CHLORIDE mmol/L 112* 109*   CO2 mmol/L 22.0 24.0   BUN mg/dL 29* 25*   CREATININE mg/dL 0.79 1.02*   GLUCOSE mg/dL 127* 175*     Results from last 7 days   Lab Units 12/18/23  0837 12/17/23  2227   CALCIUM mg/dL 7.4* 8.1*   MAGNESIUM mg/dL 1.8 1.8   PHOSPHORUS mg/dL 4.3  --      Hepatic Panel:  Results from last  7 days   Lab Units 12/17/23  2227   ALBUMIN g/dL 3.3*   BILIRUBIN mg/dL 0.3   AST (SGOT) U/L 15   ALT (SGPT) U/L 7   ALK PHOS U/L 64      Coagulation Labs:  Results from last 7 days   Lab Units 12/17/23  2227   PROTIME Seconds 15.9*   INR  1.26*   APTT seconds 23.3*      Cardiac Labs:  Results from last 7 days   Lab Units 12/17/23  2227   PROBNP pg/mL 1,311.0   HSTROP T ng/L 20*     Biomarkers:  Results from last 7 days   Lab Units 12/18/23  0056 12/17/23  2227   LACTATE mmol/L 1.4 2.2*       U/A  Results from last 7 days   Lab Units 12/18/23  1220   COLOR UA  Yellow   CLARITY UA  Clear   PH, URINE  <=5.0   SPECIFIC GRAVITY, URINE  1.054*   GLUCOSE UA  Negative   KETONES UA  Negative   BILIRUBIN UA  Negative   PROTEIN UA  Negative   BLOOD UA  Large (3+)*   LEUKOCYTES UA  Negative   NITRITE UA  Negative   UROBILINOGEN UA  0.2 E.U./dL     Results from last 7 days   Lab Units 12/18/23  1220   RBC UA /HPF 6-10*   WBC UA /HPF 0-2   BACTERIA UA /HPF None Seen   SQUAM EPITHEL UA /HPF 3-6*   HYALINE CASTS UA /LPF 0-6     COVID-19  Lab Results   Component Value Date    COVID19 Not Detected 12/17/2023    COVID19 Not Detected 06/13/2022    COVID19 Not Detected 05/29/2022    COVID19 Presumptive Negative 11/23/2021         Images:  CT Abdomen Pelvis With & Without Contrast    Result Date: 12/18/2023  Impression: 1.No acute intra-abdominal or intrapelvic process. 2.Ancillary findings as described above. Electronically Signed: Rachell Mondragon MD  12/18/2023 12:02 AM EST  Workstation ID: [id number]     Echo:  Results for orders placed during the hospital encounter of 05/28/22    Adult Transthoracic Echo Complete w/ Color, Spectral and Contrast if necessary per protocol    Interpretation Summary  · Left ventricular ejection fraction appears to be 56 - 60%. Left ventricular systolic function is normal.  · Mild mitral valve regurgitation is present.  · Moderate tricuspid valve regurgitation is present.  · Estimated right ventricular  systolic pressure from tricuspid regurgitation is moderately elevated (45-55 mmHg).      Results: Reviewed.  I reviewed the patient's new laboratory and imaging results.  I independently reviewed the patient's new images.    Medications: Reviewed.    Assessment   A/P     Hospital:  LOS: 0 days   ICU: 17h     Active Hospital Problems    Diagnosis  POA    GI bleed [K92.2]  Yes    Hypothyroidism [E03.9]  Yes    GERD [K21.9]  Yes    Atrial fib/flutter [I48.91]  Yes     1. Atrial flutter, atypical in nature  A) [facility] ED presentation, 10/23/2016, with rapid atrial flutter converted after IV diltiazem.    B) Normal LVEF by catheterization, 10/24/2016.    C) Echocardiogram, 10/23/2016, revealing LVEF of 60%, mild MR, mild TR, LV diastolic dysfunction, LA enlarged (vol index 47.4) Dominic Wolff MD.   D)Recurrent symptomatic Afib.  E)Chadsvasc=3  F)PVA 6/20/17       Aleena is a 73 y.o. female admitted on 12/17/2023 with GI bleed [K92.2]  Hemorrhagic shock [R57.8]    Assessment/Management/Treatment Plan:    GIB (Hematemesis, Hematochezia)  EGD 12/18/23; LA grade A reflux esophagitis with no bleeding. Gastrojejunal anastomosis with ulceration on the jejunal side of the anastomosis. Clips were placed.  GERD  Gastric Bypass   Cardiovascular  A Fib. Anticoagulation  Endocrine   Body mass index is 24.37 kg/m². Normal: 18.5-24.9kg/m2  Hypothyroidism  No history of Diabetes    Lab Results   Lab Value Date/Time    HGBA1C 4.60 (L) 05/29/2022 0327    HGBA1C 5.50 12/11/2016 0351     Results from last 7 days   Lab Units 12/18/23  1735 12/18/23  1144   GLUCOSE mg/dL 136* 79       Diet: Diet: Liquid Diets; Clear Liquid; Fluid Consistency: Thin (IDDSI 0)   Advance Directives: Code Status and Medical Interventions:   Ordered at: 12/18/23 0116     Code Status (Patient has no pulse and is not breathing):    CPR (Attempt to Resuscitate)     Medical Interventions (Patient has pulse or is breathing):    Full Support        DVT  prophylaxis:  Mechanical DVT prophylaxis orders are present.     In brief:  PPI continuous intravenous infusion per GI request.  Monitor serial Hb another 24h  Disposition: Transfer to Telemetry Unit soon, once OK with GI    Plan of care and goals reviewed during interdisciplinary rounds.  I discussed the patient's findings and my recommendations with patient and nursing staff    MDM:    Problem(s) High due to: Acute or Chronic illness or injury that may poses a threat to life or bodily function  Data: Moderate due to: Review of prior external records from each unique source, Review or results of each unique test, and Ordering of each unique test    Moderate    [x] Primary Attending Intensive Care Medicine - Nutrition Support   [] Consultant    Copied text in this note has been reviewed and is accurate as of 12/18/23

## 2023-12-19 NOTE — PLAN OF CARE
Goal Outcome Evaluation:            Patient remains alert and oriented x4. VSS on RA. UOP adequate, no BM. Q8 H/H remain stable. Protonix gtt continues to infuse. No complaints of pain or n/v.

## 2023-12-19 NOTE — PLAN OF CARE
Goal Outcome Evaluation:  Plan of Care Reviewed With: patient        Progress: improving  Outcome Evaluation: recieved 2 units of PRBC, awaiting post transfusion H&H result. VSS. Adaquate UOP. No BM this shift. Restarted home fluoxetine. Pt up in the chair.  updated at bedside.

## 2023-12-19 NOTE — PROGRESS NOTES
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 Parks at . Her  and nurse are at the bedside during follow up evaluation    EGD 12/18/2023 per Dr. Reyes revealed LA grade a esophagitis without bleeding, gastrojejunal anastomosis characterized by ulceration on the jejunal side, 2 ulcers 1 of which was clean-based and 1 with nonbleeding visible vessel, epinephrine injected, clips placed, no specimens collected.  IV PPI for 72 hours (started on 12/18/2023) then twice daily PPI until follow-up with her bariatric surgeon, Carafate 1 g 4 times daily, clear liquid diet    repeat hemoglobin and hematocrit  today stable but with continued anemia. 2 units of PRBC administered yesterday. 1 unit transfused today, fourth unit transfusing at time of evaluation    She feels better. She reports she has not been feeling well for 2 years. She denies abdominal pain. She had one dark bowel movement after EGD yesterday. No further hematemesis.         Objective:    /53 (BP Location: Right arm, Patient Position: Lying)   Pulse 73   Temp 98.6 °F (37 °C) (Oral)   Resp 16   Ht 167.6 cm (66\")   Wt 68.5 kg (151 lb 0.2 oz)   LMP  (LMP Unknown)   SpO2 100%   BMI 24.37 kg/m²     Physical Exam  Constitutional:       General: She is not in acute distress.     Appearance: She is not toxic-appearing.   HENT:      Head: Normocephalic and atraumatic. No contusion.      Right Ear: External ear normal.      Left Ear: External ear normal.   Eyes:      General: Lids are normal. No scleral icterus.        Right eye: No discharge.         Left eye: No discharge.      Extraocular Movements: Extraocular movements intact.   Neck:      Trachea: " Trachea normal.      Comments: No visible mass  No visible adenopathy  Cardiovascular:      Rate and Rhythm: Normal rate.   Pulmonary:      Effort: No respiratory distress.      Comments: Symmetrical expansion    Abdominal:      General: Bowel sounds are normal.      Palpations: Abdomen is soft. There is no mass.      Tenderness: There is no abdominal tenderness.   Musculoskeletal:      Comments: Symmetrical movement of upper extremities   Skin:     General: Skin is warm and dry.      Coloration: Skin is pale. Skin is not jaundiced.   Neurological:      General: No focal deficit present.      Mental Status: She is alert and oriented to person, place, and time.   Psychiatric:         Mood and Affect: Mood normal.         Behavior: Behavior normal.         Thought Content: Thought content normal.         Lab  Lab Results   Component Value Date    WBC 7.42 12/19/2023    HGB 7.2 (L) 12/19/2023    HGB 7.1 (L) 12/18/2023    HGB 7.5 (L) 12/18/2023    MCV 94.0 12/19/2023     (L) 12/19/2023    INR 1.26 (H) 12/17/2023    INR 1.07 11/16/2021    INR 0.94 02/04/2019    INR 1.01 06/19/2017    INR 1.03 12/11/2016       Lab Results   Component Value Date    GLUCOSE 101 (H) 12/19/2023    BUN 26 (H) 12/19/2023    CREATININE 0.79 12/19/2023    EGFRIFNONA >60 05/16/2022    EGFRIFAFRI >60 05/16/2022    BCR 32.9 (H) 12/19/2023     12/19/2023    K 4.2 12/19/2023    CO2 23.0 12/19/2023    CALCIUM 7.6 (L) 12/19/2023    PROTENTOTREF 6.0 03/08/2019    ALBUMIN 3.3 (L) 12/17/2023    ALKPHOS 64 12/17/2023    BILITOT 0.3 12/17/2023    ALT 7 12/17/2023    AST 15 12/17/2023       Assessment/Plan:  1.  Acute gastrointestinal bleeding with melena and BRBPR  2.  Acute blood loss anemia, symptomatic anemia  3.  Anastomotic ulcers noted during EGD 12/18/2023 as well as in 2022  4.  Emilie-en-Y gastric bypass history  5.  Atrial fibrillation, anticoagulated on Eliquis.  6. Delayed gastric emptying on chronic metoclopramide therapy  7. LA grade  A reflux esophagitis  - IV PPI BID  for total of 72 hours (started yesterday 12/18/2023) then PO BID until follow up with Dr. gleason at   - carafate four times daily  - trend hemoglobin and hematocrit, transfuse per protocol per primary team  - follow up with Dr. gleason at , consider repeat EGD  in a few weeks to evaluate for healing of ulcer  - advance diet from clear to full liquid diet    Benita Acevedo, YULI  12/19/23  15:01 EST

## 2023-12-20 ENCOUNTER — READMISSION MANAGEMENT (OUTPATIENT)
Dept: CALL CENTER | Facility: HOSPITAL | Age: 73
End: 2023-12-20
Payer: MEDICARE

## 2023-12-20 VITALS
HEIGHT: 66 IN | BODY MASS INDEX: 24.27 KG/M2 | RESPIRATION RATE: 22 BRPM | TEMPERATURE: 97.9 F | SYSTOLIC BLOOD PRESSURE: 138 MMHG | HEART RATE: 69 BPM | WEIGHT: 151.01 LBS | DIASTOLIC BLOOD PRESSURE: 79 MMHG | OXYGEN SATURATION: 99 %

## 2023-12-20 LAB
ANION GAP SERPL CALCULATED.3IONS-SCNC: 8 MMOL/L (ref 5–15)
BASOPHILS # BLD AUTO: 0.05 10*3/MM3 (ref 0–0.2)
BASOPHILS NFR BLD AUTO: 0.8 % (ref 0–1.5)
BH BB BLOOD EXPIRATION DATE: NORMAL
BH BB BLOOD EXPIRATION DATE: NORMAL
BH BB BLOOD TYPE BARCODE: 6200
BH BB BLOOD TYPE BARCODE: 6200
BH BB DISPENSE STATUS: NORMAL
BH BB DISPENSE STATUS: NORMAL
BH BB PRODUCT CODE: NORMAL
BH BB PRODUCT CODE: NORMAL
BH BB UNIT NUMBER: NORMAL
BH BB UNIT NUMBER: NORMAL
BUN SERPL-MCNC: 19 MG/DL (ref 8–23)
BUN/CREAT SERPL: 22.1 (ref 7–25)
CALCIUM SPEC-SCNC: 8.3 MG/DL (ref 8.6–10.5)
CHLORIDE SERPL-SCNC: 108 MMOL/L (ref 98–107)
CO2 SERPL-SCNC: 25 MMOL/L (ref 22–29)
CREAT SERPL-MCNC: 0.86 MG/DL (ref 0.57–1)
CROSSMATCH INTERPRETATION: NORMAL
CROSSMATCH INTERPRETATION: NORMAL
DEPRECATED RDW RBC AUTO: 54.4 FL (ref 37–54)
EGFRCR SERPLBLD CKD-EPI 2021: 70.6 ML/MIN/1.73
EOSINOPHIL # BLD AUTO: 0.07 10*3/MM3 (ref 0–0.4)
EOSINOPHIL NFR BLD AUTO: 1.1 % (ref 0.3–6.2)
ERYTHROCYTE [DISTWIDTH] IN BLOOD BY AUTOMATED COUNT: 17.2 % (ref 12.3–15.4)
GLUCOSE SERPL-MCNC: 87 MG/DL (ref 65–99)
HCT VFR BLD AUTO: 32.1 % (ref 34–46.6)
HCT VFR BLD AUTO: 32.3 % (ref 34–46.6)
HGB BLD-MCNC: 10.8 G/DL (ref 12–15.9)
HGB BLD-MCNC: 11 G/DL (ref 12–15.9)
IMM GRANULOCYTES # BLD AUTO: 0.07 10*3/MM3 (ref 0–0.05)
IMM GRANULOCYTES NFR BLD AUTO: 1.1 % (ref 0–0.5)
LYMPHOCYTES # BLD AUTO: 2 10*3/MM3 (ref 0.7–3.1)
LYMPHOCYTES NFR BLD AUTO: 31.7 % (ref 19.6–45.3)
MCH RBC QN AUTO: 30.1 PG (ref 26.6–33)
MCHC RBC AUTO-ENTMCNC: 33.6 G/DL (ref 31.5–35.7)
MCV RBC AUTO: 89.4 FL (ref 79–97)
MONOCYTES # BLD AUTO: 0.5 10*3/MM3 (ref 0.1–0.9)
MONOCYTES NFR BLD AUTO: 7.9 % (ref 5–12)
NEUTROPHILS NFR BLD AUTO: 3.62 10*3/MM3 (ref 1.7–7)
NEUTROPHILS NFR BLD AUTO: 57.4 % (ref 42.7–76)
NRBC BLD AUTO-RTO: 0 /100 WBC (ref 0–0.2)
PLATELET # BLD AUTO: 128 10*3/MM3 (ref 140–450)
PMV BLD AUTO: 9.8 FL (ref 6–12)
POTASSIUM SERPL-SCNC: 4 MMOL/L (ref 3.5–5.2)
RBC # BLD AUTO: 3.59 10*6/MM3 (ref 3.77–5.28)
SODIUM SERPL-SCNC: 141 MMOL/L (ref 136–145)
UNIT  ABO: NORMAL
UNIT  ABO: NORMAL
UNIT  RH: NORMAL
UNIT  RH: NORMAL
WBC NRBC COR # BLD AUTO: 6.31 10*3/MM3 (ref 3.4–10.8)

## 2023-12-20 PROCEDURE — 80048 BASIC METABOLIC PNL TOTAL CA: CPT | Performed by: INTERNAL MEDICINE

## 2023-12-20 PROCEDURE — 85025 COMPLETE CBC W/AUTO DIFF WBC: CPT | Performed by: INTERNAL MEDICINE

## 2023-12-20 PROCEDURE — 99232 SBSQ HOSP IP/OBS MODERATE 35: CPT | Performed by: PHYSICIAN ASSISTANT

## 2023-12-20 PROCEDURE — 85018 HEMOGLOBIN: CPT | Performed by: INTERNAL MEDICINE

## 2023-12-20 PROCEDURE — 99239 HOSP IP/OBS DSCHRG MGMT >30: CPT | Performed by: NURSE PRACTITIONER

## 2023-12-20 PROCEDURE — 85014 HEMATOCRIT: CPT | Performed by: INTERNAL MEDICINE

## 2023-12-20 RX ORDER — TRAZODONE HYDROCHLORIDE 100 MG/1
100 TABLET ORAL NIGHTLY
Status: DISCONTINUED | OUTPATIENT
Start: 2023-12-20 | End: 2023-12-20 | Stop reason: HOSPADM

## 2023-12-20 RX ORDER — FLECAINIDE ACETATE 50 MG/1
50 TABLET ORAL EVERY 12 HOURS SCHEDULED
Status: DISCONTINUED | OUTPATIENT
Start: 2023-12-20 | End: 2023-12-20 | Stop reason: HOSPADM

## 2023-12-20 RX ORDER — SUCRALFATE 1 G/1
1 TABLET ORAL
Qty: 90 TABLET | Refills: 0 | Status: SHIPPED | OUTPATIENT
Start: 2023-12-20 | End: 2023-12-20 | Stop reason: HOSPADM

## 2023-12-20 RX ORDER — PANTOPRAZOLE SODIUM 40 MG/1
40 TABLET, DELAYED RELEASE ORAL
Status: DISCONTINUED | OUTPATIENT
Start: 2023-12-20 | End: 2023-12-20 | Stop reason: HOSPADM

## 2023-12-20 RX ORDER — AMLODIPINE BESYLATE 5 MG/1
5 TABLET ORAL
Status: DISCONTINUED | OUTPATIENT
Start: 2023-12-20 | End: 2023-12-20 | Stop reason: HOSPADM

## 2023-12-20 RX ORDER — PANTOPRAZOLE SODIUM 40 MG/1
40 TABLET, DELAYED RELEASE ORAL
Qty: 180 TABLET | Refills: 1 | Status: SHIPPED | OUTPATIENT
Start: 2023-12-20

## 2023-12-20 RX ORDER — AMITRIPTYLINE HYDROCHLORIDE 25 MG/1
25 TABLET, FILM COATED ORAL DAILY
Status: DISCONTINUED | OUTPATIENT
Start: 2023-12-20 | End: 2023-12-20

## 2023-12-20 RX ORDER — AMLODIPINE BESYLATE 5 MG/1
5 TABLET ORAL
Qty: 90 TABLET | Refills: 1 | Status: SHIPPED | OUTPATIENT
Start: 2023-12-21

## 2023-12-20 RX ORDER — HYDROXYCHLOROQUINE SULFATE 200 MG/1
200 TABLET, FILM COATED ORAL EVERY 12 HOURS SCHEDULED
Status: DISCONTINUED | OUTPATIENT
Start: 2023-12-20 | End: 2023-12-20 | Stop reason: HOSPADM

## 2023-12-20 RX ORDER — AMITRIPTYLINE HYDROCHLORIDE 25 MG/1
25 TABLET, FILM COATED ORAL NIGHTLY
Status: DISCONTINUED | OUTPATIENT
Start: 2023-12-20 | End: 2023-12-20 | Stop reason: HOSPADM

## 2023-12-20 RX ADMIN — AMLODIPINE BESYLATE 5 MG: 5 TABLET ORAL at 11:27

## 2023-12-20 RX ADMIN — SODIUM CHLORIDE 8 MG/HR: 900 INJECTION INTRAVENOUS at 05:07

## 2023-12-20 RX ADMIN — SUCRALFATE 1 G: 1 TABLET ORAL at 11:27

## 2023-12-20 RX ADMIN — SUCRALFATE 1 G: 1 TABLET ORAL at 08:35

## 2023-12-20 RX ADMIN — FLECAINIDE ACETATE 50 MG: 50 TABLET ORAL at 11:27

## 2023-12-20 RX ADMIN — SODIUM CHLORIDE 8 MG/HR: 900 INJECTION INTRAVENOUS at 00:03

## 2023-12-20 RX ADMIN — FLUOXETINE HYDROCHLORIDE 40 MG: 20 CAPSULE ORAL at 08:35

## 2023-12-20 RX ADMIN — HYDROXYCHLOROQUINE SULFATE 200 MG: 200 TABLET ORAL at 11:27

## 2023-12-20 NOTE — DISCHARGE SUMMARY
DISCHARGE SUMMARY    Patient Name: Aleena Armstrong  : 1950  MRN: [id number]    Date of Admission: 2023  9:43 PM  Date of Discharge: 2023  4:16 PM   Primary Care Physician: Aniyah Goldberg MD    Reason for hospitalization     HPI:  Aleena Armstrong is a 73 y.o. female with PMH of HTN, dyslipidemia, hypothyroidism, psoriatic arthritis on hydroxychloroquine, GERD with prior gastric ulcer, s/p flo-en-Y bypass, WPW (data deficit), and AF chronically anticoagulated on Eliquis who presents to [facility] ED on 23 for evaluation of hematemesis and hematochezia found to be anemic and hypotensive.      The patient had complaints of myalgias, abdominal discomfort, and nausea with vomiting that appeared bloody at home around 9PM. She has been on a prednisone taper over the past week for urticaria which she thinks she developed secondary to eating almonds or shrimp. Denies alcohol or NSAID use. While in the ED she had numerous bloody stools with clots accompanied by hypotension given 1L NS bolus. She is bradycardic and is on metoprolol XL and Tikosyn at home. H&H is 7.8/26.2 compared to 10.3/33.6 in February. Abdominal CT is unrevealing for acute process. She was given PPI load and ordered 2 units PRBCs. She will be admitted to the ICU for higher level of care.      Patient's  reports she has had some issues with weight loss over the past 2 years.  She has had a poor appetite recently.  Had nausea earlier today prior to developing the hematemesis around 9 PM.    Significant findings.  Discharge diagnosis/problems:     Active Hospital Problems    Diagnosis    GI bleed [K92.2]    Hypothyroidism [E03.9]    GERD [K21.9]    Atrial fib/flutter [I48.91]      Resolved Hospital Problems    Diagnosis    Hemorrhagic shock [R57.8]        Physical Examination and Data     Vitals:  Temp: 97.9 °F (36.6 °C) (23 0800) Temp  Min: 97.9 °F (36.6 °C)  Max: 98.8 °F (37.1 °C)   Temp core:      BP: 120/70 (23 1300)  BP  Min: 114/52  Max: 156/87   Pulse: 76 (12/20/23 1300) Pulse  Min: 45  Max: 76   Resp: 20 (12/20/23 0800) Resp  Min: 14  Max: 20   SpO2: 98 % (12/20/23 1300) SpO2  Min: 95 %  Max: 100 %   Device: room air (12/20/23 1200)    Flow Rate: 6 (12/18/23 1400) No data recorded       Physical Examination  Telemetry:  Rhythm: normal sinus rhythm (12/20/23 1200)   Constitutional:  No acute distress.   Cardiovascular: RRR.    Respiratory: Normal breath sounds  No adventitious sounds   Abdominal:  Soft with no tenderness.   Extremities: No Edema   Neurological:             Alert, Oriented, Cooperative.  Best Eye Response: 4-->(E4) spontaneous (12/20/23 1200)  Best Motor Response: 6-->(M6) obeys commands (12/20/23 1200)  Best Verbal Response: 5-->(V5) oriented (12/20/23 1200)  Patty Coma Scale Score: 15 (12/20/23 1200)     Copied text in this note has been reviewed and is accurate as of 12/20/23.       Results Reviewed:  Laboratory  Microbiology  Radiology  Pathology    Hematology:  Results from last 7 days   Lab Units 12/20/23  1134 12/20/23  0505 12/19/23  1746 12/19/23  0459 12/18/23  1529 12/18/23  0837   WBC 10*3/mm3  --  6.31  --  7.42  --  10.41   HEMOGLOBIN g/dL 11.0* 10.8* 10.3* 7.2*   < > 9.5*   MCV fL  --  89.4  --  94.0  --  94.1   PLATELETS 10*3/mm3  --  128*  --  123*  --  136*    < > = values in this interval not displayed.     Results from last 7 days   Lab Units 12/20/23  0505 12/19/23  0459 12/17/23  2227   IMM GRAN % % 1.1* 0.8* 0.5   NEUTROS ABS 10*3/mm3 3.62 4.79 5.03   LYMPHS ABS 10*3/mm3 2.00 2.04 1.07   MONOS ABS 10*3/mm3 0.50 0.47 0.32   EOS ABS 10*3/mm3 0.07 0.03 0.00   BASOS ABS 10*3/mm3 0.05 0.03 0.04       Chemistry:  Estimated Creatinine Clearance: 61.1 mL/min (by C-G formula based on SCr of 0.86 mg/dL).    Results from last 7 days   Lab Units 12/20/23  0505 12/19/23  0459   SODIUM mmol/L 141 142   POTASSIUM mmol/L 4.0 4.2   CHLORIDE mmol/L 108* 113*   CO2 mmol/L 25.0 23.0   BUN mg/dL 19 26*    CREATININE mg/dL 0.86 0.79   GLUCOSE mg/dL 87 101*     Results from last 7 days   Lab Units 12/20/23  0505 12/19/23  0459 12/18/23  0837 12/17/23  2227   CALCIUM mg/dL 8.3* 7.6* 7.4* 8.1*   MAGNESIUM mg/dL  --   --  1.8 1.8   PHOSPHORUS mg/dL  --   --  4.3  --        Hepatic Panel:  Results from last 7 days   Lab Units 12/17/23  2227   ALBUMIN g/dL 3.3*   BILIRUBIN mg/dL 0.3   AST (SGOT) U/L 15   ALT (SGPT) U/L 7   ALK PHOS U/L 64        Coagulation Labs:  Results from last 7 days   Lab Units 12/17/23  2227   PROTIME Seconds 15.9*   INR  1.26*   APTT seconds 23.3*        Cardiac Labs:  Results from last 7 days   Lab Units 12/17/23  2227   PROBNP pg/mL 1,311.0   HSTROP T ng/L 20*       Biomarkers:  Results from last 7 days   Lab Units 12/18/23  0056 12/17/23  2227   LACTATE mmol/L 1.4 2.2*       U/A  Results from last 7 days   Lab Units 12/18/23  1220   COLOR UA  Yellow   CLARITY UA  Clear   PH, URINE  <=5.0   SPECIFIC GRAVITY, URINE  1.054*   GLUCOSE UA  Negative   KETONES UA  Negative   BILIRUBIN UA  Negative   PROTEIN UA  Negative   BLOOD UA  Large (3+)*   LEUKOCYTES UA  Negative   NITRITE UA  Negative   UROBILINOGEN UA  0.2 E.U./dL     Results from last 7 days   Lab Units 12/18/23  1220   RBC UA /HPF 6-10*   WBC UA /HPF 0-2   BACTERIA UA /HPF None Seen   SQUAM EPITHEL UA /HPF 3-6*   HYALINE CASTS UA /LPF 0-6       COVID-19  Lab Results   Component Value Date    COVID19 Not Detected 12/17/2023    COVID19 Not Detected 06/13/2022    COVID19 Not Detected 05/29/2022    COVID19 Presumptive Negative 11/23/2021       Images:  CT Abdomen Pelvis With & Without Contrast    Result Date: 12/18/2023  Impression: 1.No acute intra-abdominal or intrapelvic process. 2.Ancillary findings as described above. Electronically Signed: Rachell Mondragon MD  12/18/2023 12:02 AM EST  Workstation ID: [id number]       Echo:  Results for orders placed during the hospital encounter of 05/28/22    Adult Transthoracic Echo Complete w/ Color, Spectral  and Contrast if necessary per protocol    Interpretation Summary  · Left ventricular ejection fraction appears to be 56 - 60%. Left ventricular systolic function is normal.  · Mild mitral valve regurgitation is present.  · Moderate tricuspid valve regurgitation is present.  · Estimated right ventricular systolic pressure from tricuspid regurgitation is moderately elevated (45-55 mmHg).      Results: Reviewed.  I reviewed the patient's new laboratory and imaging results.  I independently reviewed the patient's new images.    Medications: Reviewed.    Hospital Course, Consults and Procedures  provided:   Admitted for reasons stated above in HPI. EGD 12/18/23 showed LA grade A reflux esophagitis with no bleeding. Gastrojejunal anastomosis with ulceration on the jejunal side of the anastomosis. Clips were placed. Did have anemia requiring multiple PRBC infusions. On afternoon of discharge Hgb up to 11. D/C on soft diet x 1 week then advance.     Was on anticoagulation for a. Fib can restart Apixaban in 1 week. Continue PPI.       Soft Diet x 1 week then advance   F/U with Dr. Parks  Restart Apixaban in 1 week  F/U with PCP in 1 week for lab check    Consults:  Consults       Date and Time Order Name Status Description    12/18/2023  1:16 AM Inpatient Gastroenterology Consult Completed                Procedures:  Procedure(s):  ESOPHAGOGASTRODUODENOSCOPY   12/18 1311 UPPER GI ENDOSCOPY       Condition on discharge     Good. (Vital signs within normal limits. Patient conscious and comfortable)    Patient and family instructions     Discharge Disposition: Home or Self Care    CODE STATUS:   Code Status and Medical Interventions:   Ordered at: 12/18/23 0116     Code Status (Patient has no pulse and is not breathing):    CPR (Attempt to Resuscitate)     Medical Interventions (Patient has pulse or is breathing):    Full Support       Allergies   Allergen Reactions    Bactrim [Sulfamethoxazole-Trimethoprim] Other (See  Comments)     MOUTH SORES    Beta Adrenergic Blockers Other (See Comments)     bradycardia    Percocet [Oxycodone-Acetaminophen] Other (See Comments)     NIGHT TERRORS     Statins Myalgia     MYALGIA     Sulfa Antibiotics Other (See Comments)     MOUTH SORES AND RASH     Erythromycin Rash     RASH        Discharge Medications:     Discharge Medications        New Medications        Instructions Start Date   amLODIPine 5 MG tablet  Commonly known as: NORVASC  Notes to patient: Next dose tomorrow 12/21/23   5 mg, Oral, Every 24 Hours Scheduled   Start Date: December 21, 2023     pantoprazole 40 MG EC tablet  Commonly known as: PROTONIX  Notes to patient: Next dose tonight 12/20/23   40 mg, Oral, 2 Times Daily Before Meals             Continue These Medications        Instructions Start Date   amitriptyline 25 MG tablet  Commonly known as: ELAVIL  Notes to patient: Next dose tonight 12/20/23   25 mg, Oral, Daily      apixaban 5 MG tablet tablet  Commonly known as: Eliquis  Notes to patient: Next dose 12/27/23   5 mg, Oral, Every 12 Hours      flecainide 50 MG tablet  Commonly known as: TAMBOCOR  Notes to patient: Next dose 12/21/23   50 mg, Oral, Every 12 Hours      FLUoxetine 40 MG capsule  Commonly known as: PROzac  Notes to patient: Next dose tomorrow 12/21/23   1 capsule, Oral, Daily      folic acid 1 MG tablet  Commonly known as: FOLVITE  Notes to patient: Next dose tomorrow 12/21/23   1 mg, Oral, Daily      hydroxychloroquine 200 MG tablet  Commonly known as: PLAQUENIL  Notes to patient: Next dose tomorrow 12/21/23   200 mg, Oral, 2 Times Daily      metoclopramide 5 MG tablet  Commonly known as: REGLAN  Notes to patient: Next dose tonight 12/20/23   3 Times Daily      multivitamin with minerals tablet tablet  Notes to patient: Next dose tomorrow 12/21/23   1 tablet, Oral, Daily      promethazine 25 MG tablet  Commonly known as: PHENERGAN   12.5 mg, Oral, Every 6 Hours PRN      timolol 0.5 % ophthalmic  solution  Commonly known as: TIMOPTIC  Notes to patient: Next dose tonight 12/20/23   1 drop, Left Eye, 2 Times Daily      traMADol 50 MG tablet  Commonly known as: ULTRAM   Take 1 tablet by mouth Every 6 (Six) Hours As Needed for pain.      traZODone 100 MG tablet  Commonly known as: DESYREL  Notes to patient: Next dose tonight 12/21/23   100 mg, Oral, Nightly             Stop These Medications      metoprolol succinate XL 50 MG 24 hr tablet  Commonly known as: TOPROL-XL              Activity: As tolerated        Diet: Diet: Liquid Diets; Full Liquid; Fluid Consistency: Thin (IDDSI 0)     Diet Instructions       Diet: Regular/House Diet; Soft to Chew (NDD 3); Ground Meat; Thin (IDDSI 0)      Discharge Diet: Regular/House Diet    Texture: Soft to Chew (NDD 3)    Soft to Chew: Ground Meat    Fluid Consistency: Thin (IDDSI 0)      Soft foods for 7day and advance as tolerated               Additional Instructions for the Follow-ups that You Need to Schedule       Discharge Follow-up with PCP   As directed       Currently Documented PCP:    Aniyah Goldberg MD    PCP Phone Number:    [phone]     Follow Up Details: 1 week for lab check        Discharge Follow-up with Specified Provider: Dr. Parks @    As directed      To: Dr. Parks @        Additional information on Labs and Follow-ups:    Recommend outpatient EGD in 6-8 weeks to assess ulcer healing with Dr. Parks            Time Spent on Discharge:    I spent  35  minutes on this discharge activity which included: face-to-face encounter with the patient, reviewing the data in the system, coordination of the care with the nursing staff as well as consultants, documentation, and entering orders.

## 2023-12-20 NOTE — OUTREACH NOTE
Prep Survey      Flowsheet Row Responses   [facility] patient discharged from? [facility]   Is LACE score < 7 ? No   Eligibility Readm Mgmt   Discharge diagnosis GI bleed   Does the patient have one of the following disease processes/diagnoses(primary or secondary)? Other   Does the patient have Home health ordered? No   Is there a DME ordered? No   Prep survey completed? Yes            LARRY TAVAREZ - Registered Nurse

## 2023-12-20 NOTE — PLAN OF CARE
Goal Outcome Evaluation:           Progress: improving  Outcome Evaluation: Patient alert and oriented x4. VSS, on RA. H/H stabel at 10.8/32.1. Adequate UOP, no BM. Protonix gtt infusing at 8mg/hr.

## 2023-12-20 NOTE — PROGRESS NOTES
INTENSIVIST   PROGRESS NOTE        SUBJECTIVE     Aleena 73 y.o. female is followed for: Vomiting       Atrial fib/flutter    GERD    GI bleed    Hypothyroidism    As an Intensivist, we provide an integrated approach to the ICU patient and family, medical management of comorbid conditions, including but not limited to electrolytes, glycemic control, organ dysfunction, lead interdisciplinary rounds and coordinate the care with all other services, including those from other specialists.     Interval History:  POD: 2 Days Post-Op (EGD 23)    She wants to go home today.    Temp  Min: 97.9 °F (36.6 °C)  Max: 98.8 °F (37.1 °C)       History     Last Reviewed by Reagan Phoenix MD on 2023 at  7:07 PM    Sections Reviewed    Medical, Surgical, Family, Tobacco, Alcohol, Drug Use, Sexual Activity,   Social Documentation    Problem list reviewed by Reagan Phoenix MD on 2023 at  7:07 PM  Medicines reviewed by Reagan Phoenix MD on 2023 at  7:07 PM  Allergies reviewed by Reagan Phoenix MD on 2023 at  7:07 PM       The patient's relevant past medical, surgical and social history were reviewed and updated in Epic as appropriate.        OBJECTIVE     Vitals:  Temp: 97.9 °F (36.6 °C) (23 0800) Temp  Min: 97.9 °F (36.6 °C)  Max: 98.8 °F (37.1 °C)   Temp core:      BP: 120/70 (23 1300) BP  Min: 112/46  Max: 156/87   MAP (non-invasive) Noninvasive MAP (mmHg): 81 (23 1300) Noninvasive MAP (mmHg)  Av.2  Min: 45  Max: 123   Pulse: 76 (23 1300) Pulse  Min: 45  Max: 76   Resp: 20 (23 0800) Resp  Min: 14  Max: 20   SpO2: 98 % (23 1300) SpO2  Min: 95 %  Max: 100 %   Device: room air (23 1200)    Flow Rate: 6 (23 1400) No data recorded         23  0045 23  0208   Weight: 64 kg (141 lb) 68.5 kg (151 lb 0.2 oz)        Intake/Ouptut 24 hrs (7:00AM - 6:59 AM)  Intake & Output (last 3 days)          07 07  0701  12/20 0700 12/20 0701  12/21 0700    P.O.    270    I.V. (mL/kg) 504.5 (7.4) 867 (12.7) 232 (3.4) 312.6 (4.6)    Blood 373 366 760     Total Intake(mL/kg) 877.5 (12.8) 1233 (18) 992 (14.5) 582.6 (8.5)    Urine (mL/kg/hr)  700 (0.4) 2750 (1.7) 250 (0.5)    Total Output  700 2750 250    Net +877.5 +533 -1758 +332.6            Urine Unmeasured Occurrence  1 x 1 x     Stool Unmeasured Occurrence  1 x              Medications (drips):       Physical Examination  Telemetry:  Rhythm: normal sinus rhythm (12/20/23 1200)         Constitutional:  No acute distress.   Cardiovascular: RRR.    Respiratory: Normal breath sounds  No adventitious sounds   Abdominal:  Soft with no tenderness.   Extremities: No Edema   Neurological:   Alert, Oriented, Cooperative.  Best Eye Response: 4-->(E4) spontaneous (12/20/23 1200)  Best Motor Response: 6-->(M6) obeys commands (12/20/23 1200)  Best Verbal Response: 5-->(V5) oriented (12/20/23 1200)  Patty Coma Scale Score: 15 (12/20/23 1200)     Results Reviewed:  Laboratory  Microbiology  Radiology  Pathology    Hematology:  Results from last 7 days   Lab Units 12/20/23  1134 12/20/23  0505 12/19/23  1746 12/19/23  0459 12/18/23  1529 12/18/23  0837   WBC 10*3/mm3  --  6.31  --  7.42  --  10.41   HEMOGLOBIN g/dL 11.0* 10.8* 10.3* 7.2*   < > 9.5*   MCV fL  --  89.4  --  94.0  --  94.1   PLATELETS 10*3/mm3  --  128*  --  123*  --  136*    < > = values in this interval not displayed.     Results from last 7 days   Lab Units 12/20/23  0505 12/19/23  0459 12/17/23  2227   IMM GRAN % % 1.1* 0.8* 0.5   NEUTROS ABS 10*3/mm3 3.62 4.79 5.03   LYMPHS ABS 10*3/mm3 2.00 2.04 1.07   MONOS ABS 10*3/mm3 0.50 0.47 0.32   EOS ABS 10*3/mm3 0.07 0.03 0.00   BASOS ABS 10*3/mm3 0.05 0.03 0.04     Chemistry:  Estimated Creatinine Clearance: 61.1 mL/min (by C-G formula based on SCr of 0.86 mg/dL).    Results from last 7 days   Lab Units 12/20/23  0505 12/19/23  0459   SODIUM mmol/L 141 142   POTASSIUM mmol/L  4.0 4.2   CHLORIDE mmol/L 108* 113*   CO2 mmol/L 25.0 23.0   BUN mg/dL 19 26*   CREATININE mg/dL 0.86 0.79   GLUCOSE mg/dL 87 101*     Results from last 7 days   Lab Units 12/20/23  0505 12/19/23  0459 12/18/23  0837 12/17/23  2227   CALCIUM mg/dL 8.3* 7.6* 7.4* 8.1*   MAGNESIUM mg/dL  --   --  1.8 1.8   PHOSPHORUS mg/dL  --   --  4.3  --      Hepatic Panel:  Results from last 7 days   Lab Units 12/17/23  2227   ALBUMIN g/dL 3.3*   BILIRUBIN mg/dL 0.3   AST (SGOT) U/L 15   ALT (SGPT) U/L 7   ALK PHOS U/L 64      Coagulation Labs:  Results from last 7 days   Lab Units 12/17/23  2227   PROTIME Seconds 15.9*   INR  1.26*   APTT seconds 23.3*      Cardiac Labs:  Results from last 7 days   Lab Units 12/17/23  2227   PROBNP pg/mL 1,311.0   HSTROP T ng/L 20*     Biomarkers:  Results from last 7 days   Lab Units 12/18/23  0056 12/17/23  2227   LACTATE mmol/L 1.4 2.2*     COVID-19  Lab Results   Component Value Date    COVID19 Not Detected 12/17/2023    COVID19 Not Detected 06/13/2022    COVID19 Not Detected 05/29/2022    COVID19 Presumptive Negative 11/23/2021     Images:  No radiology results for the last day    Echo:  Results for orders placed during the hospital encounter of 05/28/22    Adult Transthoracic Echo Complete w/ Color, Spectral and Contrast if necessary per protocol    Interpretation Summary  · Left ventricular ejection fraction appears to be 56 - 60%. Left ventricular systolic function is normal.  · Mild mitral valve regurgitation is present.  · Moderate tricuspid valve regurgitation is present.  · Estimated right ventricular systolic pressure from tricuspid regurgitation is moderately elevated (45-55 mmHg).      Results: Reviewed.  I reviewed the patient's new laboratory and imaging results.  I independently reviewed the patient's new images.    Medications: Reviewed.    Assessment   A/P     Hospital:  LOS: 2 days   ICU: 2d 12h     Active Hospital Problems    Diagnosis  POA    GI bleed [K92.2]  Yes     Hypothyroidism [E03.9]  Yes    GERD [K21.9]  Yes    Atrial fib/flutter [I48.91]  Yes     1. Atrial flutter, atypical in nature  A) [facility] ED presentation, 10/23/2016, with rapid atrial flutter converted after IV diltiazem.    B) Normal LVEF by catheterization, 10/24/2016.    C) Echocardiogram, 10/23/2016, revealing LVEF of 60%, mild MR, mild TR, LV diastolic dysfunction, LA enlarged (vol index 47.4) Dominic Wolff MD.   D)Recurrent symptomatic Afib.  E)Chadsvasc=3  F)PVA 6/20/17       Aleena is a 73 y.o. female admitted on 12/17/2023 with GI bleed [K92.2]  Hemorrhagic shock [R57.8]    Assessment/Management/Treatment Plan:    GIB (Hematemesis, Hematochezia)  EGD 12/18/23; LA grade A reflux esophagitis with no bleeding. Gastrojejunal anastomosis with ulceration on the jejunal side of the anastomosis. Clips were placed.  GERD  Gastric Bypass (Dr. Parks)  Cardiovascular  A Fib. Anticoagulation  Endocrine   Body mass index is 24.37 kg/m². Normal: 18.5-24.9kg/m2  Hypothyroidism  No history of Diabetes    Lab Results   Lab Value Date/Time    HGBA1C 4.60 (L) 05/29/2022 0327    HGBA1C 5.50 12/11/2016 0351     Results from last 7 days   Lab Units 12/18/23  2310 12/18/23  1735 12/18/23  1144   GLUCOSE mg/dL 109 136* 79       Diet: Diet: Liquid Diets; Full Liquid; Fluid Consistency: Thin (IDDSI 0)   Advance Directives: Code Status and Medical Interventions:   Ordered at: 12/18/23 0116     Code Status (Patient has no pulse and is not breathing):    CPR (Attempt to Resuscitate)     Medical Interventions (Patient has pulse or is breathing):    Full Support        DVT prophylaxis:  Mechanical DVT prophylaxis orders are present.     In brief:  Change PPI to PO  Hb stable post transfusion  Lab Results   Component Value Date    HGB 11.0 (L) 12/20/2023    HGB 10.8 (L) 12/20/2023    HGB 10.3 (L) 12/19/2023    HGB 7.2 (L) 12/19/2023    HGB 7.1 (L) 12/18/2023    HGB 7.5 (L) 12/18/2023     Start amlodipine 5 mg for  elevated BP/HTN. Follow up with her PCP  Disposition: Discharge Home    Discussed with DR. Brunner. OK to discontinue Carafate. Soft diet x 1 week, then advance. F/U with Dr. Parks.   Resume APIxaban in 1 week.    Plan of care and goals reviewed during interdisciplinary rounds.  I discussed the patient's findings and my recommendations with patient and nursing staff    Time < 30 min.    [x] Primary Attending Intensive Care Medicine - Nutrition Support   [] Consultant    Copied text in this note has been reviewed and is accurate as of 12/20/23

## 2023-12-20 NOTE — PROGRESS NOTES
"GI Daily Progress Note  Subjective:    Pt sitting up in bed. Denies abdominal pain, nausea, vomiting, melena, hematochezia. H/H improved to 10.8/32.1 this date. Pt is eager for discharge if possible.     Objective:    /69   Pulse 60   Temp 97.9 °F (36.6 °C) (Oral)   Resp 20   Ht 167.6 cm (66\")   Wt 68.5 kg (151 lb 0.2 oz)   LMP  (LMP Unknown)   SpO2 99%   BMI 24.37 kg/m²     Physical Exam  Vitals and nursing note reviewed.   Constitutional:       Appearance: Normal appearance. She is normal weight. She is not ill-appearing or diaphoretic.   HENT:      Head: Normocephalic and atraumatic.      Right Ear: External ear normal.      Left Ear: External ear normal.      Nose: Nose normal.      Mouth/Throat:      Mouth: Mucous membranes are moist.      Pharynx: Oropharynx is clear.   Eyes:      Conjunctiva/sclera: Conjunctivae normal.      Pupils: Pupils are equal, round, and reactive to light.   Neck:      Thyroid: No thyromegaly.   Cardiovascular:      Rate and Rhythm: Normal rate and regular rhythm.      Pulses: Normal pulses.      Heart sounds: Normal heart sounds.   Pulmonary:      Effort: Pulmonary effort is normal.      Breath sounds: Normal breath sounds.   Abdominal:      General: Abdomen is flat. Bowel sounds are normal. There is no distension.      Tenderness: There is no abdominal tenderness.   Musculoskeletal:         General: Normal range of motion.      Cervical back: Normal range of motion and neck supple.      Right lower leg: No edema.      Left lower leg: No edema.   Skin:     General: Skin is warm and dry.   Neurological:      General: No focal deficit present.      Mental Status: She is oriented to person, place, and time.   Psychiatric:         Mood and Affect: Mood normal.         Lab  Lab Results   Component Value Date    WBC 6.31 12/20/2023    HGB 10.8 (L) 12/20/2023    HGB 10.3 (L) 12/19/2023    HGB 7.2 (L) 12/19/2023    MCV 89.4 12/20/2023     (L) 12/20/2023    INR 1.26 (H) " 12/17/2023    INR 1.07 11/16/2021    INR 0.94 02/04/2019    INR 1.01 06/19/2017    INR 1.03 12/11/2016       Lab Results   Component Value Date    GLUCOSE 87 12/20/2023    BUN 19 12/20/2023    CREATININE 0.86 12/20/2023    EGFRIFNONA >60 05/16/2022    EGFRIFAFRI >60 05/16/2022    BCR 22.1 12/20/2023     12/20/2023    K 4.0 12/20/2023    CO2 25.0 12/20/2023    CALCIUM 8.3 (L) 12/20/2023    PROTENTOTREF 6.0 03/08/2019    ALBUMIN 3.3 (L) 12/17/2023    ALKPHOS 64 12/17/2023    BILITOT 0.3 12/17/2023    ALT 7 12/17/2023    AST 15 12/17/2023       Assessment and Plan:    Acute GIB with melena and BRBPR  Acute blood loss anemia  Anastomotic ulcers, noted via EGD 12/18/23 and previously in 2022  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 Parks at    - continue carafate 1g QID x 14 days total   - recommend outpatient EGD in 6-8 weeks to assess ulcer healing with Dr. Parks     Pt is stable from a GI standpoint. Continue oral diet. Recommend continuing meds as above. GI will sign off at this time.    Idalia Calles PA-C  12/20/23  10:24 EST

## 2023-12-20 NOTE — CASE MANAGEMENT/SOCIAL WORK
Continued Stay Note  [facility]     Patient Name: Aleena Armstrong  MRN: [id number]  Today's Date: 12/20/2023    Admit Date: 12/17/2023    Plan: Home   Discharge Plan       Row Name 12/20/23 5777       Plan    Plan Home    Patient/Family in Agreement with Plan yes    Plan Comments Mrs. Armstrong will go home at the time of discharge. No discharge needs identified at this time.  will transport via private vehicle. CM continues to follow.    Final Discharge Disposition Code 01 - home or self-care                   Discharge Codes    No documentation.                 Expected Discharge Date and Time       Expected Discharge Date Expected Discharge Time    Dec 20, 2023               Zia Abad RN

## 2023-12-27 ENCOUNTER — READMISSION MANAGEMENT (OUTPATIENT)
Dept: CALL CENTER | Facility: HOSPITAL | Age: 73
End: 2023-12-27
Payer: MEDICARE

## 2023-12-27 NOTE — OUTREACH NOTE
Medical Week 1 Survey      Flowsheet Row Responses   [facility] patient discharged from? [place]   Does the patient have one of the following disease processes/diagnoses(primary or secondary)? Other   Week 1 attempt successful? No   Unsuccessful attempts Attempt 1            Monica YOST - Licensed Nurse

## 2024-01-04 ENCOUNTER — READMISSION MANAGEMENT (OUTPATIENT)
Dept: CALL CENTER | Facility: HOSPITAL | Age: 74
End: 2024-01-04
Payer: MEDICARE

## 2024-01-04 NOTE — OUTREACH NOTE
Medical Week 2 Survey      Flowsheet Row Responses   [facility] patient discharged from? [place]   Does the patient have one of the following disease processes/diagnoses(primary or secondary)? Other   Week 2 attempt successful? Yes   Call start time 1256   Discharge diagnosis GI bleed   Call end time 1258   Meds reviewed with patient/caregiver? Yes   Is the patient having any side effects they believe may be caused by any medication additions or changes? No   Does the patient have all medications ordered at discharge? Yes   Is the patient taking all medications as directed (includes completed medication regime)? Yes   Does the patient have a primary care provider?  Yes   Does the patient have an appointment with their PCP within 7 days of discharge? Yes   Has the patient kept scheduled appointments due by today? Yes   Has home health visited the patient within 72 hours of discharge? N/A   Psychosocial issues? No   Did the patient receive a copy of their discharge instructions? Yes   Nursing interventions Reviewed instructions with patient   What is the patient's perception of their health status since discharge? Improving   Is the patient/caregiver able to teach back the hierarchy of who to call/visit for symptoms/problems? PCP, Specialist, Home health nurse, Urgent Care, ED, 911 Yes   Week 2 Call Completed? Yes   Graduated Yes   Is the patient interested in additional calls from an ambulatory ? No   Would this patient benefit from a Referral to Amb Social Work? No   Call end time 1258            Farrah HARRIS - Registered Nurse

## 2024-03-18 ENCOUNTER — OFFICE VISIT (OUTPATIENT)
Dept: CARDIOLOGY | Facility: CLINIC | Age: 74
End: 2024-03-18
Payer: MEDICARE

## 2024-03-18 VITALS
WEIGHT: 146 LBS | HEIGHT: 67 IN | OXYGEN SATURATION: 96 % | HEART RATE: 56 BPM | DIASTOLIC BLOOD PRESSURE: 68 MMHG | BODY MASS INDEX: 22.91 KG/M2 | SYSTOLIC BLOOD PRESSURE: 130 MMHG

## 2024-03-18 DIAGNOSIS — I48.0 PAROXYSMAL ATRIAL FIBRILLATION: Primary | Chronic | ICD-10-CM

## 2024-03-18 DIAGNOSIS — K92.0 GASTROINTESTINAL HEMORRHAGE WITH HEMATEMESIS: ICD-10-CM

## 2024-03-18 DIAGNOSIS — Z98.84 HISTORY OF ROUX-EN-Y GASTRIC BYPASS: Chronic | ICD-10-CM

## 2024-03-18 PROCEDURE — 99204 OFFICE O/P NEW MOD 45 MIN: CPT | Performed by: INTERNAL MEDICINE

## 2024-03-18 PROCEDURE — 3078F DIAST BP <80 MM HG: CPT | Performed by: INTERNAL MEDICINE

## 2024-03-18 PROCEDURE — 3075F SYST BP GE 130 - 139MM HG: CPT | Performed by: INTERNAL MEDICINE

## 2024-03-18 NOTE — PROGRESS NOTES
Cardiac Electrophysiology Outpatient Consult Note            [facility]    Consult Note     Aleena Armstrong  [id number]  03/18/2024  [phone]     Primary Care Physician: Aniyah Goldberg MD    Referred By: Gadiel Barahona MD    Subjective     Chief Complaint:   Diagnoses and all orders for this visit:    1. Paroxysmal atrial fibrillation (Primary)    2. Gastrointestinal hemorrhage with hematemesis    3. History of Emilie-en-Y gastric bypass      Chief Complaint   Patient presents with    Atrial Fibrillation       History of Present Illness:   Aleena Armstrong is a 73 y.o. female who presents to my electrophysiology clinic for evaluation of consideration for a Watchman device.  Garrels referred by her cardiologist Dr. Barahona for this procedure.    Patient had life-threatening GI bleed required 4 units of inpatient blood transfusion and this was found to be due to a bleeding lesion at her prior gastric bypass Emilie-en-Y procedure.  In the opinion of her gastroenterologist she is at a reasonably high risk of recurrent GI bleeding.    The patient has numerous risk factors for Cardine Bolick stroke.  She is very afraid of having a stroke.  She is tolerating anticoagulation but is really interested in in a different way of proceeding..      Past Medical History:   Past Medical History:   Diagnosis Date    DAVID-fib     HAD ABLATION FOR THIS IN 2017    Anemia     Anxiety and depression 12/11/2016    GERD (gastroesophageal reflux disease)     Glaucoma of left eye 12/11/2016    H/O migraine     LAST 3  YEARS AGO     History of MRSA infection     APPROX 8 YEARS, TREATED WITH ORAL ABX, NEGATIVE CULTURES FOLLOWING TX     History of transfusion     Hyperlipidemia     Hypertension     MEDS DC'D BY DR GOMEZ 1/2017    Insomnia 12/11/2016    Kidney disease     Peptic ulcer - s/p gastric bypass surgery (7-8 years ago) 12/11/2016    Rheumatoid arthritis 12/11/2016    Spinal headache      FOLLOWING SPINAL FOR CHILDBIRTH     Vitamin D deficiency 12/11/2016    Wears dentures     UPPER PLATE     Elodia-Parkinson-White (WPW) syndrome     1990s       Past Surgical History:   Past Surgical History:   Procedure Laterality Date    BREAST BIOPSY  ~1990    CARDIAC CATHETERIZATION N/A 10/24/2016    Procedure: Left Heart Cath;  Surgeon: Rubens Morejon MD;  Location: [facility] IN[facility]ASIVE LOC[facility];  Service:     CARDIAC ELECTROPHYSIOLOGY PROCEDURE N/A 6/20/2017    Procedure: PVA;  Surgeon: Edvin Cook DO;  Location:  [facility] INVASIVE LOCATION;  Service:     CHOLECYSTECTOMY  ~1990    COLONOSCOPY  2007    ENDOSCOPY N/A 12/18/2023    Procedure: ESOPHAGOGASTRODUODENOSCOPY;  Surgeon: Adeel Reyes MD;  Location:  [facility];  Service: Gastroenterology;  Laterality: N/A;  epi 1mg diluted in 10mls. 1 ml given    GASTRIC BYPASS      GASTRIC BYPASS  2005    HAMMER TOE REPAIR      LEFT--GARO IN PLACE     HYSTERECTOMY  1993    Complete    ORIF HUMERUS FRACTURE Right 2/8/2019    Procedure: ORIF RIGHT PROXIMAL HUMERUS;  Surgeon: Yonatan Galdamez MD;  Location:  [facility] OR;  Service: Orthopedics    ORIF HUMERUS FRACTURE Right 11/18/2021    Procedure: HUMERUS PROXIMAL OPEN REDUCTION INTERNAL FIXATION RIGHT WITH HARDWARE REMOVAL AND NAIL PLACEMENT;  Surgeon: Yonatan Galdamez MD;  Location:  [facility];  Service: Orthopedics;  Laterality: Right;    SHOULDER ACROMIOPLASTY Right 2019    WRIST FRACTURE SURGERY Right 2014       Family History:   Family History   Problem Relation Age of Onset    Arrhythmia Mother     Hypertension Mother     Stroke Mother     Diabetes Mother     Lung cancer Father     Breast cancer Sister     Breast cancer Maternal Aunt     Diabetes Son         Type 1    Breast cancer Cousin     Ovarian cancer Cousin        Social History:   Social History     Socioeconomic History    Marital status:    Tobacco Use    Smoking status: Former     Current packs/day: 0.00     Types: Cigarettes     Quit  date:      Years since quittin.2    Smokeless tobacco: Never   Vaping Use    Vaping status: Never Used   Substance and Sexual Activity    Alcohol use: Yes     Alcohol/week: 5.0 standard drinks of alcohol     Types: 5 Glasses of wine per week     Comment: RARE    Drug use: No    Sexual activity: Defer       Medications:     Current Outpatient Medications:     amitriptyline (ELAVIL) 25 MG tablet, Take 1 tablet by mouth Daily., Disp: , Rfl:     amLODIPine (NORVASC) 5 MG tablet, Take 1 tablet by mouth Daily., Disp: 90 tablet, Rfl: 1    apixaban (Eliquis) 5 MG tablet tablet, Take 1 tablet by mouth Every 12 (Twelve) Hours., Disp: 60 tablet, Rfl: 5    flecainide (TAMBOCOR) 50 MG tablet, Take 1 tablet by mouth Every 12 (Twelve) Hours., Disp: , Rfl:     FLUoxetine (PROzac) 40 MG capsule, Take 1 capsule by mouth Daily., Disp: , Rfl:     hydroxychloroquine (PLAQUENIL) 200 MG tablet, Take 1 tablet by mouth 2 (Two) Times a Day., Disp: , Rfl:     multivitamin with minerals (ONE-A-DAY WOMENS PO), Take 1 tablet by mouth Daily., Disp: , Rfl:     pantoprazole (PROTONIX) 40 MG EC tablet, Take 1 tablet by mouth 2 (Two) Times a Day Before Meals., Disp: 180 tablet, Rfl: 1    promethazine (PHENERGAN) 25 MG tablet, Take 0.5 tablets by mouth Every 6 (Six) Hours As Needed for Nausea or Vomiting., Disp: , Rfl:     timolol (TIMOPTIC) 0.5 % ophthalmic solution, Administer 1 drop into the left eye 2 (Two) Times a Day., Disp: , Rfl:     traMADol (ULTRAM) 50 MG tablet, Take 1 tablet by mouth Every 6 (Six) Hours As Needed for pain., Disp: 10 tablet, Rfl: 0    traZODone (DESYREL) 100 MG tablet, Take 1 tablet by mouth Every Night., Disp: , Rfl:     Allergies:   Allergies   Allergen Reactions    Bactrim [Sulfamethoxazole-Trimethoprim] Other (See Comments)     MOUTH SORES    Beta Adrenergic Blockers Other (See Comments)     bradycardia    Percocet [Oxycodone-Acetaminophen] Other (See Comments)     NIGHT TERRORS     Statins Myalgia     MYALGIA   "   Sulfa Antibiotics Other (See Comments)     MOUTH SORES AND RASH     Erythromycin Rash     RASH        Objective   Vital Signs:   Vitals:    03/18/24 1040   BP: 130/68   BP Location: Left arm   Patient Position: Sitting   Pulse: 56   SpO2: 96%   Weight: 66.2 kg (146 lb)   Height: 168.9 cm (66.5\")       PHYSICAL EXAM  General appearance: Awake, alert, cooperative  Head: Normocephalic, without obvious abnormality, atraumatic  Eyes: Conjunctivae/corneas clear, EOMs intact  Neck: no adenopathy, no carotid bruit, no JVD, and thyroid: not enlarged  Lungs: clear to auscultation bilaterally and no rhonchi or crackles\", ' symmetric  Heart: regular rate and rhythm, S1, S2 normal, no murmur, click, rub or gallop  Abdomen: Soft, non-tender, bowel sounds normal,  no organomegaly  Extremities: extremities normal, atraumatic, no cyanosis or edema  Skin: Skin color, turgor normal, no rashes or lesions  Neurologic: Grossly normal     Lab Results   Component Value Date    GLUCOSE 87 12/20/2023    CALCIUM 8.3 (L) 12/20/2023     12/20/2023    K 4.0 12/20/2023    CO2 25.0 12/20/2023     (H) 12/20/2023    BUN 19 12/20/2023    CREATININE 0.86 12/20/2023    EGFRIFAFRI >60 05/16/2022    EGFRIFNONA >60 05/16/2022    BCR 22.1 12/20/2023    ANIONGAP 8.0 12/20/2023     Lab Results   Component Value Date    WBC 4.27 03/06/2024    HGB 10.7 (L) 03/06/2024    HCT 34.8 03/06/2024    MCV 99 (H) 03/06/2024     03/06/2024     Lab Results   Component Value Date    INR 1.26 (H) 12/17/2023    INR 1.07 11/16/2021    INR 0.94 02/04/2019    PROTIME 15.9 (H) 12/17/2023    PROTIME 13.6 11/16/2021    PROTIME 12.1 02/04/2019     Lab Results   Component Value Date    TSH 2.100 12/19/2023       Cardiac Testing:      I personally viewed and interpreted the patient's EKG/Telemetry/lab data    Procedures    Tobacco Cessation: N/A  Obstructive Sleep Apnea Screening: Completed    Advance Care Planning   ACP discussion was declined by the patient. " Patient does not have an advance directive, declines further assistance.       Assessment & Plan    Diagnoses and all orders for this visit:    1. Paroxysmal atrial fibrillation (Primary)    2. Gastrointestinal hemorrhage with hematemesis    3. History of Emilie-en-Y gastric bypass         Diagnosis Plan   1. Paroxysmal atrial fibrillation  Patient referred for consideration for a Watchman device.  The referring cardiologist is Dr. Barahona.    This patient is a survivor of significant life-threatening GI bleeding.  She has high risk of Cardine Bolick stroke because of atrial fibrillation and comorbidities.  She is a poor candidate for long-term anticoagulation she is however a reasonable candidate for short to medium term anticoagulation.  She has done extensive amount of research on her own and she is interested in receiving a Watchman device.  We discussed the nature of the Watchman system we discussed that this procedure is associated with approximately 1% chance of significant procedure complication risk.  These risks that we specifically discussed are as follows:  I Quoted the patient a  1% chance of significant procedure complication risk including but not limited to vascular injury and bleeding, cardiac perforation, tamponade, stroke, myocardial infarction, death, device related thrombosis, embolization of the device requiring either percutaneous or open surgical retrieval as well as other potential unforseen complications.  The patient understands these risks and wishes to proceed.                                      HAS-Bled Score for Major Bleeding Risk       Question  Yes  No    Hypertension History - BP Systolic >160 mmHg, no treatment     2.   Renal Disease - Creatinine higher than 2.6 mg/dL (200mmol/L)     3.   Liver Disease - Bilirubin higher 2x or AST/ALT/AP higher 3x normal     4.   Stroke History      5.   History of major bleeding or predisposition  x    6.   Labile INR - unstable/high INRs, time  in Therapeutic Range <60%     7.   Age > 65  x    8.   Under medication that predisposes to bleeding  x    9.   Alcohol or drug usage history - more than 7 drinks per week              Score Major bleeding risk   0 1.10%   1 3.40%   2 4.10%   3 5.80%   4 8.90%   5 9.10%   >6 higher %           Total Score: 3      [id number] Score Stroke Risk %  per annum   0 0   1 1.3   2 2.2   3 3.2   4 4.0   5 6.7   6 9.8   7 9.6   8 12.2   9 15.6     .........................................................................  Definitions:  CHF or LV Dysfunction = 1  Hypertension (consistently above 140/90 mmHg (or treated hypertension on medication) = 1  Age 65 to 74 = 1 // 75 or greater = 2  Diabetes Mellitus = 1  Vascular Disease (heart or any arterial vascular bed, aortic plaque, prior MI) = 1  Female sex (as a sole risk factor this dose confer increased risk) = 1  Prior ischemic CVA or documented cardio-embolic event = 2      Watchman moderate sedation  Apixaban  Preop CT      2. Gastrointestinal hemorrhage with hematemesis  See above discussion.      3. History of Emilie-en-Y gastric bypass  High likelihood of recurrent GI bleeding because of this.  Additionally also by absorption of anticoagulation agents is variable.  This places the patient at higher risk for bleeding complications and cardioembolic stroke.        Body mass index is 23.21 kg/m².    I spent 51 minutes in consultation with this patient which included more than 65% of this time in direct face-to-face counseling, physical examination and discussion of my assessment and findings and this shared decision making with the patient.  The remainder of the time not spent face-to-face was performing one, some or all of the following actions: preparing to see the patient (e.g. reviewing tests, prior clinicians' notes, etc), ordering medications, tests or procedures, coordination of care, discussion of the plan with other healthcare providers, documenting clinical  information in epic as well as independently interpreting results and communication of these results to the patient family and/or caregiver(s).  Please note that this explicitly excludes time spent on other separate billable services such as performing procedures or test interpretation, when applicable.    Follow Up:       Thank you for allowing me to participate in the care of your patient. Please to not hesitate to contact me with additional questions or concerns.      Jesse Keen, DO, FACC, RS  Cardiac Electrophysiologist  [facility] / [facility]

## 2024-04-05 RX ORDER — AMLODIPINE BESYLATE 5 MG/1
5 TABLET ORAL
Qty: 90 TABLET | Refills: 1 | OUTPATIENT
Start: 2024-04-05

## 2024-04-17 DIAGNOSIS — K92.0 GASTROINTESTINAL HEMORRHAGE WITH HEMATEMESIS: ICD-10-CM

## 2024-04-17 DIAGNOSIS — I48.0 PAROXYSMAL ATRIAL FIBRILLATION: Primary | ICD-10-CM

## 2024-04-17 RX ORDER — ASPIRIN 81 MG/1
81 TABLET ORAL DAILY
Qty: 90 TABLET | Refills: 3 | Status: SHIPPED | OUTPATIENT
Start: 2024-05-22

## 2024-04-19 ENCOUNTER — PREP FOR SURGERY (OUTPATIENT)
Dept: OTHER | Facility: HOSPITAL | Age: 74
End: 2024-04-19
Payer: MEDICARE

## 2024-04-19 ENCOUNTER — TRANSCRIBE ORDERS (OUTPATIENT)
Dept: ADMINISTRATIVE | Facility: HOSPITAL | Age: 74
End: 2024-04-19
Payer: MEDICARE

## 2024-04-19 DIAGNOSIS — I48.0 PAROXYSMAL ATRIAL FIBRILLATION: Primary | ICD-10-CM

## 2024-04-19 DIAGNOSIS — I77.811 ECTATIC ABDOMINAL AORTA: Primary | ICD-10-CM

## 2024-04-19 RX ORDER — ACETAMINOPHEN 325 MG/1
650 TABLET ORAL EVERY 4 HOURS PRN
OUTPATIENT
Start: 2024-04-19

## 2024-04-19 RX ORDER — NITROGLYCERIN 0.4 MG/1
0.4 TABLET SUBLINGUAL
OUTPATIENT
Start: 2024-04-19

## 2024-04-19 RX ORDER — SODIUM CHLORIDE 9 MG/ML
40 INJECTION, SOLUTION INTRAVENOUS AS NEEDED
OUTPATIENT
Start: 2024-04-19

## 2024-04-19 RX ORDER — CEFAZOLIN SODIUM 2 G/100ML
2 INJECTION, SOLUTION INTRAVENOUS ONCE
OUTPATIENT
Start: 2024-04-19 | End: 2024-04-19

## 2024-04-19 RX ORDER — SODIUM CHLORIDE 0.9 % (FLUSH) 0.9 %
10 SYRINGE (ML) INJECTION AS NEEDED
OUTPATIENT
Start: 2024-04-19

## 2024-04-19 RX ORDER — SODIUM CHLORIDE 0.9 % (FLUSH) 0.9 %
3 SYRINGE (ML) INJECTION EVERY 12 HOURS SCHEDULED
OUTPATIENT
Start: 2024-04-19

## 2024-04-19 RX ORDER — ONDANSETRON 2 MG/ML
4 INJECTION INTRAMUSCULAR; INTRAVENOUS EVERY 6 HOURS PRN
OUTPATIENT
Start: 2024-04-19

## 2024-05-13 ENCOUNTER — HOSPITAL ENCOUNTER (OUTPATIENT)
Dept: ULTRASOUND IMAGING | Facility: HOSPITAL | Age: 74
Discharge: HOME | End: 2024-05-13
Admitting: INTERNAL MEDICINE
Payer: MEDICARE

## 2024-05-13 DIAGNOSIS — I77.811 ECTATIC ABDOMINAL AORTA: ICD-10-CM

## 2024-05-13 PROCEDURE — 76775 US EXAM ABDO BACK WALL LIM: CPT

## 2024-05-15 NOTE — NURSING NOTE
PRE-WATCHMAN HISTORY  Aleena Armstrong 1950   [address] [place] [address]   [phone] (home)     Referring MD: Gadiel Barahona MD   Information obtained from: [x] Medical record review  [x] Patient report  Scheduled for: Watchman implant on May 22, 2024 with Dr Dr. Keen  SDM Visit: Gadiel Barahona MD     History & Risk Factors (prior to Watchman implant)  [id number] Risk Factors:  Congestive HF     [x] No   [] Yes  LV Dysfunction   [x] No   [] Yes  Hypertension      [] No   [x] Yes  Diabetes    [x] No   [] Yes  Stroke       [x] No   [] Yes  TIA       [x] No   [] Yes  Thromboembolism    [x] No   [] Yes  Vascular Disease   [] No   [] Yes       If Yes, Type   [] Prior MI  [] PAD      [] Aortic Plaque    Statin if indicated          [] No   [] Yes  HAS-BLED Risk Factors:  HTN (uncontrolled) [x] No  [] Yes  Abnormal Renal Fxn  [x] No  [] Yes  Abnormal Liver Fxn   [x] No  [] Yes  Stroke            [x] No  [] Yes  Bleeding event [] No  [x] Yes  Labile INR      [x] No  [] Yes  Alcohol  [] No  [x] Yes occasionally  Antiplatelets      [x] No  [] Yes  NSAIDS  [x] No  [] Yes    Additional Stroke & Bleeding Risk Factors:  Increased Fall Risk   [] No  [x] Yes  Bleeding Event         [] No  [x] Yes      If Yes, Type      [] Intracranial [] Epistaxis   [x] GI   [] Other    Rhythm History:  AFIBTYPE: paroxysmal    Valvular AFib        [x] No  [] Yes       If Yes, Rheum Valve Disease []  No  [] Yes       If Yes, Mitral Valve Replacement [] No  [] Yes            If Yes, Mechanical?   [] No  [] Yes       If Yes, Mitral Valve Repair  [] No  [] Yes    Attempt at AFib Termination:  [] No  [x] Yes       If Yes, pharmacologic CV    [] No  [x] Yes       If Yes, DC cardioversion  [] No  [x] Yes       If Yes, catheter ablation  [] No  [x] Yes            If Yes, Date of most recent: PVA 6/20/17              Atrial Flutter    [] No  [x] Yes       If Yes, attempt at termination  [] No  [x] Yes            If Yes,  pharmacologic cardioversion [] No  [x] Yes            If Yes, DC cardioversion  [] No  [] Yes            If Yes, catheter ablation  [] No  [] Yes            If Yes, Date of most recent: ________           [place] Intervention    [x] No  [] Yes    Additional History & Risk Factors:  Cardiomyopathy   [x] No  [] Yes  Chronic Lung Disease  [x] No  [] Yes  Coronary Artery Disease  [x] No  [] Yes  Sleep Apnea    [x] No  [] Yes       If Yes, compliant with treatment [] No  [] Yes    Epicardial Approach Considered [x] No  [] Yes    Diagnostic Studies:  Last Echo:  [x] TTE Date: 5/29/22          [x] DEEPAK Date: 6/20/17        EF: 56-60%    EF: 60%         LA: 4.9 cm    LA: not noted         VHD? Mild MR, Moderate TR VHD? The cardiac valves are anatomically and functionally normal   Ace, arb arni for EF < 40%       Pre-procedure blood thinner medications:    Continue Eliquis uninterrupted. On the day of the procedure start taking a daily Aspirin 81 mg.

## 2024-05-20 ENCOUNTER — HOSPITAL ENCOUNTER (OUTPATIENT)
Dept: CT IMAGING | Facility: HOSPITAL | Age: 74
Discharge: HOME | End: 2024-05-20
Payer: MEDICARE

## 2024-05-20 ENCOUNTER — PRE-ADMISSION TESTING (OUTPATIENT)
Dept: PREADMISSION TESTING | Facility: HOSPITAL | Age: 74
DRG: 274 | End: 2024-05-20
Payer: MEDICARE

## 2024-05-20 DIAGNOSIS — I48.0 PAROXYSMAL ATRIAL FIBRILLATION: ICD-10-CM

## 2024-05-20 DIAGNOSIS — K92.0 GASTROINTESTINAL HEMORRHAGE WITH HEMATEMESIS: ICD-10-CM

## 2024-05-20 LAB
ANION GAP SERPL CALCULATED.3IONS-SCNC: 9 MMOL/L (ref 5–15)
BUN SERPL-MCNC: 13 MG/DL (ref 8–23)
BUN/CREAT SERPL: 12.7 (ref 7–25)
CALCIUM SPEC-SCNC: 8.9 MG/DL (ref 8.6–10.5)
CHLORIDE SERPL-SCNC: 105 MMOL/L (ref 98–107)
CO2 SERPL-SCNC: 27 MMOL/L (ref 22–29)
CREAT SERPL-MCNC: 1.02 MG/DL (ref 0.57–1)
DEPRECATED RDW RBC AUTO: 49.4 FL (ref 37–54)
EGFRCR SERPLBLD CKD-EPI 2021: 57.5 ML/MIN/1.73
ERYTHROCYTE [DISTWIDTH] IN BLOOD BY AUTOMATED COUNT: 13.4 % (ref 12.3–15.4)
GLUCOSE SERPL-MCNC: 91 MG/DL (ref 65–99)
HCT VFR BLD AUTO: 34.5 % (ref 34–46.6)
HGB BLD-MCNC: 11.1 G/DL (ref 12–15.9)
MCH RBC QN AUTO: 32 PG (ref 26.6–33)
MCHC RBC AUTO-ENTMCNC: 32.2 G/DL (ref 31.5–35.7)
MCV RBC AUTO: 99.4 FL (ref 79–97)
PLATELET # BLD AUTO: 202 10*3/MM3 (ref 140–450)
PMV BLD AUTO: 9.8 FL (ref 6–12)
POTASSIUM SERPL-SCNC: 4.2 MMOL/L (ref 3.5–5.2)
RBC # BLD AUTO: 3.47 10*6/MM3 (ref 3.77–5.28)
SODIUM SERPL-SCNC: 141 MMOL/L (ref 136–145)
WBC NRBC COR # BLD AUTO: 4.96 10*3/MM3 (ref 3.4–10.8)

## 2024-05-20 PROCEDURE — 85027 COMPLETE CBC AUTOMATED: CPT

## 2024-05-20 PROCEDURE — 25510000001 IOPAMIDOL PER 1 ML: Performed by: INTERNAL MEDICINE

## 2024-05-20 PROCEDURE — 71275 CT ANGIOGRAPHY CHEST: CPT

## 2024-05-20 PROCEDURE — 36415 COLL VENOUS BLD VENIPUNCTURE: CPT

## 2024-05-20 PROCEDURE — 80048 BASIC METABOLIC PNL TOTAL CA: CPT

## 2024-05-20 RX ORDER — ERGOCALCIFEROL 1.25 MG/1
50000 CAPSULE ORAL WEEKLY
COMMUNITY

## 2024-05-20 RX ADMIN — IOPAMIDOL 100 ML: 755 INJECTION, SOLUTION INTRAVENOUS at 12:20

## 2024-05-20 NOTE — PAT
An arrival time for procedure was not provided during PAT visit. If patient had any questions or concerns about their arrival time, they were instructed to contact their surgeon/physician.  Additionally, if the patient referred to an arrival time that was acquired from their my chart account, patient was encouraged to verify that time with their surgeon/physician. Arrival times are NOT provided in Pre Admission Testing Department.    Pt directed to CT after PAT.

## 2024-05-22 ENCOUNTER — HOSPITAL ENCOUNTER (INPATIENT)
Facility: HOSPITAL | Age: 74
LOS: 1 days | Discharge: HOME | DRG: 274 | End: 2024-05-22
Attending: INTERNAL MEDICINE | Admitting: INTERNAL MEDICINE
Payer: MEDICARE

## 2024-05-22 VITALS
BODY MASS INDEX: 22.64 KG/M2 | RESPIRATION RATE: 10 BRPM | TEMPERATURE: 97.5 F | HEIGHT: 66 IN | SYSTOLIC BLOOD PRESSURE: 111 MMHG | HEART RATE: 59 BPM | WEIGHT: 140.87 LBS | DIASTOLIC BLOOD PRESSURE: 63 MMHG | OXYGEN SATURATION: 99 %

## 2024-05-22 DIAGNOSIS — K92.0 GASTROINTESTINAL HEMORRHAGE WITH HEMATEMESIS: ICD-10-CM

## 2024-05-22 DIAGNOSIS — I48.0 PAROXYSMAL ATRIAL FIBRILLATION: ICD-10-CM

## 2024-05-22 PROBLEM — Z79.899 LONG TERM CURRENT USE OF ANTIARRHYTHMIC DRUG: Status: ACTIVE | Noted: 2024-05-22

## 2024-05-22 PROBLEM — R00.1 BRADYCARDIA: Status: RESOLVED | Noted: 2017-01-09 | Resolved: 2024-05-22

## 2024-05-22 PROBLEM — S30.1XXA HEMATOMA OF GROIN: Status: RESOLVED | Noted: 2017-06-23 | Resolved: 2024-05-22

## 2024-05-22 PROBLEM — S42.209A PROXIMAL HUMERUS FRACTURE: Status: RESOLVED | Noted: 2019-02-08 | Resolved: 2024-05-22

## 2024-05-22 PROBLEM — S82.141A FRACTURE OF RIGHT TIBIAL PLATEAU: Status: RESOLVED | Noted: 2021-11-16 | Resolved: 2024-05-22

## 2024-05-22 PROBLEM — G89.18 ACUTE POSTOPERATIVE PAIN: Status: RESOLVED | Noted: 2019-02-09 | Resolved: 2024-05-22

## 2024-05-22 PROBLEM — S42.301A FRACTURE OF RIGHT HUMERUS: Status: RESOLVED | Noted: 2021-11-16 | Resolved: 2024-05-22

## 2024-05-22 PROBLEM — I48.19 PERSISTENT ATRIAL FIBRILLATION: Status: ACTIVE | Noted: 2024-05-22

## 2024-05-22 LAB — ACT BLD: 271 SECONDS (ref 82–152)

## 2024-05-22 PROCEDURE — 33340 PERQ CLSR TCAT L ATR APNDGE: CPT | Performed by: INTERNAL MEDICINE

## 2024-05-22 PROCEDURE — C1759 CATH, INTRA ECHOCARDIOGRAPHY: HCPCS | Performed by: INTERNAL MEDICINE

## 2024-05-22 PROCEDURE — 25010000002 PROTAMINE SULFATE PER 10 MG: Performed by: INTERNAL MEDICINE

## 2024-05-22 PROCEDURE — C1760 CLOSURE DEV, VASC: HCPCS | Performed by: INTERNAL MEDICINE

## 2024-05-22 PROCEDURE — 99152 MOD SED SAME PHYS/QHP 5/>YRS: CPT | Performed by: INTERNAL MEDICINE

## 2024-05-22 PROCEDURE — C1889 IMPLANT/INSERT DEVICE, NOC: HCPCS

## 2024-05-22 PROCEDURE — 99153 MOD SED SAME PHYS/QHP EA: CPT | Performed by: INTERNAL MEDICINE

## 2024-05-22 PROCEDURE — 85347 COAGULATION TIME ACTIVATED: CPT

## 2024-05-22 PROCEDURE — 25010000002 BUPIVACAINE 0.5 % SOLUTION: Performed by: INTERNAL MEDICINE

## 2024-05-22 PROCEDURE — 02L73DK OCCLUSION OF LEFT ATRIAL APPENDAGE WITH INTRALUMINAL DEVICE, PERCUTANEOUS APPROACH: ICD-10-PCS | Performed by: INTERNAL MEDICINE

## 2024-05-22 PROCEDURE — C1769 GUIDE WIRE: HCPCS | Performed by: INTERNAL MEDICINE

## 2024-05-22 PROCEDURE — C1893 INTRO/SHEATH, FIXED,NON-PEEL: HCPCS | Performed by: INTERNAL MEDICINE

## 2024-05-22 PROCEDURE — C1894 INTRO/SHEATH, NON-LASER: HCPCS | Performed by: INTERNAL MEDICINE

## 2024-05-22 PROCEDURE — S0260 H&P FOR SURGERY: HCPCS | Performed by: PHYSICIAN ASSISTANT

## 2024-05-22 PROCEDURE — 25010000002 HEPARIN (PORCINE) PER 1000 UNITS: Performed by: INTERNAL MEDICINE

## 2024-05-22 PROCEDURE — 93662 INTRACARDIAC ECG (ICE): CPT | Performed by: INTERNAL MEDICINE

## 2024-05-22 PROCEDURE — 25010000002 MIDAZOLAM PER 1 MG: Performed by: INTERNAL MEDICINE

## 2024-05-22 PROCEDURE — 25510000001 IOPAMIDOL PER 1 ML: Performed by: INTERNAL MEDICINE

## 2024-05-22 PROCEDURE — 25810000003 SODIUM CHLORIDE 0.9 % SOLUTION: Performed by: INTERNAL MEDICINE

## 2024-05-22 PROCEDURE — B246ZZZ ULTRASONOGRAPHY OF RIGHT AND LEFT HEART: ICD-10-PCS | Performed by: INTERNAL MEDICINE

## 2024-05-22 PROCEDURE — C1889 IMPLANT/INSERT DEVICE, NOC: HCPCS | Performed by: INTERNAL MEDICINE

## 2024-05-22 PROCEDURE — 25010000002 CEFAZOLIN PER 500 MG: Performed by: PHYSICIAN ASSISTANT

## 2024-05-22 PROCEDURE — 25010000002 ONDANSETRON PER 1 MG: Performed by: INTERNAL MEDICINE

## 2024-05-22 PROCEDURE — 25010000002 FENTANYL CITRATE (PF) 50 MCG/ML SOLUTION: Performed by: INTERNAL MEDICINE

## 2024-05-22 DEVICE — LEFT ATRIAL APPENDAGE CLOSURE DEVICE WITH DELIVERY SYSTEM
Type: IMPLANTABLE DEVICE | Status: FUNCTIONAL
Brand: WATCHMAN FLX™

## 2024-05-22 RX ORDER — SODIUM CHLORIDE 9 MG/ML
INJECTION, SOLUTION INTRAVENOUS
Status: COMPLETED | OUTPATIENT
Start: 2024-05-22 | End: 2024-05-22

## 2024-05-22 RX ORDER — ONDANSETRON 2 MG/ML
4 INJECTION INTRAMUSCULAR; INTRAVENOUS EVERY 6 HOURS PRN
Status: DISCONTINUED | OUTPATIENT
Start: 2024-05-22 | End: 2024-05-22 | Stop reason: HOSPADM

## 2024-05-22 RX ORDER — ONDANSETRON 2 MG/ML
INJECTION INTRAMUSCULAR; INTRAVENOUS
Status: DISCONTINUED | OUTPATIENT
Start: 2024-05-22 | End: 2024-05-22 | Stop reason: HOSPADM

## 2024-05-22 RX ORDER — FENTANYL CITRATE 50 UG/ML
INJECTION, SOLUTION INTRAMUSCULAR; INTRAVENOUS
Status: DISCONTINUED | OUTPATIENT
Start: 2024-05-22 | End: 2024-05-22 | Stop reason: HOSPADM

## 2024-05-22 RX ORDER — MIDAZOLAM HYDROCHLORIDE 1 MG/ML
INJECTION INTRAMUSCULAR; INTRAVENOUS
Status: DISCONTINUED | OUTPATIENT
Start: 2024-05-22 | End: 2024-05-22 | Stop reason: HOSPADM

## 2024-05-22 RX ORDER — CEFAZOLIN SODIUM 2 G/100ML
2 INJECTION, SOLUTION INTRAVENOUS ONCE
Status: DISCONTINUED | OUTPATIENT
Start: 2024-05-22 | End: 2024-05-22

## 2024-05-22 RX ORDER — HEPARIN SODIUM 1000 [USP'U]/ML
INJECTION, SOLUTION INTRAVENOUS; SUBCUTANEOUS
Status: DISCONTINUED | OUTPATIENT
Start: 2024-05-22 | End: 2024-05-22 | Stop reason: HOSPADM

## 2024-05-22 RX ORDER — ETOMIDATE 2 MG/ML
INJECTION INTRAVENOUS
Status: DISCONTINUED | OUTPATIENT
Start: 2024-05-22 | End: 2024-05-22 | Stop reason: HOSPADM

## 2024-05-22 RX ORDER — ASPIRIN 81 MG/1
81 TABLET ORAL DAILY
Status: DISCONTINUED | OUTPATIENT
Start: 2024-05-22 | End: 2024-05-22 | Stop reason: HOSPADM

## 2024-05-22 RX ORDER — NITROGLYCERIN 0.4 MG/1
0.4 TABLET SUBLINGUAL
Status: DISCONTINUED | OUTPATIENT
Start: 2024-05-22 | End: 2024-05-22 | Stop reason: HOSPADM

## 2024-05-22 RX ORDER — SODIUM CHLORIDE 0.9 % (FLUSH) 0.9 %
10 SYRINGE (ML) INJECTION AS NEEDED
Status: DISCONTINUED | OUTPATIENT
Start: 2024-05-22 | End: 2024-05-22 | Stop reason: HOSPADM

## 2024-05-22 RX ORDER — PROTAMINE SULFATE 10 MG/ML
INJECTION, SOLUTION INTRAVENOUS
Status: DISCONTINUED | OUTPATIENT
Start: 2024-05-22 | End: 2024-05-22 | Stop reason: HOSPADM

## 2024-05-22 RX ORDER — SODIUM CHLORIDE 9 MG/ML
40 INJECTION, SOLUTION INTRAVENOUS AS NEEDED
Status: DISCONTINUED | OUTPATIENT
Start: 2024-05-22 | End: 2024-05-22 | Stop reason: HOSPADM

## 2024-05-22 RX ORDER — BUPIVACAINE HYDROCHLORIDE 5 MG/ML
INJECTION, SOLUTION PERINEURAL
Status: DISCONTINUED | OUTPATIENT
Start: 2024-05-22 | End: 2024-05-22 | Stop reason: HOSPADM

## 2024-05-22 RX ORDER — LIDOCAINE HYDROCHLORIDE 10 MG/ML
INJECTION, SOLUTION EPIDURAL; INFILTRATION; INTRACAUDAL; PERINEURAL
Status: DISCONTINUED | OUTPATIENT
Start: 2024-05-22 | End: 2024-05-22 | Stop reason: HOSPADM

## 2024-05-22 RX ORDER — SODIUM CHLORIDE 0.9 % (FLUSH) 0.9 %
3 SYRINGE (ML) INJECTION EVERY 12 HOURS SCHEDULED
Status: DISCONTINUED | OUTPATIENT
Start: 2024-05-22 | End: 2024-05-22 | Stop reason: HOSPADM

## 2024-05-22 RX ORDER — ACETAMINOPHEN 325 MG/1
650 TABLET ORAL EVERY 4 HOURS PRN
Status: DISCONTINUED | OUTPATIENT
Start: 2024-05-22 | End: 2024-05-22 | Stop reason: HOSPADM

## 2024-05-22 RX ADMIN — SODIUM CHLORIDE 2 G: 900 INJECTION INTRAVENOUS at 10:49

## 2024-05-22 NOTE — OP NOTE
" LEFT ATRIAL APPENDAGE CLOSURE (LAAC)                                 Watchman Implant    PROCEDURES PERFORMED:    Left atrial appendage closure with 31 mm Watchman FLX device with intracardiac echocardiography.  Patient was admitted to the hospital for this procedure.  This is an inpatient procedure.    PREPROCEDURAL DIAGNOSES:    1. Persistent Atrial fibrillation  2. [id number] score of 3  3. HASBLED score of 3    REFERRING PHYSICIAN:    Dr Barahona    POST PROCEDURE DIANGOSES:  As above.    INDICATION FOR PROCEDURE:  Briefly, Aleena Armstrong is a 73 y.o. year old female with a history of GI bleeding.    ANTICOAGULATION STRATEGY PRIOR TO AND POST PROCEDURE:  DOAC.  The last dose of anticoagulant was confirmed to have been taken this AM.    PT/INR:  No results found for: \"LABPROT\", \"INR\"  PTT:  No results found for: \"APTT\"    CBC:   WBC   Date Value Ref Range Status   05/20/2024 4.96 3.40 - 10.80 10*3/mm3 Final     RBC   Date Value Ref Range Status   05/20/2024 3.47 (L) 3.77 - 5.28 10*6/mm3 Final     Hemoglobin   Date Value Ref Range Status   05/20/2024 11.1 (L) 12.0 - 15.9 g/dL Final     Hematocrit   Date Value Ref Range Status   05/20/2024 34.5 34.0 - 46.6 % Final     MCV   Date Value Ref Range Status   05/20/2024 99.4 (H) 79.0 - 97.0 fL Final     RDW   Date Value Ref Range Status   05/20/2024 13.4 12.3 - 15.4 % Final     Platelets   Date Value Ref Range Status   05/20/2024 202 140 - 450 10*3/mm3 Final     BMP:     Sodium   Date Value Ref Range Status   05/20/2024 141 136 - 145 mmol/L Final     Potassium   Date Value Ref Range Status   05/20/2024 4.2 3.5 - 5.2 mmol/L Final     Comment:     Slight hemolysis detected by analyzer. Result may be falsely elevated.     Chloride   Date Value Ref Range Status   05/20/2024 105 98 - 107 mmol/L Final     CO2   Date Value Ref Range Status   05/20/2024 27.0 22.0 - 29.0 mmol/L Final     BUN   Date Value Ref Range Status   05/20/2024 13 8 - 23 mg/dL Final     Creatinine   Date " "Value Ref Range Status   05/20/2024 1.02 (H) 0.57 - 1.00 mg/dL Final     eGFR   Date Value Ref Range Status   05/20/2024 57.5 (L) >60.0 mL/min/1.73 Final       Vital Signs: /53   Pulse 80   Temp 97.5 °F (36.4 °C) (Tympanic)   Resp 10   Ht 167.6 cm (66\")   Wt 63.9 kg (140 lb 14 oz)   LMP  (LMP Unknown)   SpO2 99%   BMI 22.74 kg/m²      Admit Weight:  63.9 kg (140 lb 14 oz)  BMI: Body mass index is 22.74 kg/m².    MODERATE SEDATION FOR PROCEDURE:    Moderate sedation was given during this procedure.    I supervised and directed RN to administer this sedation.  This staff member also monitored the patient's hemodynamic and respiratory status and response to these medications.  Please see the full detailed procedure report generated by the electrophysiology laboratory staff.  The patient tolerated moderate sedation well.  There were no complications regarding sedation.  The total dose of fentanyl was 250 mcg and the total dose of midazolam was 5 mg.  The total dose of etomidate was 6 mg.  First sedation was administered at 1051 and continued through 1126.    PROCEDURE NARRATIVE:    The patient was able to give written informed consent after revisiting the key portions of the risk versus benefit profile of the procedure.  This discussion was framed by our lengthy conversations  (please see our detailed notes).  Patient verbalized strong understanding of this discussion and a strong desire to proceed with the procedure.  Please note that this detailed informed consent process utilized mutual and shared decision making process between all parties involved, principally the physician and patient, but also potentially with input from the patient's selected family and friends.    Please also note that the patient was seen and examined prior to this procedure by a non implanting physician who agreed that this patient would benefit from left atrial appendage closure as an alternative to long-term anticoagulation.  " Please see this physician separate attestation.    The patient was brought to the EP Lab in the post absorptive state.      The patient was then prepared and draped in a routing sterile fashion.  Seldinger access was obtained at the right common femoral vein with a dual venipunctures utilizing ultrasound probe guided vascular access.  A J tip wire was advanced into the vascular space.  A pair of sheaths were placed into the inferior vena cava.  The patient was anticoagulated with unfractionated heparin in bolus and then continuous infusion to maintain an ACT of between 200 and 300 seconds.    A phased array intracardiac echocardiography probe was placed into the right atrium, right ventricle and after transseptal puncture also into the left atrium and left ventricle.  This was used for visualization of critical cardiac structures such as the fossa ovalis interatrial septum left atrial appendage left superior pulmonary vein mitral valve annulus and pericardial space.  This aspect of the procedure is a separate and independent part of the procedure which was critical for implantation of the Watchman.    We performed transseptal puncture with a combination of fluoroscopic and echocardiographic guidance.    The Watchman delivery sheath was then placed in the left atrium under echocardiographic and fluoroscopic guidance safely.  Left atrial appendage was then cannulated with a 10 mm angled pigtail catheter.  This was again performed with a combination of echocardiographic and fluoroscopic guidance.  Contrast injection was also performed.  The Watchman delivery sheath was then placed into optimal position within left atrial appendage.  The left atrial appendage diameter was viewed in multiple projections on echocardiography as well as fluoroscopy with contrast injection into the left atrial appendage.  A 31 mm device was selected and delivered with the supplied delivery tools to the left atrial appendage safely.      PASS  criteria were then evaluated and confirmed.      The device was then safely released.    Catheters and sheaths were withdrawn from the left atrium and then the body.  Hemostasis at the site of venous access was achieved after withdrawing the sheath from the body with a vascade closure device  and manual pressure.  Intracardiac echocardiogram demonstrated no pericardial effusion.    Protamine was given to reverse anticoagulation.    The patient was transferred to recovery in stable condition.    COMPLICATIONS: none    EBL: minimal    RADIATION EXPOSURE: 36 mGy over 2.5 minutes      KEY PROCEDURAL FINDINGS:     31 mm Watchman FLX implanted with all trabeculae covered and no leaks.    POST PROCEDURAL PLAN:    Uninterrupted anticoagulation for not less than 45 days unless specially instructed otherwise by myself or another member of our EP physician team. The patient will also be taking 81 mg of aspirin.  An imaging ( DEEPAK or CT scan ) will be obtained to assess for appropriate closure of the left atrial appendage occlusion device at 45 days post implant.    Please note that this patient was admitted specifically for this elective inpatient procedure.  This is an inpatient procedure.   Due to COVID-19 global pandemic and bed shortage we will endeavor to discharge this patient later today rather than tomorrow.  I think that he will tolerate this well.  We will of course wait until later in the afternoon to make a final decision.  The patient will be seen at our office per protocol for this procedure.    Jesse Keen DO, FACC, [facility]  Cardiac Electrophysiologist  [facility] / [facility]

## 2024-05-22 NOTE — H&P
[facility] Electrophysiology    Date of Hospital Visit: 24    Place of Service: [facility]    Patient Name: Aleena Armstrong  :1950      Primary Care Provider: Aniyah Goldberg MD    Cheif Complaint EP: Atrial Fibrillation      Problem List:  Active Hospital Problems    Diagnosis  POA    Paroxysmal atrial fibrillation [I48.0]  Yes     Priority: High    Atrial fib/flutter [I48.91]  Yes     Priority: High     1. Atrial flutter, atypical in nature  A) [facility] ED presentation, 10/23/2016, with rapid atrial flutter converted after IV diltiazem.    B) Normal LVEF by catheterization, 10/24/2016.    C) Echocardiogram, 10/23/2016, revealing LVEF of 60%, mild MR, mild TR, LV diastolic dysfunction, LA enlarged (vol index 47.4) oDminic Wolff MD.   D)Recurrent symptomatic Afib.  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        Allergies   Allergen Reactions    Bactrim [Sulfamethoxazole-Trimethoprim] Other (See Comments)     MOUTH SORES    Beta Adrenergic Blockers Other (See Comments)     bradycardia    Percocet [Oxycodone-Acetaminophen] Other (See Comments)     NIGHT TERRORS     Statins Myalgia     MYALGIA     Sulfa Antibiotics Other (See Comments)     MOUTH SORES AND RASH     Erythromycin Rash     RASH        Current Outpatient Medications   Medication Instructions    amitriptyline (ELAVIL) 25 mg, Oral, Nightly    amLODIPine  (NORVASC) 5 mg, Oral, Every 24 Hours Scheduled    apixaban (ELIQUIS) 5 mg, Oral, Every 12 Hours    aspirin 81 mg, Oral, Daily, Start on the morning of Watchman procedure    flecainide (TAMBOCOR) 50 mg, Oral, Every 12 Hours    FLUoxetine (PROzac) 40 MG capsule 1 capsule, Oral, Daily    hydroxychloroquine (PLAQUENIL) 200 mg, Oral, 2 Times Daily    IRON CR PO 1 dose, Oral, Daily, gummies    pantoprazole (PROTONIX) 40 mg, Oral, 2 Times Daily Before Meals    promethazine (PHENERGAN) 12.5 mg, Oral, Every 6 Hours PRN    timolol (TIMOPTIC) 0.5 % ophthalmic solution 1 drop, Left Eye, 2 Times Daily    traMADol (ULTRAM) 50 MG tablet Take 1 tablet by mouth Every 6 (Six) Hours As Needed for pain.    traZODone (DESYREL) 100 mg, Oral, Nightly    vitamin D (ERGOCALCIFEROL) 50,000 Units, Oral, Weekly, Sundays    vitamin D3 5,000 Units, Oral, Daily           Social History     Socioeconomic History    Marital status:    Tobacco Use    Smoking status: Former     Current packs/day: 0.00     Types: Cigarettes     Quit date:      Years since quittin.4    Smokeless tobacco: Never   Vaping Use    Vaping status: Never Used   Substance and Sexual Activity    Alcohol use: Not Currently     Comment: occational    Drug use: No    Sexual activity: Defer       Family History   Problem Relation Age of Onset    Arrhythmia Mother     Hypertension Mother     Stroke Mother     Diabetes Mother     Lung cancer Father     Breast cancer Sister     Breast cancer Maternal Aunt     Diabetes Son         Type 1    Breast cancer Cousin     Ovarian cancer Cousin        REVIEW OF SYSTEMS:   Review of Systems   Constitutional: Negative for diaphoresis, malaise/fatigue and weight gain.   Cardiovascular:  Negative for chest pain, claudication, dyspnea on exertion, irregular heartbeat, leg swelling, orthopnea, palpitations, paroxysmal nocturnal dyspnea and syncope.   Respiratory:  Negative for cough, shortness of breath, sleep disturbances due to  "breathing and wheezing.    Hematologic/Lymphatic: Negative for bleeding problem.   Musculoskeletal:  Negative for muscle cramps, muscle weakness and myalgias.   Gastrointestinal:  Negative for heartburn.   Neurological:  Negative for weakness.            Objective:  Vitals:    05/22/24 0822 05/22/24 0824 05/22/24 0828   BP: 128/96 122/67 111/63   BP Location: Right arm Left arm Right arm   Patient Position: Lying Lying Lying   Pulse: 60     Resp: 16     Temp: 97.5 °F (36.4 °C)     TempSrc: Tympanic     SpO2: 97%     Weight: 63.9 kg (140 lb 14 oz)     Height: 167.6 cm (66\")       Body mass index is 22.74 kg/m².  Flowsheet Rows      Flowsheet Row First Filed Value   Admission Height 167.6 cm (66\") Documented at 05/22/2024 0822   Admission Weight 63.9 kg (140 lb 14 oz) Documented at 05/22/2024 0822            Vitals reviewed.   Constitutional:       Appearance: Well-developed.   Pulmonary:      Effort: Pulmonary effort is normal. No respiratory distress.      Breath sounds: Normal breath sounds. No wheezing. No rales.      Comments: Bases clear  Chest:      Chest wall: Not tender to palpatation.   Cardiovascular:      Normal rate. Regular rhythm.      Murmurs: There is no murmur.      No gallop.  No click. No rub.   Pulses:     Intact distal pulses.   Edema:     Peripheral edema absent.   Musculoskeletal: Normal range of motion.       Lab Review:       Results from last 7 days   Lab Units 05/20/24  1041   SODIUM mmol/L 141   POTASSIUM mmol/L 4.2   CHLORIDE mmol/L 105   CO2 mmol/L 27.0   BUN mg/dL 13   CREATININE mg/dL 1.02*   GLUCOSE mg/dL 91   CALCIUM mg/dL 8.9     Results from last 7 days   Lab Units 05/20/24  1041   WBC 10*3/mm3 4.96   HEMOGLOBIN g/dL 11.1*   HEMATOCRIT % 34.5   PLATELETS 10*3/mm3 202     Estimated Creatinine Clearance: 48.1 mL/min (A) (by C-G formula based on SCr of 1.02 mg/dL (H)).  Lab Results   Component Value Date    INR 1.26 (H) 12/17/2023    INR 1.07 11/16/2021    INR 0.94 02/04/2019    " PROTIME 15.9 (H) 12/17/2023    PROTIME 13.6 11/16/2021    PROTIME 12.1 02/04/2019        Assessment:   Atrial fibrillation, GI bleeding        Plan:   Left atrial appendage occlusion        GENARO Sanchez

## 2024-05-22 NOTE — Clinical Note
All Patches/Pads removed. Skin Intact. Significant on all extremities  Likely sequela of liver disease   Platelets 35 today

## 2024-05-22 NOTE — NURSING NOTE
LAAO Procedure Information   Aleena Legergeovanna 1950   [address] [facility] [address]   [phone] (home)       [place] Orifice Maximal Width: 28  mm  Cumulative Air Kerma:  36 mGy  Contrast Volume:  20 mL  Dose Area Product:  259.89 ?Gy-M2

## 2024-05-23 ENCOUNTER — CALL CENTER PROGRAMS (OUTPATIENT)
Dept: CALL CENTER | Facility: HOSPITAL | Age: 74
End: 2024-05-23
Payer: MEDICARE

## 2024-05-23 NOTE — DISCHARGE SUMMARY
Left atrial appendage closure discharge summary         [facility] at [facility]        Discharge Summary       PATIENT NAME:  Aleena Armstrong    :  1950 AGE: 73 y.o.     Admission Date: 2024   Discharge Date: 2024     Reason for Admission:   Elective inpatient percutaneous closure of left atrial appendage    Procedures     Percutaneous left atrial appendage closure    Brief Summary of Hospital Course    Patient was admitted for this elective inpatient procedure.  The procedure went smoothly.  The patient is being discharged home.    Consulting Providers: None    Discharge Instructions     Home or Self Care    Activity: To keep activity for the next 3 days at an absolute minimum at home.  Diet: Resume prior diet  Follow-up: Per protocol    Discharge Medications          Discharge Medications        Changes to Medications        Instructions Start Date   traMADol 50 MG tablet  Commonly known as: ULTRAM  What changed: reasons to take this   Take 1 tablet by mouth Every 6 (Six) Hours As Needed for pain.             Continue These Medications        Instructions Start Date   amitriptyline 25 MG tablet  Commonly known as: ELAVIL   25 mg, Oral, Nightly      amLODIPine 5 MG tablet  Commonly known as: NORVASC   5 mg, Oral, Every 24 Hours Scheduled      apixaban 5 MG tablet tablet  Commonly known as: Eliquis   5 mg, Oral, Every 12 Hours      aspirin 81 MG EC tablet   81 mg, Oral, Daily, Start on the morning of Watchman procedure      flecainide 50 MG tablet  Commonly known as: TAMBOCOR   50 mg, Oral, Every 12 Hours      FLUoxetine 40 MG capsule  Commonly known as: PROzac   1 capsule, Oral, Daily      hydroxychloroquine 200 MG tablet  Commonly known as: PLAQUENIL   200 mg, Oral, 2 Times Daily      IRON CR PO   1 dose, Oral, Daily, gummies      pantoprazole 40 MG EC tablet  Commonly known as: PROTONIX   40 mg, Oral, 2 Times Daily Before Meals      promethazine 25 MG tablet  Commonly  "known as: PHENERGAN   12.5 mg, Oral, Every 6 Hours PRN      timolol 0.5 % ophthalmic solution  Commonly known as: TIMOPTIC   1 drop, Left Eye, 2 Times Daily      traZODone 100 MG tablet  Commonly known as: DESYREL   100 mg, Oral, Nightly      vitamin D 1.25 MG (93814 UT) capsule capsule  Commonly known as: ERGOCALCIFEROL   50,000 Units, Oral, Weekly, Sundays      vitamin D3 125 MCG (5000 UT) tablet tablet   5,000 Units, Oral, Daily               /63   Pulse 59   Temp 97.5 °F (36.4 °C) (Tympanic)   Resp 10   Ht 167.6 cm (66\")   Wt 63.9 kg (140 lb 14 oz)   LMP  (LMP Unknown)   SpO2 99%   BMI 22.74 kg/m²   Admit Weight  Weight: 63.9 kg (140 lb 14 oz)  [unfilled]    CBC:   Results from last 7 days   Lab Units 05/20/24  1041   WBC 10*3/mm3 4.96   HEMOGLOBIN g/dL 11.1*   HEMATOCRIT % 34.5   MCV fL 99.4*   PLATELETS 10*3/mm3 202         BMP:  Results from last 7 days   Lab Units 05/20/24  1041   POTASSIUM mmol/L 4.2   CHLORIDE mmol/L 105   CO2 mmol/L 27.0   BUN mg/dL 13   CREATININE mg/dL 1.02*   GLUCOSE mg/dL 91   CALCIUM mg/dL 8.9     PT/INR:     APTT:       PHYSICAL EXAM  General appearance: awake, alert, oriented, moves all extremities  Lungs: no rhonchi, no wheezes, no rales  Heart: RRR  Abdomen: positive bowel sounds, no bruits, no masses  Extremities: warm and dry, no cyanosis, no clubbing       Jesse Keen, DO, FACC, [facility]  Cardiac Electrophysiologist    Dictated Utilizing Dragon Dictation    "

## 2024-05-23 NOTE — OUTREACH NOTE
PCI/Device Survey      Flowsheet Row Responses   Facility patient discharged from? [facility]   Procedure date 05/22/24   Procedure (if device, specify in description) Device   Device Description Watchman Implant   Performing MD Dr. Jesse Keen   Attempt successful? Yes   Call start time 0913   Call end time 0914   Has the patient had any of the following symptoms since discharge? --  [No symptoms noted]   Nursing interventions Patient education provided   Is the patient taking prescribed medications: ASA  [Eliquis]   Nursing intervention Reminded to continue to take prescribed medications   Does the patient have any of the following symptoms related to the cath/surgical site? --  [no symptoms noted]   Nursing intervention Patient education provided   Does the patient have an appointment scheduled with the cardiologist? Yes   Appointment comments Follow up with cardiologist Dr. Keen on 8/26   Did the patient feel prepared to go home on the same day as the procedure? Yes   Is the patient satisfied with the same day discharge process? Yes   PCI/Device call completed Yes   Wrap up additional comments Doing well.            Charissa WINCHESTER - Registered Nurse

## 2024-06-05 DIAGNOSIS — I48.0 PAROXYSMAL ATRIAL FIBRILLATION: Primary | ICD-10-CM

## 2024-06-05 DIAGNOSIS — Z95.818 PRESENCE OF WATCHMAN LEFT ATRIAL APPENDAGE CLOSURE DEVICE: ICD-10-CM

## 2024-06-17 RX ORDER — AMLODIPINE BESYLATE 5 MG/1
5 TABLET ORAL
Qty: 90 TABLET | Refills: 1 | OUTPATIENT
Start: 2024-06-17

## 2024-06-17 RX ORDER — PANTOPRAZOLE SODIUM 40 MG/1
40 TABLET, DELAYED RELEASE ORAL
Qty: 180 TABLET | Refills: 1 | OUTPATIENT
Start: 2024-06-17

## 2024-06-26 ENCOUNTER — HOSPITAL ENCOUNTER (OUTPATIENT)
Dept: CARDIOLOGY | Facility: HOSPITAL | Age: 74
Discharge: HOME | End: 2024-06-26
Admitting: INTERNAL MEDICINE
Payer: MEDICARE

## 2024-06-26 VITALS
RESPIRATION RATE: 19 BRPM | HEART RATE: 65 BPM | SYSTOLIC BLOOD PRESSURE: 126 MMHG | OXYGEN SATURATION: 98 % | DIASTOLIC BLOOD PRESSURE: 75 MMHG

## 2024-06-26 DIAGNOSIS — Z95.818 PRESENCE OF WATCHMAN LEFT ATRIAL APPENDAGE CLOSURE DEVICE: ICD-10-CM

## 2024-06-26 DIAGNOSIS — I48.0 PAROXYSMAL ATRIAL FIBRILLATION: ICD-10-CM

## 2024-06-26 PROCEDURE — 25010000002 MIDAZOLAM PER 1 MG: Performed by: INTERNAL MEDICINE

## 2024-06-26 PROCEDURE — 93320 DOPPLER ECHO COMPLETE: CPT

## 2024-06-26 PROCEDURE — 93325 DOPPLER ECHO COLOR FLOW MAPG: CPT

## 2024-06-26 PROCEDURE — 76376 3D RENDER W/INTRP POSTPROCES: CPT | Performed by: INTERNAL MEDICINE

## 2024-06-26 PROCEDURE — 93325 DOPPLER ECHO COLOR FLOW MAPG: CPT | Performed by: INTERNAL MEDICINE

## 2024-06-26 PROCEDURE — 99152 MOD SED SAME PHYS/QHP 5/>YRS: CPT | Performed by: INTERNAL MEDICINE

## 2024-06-26 PROCEDURE — 93320 DOPPLER ECHO COMPLETE: CPT | Performed by: INTERNAL MEDICINE

## 2024-06-26 PROCEDURE — 76376 3D RENDER W/INTRP POSTPROCES: CPT

## 2024-06-26 PROCEDURE — 93312 ECHO TRANSESOPHAGEAL: CPT | Performed by: INTERNAL MEDICINE

## 2024-06-26 RX ORDER — MIDAZOLAM HYDROCHLORIDE 1 MG/ML
INJECTION INTRAMUSCULAR; INTRAVENOUS
Status: COMPLETED | OUTPATIENT
Start: 2024-06-26 | End: 2024-06-26

## 2024-06-26 RX ORDER — CLOPIDOGREL BISULFATE 75 MG/1
75 TABLET ORAL DAILY
Qty: 90 TABLET | Refills: 1 | Status: SHIPPED | OUTPATIENT
Start: 2024-06-26

## 2024-06-26 RX ADMIN — MIDAZOLAM 2 MG: 1 INJECTION INTRAMUSCULAR; INTRAVENOUS at 14:21

## 2024-06-26 RX ADMIN — MIDAZOLAM 2 MG: 1 INJECTION INTRAMUSCULAR; INTRAVENOUS at 14:11

## 2024-06-26 RX ADMIN — METHOHEXITAL SODIUM 40 MG: 500 INJECTION, POWDER, LYOPHILIZED, FOR SOLUTION INTRAMUSCULAR; INTRAVENOUS; RECTAL at 14:08

## 2024-06-26 RX ADMIN — METHOHEXITAL SODIUM 20 MG: 500 INJECTION, POWDER, LYOPHILIZED, FOR SOLUTION INTRAMUSCULAR; INTRAVENOUS; RECTAL at 14:14

## 2024-06-26 NOTE — H&P
[facility] Cardiology   [address], [address]  [place], [place], [address]    [phone]  [email or web address].[facility]           HISTORY AND PHYSICAL NOTE    Patient Care Team:  Patient Care Team:  Aniyah Goldberg MD as PCP - General (Internal Medicine)  Dominic Wolff MD as Consulting Physician (Cardiology)  Jesse Keen DO as Consulting Physician (Cardiology)      Chief complaint: PAF       Subjective:     Cardiac focused problem list:  Paroxysmal atrial fibrillation/flutter  Atrial flutter with RVR, 10/2016.  Converted after IV diliazem.  Echocardiogram, 10/23/2016:  LVEF 60%, mild MR, mild TR, LV diastolic dysfunction.   PVA, 6/20/2017 per Dr. Cook. Right groin access site bleed requiring 2 units PRBCs.   CHADsVASc score of 3 (age, HTN, female).  Off Eliquis 2018 and on ASA 81 mg daily.   4 day Epatch, 11/2020:  3.3% Afib burden.  Longest 1.1 hours, <0/1% PVCs.  On NOAC, metoprolol.   History of GI bleeding  Chest pain  [facility], 10/2016:  Nonobstructive plaque involving a large obtuse marginal and the apical LAD without evidence of hemodynamically significant CAD, acceptable FFR of the OM and of apical LAD.  LVEF 60%.    Possible history of WPW, data deficient  Dyslipidemia   Hypertension   Morbid obesity   Status post gastric bypass surgery.   Gastric ulcer   Diagnosed 8/2016, per EGD secondary to Plaquenil therapy.   Right proximal humerus fracture status post ORIF, Dr. Galdamez    HPI:      Aleena Armstrong is a 73 y.o. female.  Patient with history of Afib/flutter, prior PVA and history of GI bleeding.  Underwent left atrial appendage occlusion with 31 mm Watchman FLX device per Dr. Keen on 5/22/2024.  Currently on aspirin and Eliquis, tolerating well.  Presents today for 45 day follow up transesophageal echocardiogram.    Review of Systems:  As noted in the HPI    PFSH:  Patient Active Problem List   Diagnosis    WPW (Elodia-Parkinson-White syndrome)    Morbid obesity     Dyslipidemia    Essential hypertension    Atrial flutter    Atrial fib/flutter    Insomnia    Anxiety and depression    Vitamin D deficiency    Rheumatoid arthritis    Glaucoma of left eye    Peptic ulcer     Paroxysmal atrial fibrillation    Anemia    Chronic pain    Fall    Iron deficiency anemia    GERD    History of Emilie-en-Y gastric bypass    Hypothyroidism    Psoriatic arthritis on Plaquenil    Long term current use of antiarrhythmic drug    Persistent atrial fibrillation       Current Outpatient Medications on File Prior to Encounter   Medication Sig Dispense Refill    amitriptyline (ELAVIL) 25 MG tablet Take 1 tablet by mouth Every Night.      amLODIPine (NORVASC) 5 MG tablet Take 1 tablet by mouth Daily. 90 tablet 1    apixaban (Eliquis) 5 MG tablet tablet Take 1 tablet by mouth Every 12 (Twelve) Hours. 60 tablet 5    aspirin 81 MG EC tablet Take 1 tablet by mouth Daily. Start on the morning of Watchman procedure 90 tablet 3    Cholecalciferol (vitamin D3) 125 MCG (5000 UT) tablet tablet Take 1 tablet by mouth Daily.      flecainide (TAMBOCOR) 50 MG tablet Take 1 tablet by mouth Every 12 (Twelve) Hours.      FLUoxetine (PROzac) 40 MG capsule Take 1 capsule by mouth Daily.      hydroxychloroquine (PLAQUENIL) 200 MG tablet Take 1 tablet by mouth 2 (Two) Times a Day.      IRON CR PO Take 1 dose by mouth Daily. gummies      pantoprazole (PROTONIX) 40 MG EC tablet Take 1 tablet by mouth 2 (Two) Times a Day Before Meals. 180 tablet 1    promethazine (PHENERGAN) 25 MG tablet Take 0.5 tablets by mouth Every 6 (Six) Hours As Needed for Nausea or Vomiting.      timolol (TIMOPTIC) 0.5 % ophthalmic solution Administer 1 drop into the left eye 2 (Two) Times a Day.      traMADol (ULTRAM) 50 MG tablet Take 1 tablet by mouth Every 6 (Six) Hours As Needed for pain. 10 tablet 0    traZODone (DESYREL) 100 MG tablet Take 1 tablet by mouth Every Night.      vitamin D (ERGOCALCIFEROL) 1.25 MG (41393 UT) capsule capsule Take 1  capsule by mouth 1 (One) Time Per Week. Sundays       No current facility-administered medications on file prior to encounter.       Social History     Socioeconomic History    Marital status:    Tobacco Use    Smoking status: Former     Current packs/day: 0.00     Types: Cigarettes     Quit date:      Years since quittin.5    Smokeless tobacco: Never   Vaping Use    Vaping status: Never Used   Substance and Sexual Activity    Alcohol use: Not Currently     Comment: occational    Drug use: No    Sexual activity: Defer            Objective:     Vital Sign Min/Max for last 24 hours  No data recorded   No data recorded   No data recorded   No data recorded   No data recorded   No data recorded    No intake or output data in the 24 hours ending 24 1227        There were no vitals filed for this visit.  CONSTITUTIONAL: No acute distress  RESPIRATORY: Normal effort. Clear to auscultation bilaterally without wheezing or rales  CARDIOVASCULAR: Irregularly irregular rate and rhythm with normal S1 and S2. Without murmur.  PERIPHERAL VASCULAR: No carotid bruit bilaterally.  Normal radial pulse. There is no lower extremity edema bilaterally.    Labs and radiologic results:  Today's results were reviewed by myself.    Cardiac Data:    EKG 3/18/2024:  Sinus bradycardia with first degree AVB.     Results for orders placed during the hospital encounter of 22    Adult Transthoracic Echo Complete w/ Color, Spectral and Contrast if necessary per protocol    Interpretation Summary  · Left ventricular ejection fraction appears to be 56 - 60%. Left ventricular systolic function is normal.  · Mild mitral valve regurgitation is present.  · Moderate tricuspid valve regurgitation is present.  · Estimated right ventricular systolic pressure from tricuspid regurgitation is moderately elevated (45-55 mmHg).      Tele:  Atrial fibrillation with controlled heart rate         Assessment and Plan:     Problem list:    *  No active hospital problems. *      ASSESSMENT:  Paroxysmal atrial fibrillation/flutter  CHADsVASc score of 3  Prior PVA, 2017  Status post [place] occlusion with 31 mm Watchman device per Dr. Keen, 5/22/2024  History of GI bleeding  Non-obstructive CAD  [facility], 10/2016 per Dr. Morejon revealing nonobstructive plaque.   Hypertension   Hyperlipidemia     PLAN:  Transesophageal echocardiogram today 45 days post [place] occlusion with Watchman device.   The risks, benefits, and alternatives of the procedure have been reviewed and the patient wishes to proceed.   Further recommendations to follow procedure.         Electronically signed by YULI Hsu, 06/26/24, 1:33 PM EDT.

## 2024-06-27 LAB
BH CV ECHO MEAS - MV DEC TIME: 0.16 SEC
BH CV ECHO MEAS - MV MAX PG: 14.4 MMHG
BH CV ECHO MEAS - MV MEAN PG: 3.2 MMHG
BH CV ECHO MEAS - MV V2 VTI: 29.2 CM
BH CV ECHO MEAS - RAP SYSTOLE: 8 MMHG
BH CV ECHO MEAS - RVSP: 25.8 MMHG
BH CV ECHO MEAS - TR MAX PG: 17.8 MMHG
BH CV ECHO MEAS - TR MAX VEL: 208 CM/SEC

## 2024-08-22 PROBLEM — Z95.818 PRESENCE OF WATCHMAN LEFT ATRIAL APPENDAGE CLOSURE DEVICE: Status: ACTIVE | Noted: 2024-08-22

## 2024-08-26 ENCOUNTER — OFFICE VISIT (OUTPATIENT)
Dept: CARDIOLOGY | Facility: CLINIC | Age: 74
End: 2024-08-26
Payer: MEDICARE

## 2024-08-26 VITALS
DIASTOLIC BLOOD PRESSURE: 60 MMHG | HEART RATE: 53 BPM | WEIGHT: 139.4 LBS | BODY MASS INDEX: 22.4 KG/M2 | HEIGHT: 66 IN | SYSTOLIC BLOOD PRESSURE: 110 MMHG | OXYGEN SATURATION: 96 %

## 2024-08-26 DIAGNOSIS — I48.0 PAROXYSMAL ATRIAL FIBRILLATION: Primary | Chronic | ICD-10-CM

## 2024-08-26 DIAGNOSIS — I10 ESSENTIAL HYPERTENSION: ICD-10-CM

## 2024-08-26 DIAGNOSIS — I45.6 WPW (WOLFF-PARKINSON-WHITE SYNDROME): ICD-10-CM

## 2024-08-26 DIAGNOSIS — Z95.818 PRESENCE OF WATCHMAN LEFT ATRIAL APPENDAGE CLOSURE DEVICE: ICD-10-CM

## 2024-08-26 DIAGNOSIS — Z79.899 LONG TERM CURRENT USE OF ANTIARRHYTHMIC DRUG: ICD-10-CM

## 2024-08-26 PROCEDURE — 99213 OFFICE O/P EST LOW 20 MIN: CPT | Performed by: PHYSICIAN ASSISTANT

## 2024-08-26 PROCEDURE — 1159F MED LIST DOCD IN RCRD: CPT | Performed by: PHYSICIAN ASSISTANT

## 2024-08-26 PROCEDURE — 3078F DIAST BP <80 MM HG: CPT | Performed by: PHYSICIAN ASSISTANT

## 2024-08-26 PROCEDURE — 1160F RVW MEDS BY RX/DR IN RCRD: CPT | Performed by: PHYSICIAN ASSISTANT

## 2024-08-26 PROCEDURE — G2211 COMPLEX E/M VISIT ADD ON: HCPCS | Performed by: PHYSICIAN ASSISTANT

## 2024-08-26 PROCEDURE — 3074F SYST BP LT 130 MM HG: CPT | Performed by: PHYSICIAN ASSISTANT

## 2024-08-26 RX ORDER — DENOSUMAB 60 MG/ML
60 INJECTION SUBCUTANEOUS
COMMUNITY
Start: 2024-05-06

## 2024-08-26 RX ORDER — FLECAINIDE ACETATE 50 MG/1
50 TABLET ORAL EVERY 12 HOURS
Qty: 180 TABLET | Refills: 2 | Status: SHIPPED | OUTPATIENT
Start: 2024-08-26

## 2024-08-26 RX ORDER — METHOTREXATE 2.5 MG/1
2.5 TABLET ORAL
COMMUNITY
Start: 2024-08-25

## 2024-08-26 RX ORDER — FOLIC ACID 1 MG/1
TABLET ORAL
COMMUNITY
Start: 2024-07-31

## 2024-08-26 NOTE — PROGRESS NOTES
Encounter Date:08/26/2024      Patient ID: Aleena Armstrong is a 73 y.o. female.    Aniyah Goldberg MD    Cheif Complaint EP: Atrial Fibrillation    PROBLEM LIST:  Patient Active Problem List    Diagnosis Date Noted    Atrial fib/flutter 12/11/2016     Priority: High     Note Last Updated: 8/22/2024     Atrial flutter, atypical in nature  Normal LVEF by catheterization, 10/24/2016.    Echocardiogram, 10/23/2016, revealing LVEF of 60%, mild MR, mild TR, LV diastolic dysfunction, LA enlarged (vol index 47.4) Dominic Wolff MD.   Chadsvasc=3  PVA 6/20/17  Left atrial appendage closure with 31 mm Watchman FLX device 5/22/2024      WPW (Elodia-Parkinson-White syndrome) 10/23/2016     Priority: High     Note Last Updated: 10/23/2016     Previous ablation 1995 at [facility]-- data deficit      Presence of Watchman left atrial appendage closure device 08/22/2024     Priority: Medium    Long term current use of antiarrhythmic drug 05/22/2024     Priority: Low    Essential hypertension 10/23/2016     Priority: Low    History of Emilie-en-Y gastric bypass 12/18/2023    Hypothyroidism 12/18/2023    Psoriatic arthritis on Plaquenil 12/18/2023    GERD 05/29/2022    Iron deficiency anemia 11/23/2021    Fall 11/16/2021    Anemia 02/08/2019    Chronic pain 02/08/2019    Insomnia 12/11/2016    Anxiety and depression 12/11/2016    Vitamin D deficiency 12/11/2016    Rheumatoid arthritis 12/11/2016    Glaucoma of left eye 12/11/2016    Dyslipidemia 10/23/2016             History of Present Illness  Patient presents today for follow-up with a history of atrial fibrillation with a recently implanted left atrial appendage closure device.  She presents today for initial follow-up after watchman implantation.  She had a 6-week follow-up DEEPAK showing good position.  She was taken off of full anticoagulation and put on Plavix and aspirin.  She has noticed more bruising since starting Plavix.  She reports a single occasion of recurrent A-fib  "that resolved without further intervention.  Her blood pressure at home typically runs between 110 to 115 mmHg systolic.  She reports compliance with current medical regimen.    Allergies   Allergen Reactions    Bactrim [Sulfamethoxazole-Trimethoprim] Other (See Comments)     MOUTH SORES    Beta Adrenergic Blockers Other (See Comments)     bradycardia    Percocet [Oxycodone-Acetaminophen] Other (See Comments)     NIGHT TERRORS     Statins Myalgia     MYALGIA     Sulfa Antibiotics Other (See Comments)     MOUTH SORES AND RASH     Erythromycin Rash     RASH        Current Outpatient Medications   Medication Instructions    amitriptyline (ELAVIL) 25 mg, Oral, Nightly    amLODIPine (NORVASC) 5 mg, Oral, Every 24 Hours Scheduled    aspirin 81 mg, Oral, Daily, Start on the morning of Watchman procedure    clopidogrel (PLAVIX) 75 mg, Oral, Daily    flecainide (TAMBOCOR) 50 mg, Oral, Every 12 Hours    FLUoxetine (PROzac) 40 MG capsule 1 capsule, Oral, Daily    folic acid (FOLVITE) 1 MG tablet     IRON CR PO 1 dose, Oral, Daily, gummies    methotrexate 2.5 mg    omeprazole (PRILOSEC) 20 mg, Oral, Daily    pantoprazole (PROTONIX) 40 mg, Oral, 2 Times Daily Before Meals    Prolia 60 mg    promethazine (PHENERGAN) 12.5 mg, Oral, Every 6 Hours PRN    timolol (TIMOPTIC) 0.5 % ophthalmic solution 1 drop, Left Eye, 2 Times Daily    traMADol (ULTRAM) 50 MG tablet Take 1 tablet by mouth Every 6 (Six) Hours As Needed for pain.    traZODone (DESYREL) 100 mg, Oral, Nightly    vitamin D (ERGOCALCIFEROL) 50,000 Units, Oral, Weekly, Sundays    vitamin D3 5,000 Units, Oral, Daily       .    Objective:     /60 (BP Location: Left arm, Patient Position: Sitting)   Pulse 53   Ht 167.6 cm (66\")   Wt 63.2 kg (139 lb 6.4 oz)   LMP  (LMP Unknown)   SpO2 96%   BMI 22.50 kg/m²    Body mass index is 22.5 kg/m².     Vitals reviewed.   Constitutional:       Appearance: Well-developed.   Pulmonary:      Effort: Pulmonary effort is normal. No " respiratory distress.      Breath sounds: Normal breath sounds. No wheezing. No rales.      Comments: Bases clear  Chest:      Chest wall: Not tender to palpatation.   Cardiovascular:      Normal rate. Regular rhythm.      Murmurs: There is no murmur.      No gallop.  No click. No rub.   Pulses:     Intact distal pulses.   Edema:     Peripheral edema absent.   Musculoskeletal: Normal range of motion.       Lab Review:     Lab Results   Component Value Date    GLUCOSE 91 05/20/2024    BUN 13 05/20/2024    CREATININE 1.02 (H) 05/20/2024    EGFR 57.5 (L) 05/20/2024    BCR 12.7 05/20/2024    K 4.2 05/20/2024    CO2 27.0 05/20/2024    CALCIUM 8.9 05/20/2024    PROTENTOTREF 6.0 03/08/2019    ALBUMIN 3.3 (L) 12/17/2023    BILITOT 0.3 12/17/2023    AST 15 12/17/2023    ALT 7 12/17/2023     Lab Results   Component Value Date    WBC 4.96 05/20/2024    HGB 11.1 (L) 05/20/2024    HCT 34.5 05/20/2024    MCV 99.4 (H) 05/20/2024     05/20/2024     Lab Results   Component Value Date    TSH 2.100 12/19/2023           Procedures               Assessment:      Diagnosis Plan   1. Paroxysmal atrial fibrillation  Rare reoccurrence of A-fib.  She may take an extra flecainide 50 mg as needed recurrent A-fib      2. Presence of Watchman left atrial appendage closure device  Watchman in situ.  Continue Plavix through completion then low-dose aspirin 81 mg daily      3. WPW (Elodia-Parkinson-White syndrome)  No known reoccurrence of WPW      4. Long term current use of antiarrhythmic drug  Refilled flecainide at current dose      5. Essential hypertension  Well-managed, continue current medical regimen        Plan:     Advance Care Planning   ACP discussion was declined by the patient. Patient has an advance directive (not in EMR), copy requested.           Stable cardiac status.  Continue current medications.   in 6 months, sooner as needed.  Thank you for allowing us to participate in the care of your patient.     Electronically  signed by GENARO Sanchez, 08/26/24, 1:26 PM EDT.

## 2024-09-15 ENCOUNTER — HOSPITAL ENCOUNTER (EMERGENCY)
Facility: HOSPITAL | Age: 74
Discharge: HOME | End: 2024-09-15
Attending: EMERGENCY MEDICINE | Admitting: EMERGENCY MEDICINE
Payer: MEDICARE

## 2024-09-15 ENCOUNTER — APPOINTMENT (OUTPATIENT)
Facility: HOSPITAL | Age: 74
End: 2024-09-15
Payer: MEDICARE

## 2024-09-15 VITALS
BODY MASS INDEX: 21.21 KG/M2 | SYSTOLIC BLOOD PRESSURE: 146 MMHG | HEIGHT: 66 IN | TEMPERATURE: 98.3 F | RESPIRATION RATE: 18 BRPM | OXYGEN SATURATION: 98 % | WEIGHT: 132 LBS | HEART RATE: 68 BPM | DIASTOLIC BLOOD PRESSURE: 74 MMHG

## 2024-09-15 DIAGNOSIS — I48.0 PAROXYSMAL ATRIAL FIBRILLATION: Primary | ICD-10-CM

## 2024-09-15 LAB
ALBUMIN SERPL-MCNC: 3.4 G/DL (ref 3.5–5.2)
ALBUMIN/GLOB SERPL: 1.2 G/DL
ALP SERPL-CCNC: 74 U/L (ref 39–117)
ALT SERPL W P-5'-P-CCNC: 14 U/L (ref 1–33)
ANION GAP SERPL CALCULATED.3IONS-SCNC: 9.4 MMOL/L (ref 5–15)
AST SERPL-CCNC: 35 U/L (ref 1–32)
BASOPHILS # BLD AUTO: 0.03 10*3/MM3 (ref 0–0.2)
BASOPHILS NFR BLD AUTO: 0.4 % (ref 0–1.5)
BILIRUB SERPL-MCNC: 0.4 MG/DL (ref 0–1.2)
BUN SERPL-MCNC: 13 MG/DL (ref 8–23)
BUN/CREAT SERPL: 15.1 (ref 7–25)
CALCIUM SPEC-SCNC: 8.5 MG/DL (ref 8.6–10.5)
CHLORIDE SERPL-SCNC: 104 MMOL/L (ref 98–107)
CO2 SERPL-SCNC: 26.6 MMOL/L (ref 22–29)
CREAT SERPL-MCNC: 0.86 MG/DL (ref 0.57–1)
DEPRECATED RDW RBC AUTO: 50.4 FL (ref 37–54)
EGFRCR SERPLBLD CKD-EPI 2021: 70.6 ML/MIN/1.73
EOSINOPHIL # BLD AUTO: 0.07 10*3/MM3 (ref 0–0.4)
EOSINOPHIL NFR BLD AUTO: 1 % (ref 0.3–6.2)
ERYTHROCYTE [DISTWIDTH] IN BLOOD BY AUTOMATED COUNT: 13.6 % (ref 12.3–15.4)
GEN 5 2HR TROPONIN T REFLEX: 19 NG/L
GLOBULIN UR ELPH-MCNC: 2.9 GM/DL
GLUCOSE SERPL-MCNC: 98 MG/DL (ref 65–99)
HCT VFR BLD AUTO: 39.7 % (ref 34–46.6)
HGB BLD-MCNC: 12.7 G/DL (ref 12–15.9)
HOLD SPECIMEN: NORMAL
IMM GRANULOCYTES # BLD AUTO: 0.01 10*3/MM3 (ref 0–0.05)
IMM GRANULOCYTES NFR BLD AUTO: 0.1 % (ref 0–0.5)
LIPASE SERPL-CCNC: 14 U/L (ref 13–60)
LYMPHOCYTES # BLD AUTO: 2.05 10*3/MM3 (ref 0.7–3.1)
LYMPHOCYTES NFR BLD AUTO: 28.2 % (ref 19.6–45.3)
MCH RBC QN AUTO: 31.8 PG (ref 26.6–33)
MCHC RBC AUTO-ENTMCNC: 32 G/DL (ref 31.5–35.7)
MCV RBC AUTO: 99.5 FL (ref 79–97)
MONOCYTES # BLD AUTO: 0.76 10*3/MM3 (ref 0.1–0.9)
MONOCYTES NFR BLD AUTO: 10.4 % (ref 5–12)
NEUTROPHILS NFR BLD AUTO: 4.36 10*3/MM3 (ref 1.7–7)
NEUTROPHILS NFR BLD AUTO: 59.9 % (ref 42.7–76)
NT-PROBNP SERPL-MCNC: 2756 PG/ML (ref 0–1800)
PLATELET # BLD AUTO: 265 10*3/MM3 (ref 140–450)
PMV BLD AUTO: 10 FL (ref 6–12)
POTASSIUM SERPL-SCNC: 4.5 MMOL/L (ref 3.5–5.2)
PROT SERPL-MCNC: 6.3 G/DL (ref 6–8.5)
RBC # BLD AUTO: 3.99 10*6/MM3 (ref 3.77–5.28)
SODIUM SERPL-SCNC: 140 MMOL/L (ref 136–145)
TROPONIN T DELTA: -4 NG/L
TROPONIN T SERPL HS-MCNC: 23 NG/L
WBC NRBC COR # BLD AUTO: 7.28 10*3/MM3 (ref 3.4–10.8)
WHOLE BLOOD HOLD COAG: NORMAL
WHOLE BLOOD HOLD SPECIMEN: NORMAL

## 2024-09-15 PROCEDURE — 85025 COMPLETE CBC W/AUTO DIFF WBC: CPT | Performed by: EMERGENCY MEDICINE

## 2024-09-15 PROCEDURE — 93005 ELECTROCARDIOGRAM TRACING: CPT | Performed by: PHYSICIAN ASSISTANT

## 2024-09-15 PROCEDURE — 99285 EMERGENCY DEPT VISIT HI MDM: CPT

## 2024-09-15 PROCEDURE — 71275 CT ANGIOGRAPHY CHEST: CPT

## 2024-09-15 PROCEDURE — 36415 COLL VENOUS BLD VENIPUNCTURE: CPT

## 2024-09-15 PROCEDURE — 83690 ASSAY OF LIPASE: CPT | Performed by: EMERGENCY MEDICINE

## 2024-09-15 PROCEDURE — 25510000001 IOPAMIDOL PER 1 ML: Performed by: EMERGENCY MEDICINE

## 2024-09-15 PROCEDURE — 80053 COMPREHEN METABOLIC PANEL: CPT | Performed by: EMERGENCY MEDICINE

## 2024-09-15 PROCEDURE — 84484 ASSAY OF TROPONIN QUANT: CPT | Performed by: EMERGENCY MEDICINE

## 2024-09-15 PROCEDURE — 83880 ASSAY OF NATRIURETIC PEPTIDE: CPT | Performed by: EMERGENCY MEDICINE

## 2024-09-15 PROCEDURE — 71045 X-RAY EXAM CHEST 1 VIEW: CPT

## 2024-09-15 PROCEDURE — 93005 ELECTROCARDIOGRAM TRACING: CPT | Performed by: EMERGENCY MEDICINE

## 2024-09-15 RX ORDER — DILTIAZEM HYDROCHLORIDE 5 MG/ML
10 INJECTION INTRAVENOUS ONCE
Status: DISCONTINUED | OUTPATIENT
Start: 2024-09-15 | End: 2024-09-15

## 2024-09-15 RX ORDER — MORPHINE SULFATE 2 MG/ML
2 INJECTION, SOLUTION INTRAMUSCULAR; INTRAVENOUS ONCE
Status: DISCONTINUED | OUTPATIENT
Start: 2024-09-15 | End: 2024-09-15

## 2024-09-15 RX ORDER — IOPAMIDOL 755 MG/ML
100 INJECTION, SOLUTION INTRAVASCULAR
Status: COMPLETED | OUTPATIENT
Start: 2024-09-15 | End: 2024-09-15

## 2024-09-15 RX ORDER — TRAMADOL HYDROCHLORIDE 50 MG/1
50 TABLET ORAL ONCE
Status: COMPLETED | OUTPATIENT
Start: 2024-09-15 | End: 2024-09-15

## 2024-09-15 RX ORDER — SODIUM CHLORIDE 0.9 % (FLUSH) 0.9 %
10 SYRINGE (ML) INJECTION AS NEEDED
Status: DISCONTINUED | OUTPATIENT
Start: 2024-09-15 | End: 2024-09-15 | Stop reason: HOSPADM

## 2024-09-15 RX ORDER — ASPIRIN 81 MG/1
324 TABLET, CHEWABLE ORAL ONCE
Status: DISCONTINUED | OUTPATIENT
Start: 2024-09-15 | End: 2024-09-15

## 2024-09-15 RX ADMIN — IOPAMIDOL 85 ML: 755 INJECTION, SOLUTION INTRAVENOUS at 13:57

## 2024-09-15 RX ADMIN — TRAMADOL HYDROCHLORIDE 50 MG: 50 TABLET ORAL at 14:25

## 2024-09-22 LAB
QT INTERVAL: 312 MS
QT INTERVAL: 342 MS
QT INTERVAL: 440 MS
QTC INTERVAL: 450 MS
QTC INTERVAL: 466 MS
QTC INTERVAL: 501 MS

## 2024-11-15 ENCOUNTER — CLINICAL SUPPORT (OUTPATIENT)
Dept: CARDIOLOGY | Facility: HOSPITAL | Age: 74
End: 2024-11-15
Payer: MEDICARE

## 2024-11-15 DIAGNOSIS — I48.0 PAROXYSMAL ATRIAL FIBRILLATION: Primary | ICD-10-CM

## 2024-11-15 DIAGNOSIS — Z95.818 PRESENCE OF WATCHMAN LEFT ATRIAL APPENDAGE CLOSURE DEVICE: ICD-10-CM

## 2024-11-15 NOTE — PROGRESS NOTES
6 Month Watchman Follow-up Note    Patient presents today for follow-up s/p watchman placement by Dr. Keen on 5/22/24. Patient has completed follow-up DEEPAK that revealed a well-positioned watchman device with no significant marylin-prosthetic leak present.  Patient has transitioned to DAPT. Patient reports no other cardiac procedures in the past year that would require DAPT.      Instructed patient to continue clopidogrel until 11/22/24, then discontinue. Instructed to continue ASA 81mg daily as monotherapy. Discussed 1 year watchman follow-up with planned DEEPAK around 1 year anniversary date of watchman implant.  1 year DEEPAK ordered per watchman protocol.  Patient receptive of plan, appreciative of call.

## 2024-12-19 RX ORDER — CLOPIDOGREL BISULFATE 75 MG/1
75 TABLET ORAL DAILY
Qty: 90 TABLET | Refills: 1 | OUTPATIENT
Start: 2024-12-19

## 2025-01-19 ENCOUNTER — APPOINTMENT (OUTPATIENT)
Facility: HOSPITAL | Age: 75
End: 2025-01-19
Payer: MEDICARE

## 2025-01-19 ENCOUNTER — HOSPITAL ENCOUNTER (EMERGENCY)
Facility: HOSPITAL | Age: 75
Discharge: HOME | End: 2025-01-19
Attending: STUDENT IN AN ORGANIZED HEALTH CARE EDUCATION/TRAINING PROGRAM
Payer: MEDICARE

## 2025-01-19 VITALS
SYSTOLIC BLOOD PRESSURE: 124 MMHG | HEIGHT: 66 IN | OXYGEN SATURATION: 100 % | BODY MASS INDEX: 21.38 KG/M2 | RESPIRATION RATE: 9 BRPM | WEIGHT: 133 LBS | DIASTOLIC BLOOD PRESSURE: 75 MMHG | TEMPERATURE: 98.7 F | HEART RATE: 65 BPM

## 2025-01-19 DIAGNOSIS — I48.0 PAROXYSMAL ATRIAL FIBRILLATION: Primary | ICD-10-CM

## 2025-01-19 LAB
ALBUMIN SERPL-MCNC: 3.6 G/DL (ref 3.5–5.2)
ALBUMIN/GLOB SERPL: 1.4 G/DL
ALP SERPL-CCNC: 87 U/L (ref 39–117)
ALT SERPL W P-5'-P-CCNC: 17 U/L (ref 1–33)
ANION GAP SERPL CALCULATED.3IONS-SCNC: 12.4 MMOL/L (ref 5–15)
AST SERPL-CCNC: 26 U/L (ref 1–32)
BASOPHILS # BLD AUTO: 0.03 10*3/MM3 (ref 0–0.2)
BASOPHILS NFR BLD AUTO: 0.5 % (ref 0–1.5)
BILIRUB SERPL-MCNC: 0.3 MG/DL (ref 0–1.2)
BUN SERPL-MCNC: 22 MG/DL (ref 8–23)
BUN/CREAT SERPL: 22.2 (ref 7–25)
CALCIUM SPEC-SCNC: 8.7 MG/DL (ref 8.6–10.5)
CHLORIDE SERPL-SCNC: 106 MMOL/L (ref 98–107)
CO2 SERPL-SCNC: 23.6 MMOL/L (ref 22–29)
CREAT SERPL-MCNC: 0.99 MG/DL (ref 0.57–1)
DEPRECATED RDW RBC AUTO: 52.1 FL (ref 37–54)
EGFRCR SERPLBLD CKD-EPI 2021: 59.2 ML/MIN/1.73
EOSINOPHIL # BLD AUTO: 0.05 10*3/MM3 (ref 0–0.4)
EOSINOPHIL NFR BLD AUTO: 0.8 % (ref 0.3–6.2)
ERYTHROCYTE [DISTWIDTH] IN BLOOD BY AUTOMATED COUNT: 13.5 % (ref 12.3–15.4)
GEN 5 1HR TROPONIN T REFLEX: 21 NG/L
GLOBULIN UR ELPH-MCNC: 2.5 GM/DL
GLUCOSE SERPL-MCNC: 113 MG/DL (ref 65–99)
HCT VFR BLD AUTO: 33.7 % (ref 34–46.6)
HGB BLD-MCNC: 10.4 G/DL (ref 12–15.9)
HOLD SPECIMEN: NORMAL
IMM GRANULOCYTES # BLD AUTO: 0 10*3/MM3 (ref 0–0.05)
IMM GRANULOCYTES NFR BLD AUTO: 0 % (ref 0–0.5)
LYMPHOCYTES # BLD AUTO: 1.78 10*3/MM3 (ref 0.7–3.1)
LYMPHOCYTES NFR BLD AUTO: 27.7 % (ref 19.6–45.3)
MAGNESIUM SERPL-MCNC: 2 MG/DL (ref 1.6–2.4)
MCH RBC QN AUTO: 31.9 PG (ref 26.6–33)
MCHC RBC AUTO-ENTMCNC: 30.9 G/DL (ref 31.5–35.7)
MCV RBC AUTO: 103.4 FL (ref 79–97)
MONOCYTES # BLD AUTO: 0.47 10*3/MM3 (ref 0.1–0.9)
MONOCYTES NFR BLD AUTO: 7.3 % (ref 5–12)
NEUTROPHILS NFR BLD AUTO: 4.09 10*3/MM3 (ref 1.7–7)
NEUTROPHILS NFR BLD AUTO: 63.7 % (ref 42.7–76)
NT-PROBNP SERPL-MCNC: 7641 PG/ML (ref 0–1800)
PLATELET # BLD AUTO: 249 10*3/MM3 (ref 140–450)
PMV BLD AUTO: 10.2 FL (ref 6–12)
POTASSIUM SERPL-SCNC: 4.5 MMOL/L (ref 3.5–5.2)
PROT SERPL-MCNC: 6.1 G/DL (ref 6–8.5)
RBC # BLD AUTO: 3.26 10*6/MM3 (ref 3.77–5.28)
SODIUM SERPL-SCNC: 142 MMOL/L (ref 136–145)
T4 FREE SERPL-MCNC: 0.97 NG/DL (ref 0.92–1.68)
TROPONIN T % DELTA: -16
TROPONIN T NUMERIC DELTA: -4 NG/L
TROPONIN T SERPL HS-MCNC: 25 NG/L
TSH SERPL DL<=0.05 MIU/L-ACNC: 4.97 UIU/ML (ref 0.27–4.2)
WBC NRBC COR # BLD AUTO: 6.42 10*3/MM3 (ref 3.4–10.8)
WHOLE BLOOD HOLD COAG: NORMAL
WHOLE BLOOD HOLD SPECIMEN: NORMAL

## 2025-01-19 PROCEDURE — 96375 TX/PRO/DX INJ NEW DRUG ADDON: CPT

## 2025-01-19 PROCEDURE — 99285 EMERGENCY DEPT VISIT HI MDM: CPT

## 2025-01-19 PROCEDURE — 25010000002 FENTANYL CITRATE (PF) 50 MCG/ML SOLUTION: Performed by: EMERGENCY MEDICINE

## 2025-01-19 PROCEDURE — 83880 ASSAY OF NATRIURETIC PEPTIDE: CPT | Performed by: STUDENT IN AN ORGANIZED HEALTH CARE EDUCATION/TRAINING PROGRAM

## 2025-01-19 PROCEDURE — 25010000002 FUROSEMIDE PER 20 MG: Performed by: EMERGENCY MEDICINE

## 2025-01-19 PROCEDURE — 85025 COMPLETE CBC W/AUTO DIFF WBC: CPT | Performed by: STUDENT IN AN ORGANIZED HEALTH CARE EDUCATION/TRAINING PROGRAM

## 2025-01-19 PROCEDURE — 92960 CARDIOVERSION ELECTRIC EXT: CPT

## 2025-01-19 PROCEDURE — 96374 THER/PROPH/DIAG INJ IV PUSH: CPT

## 2025-01-19 PROCEDURE — 25010000002 ONDANSETRON PER 1 MG: Performed by: PHYSICIAN ASSISTANT

## 2025-01-19 PROCEDURE — 25510000001 IOPAMIDOL PER 1 ML: Performed by: EMERGENCY MEDICINE

## 2025-01-19 PROCEDURE — 71275 CT ANGIOGRAPHY CHEST: CPT

## 2025-01-19 PROCEDURE — 93005 ELECTROCARDIOGRAM TRACING: CPT | Performed by: STUDENT IN AN ORGANIZED HEALTH CARE EDUCATION/TRAINING PROGRAM

## 2025-01-19 PROCEDURE — 36415 COLL VENOUS BLD VENIPUNCTURE: CPT

## 2025-01-19 PROCEDURE — 84484 ASSAY OF TROPONIN QUANT: CPT | Performed by: PHYSICIAN ASSISTANT

## 2025-01-19 PROCEDURE — 83735 ASSAY OF MAGNESIUM: CPT | Performed by: STUDENT IN AN ORGANIZED HEALTH CARE EDUCATION/TRAINING PROGRAM

## 2025-01-19 PROCEDURE — 71045 X-RAY EXAM CHEST 1 VIEW: CPT

## 2025-01-19 PROCEDURE — 84443 ASSAY THYROID STIM HORMONE: CPT | Performed by: STUDENT IN AN ORGANIZED HEALTH CARE EDUCATION/TRAINING PROGRAM

## 2025-01-19 PROCEDURE — 84439 ASSAY OF FREE THYROXINE: CPT | Performed by: STUDENT IN AN ORGANIZED HEALTH CARE EDUCATION/TRAINING PROGRAM

## 2025-01-19 PROCEDURE — 80053 COMPREHEN METABOLIC PANEL: CPT | Performed by: STUDENT IN AN ORGANIZED HEALTH CARE EDUCATION/TRAINING PROGRAM

## 2025-01-19 PROCEDURE — 93005 ELECTROCARDIOGRAM TRACING: CPT | Performed by: EMERGENCY MEDICINE

## 2025-01-19 PROCEDURE — 25010000002 MIDAZOLAM PER 1 MG: Performed by: EMERGENCY MEDICINE

## 2025-01-19 RX ORDER — MIDAZOLAM HYDROCHLORIDE 1 MG/ML
4 INJECTION, SOLUTION INTRAMUSCULAR; INTRAVENOUS ONCE
Status: COMPLETED | OUTPATIENT
Start: 2025-01-19 | End: 2025-01-19

## 2025-01-19 RX ORDER — METOPROLOL TARTRATE 1 MG/ML
2.5 INJECTION, SOLUTION INTRAVENOUS ONCE
Status: COMPLETED | OUTPATIENT
Start: 2025-01-19 | End: 2025-01-19

## 2025-01-19 RX ORDER — FENTANYL CITRATE 50 UG/ML
100 INJECTION, SOLUTION INTRAMUSCULAR; INTRAVENOUS ONCE
Status: COMPLETED | OUTPATIENT
Start: 2025-01-19 | End: 2025-01-19

## 2025-01-19 RX ORDER — FUROSEMIDE 10 MG/ML
40 INJECTION INTRAMUSCULAR; INTRAVENOUS ONCE
Status: COMPLETED | OUTPATIENT
Start: 2025-01-19 | End: 2025-01-19

## 2025-01-19 RX ORDER — DILTIAZEM HYDROCHLORIDE 5 MG/ML
10 INJECTION INTRAVENOUS ONCE
Status: COMPLETED | OUTPATIENT
Start: 2025-01-19 | End: 2025-01-19

## 2025-01-19 RX ORDER — SODIUM CHLORIDE 0.9 % (FLUSH) 0.9 %
10 SYRINGE (ML) INJECTION AS NEEDED
Status: DISCONTINUED | OUTPATIENT
Start: 2025-01-19 | End: 2025-01-19 | Stop reason: HOSPADM

## 2025-01-19 RX ORDER — ONDANSETRON 2 MG/ML
4 INJECTION INTRAMUSCULAR; INTRAVENOUS ONCE
Status: COMPLETED | OUTPATIENT
Start: 2025-01-19 | End: 2025-01-19

## 2025-01-19 RX ORDER — IOPAMIDOL 755 MG/ML
100 INJECTION, SOLUTION INTRAVASCULAR
Status: COMPLETED | OUTPATIENT
Start: 2025-01-19 | End: 2025-01-19

## 2025-01-19 RX ADMIN — FUROSEMIDE 40 MG: 10 INJECTION, SOLUTION INTRAMUSCULAR; INTRAVENOUS at 21:24

## 2025-01-19 RX ADMIN — METOPROLOL TARTRATE 2.5 MG: 5 INJECTION INTRAVENOUS at 19:45

## 2025-01-19 RX ADMIN — ONDANSETRON 4 MG: 2 INJECTION INTRAMUSCULAR; INTRAVENOUS at 18:44

## 2025-01-19 RX ADMIN — FENTANYL CITRATE 100 MCG: 50 INJECTION, SOLUTION INTRAMUSCULAR; INTRAVENOUS at 20:18

## 2025-01-19 RX ADMIN — IOPAMIDOL 85 ML: 755 INJECTION, SOLUTION INTRAVENOUS at 19:06

## 2025-01-19 RX ADMIN — DILTIAZEM HYDROCHLORIDE 10 MG: 5 INJECTION INTRAVENOUS at 18:44

## 2025-01-19 RX ADMIN — MIDAZOLAM HYDROCHLORIDE 4 MG: 2 INJECTION, SOLUTION INTRAMUSCULAR; INTRAVENOUS at 20:18

## 2025-01-19 NOTE — FSED PROVIDER NOTE
Subjective  History of Present Illness:    This is a 74 year old female who presents with complaints of palpitations, chest tightness, shortness of breath that started 3 days ago but has become more persistent today.  Patient has a history of atrial fibrillation, status post ablation in 2017 and Watchman procedure last May.  Cardiologist is Dr. Barahona.  Patient states that she was instructed to take an extra dose of flecainide when she felt like she may be in atrial fibrillation.  Patient did take an extra dose yesterday and today with no improvement.  She has noticed left lower extremity swelling and pain as well.  Had nausea without vomiting.  No history of coronary artery disease.      Nurses Notes reviewed and agree, including vitals, allergies, social history and prior medical history.     REVIEW OF SYSTEMS: All systems reviewed and not pertinent unless noted.  Review of Systems    Past Medical History:   Diagnosis Date    DAVID-fib     HAD ABLATION FOR THIS IN 2017    Anemia     Anxiety and depression 12/11/2016    GERD (gastroesophageal reflux disease)     Glaucoma of left eye 12/11/2016    H/O migraine     LAST 3  YEARS AGO     History of MRSA infection     APPROX 8 YEARS, TREATED WITH ORAL ABX, NEGATIVE CULTURES FOLLOWING [place]     History of transfusion 12/2023    [facility], GI Bleed, 4 units, no reactions    Hyperlipidemia     Hypertension     MEDS DC'D BY DR GOMEZ 1/2017    Insomnia 12/11/2016    Kidney disease     Peptic ulcer - s/p gastric bypass surgery (7-8 years ago) 12/11/2016    Rheumatoid arthritis 12/11/2016    Spinal headache     FOLLOWING SPINAL FOR CHILDBIRTH     Vitamin D deficiency 12/11/2016    Wears dentures     UPPER PLATE     Elodia-Parkinson-White (WPW) syndrome     1990s       Allergies:    Bactrim [sulfamethoxazole-trimethoprim], Beta adrenergic blockers, Percocet [oxycodone-acetaminophen], Statins, Sulfa antibiotics, and Erythromycin      Past Surgical History:   Procedure Laterality Date     ABLATION OF DYSRHYTHMIC FOCUS      ATRIAL APPENDAGE EXCLUSION LEFT WITH TRANSESOPHAGEAL ECHOCARDIOGRAM N/A 2024    Procedure: Atrial Appendage Occlusion;  Surgeon: Jesse Keen DO;  Location:  [facility];  Service: Cardiology;  Laterality: N/A;    BREAST BIOPSY  ~    CARDIAC CATHETERIZATION N/A 10/24/2016    Procedure: Left Heart Cath;  Surgeon: Rubens Morejon MD;  Location:  [facility];  Service:     CARDIAC ELECTROPHYSIOLOGY PROCEDURE N/A 2017    Procedure: PVA;  Surgeon: Edvin Cook DO;  Location: [facility];  Service:     CHOLECYSTECTOMY  ~    COLONOSCOPY      ENDOSCOPY N/A 2023    Procedure: ESOPHAGOGASTRODUODENOSCOPY;  Surgeon: Adeel Reyes MD;  Location:  [facility];  Service: Gastroenterology;  Laterality: N/A;  epi 1mg diluted in 10mls. 1 ml given    GASTRIC BYPASS      GASTRIC BYPASS      HAMMER TOE REPAIR      LEFT--GARO IN PLACE     HYSTERECTOMY      Complete    ORIF HUMERUS FRACTURE Right 2019    Procedure: ORIF RIGHT PROXIMAL HUMERUS;  Surgeon: Yonatan Galdamez MD;  Location:  [facility];  Service: Orthopedics    ORIF HUMERUS FRACTURE Right 2021    Procedure: HUMERUS PROXIMAL OPEN REDUCTION INTERNAL FIXATION RIGHT WITH HARDWARE REMOVAL AND NAIL PLACEMENT;  Surgeon: Yonatan Galdamez MD;  Location:  [facility];  Service: Orthopedics;  Laterality: Right;    SHOULDER ACROMIOPLASTY Right     WRIST FRACTURE SURGERY Right          Social History     Socioeconomic History    Marital status:    Tobacco Use    Smoking status: Former     Current packs/day: 0.00     Average packs/day: 1 pack/day for 30.0 years (30.0 ttl pk-yrs)     Types: Cigarettes     Quit date:      Years since quittin.0    Smokeless tobacco: Never   Vaping Use    Vaping status: Never Used   Substance and Sexual Activity    Alcohol use: Yes     Alcohol/week: 1.0 standard drink of alcohol     Types:  "1 Glasses of wine per week     Comment: occational    Drug use: No    Sexual activity: Yes     Partners: Male     Birth control/protection: Hysterectomy         Family History   Problem Relation Age of Onset    Arrhythmia Mother     Hypertension Mother     Stroke Mother     Diabetes Mother     Lung cancer Father     Breast cancer Sister     Breast cancer Maternal Aunt     Diabetes Son         Type 1    Breast cancer Cousin     Ovarian cancer Cousin        Objective  Physical Exam:  /69   Pulse 66   Temp 98.7 °F (37.1 °C) (Oral)   Resp 9   Ht 167.6 cm (66\")   Wt 60.3 kg (133 lb)   LMP  (LMP Unknown)   SpO2 99%   BMI 21.47 kg/m²      Physical Exam  Vitals and nursing note reviewed.   Constitutional:       Appearance: Normal appearance.   Cardiovascular:      Rate and Rhythm: Tachycardia present. Rhythm irregular.   Pulmonary:      Effort: Pulmonary effort is normal. No respiratory distress.      Breath sounds: No stridor. No wheezing, rhonchi or rales.   Chest:      Chest wall: No tenderness.   Abdominal:      General: Abdomen is flat.      Palpations: Abdomen is soft.   Musculoskeletal:         General: Normal range of motion.      Comments: Left calf: There is edema.  Patient has trace pitting edema to the midshin.  There are superficial abrasions to the anterior left shin.   Skin:     General: Skin is warm.   Neurological:      General: No focal deficit present.      Mental Status: She is alert and oriented to person, place, and time.         Electrical Cardioversion    Date/Time: 1/19/2025 8:25 PM    Performed by: José Manuel Funes DO  Authorized by: José Manuel Funes DO    Consent:     Consent obtained:  Verbal and written    Consent given by:  Patient    Risks, benefits, and alternatives were discussed: yes      Risks discussed:  Cutaneous burn, death, induced arrhythmia and pain    Alternatives discussed:  Rate-control medication and no treatment  Universal protocol:     Procedure explained " and questions answered to patient or proxy's satisfaction: yes      Relevant documents present and verified: yes      Test results available: yes      Imaging studies available: yes      Required blood products, implants, devices, and special equipment available: yes      Immediately prior to procedure, a time out was called: yes      Patient identity confirmed:  Verbally with patient and arm band  Pre-procedure details:     Cardioversion basis:  Elective    Rhythm:  Atrial fibrillation    Electrode placement:  Anterior-posterior  Attempt one:     Cardioversion mode:  Synchronous    Shock (Joules):  200    Shock outcome:  Conversion to normal sinus rhythm  Post-procedure details:     Patient status:  Awake    Procedure completion:  Tolerated well, no immediate complications  Procedural Sedation    Date/Time: 1/19/2025 8:28 PM    Performed by: José Manuel Funes DO  Authorized by: José Manuel Funes DO    Consent:     Consent obtained:  Verbal    Consent given by:  Patient    Risks, benefits, and alternatives were discussed: yes      Risks discussed:  Allergic reaction, dysrhythmia, inadequate sedation, prolonged hypoxia resulting in organ damage, prolonged sedation necessitating reversal, respiratory compromise necessitating ventilatory assistance and intubation, vomiting and nausea    Alternatives discussed:  Analgesia without sedation and anxiolysis  Universal protocol:     Procedure explained and questions answered to patient or proxy's satisfaction: yes      Test results available: yes      Imaging studies available: yes      Immediately prior to procedure, a time out was called: yes      Patient identity confirmed:  Verbally with patient and arm band  Indications:     Procedure performed:  Cardioversion    Procedure necessitating sedation performed by:  Physician performing sedation    Intended level of sedation:  Moderate  Pre-sedation assessment:     Time since last food or drink:  1600    ASA  classification: class 2 - patient with mild systemic disease      Mouth opening:  3 or more finger widths    Thyromental distance:  3 finger widths    Mallampati score:  I - soft palate, uvula, fauces, pillars visible    Neck mobility: normal      Pre-sedation assessments completed and reviewed: airway patency, anesthesia/sedation history, cardiovascular function, hydration status, mental status, nausea/vomiting, pain level, respiratory function and temperature      History of difficult intubation: yes      Pre-sedation assessment completed:  1/19/2025 7:50 PM  Immediate pre-procedure details:     Reassessment: Patient reassessed immediately prior to procedure      Reviewed: vital signs, relevant labs/tests and NPO status      Verified: bag valve mask available    Procedure details (see MAR for exact dosages):     Sedation start time:  1/19/2025 8:18 AM    Preoxygenation:  Nasal cannula    Sedation:  Midazolam    Analgesia:  Fentanyl    Intra-procedure monitoring:  Blood pressure monitoring, cardiac monitor, continuous capnometry, continuous pulse oximetry, frequent LOC assessments and frequent vital sign checks    Intra-procedure events: none      Sedation end time:  1/19/2025 8:21 AM    Total sedation time (minutes):  3  Post-procedure details:     Post-sedation assessment completed:  1/19/2025 8:24 AM    Attendance: Constant attendance by certified staff until patient recovered      Recovery: Patient returned to pre-procedure baseline      Post-sedation assessments completed and reviewed: airway patency and mental status      Procedure completion:  Tolerated well, no immediate complications      ED Course:    ED Course as of 01/19/25 2103   Sun Jan 19, 2025   1836 EKG: Rate 147, left axis, atrial fibrillation with RVR.  No evidence of acute ischemia [CL]   1924 proBNP(!): 7,641.0 [EM]   1925 TSH Baseline(!): 4.970 [EM]   2035 TSH Baseline(!): 4.970 [EM]      ED Course User Index  [CL] Jackson Guo,  DO  [EM] Lola Kc PA-C       Lab Results (last 24 hours)       Procedure Component Value Units Date/Time    CBC & Differential [[id number]]  (Abnormal) Collected: 01/19/25 1828    Specimen: Blood Updated: 01/19/25 1833    Narrative:      The following orders were created for panel order CBC & Differential.  Procedure                               Abnormality         Status                     ---------                               -----------         ------                     CBC Auto Differential[[id number]]        Abnormal            Final result                 Please view results for these tests on the individual orders.    Comprehensive Metabolic Panel [[id number]]  (Abnormal) Collected: 01/19/25 1828    Specimen: Blood Updated: 01/19/25 1850     Glucose 113 mg/dL      BUN 22 mg/dL      Creatinine 0.99 mg/dL      Sodium 142 mmol/L      Potassium 4.5 mmol/L      Chloride 106 mmol/L      CO2 23.6 mmol/L      Calcium 8.7 mg/dL      Total Protein 6.1 g/dL      Albumin 3.6 g/dL      ALT (SGPT) 17 U/L      AST (SGOT) 26 U/L      Alkaline Phosphatase 87 U/L      Total Bilirubin 0.3 mg/dL      Globulin 2.5 gm/dL      A/G Ratio 1.4 g/dL      BUN/Creatinine Ratio 22.2     Anion Gap 12.4 mmol/L      eGFR 59.2 mL/min/1.73     Narrative:      GFR Categories in Chronic Kidney Disease (CKD)      GFR Category          GFR (mL/min/1.73)    Interpretation  G1                     90 or greater         Normal or high (1)  G2                      60-89                Mild decrease (1)  G3a                   45-59                Mild to moderate decrease  G3b                   30-44                Moderate to severe decrease  G4                    15-29                Severe decrease  G5                    14 or less           Kidney failure          (1)In the absence of evidence of kidney disease, neither GFR category G1 or G2 fulfill the criteria for CKD.    eGFR calculation 2021 CKD-EPI creatinine equation, which does not  include race as a factor    Magnesium [[id number]]  (Normal) Collected: 01/19/25 1828    Specimen: Blood Updated: 01/19/25 1850     Magnesium 2.0 mg/dL     TSH Rfx On Abnormal To Free T4 [[id number]]  (Abnormal) Collected: 01/19/25 1828    Specimen: Blood Updated: 01/19/25 1914     TSH 4.970 uIU/mL     BNP [[id number]]  (Abnormal) Collected: 01/19/25 1828    Specimen: Blood Updated: 01/19/25 1848     proBNP 7,641.0 pg/mL     Narrative:      This assay is used as an aid in the diagnosis of individuals suspected of having heart failure. It can be used as an aid in the diagnosis of acute decompensated heart failure (ADHF) in patients presenting with signs and symptoms of ADHF to the emergency department (ED). In addition, NT-proBNP of <300 pg/mL indicates ADHF is not likely.    Age Range Result Interpretation  NT-proBNP Concentration (pg/mL:      <50             Positive            >450                   Gray                 300-450                    Negative             <300    50-75           Positive            >900                  Gray                300-900                  Negative            <300      >75             Positive            >1800                  Gray                300-1800                  Negative            <300    CBC Auto Differential [[id number]]  (Abnormal) Collected: 01/19/25 1828    Specimen: Blood Updated: 01/19/25 1833     WBC 6.42 10*3/mm3      RBC 3.26 10*6/mm3      Hemoglobin 10.4 g/dL      Hematocrit 33.7 %      .4 fL      MCH 31.9 pg      MCHC 30.9 g/dL      RDW 13.5 %      RDW-SD 52.1 fl      MPV 10.2 fL      Platelets 249 10*3/mm3      Neutrophil % 63.7 %      Lymphocyte % 27.7 %      Monocyte % 7.3 %      Eosinophil % 0.8 %      Basophil % 0.5 %      Immature Grans % 0.0 %      Neutrophils, Absolute 4.09 10*3/mm3      Lymphocytes, Absolute 1.78 10*3/mm3      Monocytes, Absolute 0.47 10*3/mm3      Eosinophils, Absolute 0.05 10*3/mm3      Basophils, Absolute 0.03 10*3/mm3       Immature Grans, Absolute 0.00 10*3/mm3     High Sensitivity Troponin T [[id number]]  (Abnormal) Collected: 01/19/25 1828    Specimen: Blood Updated: 01/19/25 1925     HS Troponin T 25 ng/L     T4, Free [[id number]]  (Normal) Collected: 01/19/25 1828    Specimen: Blood Updated: 01/19/25 1944     Free T4 0.97 ng/dL     High Sensitivity Troponin T 1Hr [[id number]]  (Abnormal) Collected: 01/19/25 2003    Specimen: Blood Updated: 01/19/25 2025     HS Troponin T 21 ng/L      Troponin T Numeric Delta -4 ng/L      Troponin T % Delta -16             CT Angiogram Chest Pulmonary Embolism    Result Date: 1/19/2025  CT ANGIOGRAM CHEST PULMONARY EMBOLISM Date of Exam: 1/19/2025 7:02 PM EST Indication: shortness of breath, left lower extremity edema, tachycardia Comparison: [facility] chest x-ray 1/19/2025, CT chest PE protocol 9/15/2024. Technique: Axial CT images were obtained of the chest after the uneventful intravenous administration of 85 mL Isovue-370 utilizing pulmonary embolism protocol.  In addition, a 3-D volume rendered image was created for interpretation.  Reconstructed coronal and sagittal images were also obtained. Automated exposure control and iterative construction methods were used. Findings: No pulmonary arterial filling defects are seen to suggest pulmonary emboli. Main pulmonary artery is mildly dilated at 3.3 cm in diameter. Heart size is mildly enlarged. No pericardial effusion. Trace right pleural effusion. 5 mm left lower lobe nodule on axial image 71 is stable. Lungs are clear. Partial included solid organs of the upper abdomen appear unremarkable for the phase of contrast enhancement. There are postoperative changes of the proximal stomach. No acute osseous abnormality is identified.     Impression: Impression: 1.No evidence of pulmonary emboli. 2.Trace right pleural effusion. Electronically Signed: Gabriella Chaudhari  1/19/2025 7:26 PM EST  Workstation ID: [id number]    XR Chest 1 View    Result Date: 1/19/2025  XR  CHEST 1 VW Date of Exam: 1/19/2025 6:35 PM EST Indication: Dysrhythmia triage protocol Comparison: [facility] chest x-ray 9/15/2024, 5/28/2022 Findings: Lungs are adequately expanded and appear clear. No pneumothorax or large pleural effusion is seen. Cardiomediastinal contours appear stable.     Impression: Impression: No acute cardiopulmonary abnormality is identified. Electronically Signed: Gabriella Chaudhari  1/19/2025 6:48 PM EST  Workstation ID: [id number]        [facility]      Initial impression of presenting illness: This is a 74-year-old female who presents with complaints of chest pain, palpitations, shortness of breath that started 3 days ago.  Patient initially in atrial fibrillation with RVR with rates in the 140s.  She was given a 10 mg dose of diltiazem with better rate control in the low 100s.  CT scan of the chest with contrast did not reveal any evidence of pulmonary embolism.  This was done due to concern for possible DVT as I was unable to get duplex ultrasounds on the weekends.  I spoke with Kira Ferguson, nurse practitioner with cardiology along with Dr. Chen.  They recommended to 9 mg of IV Lopressor x 1 if that did not work to proceed with cardioversion.  I spoke with the patient who is agreeable to this.  Patient had cardioversion with return to normal sinus rhythm.  Patient tolerated this well no complications.  Discussed that she needs to follow-up with her cardiologist, Dr. Barahona.  Patient was given return precautions.  Discussed findings and plan of care the patient is agreeable to discharge.    DDX: includes but is not limited to: Pulmonary embolism, atrial fibrillation with RVR, electrolyte abnormality      Medications   sodium chloride 0.9 % flush 10 mL (has no administration in time range)   dilTIAZem (CARDIZEM) injection 10 mg (10 mg Intravenous Given 1/19/25 1844)   ondansetron (ZOFRAN) injection 4 mg (4 mg Intravenous Given 1/19/25 1844)   iopamidol (ISOVUE-370) 76 % injection 100 mL (85 mL  Intravenous Given 1/19/25 1906)   metoprolol tartrate (LOPRESSOR) injection 2.5 mg (2.5 mg Intravenous Given 1/19/25 1945)   midazolam (VERSED) injection 4 mg (4 mg Intravenous Given 1/19/25 2018)   fentaNYL citrate (PF) (SUBLIMAZE) injection 100 mcg (100 mcg Intravenous Given 1/19/25 2018)       Data interpreted: Nursing notes reviewed, vital signs reviewed.  Labs independently interpreted by me (CBC, CMP, lipase, UA, troponin, ABG, lactic acid, procalcitonin).  Imaging independently interpreted by me (x-ray, CT scan).  EKG independently interpreted by me.  O2 saturation: 99%    Counseling: Discussed the results above with the patient regarding need for admission or discharge.  Patient understands and agrees plan of care.      -----  ED Disposition       ED Disposition   Discharge    Condition   Stable    Comment   --             Final diagnoses:   Paroxysmal atrial fibrillation      Your Follow-Up Providers       Go to  [facility] EMERGENCY DEPART[facility] [place].    Specialty: Emergency Medicine  Follow up details: As needed, If symptoms worsen  [address]  [facility] [address] [phone]             Schedule an appointment as soon as possible for a visit  with Gadiel Barahona MD.    Specialty: Cardiology  [address]  [address]  [place] [id number] [phone]                       Contact information for after-discharge care    Follow-up information has not been specified.                    Your medication list        CONTINUE taking these medications        Instructions Last Dose Given Next Dose Due   amitriptyline 25 MG tablet  Commonly known as: ELAVIL      Take 1 tablet by mouth Every Night.       amLODIPine 5 MG tablet  Commonly known as: NORVASC      Take 1 tablet by mouth Daily.       aspirin 81 MG EC tablet      Take 1 tablet by mouth Daily. Start on the morning of Watchman procedure       flecainide 50 MG tablet  Commonly known as: TAMBOCOR       Take 1 tablet by mouth Every 12 (Twelve) Hours.       FLUoxetine 40 MG capsule  Commonly known as: PROzac      Take 1 capsule by mouth Daily.       folic acid 1 MG tablet  Commonly known as: FOLVITE           IRON CR PO      Take 1 dose by mouth Daily. gummies       methotrexate 2.5 MG tablet      1 tablet.       omeprazole 20 MG capsule  Commonly known as: priLOSEC      Take 1 capsule by mouth Daily.       pantoprazole 40 MG EC tablet  Commonly known as: PROTONIX      Take 1 tablet by mouth 2 (Two) Times a Day Before Meals.       Prolia 60 MG/ML solution prefilled syringe syringe  Generic drug: denosumab      1 mL.       promethazine 25 MG tablet  Commonly known as: PHENERGAN      Take 0.5 tablets by mouth Every 6 (Six) Hours As Needed for Nausea or Vomiting.       timolol 0.5 % ophthalmic solution  Commonly known as: TIMOPTIC      Administer 1 drop into the left eye 2 (Two) Times a Day.       traMADol 50 MG tablet  Commonly known as: ULTRAM      Take 1 tablet by mouth Every 6 (Six) Hours As Needed for pain.       traZODone 100 MG tablet  Commonly known as: DESYREL      Take 1 tablet by mouth Every Night.       vitamin D 1.25 MG (98856 UT) capsule capsule  Commonly known as: ERGOCALCIFEROL      Take 1 capsule by mouth 1 (One) Time Per Week. Sundays       vitamin D3 125 MCG (5000 UT) tablet tablet      Take 1 tablet by mouth Daily.

## 2025-01-20 LAB
QT INTERVAL: 312 MS
QT INTERVAL: 430 MS
QTC INTERVAL: 436 MS
QTC INTERVAL: 488 MS

## 2025-01-20 NOTE — DISCHARGE INSTRUCTIONS
Please follow-up with cardiology in the outpatient setting.  Take all of your home medications as prescribed.  Return to the emergency department for any worsening symptoms

## 2025-01-28 LAB
QT INTERVAL: 430 MS
QTC INTERVAL: 436 MS

## 2025-02-10 ENCOUNTER — OFFICE VISIT (OUTPATIENT)
Dept: CARDIOLOGY | Facility: CLINIC | Age: 75
End: 2025-02-10
Payer: MEDICARE

## 2025-02-10 VITALS
HEART RATE: 51 BPM | OXYGEN SATURATION: 99 % | DIASTOLIC BLOOD PRESSURE: 66 MMHG | HEIGHT: 66 IN | BODY MASS INDEX: 23.59 KG/M2 | SYSTOLIC BLOOD PRESSURE: 136 MMHG | WEIGHT: 146.8 LBS

## 2025-02-10 DIAGNOSIS — I48.0 PAROXYSMAL ATRIAL FIBRILLATION: Primary | ICD-10-CM

## 2025-02-10 PROCEDURE — 3078F DIAST BP <80 MM HG: CPT | Performed by: NURSE PRACTITIONER

## 2025-02-10 PROCEDURE — 99214 OFFICE O/P EST MOD 30 MIN: CPT | Performed by: NURSE PRACTITIONER

## 2025-02-10 PROCEDURE — 3075F SYST BP GE 130 - 139MM HG: CPT | Performed by: NURSE PRACTITIONER

## 2025-02-10 RX ORDER — ONDANSETRON 4 MG/1
4 TABLET, ORALLY DISINTEGRATING ORAL
COMMUNITY
Start: 2024-12-18

## 2025-02-10 RX ORDER — PREGABALIN 50 MG/1
CAPSULE ORAL
COMMUNITY
Start: 2025-01-30

## 2025-02-10 NOTE — PROGRESS NOTES
"        Encounter Date:08/26/2024      Patient ID: Aleena Goldberg, Aniyah Singh MD    Cheif Complaint EP: Atrial Fibrillation, s/p Watchman LAAO device       Patient Active Problem List    Diagnosis Date Noted    Presence of Watchman left atrial appendage closure device 08/22/2024    Long term current use of antiarrhythmic drug 05/22/2024    History of Emilie-en-Y gastric bypass 12/18/2023    Hypothyroidism 12/18/2023    Psoriatic arthritis on Plaquenil 12/18/2023    GERD 05/29/2022    Iron deficiency anemia 11/23/2021    Fall 11/16/2021    Anemia 02/08/2019    Chronic pain 02/08/2019    Atrial fib/flutter 12/11/2016     Note Last Updated: 8/22/2024     Atrial flutter, atypical in nature  Normal LVEF by catheterization, 10/24/2016.    Echocardiogram, 10/23/2016, revealing LVEF of 60%, mild MR, mild TR, LV diastolic dysfunction, LA enlarged (vol index 47.4) Dominic Wolff MD.   Chadsvasc=3  PVA 6/20/17  Left atrial appendage closure with 31 mm Watchman FLX device 5/22/2024      Insomnia 12/11/2016    Anxiety and depression 12/11/2016    Vitamin D deficiency 12/11/2016    Rheumatoid arthritis 12/11/2016    Glaucoma of left eye 12/11/2016    WPW (Elodia-Parkinson-White syndrome) 10/23/2016     Note Last Updated: 10/23/2016     Previous ablation 1995 at [facility]-- data deficit      Dyslipidemia 10/23/2016    Essential hypertension 10/23/2016             History of Present Illness  Patient presents today for follow-up with a history of atrial fibrillation.  She underwent Watchman left atrial appendage closure device on 5/22/2024.  She currently is maintained on Aspirin monotherapy. TTE on 6/27/2024 revealed \"Left ventricular systolic function is normal. Left ventricular ejection fraction appears to be 56 - 60%. The left atrial cavity is dilated.  There is a well positioned Watchman FLX left atrial appendage occlusion device. There is a moderate marylin-prosthetic leak present with a maximal diameter measured at 2.8 mm.  " "Mild mitral valve regurgitation is present.  Mild to moderate tricuspid valve regurgitation is present.  There is mild plaque in the aortic arch present.\".     Since her last visit the patient denies chest pain, chest pressure, chest heaviness, shortness of breath, palpitations, edema, syncope, presyncope. She denies any episodes of lana red blood or melena. She was in the emergency department last month with atrial fibrillation with RVR requiring cardioversion.  At that time her thyroid studies were abnormal.  She since has had no further episodes of atrial fibrillation that she is aware of.        Allergies   Allergen Reactions    Bactrim [Sulfamethoxazole-Trimethoprim] Other (See Comments)     MOUTH SORES    Beta Adrenergic Blockers Other (See Comments)     bradycardia    Percocet [Oxycodone-Acetaminophen] Other (See Comments)     NIGHT TERRORS     Statins Myalgia     MYALGIA     Sulfa Antibiotics Other (See Comments)     MOUTH SORES AND RASH     Erythromycin Rash     RASH        Current Outpatient Medications   Medication Instructions    amitriptyline (ELAVIL) 25 mg, Oral, Nightly    amLODIPine (NORVASC) 5 mg, Oral, Every 24 Hours Scheduled    aspirin 81 mg, Oral, Daily, Start on the morning of Watchman procedure    clopidogrel (PLAVIX) 75 mg, Oral, Daily    flecainide (TAMBOCOR) 50 mg, Oral, Every 12 Hours    FLUoxetine (PROzac) 40 MG capsule 1 capsule, Oral, Daily    folic acid (FOLVITE) 1 MG tablet     IRON CR PO 1 dose, Oral, Daily, gummies    methotrexate 2.5 mg    omeprazole (PRILOSEC) 20 mg, Oral, Daily    pantoprazole (PROTONIX) 40 mg, Oral, 2 Times Daily Before Meals    Prolia 60 mg    promethazine (PHENERGAN) 12.5 mg, Oral, Every 6 Hours PRN    timolol (TIMOPTIC) 0.5 % ophthalmic solution 1 drop, Left Eye, 2 Times Daily    traMADol (ULTRAM) 50 MG tablet Take 1 tablet by mouth Every 6 (Six) Hours As Needed for pain.    traZODone (DESYREL) 100 mg, Oral, Nightly    vitamin D (ERGOCALCIFEROL) 50,000 " Units, Oral, Weekly, Sundays    vitamin D3 5,000 Units, Oral, Daily         Objective:     LMP  (LMP Unknown)    There is no height or weight on file to calculate BMI.     Vitals reviewed.   Constitutional:       Appearance: Well-developed.   Pulmonary:      Effort: Pulmonary effort is normal. No respiratory distress.      Breath sounds: Normal breath sounds. No wheezing. No rales.      Comments: Bases clear  Chest:      Chest wall: Not tender to palpatation.   Cardiovascular:      PMI at left midclavicular line. Normal rate. Regular rhythm.      Murmurs: There is no murmur.      No gallop.  No click. No rub.   Pulses:     Intact distal pulses.   Edema:     Peripheral edema absent.   Musculoskeletal: Normal range of motion.       Lab Review:     Lab Results   Component Value Date    GLUCOSE 113 (H) 01/19/2025    BUN 22 01/19/2025    CREATININE 0.99 01/19/2025    EGFR 59.2 (L) 01/19/2025    BCR 22.2 01/19/2025    K 4.5 01/19/2025    CO2 23.6 01/19/2025    CALCIUM 8.7 01/19/2025    PROTENTOTREF 6.0 03/08/2019    ALBUMIN 3.6 01/19/2025    BILITOT 0.3 01/19/2025    AST 26 01/19/2025    ALT 17 01/19/2025     Lab Results   Component Value Date    WBC 6.42 01/19/2025    HGB 10.4 (L) 01/19/2025    HCT 33.7 (L) 01/19/2025    .4 (H) 01/19/2025     01/19/2025     Lab Results   Component Value Date    TSH 4.970 (H) 01/19/2025         EKG today revealed NSR with 1st degree AV block & compared to EKG obtained on 1/19/2025                 Assessment:      Diagnosis Plan   1. Paroxysmal atrial fibrillation  Since her last visit she has had 1 occurrence of atrial fibrillation with rapid ventricular response for which she required an emergency room visit as well as direct-current cardioversion.  At that time her thyroid studies were noted to be abnormal.  She currently is not on thyroid replacement.  She has been instructed follow-up with her primary care for repeat thyroid studies and possible replacement therapy at  the direction of her primary care provider.  Since this episode she has had no further episodes of atrial fibrillation that she is aware of.          2. Presence of Watchman left atrial appendage closure device  Watchman in situ.  Continue Aspirin 81mg Po daily lifelong, unless contraindication arises.  Patient is scheduled for follow-up in May at her 1 year post watchman implant visit.  Further diagnostic imaging of her device to be arranged with the heart valve clinic.  I have messaged them personally to arrange.        3. WPW (Elodia-Parkinson-White syndrome)  No known reoccurrence of WPW        4. Long term current use of antiarrhythmic drug  Maintained on flecainide        5. Essential hypertension  Well-managed, continue current medical regimen          Plan:     Advance Care Planning   ACP discussion was declined by the patient. Patient has an advance directive (not in EMR), copy requested.         YULI Hall  Electrophysiology  [facility] / [facility]

## 2025-02-23 ENCOUNTER — HOSPITAL ENCOUNTER (EMERGENCY)
Facility: HOSPITAL | Age: 75
Discharge: HOME | End: 2025-02-23
Attending: EMERGENCY MEDICINE | Admitting: EMERGENCY MEDICINE
Payer: MEDICARE

## 2025-02-23 ENCOUNTER — APPOINTMENT (OUTPATIENT)
Facility: HOSPITAL | Age: 75
End: 2025-02-23
Payer: MEDICARE

## 2025-02-23 ENCOUNTER — APPOINTMENT (OUTPATIENT)
Dept: GENERAL RADIOLOGY | Facility: HOSPITAL | Age: 75
End: 2025-02-23
Payer: MEDICARE

## 2025-02-23 VITALS
SYSTOLIC BLOOD PRESSURE: 126 MMHG | DIASTOLIC BLOOD PRESSURE: 86 MMHG | RESPIRATION RATE: 18 BRPM | HEART RATE: 80 BPM | WEIGHT: 147.1 LBS | TEMPERATURE: 99.3 F | OXYGEN SATURATION: 93 % | BODY MASS INDEX: 23.09 KG/M2 | HEIGHT: 67 IN

## 2025-02-23 DIAGNOSIS — I48.91 ATRIAL FIBRILLATION WITH RAPID VENTRICULAR RESPONSE: Primary | ICD-10-CM

## 2025-02-23 LAB
ALBUMIN SERPL-MCNC: 3.7 G/DL (ref 3.5–5.2)
ALBUMIN/GLOB SERPL: 1.6 G/DL
ALP SERPL-CCNC: 102 U/L (ref 39–117)
ALT SERPL W P-5'-P-CCNC: 22 U/L (ref 1–33)
ANION GAP SERPL CALCULATED.3IONS-SCNC: 11.3 MMOL/L (ref 5–15)
AST SERPL-CCNC: 31 U/L (ref 1–32)
BASOPHILS # BLD AUTO: 0.02 10*3/MM3 (ref 0–0.2)
BASOPHILS NFR BLD AUTO: 0.4 % (ref 0–1.5)
BILIRUB SERPL-MCNC: 0.4 MG/DL (ref 0–1.2)
BUN SERPL-MCNC: 16 MG/DL (ref 8–23)
BUN/CREAT SERPL: 18 (ref 7–25)
CALCIUM SPEC-SCNC: 8.7 MG/DL (ref 8.6–10.5)
CHLORIDE SERPL-SCNC: 101 MMOL/L (ref 98–107)
CO2 SERPL-SCNC: 24.7 MMOL/L (ref 22–29)
CREAT SERPL-MCNC: 0.89 MG/DL (ref 0.57–1)
DEPRECATED RDW RBC AUTO: 48.8 FL (ref 37–54)
EGFRCR SERPLBLD CKD-EPI 2021: 67.3 ML/MIN/1.73
EOSINOPHIL # BLD AUTO: 0.01 10*3/MM3 (ref 0–0.4)
EOSINOPHIL NFR BLD AUTO: 0.2 % (ref 0.3–6.2)
ERYTHROCYTE [DISTWIDTH] IN BLOOD BY AUTOMATED COUNT: 13 % (ref 12.3–15.4)
FLUAV RNA RESP QL NAA+PROBE: NOT DETECTED
FLUBV RNA RESP QL NAA+PROBE: NOT DETECTED
GLOBULIN UR ELPH-MCNC: 2.3 GM/DL
GLUCOSE SERPL-MCNC: 108 MG/DL (ref 65–99)
HCT VFR BLD AUTO: 32.9 % (ref 34–46.6)
HGB BLD-MCNC: 10.1 G/DL (ref 12–15.9)
HOLD SPECIMEN: NORMAL
IMM GRANULOCYTES # BLD AUTO: 0 10*3/MM3 (ref 0–0.05)
IMM GRANULOCYTES NFR BLD AUTO: 0 % (ref 0–0.5)
LYMPHOCYTES # BLD AUTO: 0.76 10*3/MM3 (ref 0.7–3.1)
LYMPHOCYTES NFR BLD AUTO: 15.2 % (ref 19.6–45.3)
MAGNESIUM SERPL-MCNC: 2 MG/DL (ref 1.6–2.4)
MCH RBC QN AUTO: 31 PG (ref 26.6–33)
MCHC RBC AUTO-ENTMCNC: 30.7 G/DL (ref 31.5–35.7)
MCV RBC AUTO: 100.9 FL (ref 79–97)
MONOCYTES # BLD AUTO: 0.55 10*3/MM3 (ref 0.1–0.9)
MONOCYTES NFR BLD AUTO: 11 % (ref 5–12)
NEUTROPHILS NFR BLD AUTO: 3.65 10*3/MM3 (ref 1.7–7)
NEUTROPHILS NFR BLD AUTO: 73.2 % (ref 42.7–76)
NT-PROBNP SERPL-MCNC: 8312 PG/ML (ref 0–1800)
PLATELET # BLD AUTO: 179 10*3/MM3 (ref 140–450)
PMV BLD AUTO: 9.9 FL (ref 6–12)
POTASSIUM SERPL-SCNC: 4.4 MMOL/L (ref 3.5–5.2)
PROT SERPL-MCNC: 6 G/DL (ref 6–8.5)
RBC # BLD AUTO: 3.26 10*6/MM3 (ref 3.77–5.28)
RSV RNA RESP QL NAA+PROBE: NOT DETECTED
SARS-COV-2 RNA RESP QL NAA+PROBE: NOT DETECTED
SODIUM SERPL-SCNC: 137 MMOL/L (ref 136–145)
TSH SERPL DL<=0.05 MIU/L-ACNC: 2.28 UIU/ML (ref 0.27–4.2)
WBC NRBC COR # BLD AUTO: 4.99 10*3/MM3 (ref 3.4–10.8)
WHOLE BLOOD HOLD COAG: NORMAL
WHOLE BLOOD HOLD SPECIMEN: NORMAL

## 2025-02-23 PROCEDURE — 83880 ASSAY OF NATRIURETIC PEPTIDE: CPT

## 2025-02-23 PROCEDURE — 87637 SARSCOV2&INF A&B&RSV AMP PRB: CPT

## 2025-02-23 PROCEDURE — 84443 ASSAY THYROID STIM HORMONE: CPT | Performed by: EMERGENCY MEDICINE

## 2025-02-23 PROCEDURE — 71045 X-RAY EXAM CHEST 1 VIEW: CPT

## 2025-02-23 PROCEDURE — 80053 COMPREHEN METABOLIC PANEL: CPT | Performed by: EMERGENCY MEDICINE

## 2025-02-23 PROCEDURE — 92960 CARDIOVERSION ELECTRIC EXT: CPT

## 2025-02-23 PROCEDURE — 93005 ELECTROCARDIOGRAM TRACING: CPT | Performed by: EMERGENCY MEDICINE

## 2025-02-23 PROCEDURE — 93005 ELECTROCARDIOGRAM TRACING: CPT

## 2025-02-23 PROCEDURE — 83735 ASSAY OF MAGNESIUM: CPT | Performed by: EMERGENCY MEDICINE

## 2025-02-23 PROCEDURE — 99285 EMERGENCY DEPT VISIT HI MDM: CPT

## 2025-02-23 PROCEDURE — 25810000003 SODIUM CHLORIDE 0.9 % SOLUTION

## 2025-02-23 PROCEDURE — 85025 COMPLETE CBC W/AUTO DIFF WBC: CPT

## 2025-02-23 RX ORDER — ETOMIDATE 2 MG/ML
10 INJECTION INTRAVENOUS ONCE
Status: COMPLETED | OUTPATIENT
Start: 2025-02-23 | End: 2025-02-23

## 2025-02-23 RX ORDER — METOPROLOL SUCCINATE 25 MG/1
25 TABLET, EXTENDED RELEASE ORAL ONCE
Status: COMPLETED | OUTPATIENT
Start: 2025-02-23 | End: 2025-02-23

## 2025-02-23 RX ORDER — METOPROLOL SUCCINATE 25 MG/1
25 TABLET, EXTENDED RELEASE ORAL DAILY
Qty: 30 TABLET | Refills: 0 | Status: SHIPPED | OUTPATIENT
Start: 2025-02-24 | End: 2025-03-26

## 2025-02-23 RX ORDER — SODIUM CHLORIDE 0.9 % (FLUSH) 0.9 %
10 SYRINGE (ML) INJECTION AS NEEDED
Status: DISCONTINUED | OUTPATIENT
Start: 2025-02-23 | End: 2025-02-23 | Stop reason: HOSPADM

## 2025-02-23 RX ADMIN — ETOMIDATE INJECTION 10 MG: 2 SOLUTION INTRAVENOUS at 14:45

## 2025-02-23 RX ADMIN — METOPROLOL SUCCINATE 25 MG: 25 TABLET, EXTENDED RELEASE ORAL at 16:08

## 2025-02-23 RX ADMIN — SODIUM CHLORIDE 250 ML: 9 INJECTION, SOLUTION INTRAVENOUS at 14:17

## 2025-02-24 LAB
QT INTERVAL: 306 MS
QT INTERVAL: 370 MS
QTC INTERVAL: 416 MS
QTC INTERVAL: 443 MS

## 2025-03-06 ENCOUNTER — HOSPITAL ENCOUNTER (OUTPATIENT)
Dept: GENERAL RADIOLOGY | Facility: HOSPITAL | Age: 75
Discharge: HOME | End: 2025-03-06
Admitting: FAMILY MEDICINE
Payer: MEDICARE

## 2025-03-06 ENCOUNTER — TRANSCRIBE ORDERS (OUTPATIENT)
Dept: GENERAL RADIOLOGY | Facility: HOSPITAL | Age: 75
End: 2025-03-06
Payer: MEDICARE

## 2025-03-06 DIAGNOSIS — S20.212A CONTUSION, CHEST WALL, LEFT, INITIAL ENCOUNTER: ICD-10-CM

## 2025-03-06 DIAGNOSIS — S20.212A CONTUSION, CHEST WALL, LEFT, INITIAL ENCOUNTER: Primary | ICD-10-CM

## 2025-03-06 PROCEDURE — 71101 X-RAY EXAM UNILAT RIBS/CHEST: CPT

## 2025-05-06 ENCOUNTER — TELEPHONE (OUTPATIENT)
Dept: CARDIOLOGY | Facility: CLINIC | Age: 75
End: 2025-05-06
Payer: MEDICARE

## 2025-05-06 DIAGNOSIS — Z95.818 PRESENCE OF WATCHMAN LEFT ATRIAL APPENDAGE CLOSURE DEVICE: Primary | ICD-10-CM

## 2025-05-06 DIAGNOSIS — I48.0 PAROXYSMAL ATRIAL FIBRILLATION: ICD-10-CM

## 2025-05-06 NOTE — TELEPHONE ENCOUNTER
Dr. Keen reviewed records and recommended pt undergo redo PVA, will cancel 1 yr DEEPAK and defer to CTA to do both confirm placement and leak and for afib ablation.  He would like for patient to restart Eliquis 5mg BID 1 week prior to PVA.      Discussed plan with patient and she is agreeable.  She is currently in NSR and feeling better so no need for ECV.  Will place orders and get her scheduled in about 4-6 weeks.

## 2025-05-07 NOTE — PLAN OF CARE
[facility]  [address]  [place] [address]  Phone: [phone]   [phone]    Patient's PCP: Reynaldo Rahman, APRN,FNP-C  Referring Provider: Ryenaldo Rahman    Subjective:     Chief Complaint:   Chief Complaint   Patient presents with    Right Shoulder - Pain     R shoulder weakness x 2-3 years. Denies any injury. Endorses more weakness vs pain, feels like he has been working out and shoulder gets tired. Hx rotator cuff sx 2016 @ [facility]. Denies weakness in his hand. Limited ROM, unable to lift >5 lbs.       Past Medical History:   Diagnosis Date    Acid reflux     Acquired tricuspid valve insufficiency     Anemia     Borderline diabetes     BPH (benign prostatic hyperplasia)     Cataract     1/16/20 rt eye, 1/30/20- Lt eye    Coronary artery disease     History of colonic polyps 04/17/2025    Hypertension     Liver cyst 08/24/2022    Pulmonary nodules     Skin cancer        Past Surgical History:   Procedure Laterality Date    CYSTOSCOPY N/A 2/19/2024    Procedure: CYSTOSCOPY;  Surgeon: La Nena James MD;  Location: [facility] OR;  Service: Urology;  Laterality: N/A;    CYSTOSCOPY WITH TRANSURETHRAL DESTRUCTION OF PROSTATE,RFA N/A 10/21/2020    Procedure: CYSTOSCOPY WITH TRANSURETHRAL DESTRUCTION OF PROSTATE,RFA;  Surgeon: La Nena James MD;  Location: [facility] OR;  Service: Urology;  Laterality: N/A;  please make sure [facility] [phone]    EYE SURGERY      for cataracts, rt eye 1/16/20, left eye 1/30/20    HERNIA REPAIR  12/1999    HERNIA REPAIR  05/2004    INSERTION OF SACRAL NEUROSTIMULATOR, ELECTRODES, AND IMPLANTABLE PULSE GENERATOR (IPG) N/A 7/12/2023    Procedure: INSERTION, ELECTRODE LEADS AND PULSE GENERATOR, NEUROSTIMULATOR, SACRAL;  Surgeon: La Nena James MD;  Location: [facility] OR;  Service: Urology;  Laterality: N/A;    KNEE ARTHROSCOPY Left 04/12/2018    LAPAROSCOPIC APPENDECTOMY N/A 1/2/2022    Procedure: APPENDECTOMY,  Goal Outcome Evaluation:  Plan of Care Reviewed With: patient  Progress: no change   Pt. Admitted to the floor today and will possibly have surgery Thursday. She has mild to severe pain, pt has not taken any PRNs since moving to the floor. Pain team was able to put in a nerve block for her shoulder. It is running at 6mL/hr. They did a one block femoral shot. Pt. Is on a waffle mattress and being turned q 2. Will continue to monitor.    LAPAROSCOPIC;  Surgeon: Sam Goss MD;  Location: [facility] OR;  Service: General;  Laterality: N/A;    PERCUTANEOUS INSERTION OF SACRAL NERVE NEUROSTIMULATOR ELECTRODE LEAD N/A 5/22/2023    Procedure: INSERTION, ELECTRODE LEAD, NEUROSTIMULATOR, SACRAL, PERCUTANEOUS Keep first;  Surgeon: La Nena James MD;  Location: [facility] OR;  Service: Urology;  Laterality: N/A;  patient needs to apply EMLA cream to entirety of lower back 20 minutes prior to procedure, also need 1% lidocaine available    PERCUTANEOUS INSERTION OF SACRAL NERVE NEUROSTIMULATOR ELECTRODE LEAD N/A 7/12/2023    Procedure: INSERTION, ELECTRODE LEAD, NEUROSTIMULATOR, SACRAL, PERCUTANEOUS;  Surgeon: La Nena James MD;  Location: [facility] OR;  Service: Urology;  Laterality: N/A;    SKIN LESION EXCISION  10/2009    Neck    SKIN LESION EXCISION  2015    STAPEDECTOMY Right 03/2001    TRANSRECTAL ULTRASOUND EXAMINATION N/A 8/17/2020    Procedure: TRANSRECTAL ULTRASOUND;  Surgeon: La Nena James MD;  Location: [facility] OR;  Service: Urology;  Laterality: N/A;    TRANSRECTAL ULTRASOUND EXAMINATION N/A 2/19/2024    Procedure: ULTRASOUND, RECTAL APPROACH;  Surgeon: La Nena James MD;  Location: [facility] OR;  Service: Urology;  Laterality: N/A;    TRANSURETHRAL INCISION OF PROSTATE N/A 3/13/2024    Procedure: INCISION, PROSTATE, TRANSURETHRAL;  Surgeon: La Nena James MD;  Location: [facility] OR;  Service: Urology;  Laterality: N/A;    TRANSURETHRAL RESECTION OF PROSTATE N/A 3/13/2024    Procedure: TURP (TRANSURETHRAL RESECTION OF PROSTATE)- BIPOLAR;  Surgeon: La Nena James MD;  Location: [facility] OR;  Service: Urology;  Laterality: N/A;  WITH PARALYSIS       Current Outpatient Medications   Medication Sig    albuterol (VENTOLIN HFA) 90 mcg/actuation inhaler Inhale 2 puffs into the lungs every 6 (six) hours as needed for Shortness of Breath. Rescue    amLODIPine (NORVASC) 10 MG tablet Take 1 tablet (10 mg total) by  mouth once daily.    aspirin (ECOTRIN) 81 MG EC tablet Take 81 mg by mouth once daily.    busPIRone (BUSPAR) 5 MG Tab Take 1 tablet (5 mg total) by mouth once daily.    calcitRIOL (ROCALTROL) 0.25 MCG Cap Take 0.25 mcg by mouth every 7 days.    co-enzyme Q-10 30 mg capsule Take 30 mg by mouth 3 (three) times daily.    doxycycline monohydrate 100 mg Tab     hydroCHLOROthiazide (HYDRODIURIL) 25 MG tablet Take 1/2 (one-half) tablet by mouth once daily    lisinopriL (PRINIVIL,ZESTRIL) 40 MG tablet 0    metFORMIN (GLUCOPHAGE) 500 MG tablet Take 1 tablet (500 mg total) by mouth daily with breakfast.    multivit with minerals/lutein (MULTIVITAMIN 50 PLUS ORAL) Take 1 tablet by mouth once daily.    omega 3-dha-epa-fish oil (FISH OIL) 100-160-1,000 mg Cap Take 1 capsule by mouth once daily.    omeprazole (PRILOSEC) 20 MG capsule Take 1 capsule (20 mg total) by mouth once daily.    pantoprazole (PROTONIX) 40 MG tablet 90 tablets.    propranoloL (INDERAL LA) 60 MG 24 hr capsule Take 1 capsule (60 mg total) by mouth once daily.    ranolazine (RANEXA) 500 MG Tb12 Take 2 tablets by mouth twice daily    rosuvastatin (CRESTOR) 10 MG tablet Take 1 tablet (10 mg total) by mouth once daily.    tadalafiL (CIALIS) 5 MG tablet Take 1 tablet (5 mg total) by mouth every morning.    telmisartan (MICARDIS) 40 MG Tab Take 1 tablet (40 mg total) by mouth once daily.    vismodegib (ERIVEDGE) 150 mg Cap 0     No current facility-administered medications for this visit.     Facility-Administered Medications Ordered in Other Visits   Medication    electrolyte-S (ISOLYTE)    electrolyte-S (ISOLYTE)    electrolyte-S (ISOLYTE)    fentaNYL 50 mcg/mL injection 25 mcg    fentaNYL 50 mcg/mL injection 25 mcg    lactated ringers infusion    LIDOcaine (PF) 10 mg/ml (1%) injection 10 mg    LIDOcaine (PF) 10 mg/ml (1%) injection 10 mg    metoclopramide HCl injection 10 mg    ondansetron injection 4 mg    oxyCODONE immediate release tablet 5 mg    oxyCODONE  immediate release tablet 5 mg       Review of patient's allergies indicates:  No Known Allergies    Family History   Problem Relation Name Age of Onset    Cancer Mother          skin    Stroke Mother      Heart failure Father      Kidney disease Father         Social History[1]    Prior to meeting with the patient I reviewed the medical chart in [facility]. This included reviewing the previous progress notes from our office, review of the patient's last appointment with their primary care provider, review of any visits to the emergency room, and review of any pain management appointments or procedures.    History of present illness: 73 y.o. male  presenting with chronic right shoulder pain for 2-3 years with an increase in the severity of his symptoms recently.  He denies any specific incident or trauma which caused the pain to arise.  He admits to limited range of motion of the right shoulder as well as weakness.  He states that he is unable to lift anything greater than 5 lb in his right shoulder.  He does have a history of right shoulder arthroscopy with rotator cuff repair in 2016 performed at [facility].  He has not had any form of treatment in the past few years for his right shoulder pain.       Review of Systems:    Constitutional: Negative for chills, fever and weight loss.  HENT: Negative for congestion.    Eyes: Negative for discharge and redness.  Respiratory: Negative for cough and shortness of breath.    Cardiovascular: Negative for chest pain.  Gastrointestinal: Negative for nausea and vomiting.  Musculoskeletal: See HPI.  Skin: Negative for rash.  Neurological: Negative for headaches.  Endo/Heme/Allergies: Does not bruise/bleed easily.  Psychiatric/Behavioral: The patient is not nervous/anxious.    All other systems reviewed and are negative.       Objective:     Physical Examination:    Vital Signs: VITALS@    Body mass index is 26.6 kg/m².    This a well-developed, well nourished patient in no acute  distress.  They are alert and oriented and cooperative to examination.    Right shoulder exam: Skin overlying the right shoulder is clean dry and intact.  No erythema or ecchymosis.  No signs or symptoms of infection.  Range of motion of the right shoulder with active flexion and abduction 0-140 degrees.  Generalized weakness of the rotator cuff muscles upon resistive testing.  Negative Speed's test.  Positive Ritu's test.  Distally neurovascularly intact.      Pertinent New Results:      XRAY Report / Interpretation:   Three views of the right shoulder taken in clinic today reveal no acute fractures or dislocations.  Evidence of distal clavicle excision from prior right shoulder arthroscopy.  High-riding humerus of the right shoulder likely indicative of recurrent rotator cuff tearing.    Procedure/s:  Large Joint Aspiration/Injection: R subacromial bursa    Date/Time: 5/7/2025 10:00 AM    Performed by: Alvarado Dominguez PA-C  Authorized by: Alvarado Dominguez PA-C    Consent Done?:  Yes (Verbal)  Indications:  Pain    Local anesthesia used?: Yes    Local anesthetic:  Lidocaine 1% without epinephrine    Details:  Needle Size:  25 G  Location:  Shoulder  Site:  R subacromial bursa  Medications:  40 mg triamcinolone acetonide 40 mg/mL      Large Joint Aspiration/Injection: R subacromial bursa    Date/Time: 5/7/2025 10:00 AM    Performed by: Alvarado Dominguez PA-C  Authorized by: Alvarado Dominguez PA-C    Consent Done?:  Yes (Verbal)  Indications:  Pain  Site marked: the procedure site was marked    Timeout: prior to procedure the correct patient, procedure, and site was verified    Prep: patient was prepped and draped in usual sterile fashion      Local anesthesia used?: Yes    Local anesthetic:  Lidocaine 1% without epinephrine    Details:  Needle Size:  22 G  Location:  Shoulder  Site:  R subacromial bursa  Medications:  40 mg triamcinolone acetonide 40 mg/mL  Patient tolerance:  Patient tolerated the procedure well  with no immediate complications         Assessment:     1. Tear of right rotator cuff, unspecified tear extent, unspecified whether traumatic    2. Subacromial bursitis of right shoulder joint    3. Glenohumeral arthritis, right      Plan:    Tear of right rotator cuff, unspecified tear extent, unspecified whether traumatic  -     X-ray Shoulder 2 or More Views Right    Subacromial bursitis of right shoulder joint  -     Ambulatory referral/consult to Orthopedics  -     Cancel: Large Joint Aspiration/Injection: R subacromial bursa  -     Large Joint Aspiration/Injection: R subacromial bursa    Glenohumeral arthritis, right    Other orders  -     Cancel: Large Joint Aspiration/Injection        Follow up in about 6 weeks (around 6/18/2025) for R shoulder inj 5/7/25 f/u.    Recurrent tear of right rotator cuff, glenohumeral arthritis of the right shoulder, history of right shoulder arthroscopy with rotator cuff repair.  I discussed his treatment options with him today whether it be to attempt to manage his symptoms with a corticosteroid injection versus obtaining an MRI to further assess the integrity of the rotator cuff muscles as a preoperative test.  He is not interested in surgical intervention at this time.  I injected his right shoulder subacromial space with corticosteroid 40 mg Kenalog.  He tolerated this well.  I would like him to follow up in 6 weeks to reassess his progress.  If he has not received significant relief of his symptoms with the corticosteroid injection, I would like to obtain an MRI of the right shoulder to further assess the integrity of the rotator cuff muscles.    The patient and I had a thorough discussion today.  We discussed the working diagnosis as well as several other potential alternative diagnoses.  We discussed treatment options, both conservative and surgical.  Conservative treatment options would include things such as activity modifications, workplace modifications, a period of  rest, oral versus topical over the counter and oral versus topical prescription anti-inflammatory medications, physical therapy / occupational therapy, splinting / bracing, immobilization, corticosteroid injections, and others.      Alvarado Dominguez PA-C      EMR Statement:  Please note that portions of this patient encounter record were imported from the patients electronic medical record and that others were dictated using voice recognition software. For these reasons grammatical errors, nonsensical language, and apparently contradictory statements may be present.  These should be disregarded or interpreted with respect to the context of the document.       [1]   Social History  Socioeconomic History    Marital status:    Tobacco Use    Smoking status: Former     Current packs/day: 0.00     Types: Cigarettes     Quit date:      Years since quittin.3    Smokeless tobacco: Never   Substance and Sexual Activity    Alcohol use: Yes     Comment: occasional    Drug use: No    Sexual activity: Yes     Partners: Female     Social Drivers of Health     Financial Resource Strain: Low Risk  (2025)    Overall Financial Resource Strain (CARDIA)     Difficulty of Paying Living Expenses: Not hard at all   Food Insecurity: No Food Insecurity (2025)    Hunger Vital Sign     Worried About Running Out of Food in the Last Year: Never true     Ran Out of Food in the Last Year: Never true   Transportation Needs: No Transportation Needs (2023)    PRAPARE - Transportation     Lack of Transportation (Medical): No     Lack of Transportation (Non-Medical): No   Physical Activity: Inactive (2025)    Exercise Vital Sign     Days of Exercise per Week: 0 days     Minutes of Exercise per Session: 0 min   Stress: Stress Concern Present (2025)    [employer] - Occupational Stress Questionnaire     Feeling of Stress : To some extent   Housing Stability: Low Risk  (2023)     Housing Stability Vital Sign     Unable to Pay for Housing in the Last Year: No     Number of Places Lived in the Last Year: 1     Unstable Housing in the Last Year: No

## 2025-05-08 DIAGNOSIS — I48.19 ATRIAL FIBRILLATION, PERSISTENT: Primary | ICD-10-CM

## 2025-05-08 RX ORDER — ACETAMINOPHEN 325 MG/1
650 TABLET ORAL EVERY 4 HOURS PRN
OUTPATIENT
Start: 2025-05-08

## 2025-05-08 RX ORDER — NITROGLYCERIN 0.4 MG/1
0.4 TABLET SUBLINGUAL
OUTPATIENT
Start: 2025-05-08

## 2025-05-08 RX ORDER — ONDANSETRON 2 MG/ML
4 INJECTION INTRAMUSCULAR; INTRAVENOUS EVERY 6 HOURS PRN
OUTPATIENT
Start: 2025-05-08

## 2025-05-08 RX ORDER — SODIUM CHLORIDE 9 MG/ML
40 INJECTION, SOLUTION INTRAVENOUS AS NEEDED
OUTPATIENT
Start: 2025-05-08

## 2025-05-23 ENCOUNTER — APPOINTMENT (OUTPATIENT)
Facility: HOSPITAL | Age: 75
End: 2025-05-23
Payer: MEDICARE

## 2025-05-23 ENCOUNTER — HOSPITAL ENCOUNTER (EMERGENCY)
Facility: HOSPITAL | Age: 75
Discharge: HOME | End: 2025-05-23
Attending: STUDENT IN AN ORGANIZED HEALTH CARE EDUCATION/TRAINING PROGRAM
Payer: MEDICARE

## 2025-05-23 VITALS
SYSTOLIC BLOOD PRESSURE: 138 MMHG | TEMPERATURE: 97.7 F | RESPIRATION RATE: 18 BRPM | HEIGHT: 67 IN | WEIGHT: 147 LBS | BODY MASS INDEX: 23.07 KG/M2 | OXYGEN SATURATION: 99 % | DIASTOLIC BLOOD PRESSURE: 73 MMHG | HEART RATE: 68 BPM

## 2025-05-23 DIAGNOSIS — S52.001A CLOSED FRACTURE OF PROXIMAL END OF RIGHT ULNA, UNSPECIFIED FRACTURE MORPHOLOGY, INITIAL ENCOUNTER: ICD-10-CM
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
## 2025-05-23 PROCEDURE — 73080 X-RAY EXAM OF ELBOW: CPT

## 2025-05-23 PROCEDURE — 25010000002 MORPHINE PER 10 MG: Performed by: STUDENT IN AN ORGANIZED HEALTH CARE EDUCATION/TRAINING PROGRAM

## 2025-05-23 PROCEDURE — 96374 THER/PROPH/DIAG INJ IV PUSH: CPT

## 2025-05-23 PROCEDURE — 25010000002 ONDANSETRON PER 1 MG: Performed by: STUDENT IN AN ORGANIZED HEALTH CARE EDUCATION/TRAINING PROGRAM

## 2025-05-23 PROCEDURE — 96375 TX/PRO/DX INJ NEW DRUG ADDON: CPT

## 2025-05-23 PROCEDURE — 99284 EMERGENCY DEPT VISIT MOD MDM: CPT | Performed by: STUDENT IN AN ORGANIZED HEALTH CARE EDUCATION/TRAINING PROGRAM

## 2025-05-23 PROCEDURE — 73030 X-RAY EXAM OF SHOULDER: CPT

## 2025-05-23 PROCEDURE — 70450 CT HEAD/BRAIN W/O DYE: CPT

## 2025-05-23 PROCEDURE — 96372 THER/PROPH/DIAG INJ SC/IM: CPT

## 2025-05-23 PROCEDURE — 25010000002 FENTANYL CITRATE (PF) 50 MCG/ML SOLUTION: Performed by: STUDENT IN AN ORGANIZED HEALTH CARE EDUCATION/TRAINING PROGRAM

## 2025-05-23 PROCEDURE — 72125 CT NECK SPINE W/O DYE: CPT

## 2025-05-23 RX ORDER — ACETAMINOPHEN 500 MG
1000 TABLET ORAL ONCE
Status: COMPLETED | OUTPATIENT
Start: 2025-05-23 | End: 2025-05-23

## 2025-05-23 RX ORDER — SODIUM CHLORIDE 0.9 % (FLUSH) 0.9 %
10 SYRINGE (ML) INJECTION AS NEEDED
Status: DISCONTINUED | OUTPATIENT
Start: 2025-05-23 | End: 2025-05-23 | Stop reason: HOSPADM

## 2025-05-23 RX ORDER — ONDANSETRON 4 MG/1
4 TABLET, ORALLY DISINTEGRATING ORAL EVERY 6 HOURS PRN
Qty: 30 TABLET | Refills: 0 | Status: SHIPPED | OUTPATIENT
Start: 2025-05-23

## 2025-05-23 RX ORDER — FENTANYL CITRATE 50 UG/ML
50 INJECTION, SOLUTION INTRAMUSCULAR; INTRAVENOUS ONCE
Refills: 0 | Status: COMPLETED | OUTPATIENT
Start: 2025-05-23 | End: 2025-05-23

## 2025-05-23 RX ORDER — ONDANSETRON 2 MG/ML
4 INJECTION INTRAMUSCULAR; INTRAVENOUS ONCE
Status: COMPLETED | OUTPATIENT
Start: 2025-05-23 | End: 2025-05-23

## 2025-05-23 RX ORDER — OXYCODONE HYDROCHLORIDE 5 MG/1
5 TABLET ORAL EVERY 4 HOURS PRN
Qty: 18 TABLET | Refills: 0 | Status: SHIPPED | OUTPATIENT
Start: 2025-05-23 | End: 2025-05-27

## 2025-05-23 RX ADMIN — ONDANSETRON 4 MG: 2 INJECTION INTRAMUSCULAR; INTRAVENOUS at 15:48

## 2025-05-23 RX ADMIN — ACETAMINOPHEN 1000 MG: 500 TABLET, FILM COATED ORAL at 14:16

## 2025-05-23 RX ADMIN — FENTANYL CITRATE 50 MCG: 50 INJECTION INTRAMUSCULAR; INTRAVENOUS at 15:51

## 2025-05-23 RX ADMIN — MORPHINE SULFATE 4 MG: 4 INJECTION, SOLUTION INTRAMUSCULAR; INTRAVENOUS at 14:17

## 2025-05-23 NOTE — DISCHARGE INSTRUCTIONS
Please keep your splint clean and dry at all times.  Your splint is not allowed to get wet.  You may take acetaminophen in addition to the pain medication as prescribed.  You have been given a prescription for Zofran if the pain medication makes you nauseous.  Please make sure you have appropriate bowel movements if not please start taking MiraLAX.  Please follow-up with orthopedic physicians as soon as possible.

## 2025-05-23 NOTE — FSED PROVIDER NOTE
Subjective  History of Present Illness:    Patient is a 74-year-old female who presented to the emergency department today with complaints of right elbow pain and neck pain after a fall.  Patient states that she was opening the car door just prior to arrival when the wind picked up and flung over the car door pushing her backwards.  Patient states that she tripped backwards and landed her elbows on a [place].  Patient states she hit her head but denied any loss of consciousness.  Patient is currently on Eliquis.  Patient is currently complaining of neck pain in addition to significant elbow pain.  Patient has a obviously enlarged right elbow with significant ecchymosis noted at this time.  Patient has pain with any movements of her left elbow.  Patient's left elbow is flexed at 90 degrees.  Patient has normal range of motion in bilateral wrist and hands.  Patient denies any snuffbox tenderness.  Patient denied any numbness at this time.      Nurses Notes reviewed and agree, including vitals, allergies, social history and prior medical history.     REVIEW OF SYSTEMS: All systems reviewed and not pertinent unless noted.  Review of Systems   Constitutional:  Negative for chills, diaphoresis and fever.   HENT:  Negative for ear pain, rhinorrhea and sore throat.    Respiratory:  Negative for cough and shortness of breath.    Cardiovascular:  Negative for chest pain and palpitations.   Gastrointestinal:  Negative for abdominal pain, constipation, diarrhea, nausea and vomiting.   Musculoskeletal:  Positive for myalgias and neck pain.   Skin:  Negative for color change.   Neurological:  Negative for dizziness, weakness, numbness and headaches.   All other systems reviewed and are negative.      Past Medical History:   Diagnosis Date    DAVID-fib     HAD ABLATION FOR THIS IN 2017    Anemia     Anxiety and depression 12/11/2016    GERD (gastroesophageal reflux disease)     Glaucoma of left eye  12/11/2016    H/O migraine     LAST 3  YEARS AGO     History of MRSA infection     APPROX 8 YEARS, TREATED WITH ORAL ABX, NEGATIVE CULTURES FOLLOWING [place]     History of transfusion 12/2023    [facility], GI Bleed, 4 units, no reactions    Hyperlipidemia     Hypertension     MEDS DC'D BY DR GOMEZ 1/2017    Insomnia 12/11/2016    Kidney disease     Peptic ulcer - s/p gastric bypass surgery (7-8 years ago) 12/11/2016    Rheumatoid arthritis 12/11/2016    Spinal headache     FOLLOWING SPINAL FOR CHILDBIRTH     Vitamin D deficiency 12/11/2016    Wears dentures     UPPER PLATE     Elodia-Parkinson-White (WPW) syndrome     1990s       Allergies:    Bactrim [sulfamethoxazole-trimethoprim], Percocet [oxycodone-acetaminophen], Statins, Sulfa antibiotics, and Erythromycin      Past Surgical History:   Procedure Laterality Date    ABLATION OF DYSRHYTHMIC FOCUS  1996    ATRIAL APPENDAGE EXCLUSION LEFT WITH TRANSESOPHAGEAL ECHOCARDIOGRAM N/A 05/22/2024    Procedure: Atrial Appendage Occlusion;  Surgeon: Jesse Keen DO;  Location:  [facility];  Service: Cardiology;  Laterality: N/A;    BREAST BIOPSY  ~1990    CARDIAC CATHETERIZATION N/A 10/24/2016    Procedure: Left Heart Cath;  Surgeon: Ruebns Morejon MD;  Location:  [facility];  Service:     CARDIAC ELECTROPHYSIOLOGY PROCEDURE N/A 06/20/2017    Procedure: PVA;  Surgeon: Edvin Gomez DO;  Location:  [facility];  Service:     CHOLECYSTECTOMY  ~1990    COLONOSCOPY  2007    ENDOSCOPY N/A 12/18/2023    Procedure: ESOPHAGOGASTRODUODENOSCOPY;  Surgeon: Adeel Reyes MD;  Location:  [facility];  Service: Gastroenterology;  Laterality: N/A;  epi 1mg diluted in 10mls. 1 ml given    GASTRIC BYPASS      GASTRIC BYPASS  2005    HAMMER TOE REPAIR      LEFT--GARO IN PLACE     HYSTERECTOMY  1993    Complete    ORIF HUMERUS FRACTURE Right 02/08/2019    Procedure: ORIF RIGHT PROXIMAL HUMERUS;  Surgeon: Yonatan Galdamez MD;  Location:   "[facility];  Service: Orthopedics    ORIF HUMERUS FRACTURE Right 2021    Procedure: HUMERUS PROXIMAL OPEN REDUCTION INTERNAL FIXATION RIGHT WITH HARDWARE REMOVAL AND NAIL PLACEMENT;  Surgeon: Yonatan Galdamez MD;  Location:  [facility];  Service: Orthopedics;  Laterality: Right;    SHOULDER ACROMIOPLASTY Right 2019    WRIST FRACTURE SURGERY Right          Social History     Socioeconomic History    Marital status:    Tobacco Use    Smoking status: Former     Current packs/day: 0.00     Average packs/day: 1 pack/day for 30.0 years (30.0 ttl pk-yrs)     Types: Cigarettes     Quit date:      Years since quittin.4    Smokeless tobacco: Never   Vaping Use    Vaping status: Never Used   Substance and Sexual Activity    Alcohol use: Yes     Alcohol/week: 1.0 standard drink of alcohol     Types: 1 Glasses of wine per week     Comment: occational    Drug use: No    Sexual activity: Yes     Partners: Male     Birth control/protection: Hysterectomy         Family History   Problem Relation Age of Onset    Arrhythmia Mother     Hypertension Mother     Stroke Mother     Diabetes Mother     Lung cancer Father     Breast cancer Sister     Breast cancer Maternal Aunt     Diabetes Son         Type 1    Breast cancer Cousin     Ovarian cancer Cousin        Objective  Physical Exam:  /73   Pulse 68   Temp 97.7 °F (36.5 °C) (Oral)   Resp 18   Ht 170.2 cm (67\")   Wt 66.7 kg (147 lb)   LMP  (LMP Unknown)   SpO2 99%   BMI 23.02 kg/m²      Physical Exam  Vitals and nursing note reviewed.   Constitutional:       General: She is not in acute distress.     Appearance: Normal appearance. She is normal weight. She is not ill-appearing, toxic-appearing or diaphoretic.   HENT:      Head: Normocephalic and atraumatic.      Right Ear: Tympanic membrane, ear canal and external ear normal.      Left Ear: Tympanic membrane, ear canal and external ear normal.      Nose: Nose normal.      Mouth/Throat:      Mouth: " Mucous membranes are moist.      Pharynx: Oropharynx is clear.   Eyes:      Extraocular Movements: Extraocular movements intact.      Conjunctiva/sclera: Conjunctivae normal.      Pupils: Pupils are equal, round, and reactive to light.   Cardiovascular:      Rate and Rhythm: Normal rate and regular rhythm.      Pulses: Normal pulses.      Heart sounds: Normal heart sounds.   Pulmonary:      Effort: Pulmonary effort is normal.      Breath sounds: Normal breath sounds. No stridor. No wheezing, rhonchi or rales.   Abdominal:      General: Bowel sounds are normal.      Palpations: Abdomen is soft.      Tenderness: There is no abdominal tenderness.   Musculoskeletal:         General: Normal range of motion.      Right upper arm: Tenderness present.      Right elbow: Swelling and effusion present. Tenderness present.      Left elbow: Normal.      Right forearm: Normal.      Left forearm: Normal.      Right wrist: Normal. Normal range of motion.      Left wrist: Normal. Normal range of motion.      Right hand: Normal. No tenderness. Normal pulse.      Left hand: Normal. No tenderness. Normal pulse.        Arms:       Cervical back: Normal range of motion and neck supple. Tenderness present.   Skin:     General: Skin is warm and dry.      Capillary Refill: Capillary refill takes less than 2 seconds.      Findings: Bruising present.   Neurological:      General: No focal deficit present.      Mental Status: She is alert and oriented to person, place, and time. Mental status is at baseline.      Sensory: No sensory deficit.      Motor: No weakness.      Gait: Gait normal.   Psychiatric:         Mood and Affect: Mood normal.         Behavior: Behavior normal.         Thought Content: Thought content normal.         Judgment: Judgment normal.         Splint - Cast - Strapping    Date/Time: 5/23/2025 4:10 PM    Performed by: Tiffanie Stoner PA-C  Authorized by: Max Francis MD    Consent:     Consent obtained:   Verbal    Consent given by:  Patient    Risks, benefits, and alternatives were discussed: yes      Risks discussed:  Discoloration, numbness, pain and swelling    Alternatives discussed:  No treatment, delayed treatment and alternative treatment  Universal protocol:     Procedure explained and questions answered to patient or proxy's satisfaction: yes      Imaging studies available: yes      Patient identity confirmed:  Verbally with patient  Pre-procedure details:     Distal neurologic exam:  Normal    Distal perfusion: distal pulses strong and brisk capillary refill    Procedure details:     Location:  Elbow    Elbow location:  R elbow    Upper extremity splint type: Posterior long-arm splint.    Supplies:  Fiberglass    Attestation: Splint applied and adjusted personally by me    Post-procedure details:     Distal neurologic exam:  Normal    Distal perfusion: distal pulses strong and brisk capillary refill      Procedure completion:  Tolerated well, no immediate complications    Post-procedure imaging: not applicable        ED Course:         Lab Results (last 24 hours)       ** No results found for the last 24 hours. **             CT Head Without Contrast  Result Date: 5/23/2025  CT HEAD WO CONTRAST, CT CERVICAL SPINE WO CONTRAST Date of Exam: 5/23/2025 3:15 PM EDT Indication: Fall. Comparison: 7/16/2022 Technique: Axial CT images were obtained of the head and cervical spine without contrast administration.  Automated exposure control and iterative construction methods were used. Findings: CT head no large territory infarct. There is no evidence of hemorrhage. No mass effect, edema or midline shift Mild periventricular and subcortical white matter hypodensities, nonspecific but most likely represents chronic small vessel ischemic changes. No extra-axial fluid collection. The ventricles are normal in size and configuration. Mild diffuse parenchymal volume loss The visualized orbits are unremarkable. Near  complete mucosal thickening of the left maxillary sinus. The remainder of the paranasal sinuses and mastoid air cells are clear. The visualized soft tissues are unremarkable. No acute osseous abnormality. CT cervical spine: No acute fracture or traumatic malalignment. Alignment: Straightening of the cervical spine, most likely positional or due to muscle strain. No definite subluxation. Vertebral body height: Maintained Disc spaces: Moderate degenerative changes noted throughout the visualized spine. Soft tissue: Normal Other Bones:No acute abnormality.     Impression: Impression: CT HEAD: No acute intracranial abnormality. CT CERVICAL SPINE: No acute fracture or traumatic malalignment of the cervical spine. Electronically Signed: Tomasz Lamar DO  5/23/2025 3:27 PM EDT  Workstation ID: [id number]    CT Cervical Spine Without Contrast  Result Date: 5/23/2025  CT HEAD WO CONTRAST, CT CERVICAL SPINE WO CONTRAST Date of Exam: 5/23/2025 3:15 PM EDT Indication: Fall. Comparison: 7/16/2022 Technique: Axial CT images were obtained of the head and cervical spine without contrast administration.  Automated exposure control and iterative construction methods were used. Findings: CT head no large territory infarct. There is no evidence of hemorrhage. No mass effect, edema or midline shift Mild periventricular and subcortical white matter hypodensities, nonspecific but most likely represents chronic small vessel ischemic changes. No extra-axial fluid collection. The ventricles are normal in size and configuration. Mild diffuse parenchymal volume loss The visualized orbits are unremarkable. Near complete mucosal thickening of the left maxillary sinus. The remainder of the paranasal sinuses and mastoid air cells are clear. The visualized soft tissues are unremarkable. No acute osseous abnormality. CT cervical spine: No acute fracture or traumatic malalignment. Alignment: Straightening of the cervical spine, most likely  positional or due to muscle strain. No definite subluxation. Vertebral body height: Maintained Disc spaces: Moderate degenerative changes noted throughout the visualized spine. Soft tissue: Normal Other Bones:No acute abnormality.     Impression: Impression: CT HEAD: No acute intracranial abnormality. CT CERVICAL SPINE: No acute fracture or traumatic malalignment of the cervical spine. Electronically Signed: Tomasz LamarDO  5/23/2025 3:27 PM EDT  Workstation ID: [id number]    XR Shoulder 2+ View Right  Result Date: 5/23/2025  XR SHOULDER 2+ VW RIGHT Date of Exam: 5/23/2025 1:55 PM EDT Indication: fall, shoulder pain Comparison: 11/15/2021 Findings: There is a chronic fracture deformity of the proximal humerus with postoperative changes of intramedullary alicia and screw fixation. There is no acute fracture. Degenerative changes are present in the acromioclavicular and glenohumeral joints. The soft tissues are unremarkable.     Impression: Impression: Postsurgical changes of ORIF proximal humeral fracture. No acute osseous abnormality. Electronically Signed: Luci Dodge MD  5/23/2025 2:19 PM EDT  Workstation ID: [id number]    XR Elbow 3+ View Right  Result Date: 5/23/2025  XR ELBOW 3+ VW RIGHT Date of Exam: 5/23/2025 1:55 PM EDT Indication: fall, edema, limited ROM Comparison: 7/17/2022 Findings: Postoperative changes of nail and alicia fixation of the humerus are partially seen. There is a acute fracture of the olecranon extending to the humeroulnar Articular surface with approximately 7 mm distraction. There is an elbow joint effusion. There is prominent soft tissue swelling along the posterior elbow/proximal ulna.     Impression: Impression: Mildly distracted, intra-articular fracture of the proximal ulna. Electronically Signed: Luci Dodge MD  5/23/2025 2:18 PM EDT  Workstation ID: [id number]         [facility]     Amount and/or Complexity of Data Reviewed  Tests in the radiology section of CPT®:  reviewed        Initial impression of presenting illness: Elbow pain    DDX: includes but is not limited to: Sprain versus strain versus contusion versus fracture    Patient arrives private vehicle with vitals interpreted by myself.     Pertinent features from physical exam: Significant swelling to right elbow in addition to pain with palpation of cervical spine.    Initial diagnostic plan: CT head and neck, x-rays of elbow    Results from initial plan were reviewed and interpreted by me revealing patient CT head and neck were nonactionable.  Patient's elbow x-ray revealed a intra-articular proximal ulnar fracture    Diagnostic information from other sources: Previous medical records    Interventions / Re-evaluation: Patient was given an injection of IM morphine in addition to p.o. Tylenol.  Patient had little pain improvement therefore she was given IV fentanyl which significantly improved the patient's pain.  Patient was found to have a fracture of her proximal shoulder on the right therefore she was placed in a posterior long-arm splint.  Patient tolerated the splint well.    Medications   morphine injection 4 mg (4 mg Intramuscular Given 5/23/25 1417)   acetaminophen (TYLENOL) tablet 1,000 mg (1,000 mg Oral Given 5/23/25 1416)   fentaNYL citrate (PF) (SUBLIMAZE) injection 50 mcg (50 mcg Intravenous Given 5/23/25 1551)   ondansetron (ZOFRAN) injection 4 mg (4 mg Intravenous Given 5/23/25 1548)       Results/clinical rationale were discussed with patient patient's family members    Consultations/Discussion of results with other physicians: Dr. Francis    Patient's Jonathan was tried to request report #[id number].  Patient was educated on substance abuse and at the risk of experiencing substance abuse with narcotic pain medications.  Patient was informed not to give her pain medications to anyone.    Data interpreted: Nursing notes reviewed, vital signs reviewed.  Imaging independently interpreted by me (x-ray, CT  scan).  O2 saturation: 99% on room air    Counseling: Discussed the results above with the patient regarding need for admission or discharge.  Patient understands and agrees plan of care.      Patient is a 74-year-old female who presented to the emergency department today after a fall.  Patient denies any loss of consciousness.  Patient states the wind pushed her backwards and she hit her elbow on a tombstone.  Patient CT head and neck were nonactionable.  Patient's x-rays of her elbow revealed a mildly distracted intra-articular fracture of the proximal ulna.  Patient had 2+ radial pulses distally.  Patient had normal neurovascular function distally with normal  strength bilaterally.  Patient denied any snuffbox tenderness.  Patient was placed in a posterior long-arm splint.  Patient was informed of appropriate splint care and the need to not get the splint wet.  Patient was also given a sling.  Patient was given IM morphine, p.o. acetaminophen in the ER for pain.  Patient was also given IV fentanyl.  Patient's pain has significantly improved after the IV fentanyl.  Patient states she can takes oxycodone by itself however when it is Percocet she has problems.  Patient will be discharged home with prescription for oxycodone to use as needed for pain in addition to acetaminophen as needed for pain.  Patient encouraged to follow-up with orthopedics as soon as possible for reevaluation given the fracture seen today on x-ray.  Patient states she has her own orthopedic physician that she will try to follow-up with however she was also given a referral.  Patient was informed of concerning symptoms that require immediate return to the emergency department.  Patient will be discharged home in stable condition at this time.    -----  ED Disposition       ED Disposition   Discharge    Condition   Stable    Comment   --             Final diagnoses:   Fall, initial encounter   Closed fracture of proximal end of right ulna,  unspecified fracture morphology, initial encounter      Your Follow-Up Providers       [facility].    Specialty: Orthopedic Surgery  [address]  [facility] [address] [phone]  Additional information:  Phone Number [phone].  Located on the same side of the street as [employer] and [place].  Entrance Information: Patients are encouraged to park in front of [facility], using Entrance A, near the fountain.              Aniyah Goldberg MD.    Specialty: Internal Medicine  [address]  [address]  [place] [address]  [phone]               Yonatan Galdamez MD. Schedule an appointment as soon as possible for a visit on 5/26/2025.    Specialty: Orthopedic Surgery  [address] [place] [id number] [phone]                       Contact information for after-discharge care    Follow-up information has not been specified.                    Your medication list        START taking these medications        Instructions Last Dose Given Next Dose Due   naloxone 4 MG/0.1ML nasal spray  Commonly known as: NARCAN      Call 911. Don't prime. [place] in 1 nostril for overdose. Repeat in 2-3 minutes in other nostril if no or minimal breathing/responsiveness.       oxyCODONE 5 MG immediate release tablet  Commonly known as: Roxicodone      Take 1 tablet by mouth Every 4 (Four) Hours As Needed for Moderate Pain or Severe Pain for up to 4 days.              CHANGE how you take these medications        Instructions Last Dose Given Next Dose Due   ondansetron ODT 4 MG disintegrating tablet  Commonly known as: ZOFRAN-ODT  What changed:   when to take this  reasons to take this      Take 1 tablet by mouth Every 6 (Six) Hours As Needed for Nausea or Vomiting.              CONTINUE taking these medications        Instructions Last Dose Given Next Dose Due   amitriptyline 25 MG tablet  Commonly known as: ELAVIL       Take 1 tablet by mouth Every Night.       amLODIPine 5 MG tablet  Commonly known as: NORVASC      Take 1 tablet by mouth Daily.       apixaban 5 MG tablet tablet  Commonly known as: ELIQUIS      Take 1 tablet by mouth Every 12 (Twelve) Hours. Start 1 week prior to ablation       aspirin 81 MG EC tablet      Take 1 tablet by mouth Daily. Start on the morning of Watchman procedure       B-1 PO      Take 250 mg by mouth.       flecainide 50 MG tablet  Commonly known as: TAMBOCOR      Take 1 tablet by mouth Every 12 (Twelve) Hours.       FLUoxetine 40 MG capsule  Commonly known as: PROzac      Take 1 capsule by mouth Daily.       folic acid 1 MG tablet  Commonly known as: FOLVITE           IRON CR PO      Take 1 dose by mouth Daily. gummies       methotrexate 2.5 MG tablet      1 tablet.       metoprolol succinate XL 25 MG 24 hr tablet  Commonly known as: TOPROL-XL      Take 1 tablet by mouth Daily for 30 days.       omeprazole 20 MG capsule  Commonly known as: priLOSEC      Take 1 capsule by mouth Daily.       pantoprazole 40 MG EC tablet  Commonly known as: PROTONIX      Take 1 tablet by mouth 2 (Two) Times a Day Before Meals.       pregabalin 50 MG capsule  Commonly known as: LYRICA           Prolia 60 MG/ML solution prefilled syringe syringe  Generic drug: denosumab      1 mL.       promethazine 25 MG tablet  Commonly known as: PHENERGAN      Take 0.5 tablets by mouth Every 6 (Six) Hours As Needed for Nausea or Vomiting.       timolol 0.5 % ophthalmic solution  Commonly known as: TIMOPTIC      Administer 1 drop into the left eye 2 (Two) Times a Day.       traMADol 50 MG tablet  Commonly known as: ULTRAM      Take 1 tablet by mouth Every 6 (Six) Hours As Needed for pain.       traZODone 100 MG tablet  Commonly known as: DESYREL      Take 1 tablet by mouth Every Night.       vitamin D 1.25 MG (30491 UT) capsule capsule  Commonly known as: ERGOCALCIFEROL      Take 1 capsule by mouth 1 (One) Time Per Week. Sundays        vitamin D3 125 MCG (5000 UT) tablet tablet      Take 1 tablet by mouth Daily.                 Where to Get Your Medications        These medications were sent to [facility] [id number] - [place], [place] - [address] [place] [address] - [phone]  - [phone] FX  [address] [address]      Phone: [phone]   naloxone 4 MG/0.1ML nasal spray  ondansetron ODT 4 MG disintegrating tablet  oxyCODONE 5 MG immediate release tablet

## 2025-05-29 ENCOUNTER — ANESTHESIA EVENT (OUTPATIENT)
Dept: PERIOP | Facility: HOSPITAL | Age: 75
End: 2025-05-29
Payer: MEDICARE

## 2025-05-29 ENCOUNTER — HOSPITAL ENCOUNTER (OUTPATIENT)
Facility: HOSPITAL | Age: 75
Discharge: HOME | End: 2025-06-01
Attending: ORTHOPAEDIC SURGERY | Admitting: ORTHOPAEDIC SURGERY
Payer: MEDICARE

## 2025-05-29 ENCOUNTER — ANESTHESIA (OUTPATIENT)
Dept: PERIOP | Facility: HOSPITAL | Age: 75
End: 2025-05-29
Payer: MEDICARE

## 2025-05-29 ENCOUNTER — APPOINTMENT (OUTPATIENT)
Dept: GENERAL RADIOLOGY | Facility: HOSPITAL | Age: 75
End: 2025-05-29
Payer: MEDICARE

## 2025-05-29 ENCOUNTER — ANESTHESIA EVENT CONVERTED (OUTPATIENT)
Dept: ANESTHESIOLOGY | Facility: HOSPITAL | Age: 75
End: 2025-05-29
Payer: MEDICARE

## 2025-05-29 DIAGNOSIS — Z98.890 S/P ORIF (OPEN REDUCTION INTERNAL FIXATION) FRACTURE: Primary | ICD-10-CM

## 2025-05-29 DIAGNOSIS — Z87.81 S/P ORIF (OPEN REDUCTION INTERNAL FIXATION) FRACTURE: Primary | ICD-10-CM

## 2025-05-29 PROBLEM — S52.023A OLECRANON FRACTURE: Status: ACTIVE | Noted: 2025-05-29

## 2025-05-29 LAB
ANION GAP SERPL CALCULATED.3IONS-SCNC: 9 MMOL/L (ref 5–15)
BUN SERPL-MCNC: 22.8 MG/DL (ref 8–23)
BUN/CREAT SERPL: 22.8 (ref 7–25)
CALCIUM SPEC-SCNC: 9.1 MG/DL (ref 8.6–10.5)
CHLORIDE SERPL-SCNC: 101 MMOL/L (ref 98–107)
CO2 SERPL-SCNC: 27 MMOL/L (ref 22–29)
CREAT SERPL-MCNC: 1 MG/DL (ref 0.57–1)
EGFRCR SERPLBLD CKD-EPI 2021: 58.5 ML/MIN/1.73
GLUCOSE SERPL-MCNC: 99 MG/DL (ref 65–99)
POTASSIUM SERPL-SCNC: 3.9 MMOL/L (ref 3.5–5.2)
POTASSIUM SERPL-SCNC: 4.1 MMOL/L (ref 3.5–5.2)
SODIUM SERPL-SCNC: 137 MMOL/L (ref 136–145)

## 2025-05-29 PROCEDURE — 25010000002 BUPIVACAINE (PF) 0.25 % SOLUTION: Performed by: NURSE ANESTHETIST, CERTIFIED REGISTERED

## 2025-05-29 PROCEDURE — 25810000003 LACTATED RINGERS PER 1000 ML: Performed by: ANESTHESIOLOGY

## 2025-05-29 PROCEDURE — 25010000002 FENTANYL CITRATE (PF) 50 MCG/ML SOLUTION

## 2025-05-29 PROCEDURE — 84132 ASSAY OF SERUM POTASSIUM: CPT | Performed by: ORTHOPAEDIC SURGERY

## 2025-05-29 PROCEDURE — C1713 ANCHOR/SCREW BN/BN,TIS/BN: HCPCS | Performed by: ORTHOPAEDIC SURGERY

## 2025-05-29 PROCEDURE — 25010000002 LIDOCAINE PF 1% 1 % SOLUTION: Performed by: ANESTHESIOLOGY

## 2025-05-29 PROCEDURE — 25010000002 CEFAZOLIN PER 500 MG: Performed by: ORTHOPAEDIC SURGERY

## 2025-05-29 PROCEDURE — 25010000002 ROPIVACAINE HCL-NACL 0.2-0.9 % SOLUTION: Performed by: ORTHOPAEDIC SURGERY

## 2025-05-29 PROCEDURE — 73070 X-RAY EXAM OF ELBOW: CPT

## 2025-05-29 PROCEDURE — 25010000002 FENTANYL CITRATE (PF) 50 MCG/ML SOLUTION: Performed by: NURSE ANESTHETIST, CERTIFIED REGISTERED

## 2025-05-29 PROCEDURE — 25010000002 DEXAMETHASONE PER 1 MG: Performed by: NURSE ANESTHETIST, CERTIFIED REGISTERED

## 2025-05-29 PROCEDURE — A9270 NON-COVERED ITEM OR SERVICE: HCPCS | Performed by: INTERNAL MEDICINE

## 2025-05-29 PROCEDURE — 25010000002 LIDOCAINE PF 1% 1 % SOLUTION: Performed by: NURSE ANESTHETIST, CERTIFIED REGISTERED

## 2025-05-29 PROCEDURE — 63710000001 FLUOXETINE 20 MG CAPSULE: Performed by: INTERNAL MEDICINE

## 2025-05-29 PROCEDURE — G0378 HOSPITAL OBSERVATION PER HR: HCPCS

## 2025-05-29 PROCEDURE — 25010000002 HYDROMORPHONE 1 MG/ML SOLUTION

## 2025-05-29 PROCEDURE — 80048 BASIC METABOLIC PNL TOTAL CA: CPT | Performed by: ANESTHESIOLOGY

## 2025-05-29 PROCEDURE — 25010000002 ROPIVACAINE HCL-NACL 0.2-0.9 % SOLUTION: Performed by: NURSE ANESTHETIST, CERTIFIED REGISTERED

## 2025-05-29 PROCEDURE — 25010000002 ONDANSETRON PER 1 MG: Performed by: NURSE ANESTHETIST, CERTIFIED REGISTERED

## 2025-05-29 PROCEDURE — 63710000001 FLECAINIDE 50 MG TABLET: Performed by: INTERNAL MEDICINE

## 2025-05-29 PROCEDURE — 63710000001 TRAZODONE 50 MG TABLET: Performed by: INTERNAL MEDICINE

## 2025-05-29 PROCEDURE — C1755 CATHETER, INTRASPINAL: HCPCS | Performed by: ORTHOPAEDIC SURGERY

## 2025-05-29 PROCEDURE — 25010000002 PROPOFOL 10 MG/ML EMULSION: Performed by: NURSE ANESTHETIST, CERTIFIED REGISTERED

## 2025-05-29 RX ORDER — PREGABALIN 75 MG/1
75 CAPSULE ORAL ONCE
Status: DISCONTINUED | OUTPATIENT
Start: 2025-05-29 | End: 2025-05-29 | Stop reason: HOSPADM

## 2025-05-29 RX ORDER — HYDROCODONE BITARTRATE AND ACETAMINOPHEN 5; 325 MG/1; MG/1
1 TABLET ORAL ONCE AS NEEDED
Status: DISCONTINUED | OUTPATIENT
Start: 2025-05-29 | End: 2025-05-29 | Stop reason: HOSPADM

## 2025-05-29 RX ORDER — ONDANSETRON 2 MG/ML
INJECTION INTRAMUSCULAR; INTRAVENOUS AS NEEDED
Status: DISCONTINUED | OUTPATIENT
Start: 2025-05-29 | End: 2025-05-29 | Stop reason: SURG

## 2025-05-29 RX ORDER — HYDROCODONE BITARTRATE AND ACETAMINOPHEN 7.5; 325 MG/1; MG/1
1 TABLET ORAL EVERY 4 HOURS PRN
Status: DISCONTINUED | OUTPATIENT
Start: 2025-05-29 | End: 2025-05-29 | Stop reason: HOSPADM

## 2025-05-29 RX ORDER — OXYCODONE HCL 10 MG/1
5 TABLET, FILM COATED, EXTENDED RELEASE ORAL EVERY 4 HOURS PRN
COMMUNITY
End: 2025-06-01 | Stop reason: HOSPADM

## 2025-05-29 RX ORDER — LIDOCAINE HYDROCHLORIDE 10 MG/ML
0.5 INJECTION, SOLUTION EPIDURAL; INFILTRATION; INTRACAUDAL; PERINEURAL ONCE AS NEEDED
Status: COMPLETED | OUTPATIENT
Start: 2025-05-29 | End: 2025-05-29

## 2025-05-29 RX ORDER — NALOXONE HCL 0.4 MG/ML
0.1 VIAL (ML) INJECTION
Status: DISCONTINUED | OUTPATIENT
Start: 2025-05-29 | End: 2025-06-01 | Stop reason: HOSPADM

## 2025-05-29 RX ORDER — TRANEXAMIC ACID 10 MG/ML
1000 INJECTION, SOLUTION INTRAVENOUS ONCE
Status: COMPLETED | OUTPATIENT
Start: 2025-05-29 | End: 2025-05-29

## 2025-05-29 RX ORDER — PANTOPRAZOLE SODIUM 40 MG/1
40 TABLET, DELAYED RELEASE ORAL
Status: DISCONTINUED | OUTPATIENT
Start: 2025-05-30 | End: 2025-06-01 | Stop reason: HOSPADM

## 2025-05-29 RX ORDER — SODIUM CHLORIDE 0.9 % (FLUSH) 0.9 %
10 SYRINGE (ML) INJECTION EVERY 12 HOURS SCHEDULED
Status: DISCONTINUED | OUTPATIENT
Start: 2025-05-29 | End: 2025-06-01 | Stop reason: HOSPADM

## 2025-05-29 RX ORDER — DEXAMETHASONE SODIUM PHOSPHATE 4 MG/ML
INJECTION, SOLUTION INTRA-ARTICULAR; INTRALESIONAL; INTRAMUSCULAR; INTRAVENOUS; SOFT TISSUE AS NEEDED
Status: DISCONTINUED | OUTPATIENT
Start: 2025-05-29 | End: 2025-05-29 | Stop reason: SURG

## 2025-05-29 RX ORDER — BUPIVACAINE HYDROCHLORIDE 2.5 MG/ML
INJECTION, SOLUTION EPIDURAL; INFILTRATION; INTRACAUDAL; PERINEURAL
Status: COMPLETED | OUTPATIENT
Start: 2025-05-29 | End: 2025-05-29

## 2025-05-29 RX ORDER — DROPERIDOL 2.5 MG/ML
0.62 INJECTION, SOLUTION INTRAMUSCULAR; INTRAVENOUS ONCE AS NEEDED
Status: DISCONTINUED | OUTPATIENT
Start: 2025-05-29 | End: 2025-05-29 | Stop reason: HOSPADM

## 2025-05-29 RX ORDER — HYDROMORPHONE HYDROCHLORIDE 1 MG/ML
0.5 INJECTION, SOLUTION INTRAMUSCULAR; INTRAVENOUS; SUBCUTANEOUS
Status: DISCONTINUED | OUTPATIENT
Start: 2025-05-29 | End: 2025-05-29 | Stop reason: HOSPADM

## 2025-05-29 RX ORDER — PROPOFOL 10 MG/ML
VIAL (ML) INTRAVENOUS AS NEEDED
Status: DISCONTINUED | OUTPATIENT
Start: 2025-05-29 | End: 2025-05-29 | Stop reason: SURG

## 2025-05-29 RX ORDER — PROMETHAZINE HYDROCHLORIDE 25 MG/1
25 TABLET ORAL ONCE AS NEEDED
Status: DISCONTINUED | OUTPATIENT
Start: 2025-05-29 | End: 2025-05-29 | Stop reason: HOSPADM

## 2025-05-29 RX ORDER — MIDAZOLAM HYDROCHLORIDE 1 MG/ML
0.5 INJECTION, SOLUTION INTRAMUSCULAR; INTRAVENOUS
Status: DISCONTINUED | OUTPATIENT
Start: 2025-05-29 | End: 2025-05-29 | Stop reason: HOSPADM

## 2025-05-29 RX ORDER — DROPERIDOL 2.5 MG/ML
0.62 INJECTION, SOLUTION INTRAMUSCULAR; INTRAVENOUS
Status: DISCONTINUED | OUTPATIENT
Start: 2025-05-29 | End: 2025-05-29 | Stop reason: HOSPADM

## 2025-05-29 RX ORDER — PROMETHAZINE HYDROCHLORIDE 25 MG/1
25 SUPPOSITORY RECTAL ONCE AS NEEDED
Status: DISCONTINUED | OUTPATIENT
Start: 2025-05-29 | End: 2025-05-29 | Stop reason: HOSPADM

## 2025-05-29 RX ORDER — AMLODIPINE BESYLATE 5 MG/1
5 TABLET ORAL
Status: DISCONTINUED | OUTPATIENT
Start: 2025-05-30 | End: 2025-06-01 | Stop reason: HOSPADM

## 2025-05-29 RX ORDER — NITROGLYCERIN 0.4 MG/1
0.4 TABLET SUBLINGUAL
Status: DISCONTINUED | OUTPATIENT
Start: 2025-05-29 | End: 2025-06-01 | Stop reason: HOSPADM

## 2025-05-29 RX ORDER — ASPIRIN 81 MG/1
81 TABLET ORAL DAILY
Status: DISCONTINUED | OUTPATIENT
Start: 2025-05-30 | End: 2025-06-01 | Stop reason: HOSPADM

## 2025-05-29 RX ORDER — FENTANYL CITRATE 50 UG/ML
INJECTION, SOLUTION INTRAMUSCULAR; INTRAVENOUS
Status: COMPLETED | OUTPATIENT
Start: 2025-05-29 | End: 2025-05-29

## 2025-05-29 RX ORDER — ONDANSETRON 2 MG/ML
4 INJECTION INTRAMUSCULAR; INTRAVENOUS ONCE AS NEEDED
Status: DISCONTINUED | OUTPATIENT
Start: 2025-05-29 | End: 2025-05-29 | Stop reason: HOSPADM

## 2025-05-29 RX ORDER — FAMOTIDINE 20 MG/1
20 TABLET, FILM COATED ORAL ONCE
Status: DISCONTINUED | OUTPATIENT
Start: 2025-05-29 | End: 2025-05-29 | Stop reason: HOSPADM

## 2025-05-29 RX ORDER — ACETAMINOPHEN 500 MG
1000 TABLET ORAL ONCE
Status: COMPLETED | OUTPATIENT
Start: 2025-05-29 | End: 2025-05-29

## 2025-05-29 RX ORDER — FENTANYL CITRATE 50 UG/ML
50 INJECTION, SOLUTION INTRAMUSCULAR; INTRAVENOUS
Status: DISCONTINUED | OUTPATIENT
Start: 2025-05-29 | End: 2025-05-29 | Stop reason: HOSPADM

## 2025-05-29 RX ORDER — HYDRALAZINE HYDROCHLORIDE 20 MG/ML
5 INJECTION INTRAMUSCULAR; INTRAVENOUS
Status: DISCONTINUED | OUTPATIENT
Start: 2025-05-29 | End: 2025-05-29 | Stop reason: HOSPADM

## 2025-05-29 RX ORDER — BUPIVACAINE HCL/0.9 % NACL/PF 0.125 %
PLASTIC BAG, INJECTION (ML) EPIDURAL AS NEEDED
Status: DISCONTINUED | OUTPATIENT
Start: 2025-05-29 | End: 2025-05-29 | Stop reason: SURG

## 2025-05-29 RX ORDER — FLECAINIDE ACETATE 50 MG/1
50 TABLET ORAL EVERY 12 HOURS
Status: DISCONTINUED | OUTPATIENT
Start: 2025-05-29 | End: 2025-06-01

## 2025-05-29 RX ORDER — SODIUM CHLORIDE 0.9 % (FLUSH) 0.9 %
10 SYRINGE (ML) INJECTION AS NEEDED
Status: DISCONTINUED | OUTPATIENT
Start: 2025-05-29 | End: 2025-06-01 | Stop reason: HOSPADM

## 2025-05-29 RX ORDER — SODIUM CHLORIDE 9 MG/ML
9 INJECTION, SOLUTION INTRAVENOUS AS NEEDED
Status: DISCONTINUED | OUTPATIENT
Start: 2025-05-29 | End: 2025-05-29 | Stop reason: HOSPADM

## 2025-05-29 RX ORDER — LABETALOL HYDROCHLORIDE 5 MG/ML
5 INJECTION, SOLUTION INTRAVENOUS
Status: DISCONTINUED | OUTPATIENT
Start: 2025-05-29 | End: 2025-05-29 | Stop reason: HOSPADM

## 2025-05-29 RX ORDER — ROPIVACAINE HYDROCHLORIDE 2 MG/ML
INJECTION, SOLUTION EPIDURAL; INFILTRATION; PERINEURAL CONTINUOUS
Status: DISCONTINUED | OUTPATIENT
Start: 2025-05-29 | End: 2025-06-01 | Stop reason: HOSPADM

## 2025-05-29 RX ORDER — ONDANSETRON 4 MG/1
4 TABLET, ORALLY DISINTEGRATING ORAL EVERY 6 HOURS PRN
Status: DISCONTINUED | OUTPATIENT
Start: 2025-05-29 | End: 2025-06-01 | Stop reason: HOSPADM

## 2025-05-29 RX ORDER — SODIUM CHLORIDE 0.9 % (FLUSH) 0.9 %
10 SYRINGE (ML) INJECTION AS NEEDED
Status: DISCONTINUED | OUTPATIENT
Start: 2025-05-29 | End: 2025-05-29 | Stop reason: HOSPADM

## 2025-05-29 RX ORDER — FAMOTIDINE 10 MG/ML
20 INJECTION, SOLUTION INTRAVENOUS ONCE
Status: DISCONTINUED | OUTPATIENT
Start: 2025-05-29 | End: 2025-05-29 | Stop reason: HOSPADM

## 2025-05-29 RX ORDER — SODIUM CHLORIDE, SODIUM LACTATE, POTASSIUM CHLORIDE, CALCIUM CHLORIDE 600; 310; 30; 20 MG/100ML; MG/100ML; MG/100ML; MG/100ML
9 INJECTION, SOLUTION INTRAVENOUS CONTINUOUS
Status: ACTIVE | OUTPATIENT
Start: 2025-05-29 | End: 2025-05-30

## 2025-05-29 RX ORDER — SODIUM CHLORIDE 0.9 % (FLUSH) 0.9 %
3 SYRINGE (ML) INJECTION EVERY 12 HOURS SCHEDULED
Status: DISCONTINUED | OUTPATIENT
Start: 2025-05-29 | End: 2025-05-29 | Stop reason: HOSPADM

## 2025-05-29 RX ORDER — ONDANSETRON 2 MG/ML
4 INJECTION INTRAMUSCULAR; INTRAVENOUS EVERY 6 HOURS PRN
Status: DISCONTINUED | OUTPATIENT
Start: 2025-05-29 | End: 2025-06-01 | Stop reason: HOSPADM

## 2025-05-29 RX ORDER — NALOXONE HCL 0.4 MG/ML
0.4 VIAL (ML) INJECTION AS NEEDED
Status: DISCONTINUED | OUTPATIENT
Start: 2025-05-29 | End: 2025-05-29 | Stop reason: HOSPADM

## 2025-05-29 RX ORDER — TRAZODONE HYDROCHLORIDE 50 MG/1
100 TABLET ORAL NIGHTLY
Status: DISCONTINUED | OUTPATIENT
Start: 2025-05-29 | End: 2025-06-01 | Stop reason: HOSPADM

## 2025-05-29 RX ORDER — IPRATROPIUM BROMIDE AND ALBUTEROL SULFATE 2.5; .5 MG/3ML; MG/3ML
3 SOLUTION RESPIRATORY (INHALATION) ONCE AS NEEDED
Status: DISCONTINUED | OUTPATIENT
Start: 2025-05-29 | End: 2025-05-29 | Stop reason: HOSPADM

## 2025-05-29 RX ORDER — SODIUM CHLORIDE 0.9 % (FLUSH) 0.9 %
10 SYRINGE (ML) INJECTION EVERY 12 HOURS SCHEDULED
Status: DISCONTINUED | OUTPATIENT
Start: 2025-05-29 | End: 2025-05-29 | Stop reason: HOSPADM

## 2025-05-29 RX ORDER — LABETALOL HYDROCHLORIDE 5 MG/ML
10 INJECTION, SOLUTION INTRAVENOUS EVERY 4 HOURS PRN
Status: DISCONTINUED | OUTPATIENT
Start: 2025-05-29 | End: 2025-06-01 | Stop reason: HOSPADM

## 2025-05-29 RX ORDER — METOPROLOL TARTRATE 1 MG/ML
INJECTION, SOLUTION INTRAVENOUS AS NEEDED
Status: DISCONTINUED | OUTPATIENT
Start: 2025-05-29 | End: 2025-05-29 | Stop reason: SURG

## 2025-05-29 RX ORDER — LIDOCAINE HYDROCHLORIDE 10 MG/ML
INJECTION, SOLUTION EPIDURAL; INFILTRATION; INTRACAUDAL; PERINEURAL AS NEEDED
Status: DISCONTINUED | OUTPATIENT
Start: 2025-05-29 | End: 2025-05-29 | Stop reason: SURG

## 2025-05-29 RX ORDER — FENTANYL CITRATE 50 UG/ML
INJECTION, SOLUTION INTRAMUSCULAR; INTRAVENOUS
Status: COMPLETED
Start: 2025-05-29 | End: 2025-05-29

## 2025-05-29 RX ORDER — SODIUM CHLORIDE 0.9 % (FLUSH) 0.9 %
3-10 SYRINGE (ML) INJECTION AS NEEDED
Status: DISCONTINUED | OUTPATIENT
Start: 2025-05-29 | End: 2025-05-29 | Stop reason: HOSPADM

## 2025-05-29 RX ORDER — TIMOLOL MALEATE 5 MG/ML
1 SOLUTION/ DROPS OPHTHALMIC 2 TIMES DAILY
Status: DISCONTINUED | OUTPATIENT
Start: 2025-05-29 | End: 2025-06-01 | Stop reason: HOSPADM

## 2025-05-29 RX ORDER — MELOXICAM 15 MG/1
15 TABLET ORAL ONCE
Status: COMPLETED | OUTPATIENT
Start: 2025-05-29 | End: 2025-05-29

## 2025-05-29 RX ORDER — HYDROMORPHONE HYDROCHLORIDE 1 MG/ML
0.5 INJECTION, SOLUTION INTRAMUSCULAR; INTRAVENOUS; SUBCUTANEOUS
Status: DISCONTINUED | OUTPATIENT
Start: 2025-05-29 | End: 2025-06-01 | Stop reason: HOSPADM

## 2025-05-29 RX ORDER — HYDROCODONE BITARTRATE AND ACETAMINOPHEN 5; 325 MG/1; MG/1
2 TABLET ORAL EVERY 4 HOURS PRN
Status: DISCONTINUED | OUTPATIENT
Start: 2025-05-29 | End: 2025-05-31

## 2025-05-29 RX ORDER — SODIUM CHLORIDE 9 MG/ML
40 INJECTION, SOLUTION INTRAVENOUS AS NEEDED
Status: DISCONTINUED | OUTPATIENT
Start: 2025-05-29 | End: 2025-06-01 | Stop reason: HOSPADM

## 2025-05-29 RX ORDER — SODIUM CHLORIDE, SODIUM LACTATE, POTASSIUM CHLORIDE, CALCIUM CHLORIDE 600; 310; 30; 20 MG/100ML; MG/100ML; MG/100ML; MG/100ML
9 INJECTION, SOLUTION INTRAVENOUS CONTINUOUS
Status: DISCONTINUED | OUTPATIENT
Start: 2025-05-30 | End: 2025-05-29

## 2025-05-29 RX ADMIN — TRAZODONE HYDROCHLORIDE 100 MG: 50 TABLET ORAL at 20:06

## 2025-05-29 RX ADMIN — HYDROMORPHONE HYDROCHLORIDE 0.5 MG: 1 INJECTION, SOLUTION INTRAMUSCULAR; INTRAVENOUS; SUBCUTANEOUS at 14:37

## 2025-05-29 RX ADMIN — Medication 10 ML: at 20:06

## 2025-05-29 RX ADMIN — SODIUM CHLORIDE, POTASSIUM CHLORIDE, SODIUM LACTATE AND CALCIUM CHLORIDE: 600; 310; 30; 20 INJECTION, SOLUTION INTRAVENOUS at 11:53

## 2025-05-29 RX ADMIN — FENTANYL CITRATE 100 MCG: 50 INJECTION, SOLUTION INTRAMUSCULAR; INTRAVENOUS at 11:16

## 2025-05-29 RX ADMIN — DEXAMETHASONE SODIUM PHOSPHATE 4 MG: 4 INJECTION, SOLUTION INTRA-ARTICULAR; INTRALESIONAL; INTRAMUSCULAR; INTRAVENOUS; SOFT TISSUE at 12:20

## 2025-05-29 RX ADMIN — FENTANYL CITRATE 50 MCG: 50 INJECTION, SOLUTION INTRAMUSCULAR; INTRAVENOUS at 14:30

## 2025-05-29 RX ADMIN — FENTANYL CITRATE 50 MCG: 50 INJECTION, SOLUTION INTRAMUSCULAR; INTRAVENOUS at 14:16

## 2025-05-29 RX ADMIN — FLUOXETINE HYDROCHLORIDE 40 MG: 20 CAPSULE ORAL at 20:06

## 2025-05-29 RX ADMIN — SODIUM CHLORIDE, POTASSIUM CHLORIDE, SODIUM LACTATE AND CALCIUM CHLORIDE: 600; 310; 30; 20 INJECTION, SOLUTION INTRAVENOUS at 13:54

## 2025-05-29 RX ADMIN — Medication 100 MCG: at 12:23

## 2025-05-29 RX ADMIN — SODIUM CHLORIDE 2000 MG: 900 INJECTION INTRAVENOUS at 20:06

## 2025-05-29 RX ADMIN — TRANEXAMIC ACID 1000 MG: 10 INJECTION, SOLUTION INTRAVENOUS at 13:30

## 2025-05-29 RX ADMIN — FLECAINIDE ACETATE 50 MG: 50 TABLET ORAL at 20:06

## 2025-05-29 RX ADMIN — Medication 100 MCG: at 12:15

## 2025-05-29 RX ADMIN — SODIUM CHLORIDE 2000 MG: 900 INJECTION INTRAVENOUS at 12:03

## 2025-05-29 RX ADMIN — TRANEXAMIC ACID 1000 MG: 10 INJECTION, SOLUTION INTRAVENOUS at 12:14

## 2025-05-29 RX ADMIN — LIDOCAINE HYDROCHLORIDE 50 MG: 10 INJECTION, SOLUTION EPIDURAL; INFILTRATION; INTRACAUDAL; PERINEURAL at 11:59

## 2025-05-29 RX ADMIN — ONDANSETRON 4 MG: 2 INJECTION INTRAMUSCULAR; INTRAVENOUS at 13:36

## 2025-05-29 RX ADMIN — METOPROLOL TARTRATE 2 MG: 5 INJECTION INTRAVENOUS at 13:50

## 2025-05-29 RX ADMIN — METOPROLOL TARTRATE 1 MG: 5 INJECTION INTRAVENOUS at 13:14

## 2025-05-29 RX ADMIN — BUPIVACAINE HYDROCHLORIDE 30 ML: 2.5 INJECTION, SOLUTION EPIDURAL; INFILTRATION; INTRACAUDAL; PERINEURAL at 11:16

## 2025-05-29 RX ADMIN — PROPOFOL 150 MG: 10 INJECTION, EMULSION INTRAVENOUS at 11:59

## 2025-05-29 RX ADMIN — LIDOCAINE HYDROCHLORIDE 0.5 ML: 10 INJECTION, SOLUTION EPIDURAL; INFILTRATION; INTRACAUDAL; PERINEURAL at 11:10

## 2025-05-29 RX ADMIN — Medication 1000 MG: at 14:05

## 2025-05-29 RX ADMIN — ACETAMINOPHEN 1000 MG: 500 TABLET ORAL at 11:10

## 2025-05-29 RX ADMIN — MELOXICAM 15 MG: 15 TABLET ORAL at 11:10

## 2025-05-29 NOTE — ANESTHESIA POSTPROCEDURE EVALUATION
Patient: Aleena Armstrong    Procedure Summary       Date: 05/29/25 Room / [facility]:  [facility]    Anesthesia Start: 1153 Anesthesia Stop: 1401    Procedure: OPEN REDUCTION INTERNAL FIXATION OLECTANON FRACTURE- RIGHT (Right: Elbow) Diagnosis:       Olecranon fracture      (olecranon fracture)    Surgeons: Yonatan Galdamez MD Provider: Natan Mac MD    Anesthesia Type: general with block ASA Status: 3            Anesthesia Type: general with block    Vitals  No vitals data found for the desired time range.          Post Anesthesia Care and Evaluation    Patient location during evaluation: PACU  Patient participation: complete - patient participated  Level of consciousness: awake and alert  Pain management: adequate    Airway patency: patent  Anesthetic complications: No anesthetic complications  PONV Status: none  Cardiovascular status: hemodynamically stable and acceptable  Respiratory status: nonlabored ventilation, acceptable and nasal cannula  Hydration status: acceptable

## 2025-05-29 NOTE — PLAN OF CARE
Goal Outcome Evaluation:   Pt arrived from PACU to floor about 1530, VSS on room air. NSR on monitor. Ropivacaine nerve block in place. Family updated @ bedside.

## 2025-05-29 NOTE — H&P
Pre-Op H&P  Aleena Armstrong  [id number]  1950      Chief complaint: Right arm pain      Subjective:  Patient is a 74 y.o.female presents for scheduled surgery by Dr. Galdamez. She anticipates a OPEN REDUCTION INTERNAL FIXATION OLECTANON FRACTURE- RIGHT  today. She had a fall on 5/23/25 and injured her right arm. She went to ER and was found to have a right ulna fracture. She was placed in a splint. She reports severe pain. She has had two previous right arm surgeries in the past.   Of note, she states she stopped taking Eliquis last November and last aspirin on 5/23/25.      Review of Systems:  Constitutional-- No fever, chills or sweats. No fatigue.  CV-- No chest pain, palpitation or syncope. +HTN, HLD, afib  Resp-- No SOB, cough, hemoptysis  Skin--No rashes or lesions      Allergies:   Allergies   Allergen Reactions    Bactrim [Sulfamethoxazole-Trimethoprim] Other (See Comments)     MOUTH SORES    Percocet [Oxycodone-Acetaminophen] Other (See Comments)     NIGHT TERRORS     Statins Myalgia     MYALGIA     Sulfa Antibiotics Other (See Comments)     MOUTH SORES AND RASH     Erythromycin Rash     RASH          Home Meds:  Medications Prior to Admission   Medication Sig Dispense Refill Last Dose/Taking    amitriptyline (ELAVIL) 25 MG tablet Take 1 tablet by mouth Every Night. (Patient not taking: Reported on 2/10/2025)       amLODIPine (NORVASC) 5 MG tablet Take 1 tablet by mouth Daily. 90 tablet 1     apixaban (ELIQUIS) 5 MG tablet tablet Take 1 tablet by mouth Every 12 (Twelve) Hours. Start 1 week prior to ablation 60 tablet 2     aspirin 81 MG EC tablet Take 1 tablet by mouth Daily. Start on the morning of Watchman procedure 90 tablet 3     Cholecalciferol (vitamin D3) 125 MCG (5000 UT) tablet tablet Take 1 tablet by mouth Daily. (Patient not taking: Reported on 2/10/2025)       denosumab (Prolia) 60 MG/ML solution prefilled syringe syringe 1 mL.       flecainide (TAMBOCOR) 50 MG tablet Take 1 tablet by mouth  Every 12 (Twelve) Hours. 180 tablet 2     FLUoxetine (PROzac) 40 MG capsule Take 1 capsule by mouth Daily.       folic acid (FOLVITE) 1 MG tablet  (Patient not taking: Reported on 2/10/2025)       IRON CR PO Take 1 dose by mouth Daily. gummies (Patient not taking: Reported on 2/10/2025)       methotrexate 2.5 MG tablet 1 tablet. (Patient not taking: Reported on 2/10/2025)       metoprolol succinate XL (TOPROL-XL) 25 MG 24 hr tablet Take 1 tablet by mouth Daily for 30 days. 30 tablet 0     naloxone (NARCAN) 4 MG/0.1ML nasal spray Call 911. Don't prime. [place] in 1 nostril for overdose. Repeat in 2-3 minutes in other nostril if no or minimal breathing/responsiveness. 2 each 0     omeprazole (priLOSEC) 20 MG capsule Take 1 capsule by mouth Daily.       ondansetron ODT (ZOFRAN-ODT) 4 MG disintegrating tablet Take 1 tablet by mouth Every 6 (Six) Hours As Needed for Nausea or Vomiting. 30 tablet 0     pantoprazole (PROTONIX) 40 MG EC tablet Take 1 tablet by mouth 2 (Two) Times a Day Before Meals. (Patient not taking: Reported on 2/10/2025) 180 tablet 1     pregabalin (LYRICA) 50 MG capsule        promethazine (PHENERGAN) 25 MG tablet Take 0.5 tablets by mouth Every 6 (Six) Hours As Needed for Nausea or Vomiting. (Patient not taking: Reported on 2/10/2025)       Thiamine HCl (B-1 PO) Take 250 mg by mouth.       timolol (TIMOPTIC) 0.5 % ophthalmic solution Administer 1 drop into the left eye 2 (Two) Times a Day.       traMADol (ULTRAM) 50 MG tablet Take 1 tablet by mouth Every 6 (Six) Hours As Needed for pain. (Patient not taking: Reported on 2/10/2025) 10 tablet 0     traZODone (DESYREL) 100 MG tablet Take 1 tablet by mouth Every Night.       vitamin D (ERGOCALCIFEROL) 1.25 MG (32809 UT) capsule capsule Take 1 capsule by mouth 1 (One) Time Per Week. Sundays (Patient not taking: Reported on 2/10/2025)            PMH:   Past Medical History:   Diagnosis Date    A-fib     HAD ABLATION FOR THIS IN 2017    Anemia     Anxiety and  depression 12/11/2016    GERD (gastroesophageal reflux disease)     Glaucoma of left eye 12/11/2016    H/O migraine     LAST 3  YEARS AGO     History of MRSA infection     APPROX 8 YEARS, TREATED WITH ORAL ABX, NEGATIVE CULTURES FOLLOWING TX     History of transfusion 12/2023    [facility], GI Bleed, 4 units, no reactions    Hyperlipidemia     Hypertension     MEDS DC'D BY DR GOMEZ 1/2017    Insomnia 12/11/2016    Kidney disease     Peptic ulcer - s/p gastric bypass surgery (7-8 years ago) 12/11/2016    Rheumatoid arthritis 12/11/2016    Spinal headache     FOLLOWING SPINAL FOR CHILDBIRTH     Vitamin D deficiency 12/11/2016    Wears dentures     UPPER PLATE     Elodia-Parkinson-White (WPW) syndrome     1990s     PSH:    Past Surgical History:   Procedure Laterality Date    ABLATION OF DYSRHYTHMIC FOCUS  1996    ATRIAL APPENDAGE EXCLUSION LEFT WITH TRANSESOPHAGEAL ECHOCARDIOGRAM N/A 05/22/2024    Procedure: Atrial Appendage Occlusion;  Surgeon: Jesse Keen DO;  Location:  [facility];  Service: Cardiology;  Laterality: N/A;    BREAST BIOPSY  ~1990    CARDIAC CATHETERIZATION N/A 10/24/2016    Procedure: Left Heart Cath;  Surgeon: Rubens Morejon MD;  Location:  [facility];  Service:     CARDIAC ELECTROPHYSIOLOGY PROCEDURE N/A 06/20/2017    Procedure: PVA;  Surgeon: Edvin oGmez DO;  Location:  [facility];  Service:     CHOLECYSTECTOMY  ~1990    COLONOSCOPY  2007    ENDOSCOPY N/A 12/18/2023    Procedure: ESOPHAGOGASTRODUODENOSCOPY;  Surgeon: Adeel Reyes MD;  Location:  [facility];  Service: Gastroenterology;  Laterality: N/A;  epi 1mg diluted in 10mls. 1 ml given    GASTRIC BYPASS      GASTRIC BYPASS  2005    HAMMER TOE REPAIR      LEFT--GARO IN PLACE     HYSTERECTOMY  1993    Complete    ORIF HUMERUS FRACTURE Right 02/08/2019    Procedure: ORIF RIGHT PROXIMAL HUMERUS;  Surgeon: Yonatan Galdamez MD;  Location:  [facility];  Service: Orthopedics    ORIF HUMERUS  FRACTURE Right 2021    Procedure: HUMERUS PROXIMAL OPEN REDUCTION INTERNAL FIXATION RIGHT WITH HARDWARE REMOVAL AND NAIL PLACEMENT;  Surgeon: Yonatan Galdamez MD;  Location: [facility];  Service: Orthopedics;  Laterality: Right;    SHOULDER ACROMIOPLASTY Right 2019    WRIST FRACTURE SURGERY Right        Immunization History:  Influenza: UTD  Pneumococcal: UTD  Tetanus: UTD    Social History:   Tobacco:   Social History     Tobacco Use   Smoking Status Former    Current packs/day: 0.00    Average packs/day: 1 pack/day for 30.0 years (30.0 ttl pk-yrs)    Types: Cigarettes    Quit date:     Years since quittin.4   Smokeless Tobacco Never      Alcohol:     Social History     Substance and Sexual Activity   Alcohol Use Yes    Alcohol/week: 1.0 standard drink of alcohol    Types: 1 Glasses of wine per week    Comment: occational         Physical Exam: VS: /77  HR 90  RR 16  T 98.4  sat 97%RA      General Appearance:    Alert, cooperative, no distress, appears stated age   Head:    Normocephalic, without obvious abnormality, atraumatic   Lungs:     Clear to auscultation bilaterally, respirations unlabored    Heart:   Regular rate and rhythm, S1 and S2 normal    Abdomen:    Soft without tenderness   Extremities:   RUE splint CDI. Right hand swelling and ecchymosis    Skin:   Skin color, texture, turgor normal, no rashes or lesions   Neurologic:   Grossly intact     Results Review:     LABS:  Lab Results   Component Value Date    WBC 4.99 2025    HGB 10.1 (L) 2025    HCT 32.9 (L) 2025    .9 (H) 2025     2025    NEUTROABS 3.65 2025    GLUCOSE 108 (H) 2025    BUN 16 2025    CREATININE 0.89 2025    EGFRIFNONA >60 2022    EGFRIFAFRI >60 2022     2025    K 4.4 2025     2025    CO2 24.7 2025    MG 2.0 2025    PHOS 4.3 2023    CALCIUM 8.7 2025    ALBUMIN 3.7 2025     AST 31 02/23/2025    ALT 22 02/23/2025    BILITOT 0.4 02/23/2025       RADIOLOGY:  Study Result    Narrative & Impression   XR ELBOW 3+ VW RIGHT     Date of Exam: 5/23/2025 1:55 PM EDT     Indication: fall, edema, limited ROM     Comparison: 7/17/2022     Findings:  Postoperative changes of nail and alicia fixation of the humerus are partially seen. There is a acute fracture of the olecranon extending to the humeroulnar  Articular surface with approximately 7 mm distraction. There is an elbow joint effusion. There is prominent soft tissue swelling along the posterior elbow/proximal ulna.     IMPRESSION:  Impression:  Mildly distracted, intra-articular fracture of the proximal ulna.       I reviewed the patient's new clinical results.    Cancer Staging (if applicable)  Cancer Patient: __ yes __no __unknown; If yes, clinical stage T:__ N:__M:__, stage group or __N/A      Impression: Closed fracture of proximal end of right ulna       Plan: OPEN REDUCTION INTERNAL FIXATION OLECTANON FRACTURE- RIGHT       Aniyah Mata, YULI   5/29/2025   10:39 EDT

## 2025-05-29 NOTE — ANESTHESIA PROCEDURE NOTES
Infraclavicular catheter      Patient reassessed immediately prior to procedure    Patient location during procedure: pre-op  Stop time: 5/29/2025 11:16 AM  Reason for block: at surgeon's request and post-op pain management  Performed by  CRNA/CAA: Marcin Loja, CRNA  Assisted by: Leda Atkins RN  Preanesthetic Checklist  Completed: patient identified, IV checked, site marked, risks and benefits discussed, surgical consent, monitors and equipment checked, pre-op evaluation and timeout performed  Prep:  Pt Position: supine  Sterile barriers:cap, gloves, mask and washed/disinfected hands  Prep: ChloraPrep  Patient monitoring: blood pressure monitoring, continuous pulse oximetry and EKG  Procedure    Sedation: yes  Performed under: local infiltration  Guidance:ultrasound guided    ULTRASOUND INTERPRETATION.  Using ultrasound guidance a 20 G gauge needle was placed in close proximity to the brachial plexus nerve, at which point, under ultrasound guidance anesthetic was injected in the area of the nerve and spread of the anesthesia was seen on ultrasound in close proximity thereto.  There were no abnormalities seen on ultrasound; a digital image was taken; and the patient tolerated the procedure with no complications. Images:still images obtained, printed/placed on chart    Laterality:right  Block Type:infraclavicular  Injection Technique:catheter  Needle Type:Tuohy and echogenic  Needle Gauge:18 G  Resistance on Injection: none  Catheter Size:20 G  Cath Depth at skin: 7 cm    Medications Used: fentaNYL citrate (PF) (SUBLIMAZE) injection - Intravenous   100 mcg - 5/29/2025 11:16:00 AM  bupivacaine PF (MARCAINE) 0.25 % injection - Injection   30 mL - 5/29/2025 11:16:00 AM      Post Assessment  Injection Assessment: negative aspiration for heme, no paresthesia on injection and incremental injection  Patient Tolerance:comfortable throughout block  Complications:no  Additional Notes  CATHETER  The affected upper  "extremity was abducted and rotated 90 degrees at the shoulder, within the patients range of motion. A high-frequency linear transducer, with sterile cover, was placed just medial to the coracoid process, distal third of the clavicle, and inferior to the clavicle. Keeping the transducer is in a parasagittal plane (cephalad to caudad), scanning medially to laterally. The Pectoralis major and minor, brachial plexus, axillary artery and vein, rib and pleura where visualized. The insertion site was prepped and draped in sterile fashion. Skin and cutaneous tissue was infiltrated with 2-5 ml of 1% Lidocaine. Using ultrasound-guidance, an 18-gauge Contiplex Ultra 360 Touhy needle was advanced in plane lateral to the axillary artery and lateral cord of the brachial plexus. Preservative-free normal saline was utilized for hydro-dissection of tissue, advancement of Touhy needle, and to confirm final needle placement posterior to the axillary artery and posterior cord of the brachial plexus. Local anesthetic injection spread, in incremental 3-5 ml injections, was confirmed. Aspiration every 5 ml to prevent intravascular injection. Injection was completed with negative aspiration of blood and negative intravascular injection. Injection pressures were normal with minimal resistance. A 20-gauge Contiplex Echo catheter was placed through the needle and advance out the tip of the Touhy 1-3 cm. The Touhy needle was then removed, and final catheter position verified at the 5-6 o'clock position to the axillary artery and posterior cord. The catheter was secured in usual fashion with skin glue, benzoin, steri-strips, CHG tegaderm and Label noting \"Nerve Block Catheter\". Jerk tape applied at yellow connector and catheter connection.    Performed by: Marcin Loja, CRNA            "

## 2025-05-29 NOTE — OP NOTE
Operative Report ORIF olecranon fracture    Preoperative Diagnosis: right displaced olecranon fracture    Postoperative Diagnosis: same    Procedure:  Open reduction internal fixation right olecranon fracture    Surgeon: Yonatan Galdamez MD    Assistant: Jaron Ortiz PA-C  ** Please note the physician assistant was medically necessary to assist with positioning retraction, arm positioning, care of soft tissues and closure    EBL: 25cc    Anesthesia: GETA + regional    Implants:   [employer] ([employer]) locking olecranon plate, 4 hole    Indications: Patient is a 73yo female who suffered a right displaced olecranon fracture. Operative and non-operative treatment options were discussed and it was felt that this fracture met operative criteria.   After discussing the risks and benefits of operative versus nonoperative treatment the patient was consented for the above surgery    Description of the procedure: The patient was met in the same day holding area and identified by name MRN and Date of birth.  The patient was met by anesthesia and cleared for surgery.  Operative site was correctly marked. A preop erative regional anesthetic was administered    Patient was brought back to the operating room suite, positioned in the supine position and had smooth induction with general endotracheal anesthesia. Patient then positioned in the left lateral decubitus position.  A nonsterile tourniquet was applied out of the field.  The right upper extremity was prepped and draped in the usual sterile fashion.  Preoperative antibiotics were given and a timeout observed    An approx 7cm skin incision was made centered over the ulnar shaft and olecranon, full thickness skin flaps were raised and the interval between FCU and ECU exposed.    Soft tissue and hematoma was cleared from the fracture site and the fracture was manually reduced, and then crossed K wires were placed to provisionally reduce and hold the fracture. C arm  Post Op Instructions:  Patient should Maintain Eye shield & Dressing until seen tomorrow in eye clinic  Tylenol as needed for general discomfort  Use Prescription for pain medication if pain is severe  Use Prescription for Nausea (Zofran) if nausea or vomiting  No excessive exercise   No Bending, Lifting or Straining  Call MD if significant pain or nausea / vomiting uncontrolled by medications  Call MD if temperature in excess of 101' F  Do NOT sleep flat on back.  SLeep on either side, stomach, or head of bed elevated 45  degrees  Return to eye clinic for Post Op Examination tomorrow Morning.  Bring Medicine bag to tomorrow's appointment.     images confirmed adequate fracture reduction. A  MDS locking olecranon plate, 4 hole was applied and  series of locking screws and nonlocking screws  were applied.  C arm images were taken to confirmed reduction of the fracture.  Care was taken to ensure that no intra-articular penetration of screws occurred during placement.        The wound was then irrigated with sterile saline, The ECU/FCU interval was closed with #2 fiberwire, vancomycin powder was placed in the wound, the dermis was closed with 2-0 vicryl, and the skin with 3-0 nylon    A posterior splint was applied in neutral rotation.      At the end of the case all sponge and instrument counts were correct x 2.  Patient tolerated procedure well and taken to PACU in stable condition

## 2025-05-29 NOTE — H&P
Patient Name: Aleena Armstrong  MRN: [id number]  : 1950  DOS: 2025    Attending: Yonatan Galdamez MD    Primary Care Provider: Aniyah Goldberg MD      Chief complaint: Right arm pain/olecranon fracture    Subjective   Patient is a pleasant 74 y.o. female presented for scheduled surgery by Dr. Galdamez.    Patient had a fall leading to right displaced olecranial fracture.  She was seen by Dr. Galdamez and opted to proceed with surgery.    I saw preoperatively, reviewed with patient past medical history and home medications.    Noting that she was on chronic anticoagulation until she had a watchman's procedure.    Subsequent notes indicate she later underwent open duction internal fixation of right olecranon fracture, surgery was done under general anesthesia and a block.    She was admitted for further management.  Allergies   Allergen Reactions    Bactrim [Sulfamethoxazole-Trimethoprim] Other (See Comments)     MOUTH SORES    Lyrica [Pregabalin] GI Intolerance    Percocet [Oxycodone-Acetaminophen] Other (See Comments)     NIGHT TERRORS     Statins Myalgia     MYALGIA     Sulfa Antibiotics Other (See Comments)     MOUTH SORES AND RASH     Erythromycin Rash     RASH           Medications Prior to Admission   Medication Sig Dispense Refill Last Dose/Taking    amLODIPine (NORVASC) 5 MG tablet Take 1 tablet by mouth Daily. 90 tablet 1 2025    flecainide (TAMBOCOR) 50 MG tablet Take 1 tablet by mouth Every 12 (Twelve) Hours. 180 tablet 2 2025    FLUoxetine (PROzac) 40 MG capsule Take 1 capsule by mouth Daily.   2025    omeprazole (priLOSEC) 20 MG capsule Take 1 capsule by mouth Daily.   2025    ondansetron ODT (ZOFRAN-ODT) 4 MG disintegrating tablet Take 1 tablet by mouth Every 6 (Six) Hours As Needed for Nausea or Vomiting. 30 tablet 0 2025    Thiamine HCl (B-1 PO) Take 250 mg by mouth.   2025    timolol (TIMOPTIC) 0.5 % ophthalmic solution Administer 1 drop into the left  eye 2 (Two) Times a Day.   5/28/2025    traZODone (DESYREL) 100 MG tablet Take 1 tablet by mouth Every Night.   5/28/2025    amitriptyline (ELAVIL) 25 MG tablet Take 1 tablet by mouth Every Night. (Patient not taking: Reported on 2/10/2025)   Not Taking    apixaban (ELIQUIS) 5 MG tablet tablet Take 1 tablet by mouth Every 12 (Twelve) Hours. Start 1 week prior to ablation 60 tablet 2     aspirin 81 MG EC tablet Take 1 tablet by mouth Daily. Start on the morning of Watchman procedure 90 tablet 3 5/23/2025    Cholecalciferol (vitamin D3) 125 MCG (5000 UT) tablet tablet Take 1 tablet by mouth Daily. (Patient not taking: Reported on 2/10/2025)   Not Taking    denosumab (Prolia) 60 MG/ML solution prefilled syringe syringe 1 mL.       folic acid (FOLVITE) 1 MG tablet  (Patient not taking: Reported on 2/10/2025)       IRON CR PO Take 1 dose by mouth Daily. gummies (Patient not taking: Reported on 2/10/2025)       methotrexate 2.5 MG tablet 1 tablet. (Patient not taking: Reported on 2/10/2025)       metoprolol succinate XL (TOPROL-XL) 25 MG 24 hr tablet Take 1 tablet by mouth Daily for 30 days. 30 tablet 0     naloxone (NARCAN) 4 MG/0.1ML nasal spray Call 911. Don't prime. Clymer in 1 nostril for overdose. Repeat in 2-3 minutes in other nostril if no or minimal breathing/responsiveness. 2 each 0     oxyCODONE (oxyCONTIN) 10 MG 12 hr tablet Take 5 mg by mouth Every 4 (Four) Hours As Needed for Moderate Pain or Severe Pain.       pantoprazole (PROTONIX) 40 MG EC tablet Take 1 tablet by mouth 2 (Two) Times a Day Before Meals. (Patient not taking: Reported on 2/10/2025) 180 tablet 1     pregabalin (LYRICA) 50 MG capsule        promethazine (PHENERGAN) 25 MG tablet Take 0.5 tablets by mouth Every 6 (Six) Hours As Needed for Nausea or Vomiting. (Patient not taking: Reported on 2/10/2025)       traMADol (ULTRAM) 50 MG tablet Take 1 tablet by mouth Every 6 (Six) Hours As Needed for pain. (Patient not taking: Reported on 2/10/2025) 10  tablet 0     vitamin D (ERGOCALCIFEROL) 1.25 MG (84714 UT) capsule capsule Take 1 capsule by mouth 1 (One) Time Per Week. Sundays (Patient not taking: Reported on 2/10/2025)             Past Medical History:   Diagnosis Date    Travis     HAD ABLATION FOR THIS IN 2017    Anemia     Anxiety and depression 12/11/2016    GERD (gastroesophageal reflux disease)     Glaucoma of left eye 12/11/2016    H/O migraine     LAST 3  YEARS AGO     History of MRSA infection     APPROX 8 YEARS, TREATED WITH ORAL ABX, NEGATIVE CULTURES FOLLOWING TX     History of transfusion 12/2023    [facility], GI Bleed, 4 units, no reactions    Hyperlipidemia     Hypertension     MEDS DC'D BY DR GOMEZ 1/2017    Insomnia 12/11/2016    Kidney disease     Peptic ulcer - s/p gastric bypass surgery (7-8 years ago) 12/11/2016    Rheumatoid arthritis 12/11/2016    Spinal headache     FOLLOWING SPINAL FOR CHILDBIRTH     Vitamin D deficiency 12/11/2016    Wears dentures     UPPER PLATE     Elodia-Parkinson-White (WPW) syndrome     1990s     Past Surgical History:   Procedure Laterality Date    ABLATION OF DYSRHYTHMIC FOCUS  1996    ATRIAL APPENDAGE EXCLUSION LEFT WITH TRANSESOPHAGEAL ECHOCARDIOGRAM N/A 05/22/2024    Procedure: Atrial Appendage Occlusion;  Surgeon: Jesse Keen DO;  Location:  [facility];  Service: Cardiology;  Laterality: N/A;    BREAST BIOPSY  ~1990    CARDIAC CATHETERIZATION N/A 10/24/2016    Procedure: Left Heart Cath;  Surgeon: Rubens Morejon MD;  Location:  [facility];  Service:     CARDIAC ELECTROPHYSIOLOGY PROCEDURE N/A 06/20/2017    Procedure: PVA;  Surgeon: Edvin Gomez DO;  Location:  [facility];  Service:     CHOLECYSTECTOMY  ~1990    COLONOSCOPY  2007    ENDOSCOPY N/A 12/18/2023    Procedure: ESOPHAGOGASTRODUODENOSCOPY;  Surgeon: Adeel Reyes MD;  Location:  [facility];  Service: Gastroenterology;  Laterality: N/A;  epi 1mg diluted in 10mls. 1 ml given    GASTRIC BYPASS       "GASTRIC BYPASS      HAMMER TOE REPAIR      LEFT--GARO IN PLACE     HYSTERECTOMY  1993    Complete    ORIF HUMERUS FRACTURE Right 2019    Procedure: ORIF RIGHT PROXIMAL HUMERUS;  Surgeon: Yonatan Galdamez MD;  Location:  [facility];  Service: Orthopedics    ORIF HUMERUS FRACTURE Right 2021    Procedure: HUMERUS PROXIMAL OPEN REDUCTION INTERNAL FIXATION RIGHT WITH HARDWARE REMOVAL AND NAIL PLACEMENT;  Surgeon: Yonatan Galdamez MD;  Location:  [facility];  Service: Orthopedics;  Laterality: Right;    SHOULDER ACROMIOPLASTY Right 2019    WRIST FRACTURE SURGERY Right 2014     Family History   Problem Relation Age of Onset    Arrhythmia Mother     Hypertension Mother     Stroke Mother     Diabetes Mother     Lung cancer Father     Breast cancer Sister     Breast cancer Maternal Aunt     Diabetes Son         Type 1    Breast cancer Cousin     Ovarian cancer Cousin      Social History     Tobacco Use    Smoking status: Former     Current packs/day: 0.00     Average packs/day: 1 pack/day for 30.0 years (30.0 ttl pk-yrs)     Types: Cigarettes     Quit date:      Years since quittin.4    Smokeless tobacco: Never   Vaping Use    Vaping status: Never Used   Substance Use Topics    Alcohol use: Yes     Alcohol/week: 1.0 standard drink of alcohol     Types: 1 Glasses of wine per week     Comment: occational    Drug use: No       Review of Systems  Pertinent items are noted in HPI    Vital Signs  /72   Pulse 101   Temp 98.1 °F (36.7 °C)   Resp 17   Ht 170.2 cm (67\")   Wt 64.4 kg (142 lb)   LMP  (LMP Unknown)   SpO2 98%   BMI 22.24 kg/m²     Physical Exam:    General Appearance:    Alert, cooperative, in no acute distress   Head:    Normocephalic, without obvious abnormality, atraumatic   Eyes:            Lids and lashes normal, conjunctivae and sclerae normal, no   icterus, no pallor, corneas clear,    Ears:    Ears appear intact with no abnormalities noted   Throat:   No oral " "lesions, no thrush, oral mucosa moist   Neck:   No adenopathy, supple, trachea midline, no thyromegaly         Lungs:     Clear to auscultation,respirations regular, even and                   unlabored    Heart:    Regular rhythm and normal rate, normal S1 and S2, no            murmur, no gallop   Abdomen:     Normal bowel sounds, no masses, no organomegaly, soft,        nontender, nondistended, no guarding, no rebound                 tenderness   Genitalia:    Deferred   Extremities: Right upper extremity in a splint when seen preop.  Peripheral nerve block catheter has been placed by acute pain service.   Pulses:   Pulses palpable and equal bilaterally   Skin:   No bleeding, bruising or rash   Neurologic:   Cranial nerves 2 - 12 grossly intact, moves her right hand/fingers well.      I reviewed the patient's new clinical results.             Invalid input(s): \"NEUTOPHILPCT\"  Results from last 7 days   Lab Units 05/29/25  1117   SODIUM mmol/L 137   POTASSIUM mmol/L 3.9   CHLORIDE mmol/L 101   CO2 mmol/L 27.0   BUN mg/dL 22.8   CREATININE mg/dL 1.00   CALCIUM mg/dL 9.1   GLUCOSE mg/dL 99     Lab Results   Component Value Date    HGBA1C 4.60 (L) 05/29/2022           Assessment and Plan:       S/P ORIF (open reduction internal fixation) fracture, right olecranon.    Dyslipidemia    Essential hypertension    Atrial fib/flutter    Anxiety and depression    GERD    History of Emilie-en-Y gastric bypass    Olecranon fracture      Plan  1. PT/OT.  Right upper extremity restrictions per Dr. Galdamez  2. Pain control-prns, multimodal approach including peripheral nerve block catheter infusion of ropivacaine   3. IS-encourage  4. DVT proph- mechanicals, resume home aspirin.  5. Bowel regimen  6. Resume home medications as appropriate  7. Monitor post-op labs  8. Discharge planning     - Hypertension:  Resume home medications as appropriate, formulary substitution when indicated.  Holding parameters.  PRN medications for " elevated blood pressure.    -Depression and anxiety: Resume home regimen, Prozac.    - Hyperlipidemia: Resume home regimen at discharge.    - History of A-fib, prior ablation, status post watchman's.       Dragon disclaimer:  Part of this encounter note is an electronic transcription/translation of spoken language to printed text. The electronic translation of spoken language may permit erroneous, or at times, nonsensical words or phrases to be inadvertently transcribed; Although I have reviewed the note for such errors, some may still exist.    Ivana Jay MD  05/29/25  18:24 EDT

## 2025-05-29 NOTE — ANESTHESIA PROCEDURE NOTES
Airway  Date/Time: 5/29/2025 12:00 PM  Airway not difficult    General Information and Staff    Patient location during procedure: OR  CRNA/CAA: Nikita Reed CRNA    Indications and Patient Condition  Indications for airway management: airway protection    Preoxygenated: yes    Mask difficulty assessment: 0 - not attempted    Final Airway Details    Final airway type: supraglottic airway      Successful airway: I-gel  Size: 3   Number of attempts at approach: 1  Assessment: lips, teeth, and gum same as pre-op and atraumatic intubation             No

## 2025-05-29 NOTE — ANESTHESIA PREPROCEDURE EVALUATION
Anesthesia Evaluation     Patient summary reviewed and Nursing notes reviewed   history of anesthetic complications (sp HA):   NPO Solid Status: > 8 hours  NPO Liquid Status: > 2 hours           Airway   Mallampati: I  TM distance: >3 FB  Neck ROM: full  No difficulty expected  Dental    (+) edentulous, upper dentures and partials    Pulmonary    (-) asthma, shortness of breath, recent URI, sleep apnea, not a smoker  Cardiovascular     ECG reviewed    (+) hypertension, dysrhythmias (WPW syndrome abalted and  PVA) Atrial Fib, hyperlipidemia  (-) past MI, angina, cardiac stents    ROS comment: ECG NSR  q waves in V1      ECHO 2024 EF >56 %. LA dilated.  well positioned Watchman LAAOdevice  moderate marylin-prosthetic leak present with a maximal diameter measured at 2.8 mm.  Mild MR / mod TI      Cath 2016 minimal CAD         Neuro/Psych  (+) headaches, psychiatric history  (-) seizures, CVA  GI/Hepatic/Renal/Endo    (+) obesity (sp G sleeve), GERD well controlled, PUD, GI bleeding (EGD 2023) , renal disease (creat  normal)-, thyroid problem hypothyroidism  (-) morbid obesity, diabetes    Musculoskeletal     Abdominal    Substance History      OB/GYN          Other   arthritis (psoriatic rheumatoid on plaquenil), blood dyscrasia anemia,     ROS/Med Hx Other: HCT 32 has had GI Bleed in past       Phys Exam Other: Mac 3 G1V               Anesthesia Plan    ASA 3     general with block     (Supraclav block   Neck care rheumatoid )  intravenous induction     Anesthetic plan, risks, benefits, and alternatives have been provided, discussed and informed consent has been obtained with: patient.    Plan discussed with CRNA.      CODE STATUS:

## 2025-05-30 LAB
ANION GAP SERPL CALCULATED.3IONS-SCNC: 8 MMOL/L (ref 5–15)
BASOPHILS # BLD AUTO: 0.03 10*3/MM3 (ref 0–0.2)
BASOPHILS NFR BLD AUTO: 0.8 % (ref 0–1.5)
BUN SERPL-MCNC: 24.5 MG/DL (ref 8–23)
BUN/CREAT SERPL: 23.6 (ref 7–25)
CALCIUM SPEC-SCNC: 8.6 MG/DL (ref 8.6–10.5)
CHLORIDE SERPL-SCNC: 102 MMOL/L (ref 98–107)
CO2 SERPL-SCNC: 27 MMOL/L (ref 22–29)
CREAT SERPL-MCNC: 1.04 MG/DL (ref 0.57–1)
DEPRECATED RDW RBC AUTO: 55.9 FL (ref 37–54)
EGFRCR SERPLBLD CKD-EPI 2021: 55.8 ML/MIN/1.73
EOSINOPHIL # BLD AUTO: 0.05 10*3/MM3 (ref 0–0.4)
EOSINOPHIL NFR BLD AUTO: 1.3 % (ref 0.3–6.2)
ERYTHROCYTE [DISTWIDTH] IN BLOOD BY AUTOMATED COUNT: 14.5 % (ref 12.3–15.4)
GLUCOSE SERPL-MCNC: 116 MG/DL (ref 65–99)
HCT VFR BLD AUTO: 27.6 % (ref 34–46.6)
HGB BLD-MCNC: 8.5 G/DL (ref 12–15.9)
IMM GRANULOCYTES # BLD AUTO: 0.01 10*3/MM3 (ref 0–0.05)
IMM GRANULOCYTES NFR BLD AUTO: 0.3 % (ref 0–0.5)
LYMPHOCYTES # BLD AUTO: 1.08 10*3/MM3 (ref 0.7–3.1)
LYMPHOCYTES NFR BLD AUTO: 27.6 % (ref 19.6–45.3)
MCH RBC QN AUTO: 32.3 PG (ref 26.6–33)
MCHC RBC AUTO-ENTMCNC: 30.8 G/DL (ref 31.5–35.7)
MCV RBC AUTO: 104.9 FL (ref 79–97)
MONOCYTES # BLD AUTO: 0.63 10*3/MM3 (ref 0.1–0.9)
MONOCYTES NFR BLD AUTO: 16.1 % (ref 5–12)
NEUTROPHILS NFR BLD AUTO: 2.11 10*3/MM3 (ref 1.7–7)
NEUTROPHILS NFR BLD AUTO: 53.9 % (ref 42.7–76)
NRBC BLD AUTO-RTO: 0 /100 WBC (ref 0–0.2)
PLATELET # BLD AUTO: 212 10*3/MM3 (ref 140–450)
PMV BLD AUTO: 9.6 FL (ref 6–12)
POTASSIUM SERPL-SCNC: 3.7 MMOL/L (ref 3.5–5.2)
RBC # BLD AUTO: 2.63 10*6/MM3 (ref 3.77–5.28)
SODIUM SERPL-SCNC: 137 MMOL/L (ref 136–145)
WBC NRBC COR # BLD AUTO: 3.91 10*3/MM3 (ref 3.4–10.8)

## 2025-05-30 PROCEDURE — A9270 NON-COVERED ITEM OR SERVICE: HCPCS | Performed by: INTERNAL MEDICINE

## 2025-05-30 PROCEDURE — 63710000001 TIMOLOL 0.5 % SOLUTION 5 ML BOTTLE: Performed by: INTERNAL MEDICINE

## 2025-05-30 PROCEDURE — 63710000001 HYDROCODONE-ACETAMINOPHEN 5-325 MG TABLET: Performed by: ORTHOPAEDIC SURGERY

## 2025-05-30 PROCEDURE — 25010000002 HYDROMORPHONE PER 4 MG: Performed by: ORTHOPAEDIC SURGERY

## 2025-05-30 PROCEDURE — 63710000001 POLYETHYLENE GLYCOL 17 G PACK: Performed by: INTERNAL MEDICINE

## 2025-05-30 PROCEDURE — 63710000001 TRAZODONE 50 MG TABLET: Performed by: INTERNAL MEDICINE

## 2025-05-30 PROCEDURE — 97165 OT EVAL LOW COMPLEX 30 MIN: CPT

## 2025-05-30 PROCEDURE — 63710000001 PANTOPRAZOLE 40 MG TABLET DELAYED-RELEASE: Performed by: INTERNAL MEDICINE

## 2025-05-30 PROCEDURE — A9270 NON-COVERED ITEM OR SERVICE: HCPCS | Performed by: ORTHOPAEDIC SURGERY

## 2025-05-30 PROCEDURE — 63710000001 BISACODYL 5 MG TABLET DELAYED-RELEASE: Performed by: INTERNAL MEDICINE

## 2025-05-30 PROCEDURE — 25010000002 KETOROLAC TROMETHAMINE PER 15 MG: Performed by: ORTHOPAEDIC SURGERY

## 2025-05-30 PROCEDURE — 63710000001 SENNOSIDES-DOCUSATE 8.6-50 MG TABLET: Performed by: INTERNAL MEDICINE

## 2025-05-30 PROCEDURE — 25010000002 CEFAZOLIN PER 500 MG: Performed by: ORTHOPAEDIC SURGERY

## 2025-05-30 PROCEDURE — 63710000001 FLECAINIDE 50 MG TABLET: Performed by: INTERNAL MEDICINE

## 2025-05-30 PROCEDURE — 63710000001 FLUOXETINE 20 MG CAPSULE: Performed by: INTERNAL MEDICINE

## 2025-05-30 PROCEDURE — 63710000001 AMLODIPINE 5 MG TABLET: Performed by: INTERNAL MEDICINE

## 2025-05-30 PROCEDURE — 97535 SELF CARE MNGMENT TRAINING: CPT

## 2025-05-30 PROCEDURE — 63710000001 ASPIRIN 81 MG TABLET DELAYED-RELEASE: Performed by: INTERNAL MEDICINE

## 2025-05-30 PROCEDURE — 63710000001 BISACODYL 10 MG SUPPOSITORY: Performed by: INTERNAL MEDICINE

## 2025-05-30 PROCEDURE — 80048 BASIC METABOLIC PNL TOTAL CA: CPT | Performed by: ORTHOPAEDIC SURGERY

## 2025-05-30 PROCEDURE — 85025 COMPLETE CBC W/AUTO DIFF WBC: CPT | Performed by: ORTHOPAEDIC SURGERY

## 2025-05-30 RX ORDER — BISACODYL 5 MG/1
5 TABLET, DELAYED RELEASE ORAL DAILY PRN
Status: DISCONTINUED | OUTPATIENT
Start: 2025-05-30 | End: 2025-06-01 | Stop reason: HOSPADM

## 2025-05-30 RX ORDER — KETOROLAC TROMETHAMINE 30 MG/ML
30 INJECTION, SOLUTION INTRAMUSCULAR; INTRAVENOUS EVERY 6 HOURS PRN
Status: DISCONTINUED | OUTPATIENT
Start: 2025-05-30 | End: 2025-06-01 | Stop reason: HOSPADM

## 2025-05-30 RX ORDER — BISACODYL 10 MG
10 SUPPOSITORY, RECTAL RECTAL DAILY PRN
Status: DISCONTINUED | OUTPATIENT
Start: 2025-05-30 | End: 2025-06-01 | Stop reason: HOSPADM

## 2025-05-30 RX ORDER — AMOXICILLIN 250 MG
2 CAPSULE ORAL 2 TIMES DAILY
Status: DISCONTINUED | OUTPATIENT
Start: 2025-05-30 | End: 2025-06-01 | Stop reason: HOSPADM

## 2025-05-30 RX ORDER — POLYETHYLENE GLYCOL 3350 17 G/17G
17 POWDER, FOR SOLUTION ORAL DAILY PRN
Status: DISCONTINUED | OUTPATIENT
Start: 2025-05-30 | End: 2025-06-01 | Stop reason: HOSPADM

## 2025-05-30 RX ADMIN — BISACODYL 5 MG: 5 TABLET, COATED ORAL at 10:31

## 2025-05-30 RX ADMIN — FLECAINIDE ACETATE 50 MG: 50 TABLET ORAL at 20:56

## 2025-05-30 RX ADMIN — SODIUM CHLORIDE 2000 MG: 900 INJECTION INTRAVENOUS at 05:11

## 2025-05-30 RX ADMIN — DOCUSATE SODIUM 50 MG AND SENNOSIDES 8.6 MG 2 TABLET: 8.6; 5 TABLET, FILM COATED ORAL at 10:32

## 2025-05-30 RX ADMIN — HYDROCODONE BITARTRATE AND ACETAMINOPHEN 2 TABLET: 5; 325 TABLET ORAL at 18:04

## 2025-05-30 RX ADMIN — TIMOLOL MALEATE 1 DROP: 5 SOLUTION/ DROPS OPHTHALMIC at 20:56

## 2025-05-30 RX ADMIN — Medication 10 ML: at 20:56

## 2025-05-30 RX ADMIN — Medication 10 ML: at 08:35

## 2025-05-30 RX ADMIN — PANTOPRAZOLE SODIUM 40 MG: 40 TABLET, DELAYED RELEASE ORAL at 05:12

## 2025-05-30 RX ADMIN — FLECAINIDE ACETATE 50 MG: 50 TABLET ORAL at 08:34

## 2025-05-30 RX ADMIN — HYDROCODONE BITARTRATE AND ACETAMINOPHEN 2 TABLET: 5; 325 TABLET ORAL at 13:42

## 2025-05-30 RX ADMIN — FLUOXETINE HYDROCHLORIDE 40 MG: 20 CAPSULE ORAL at 08:34

## 2025-05-30 RX ADMIN — HYDROCODONE BITARTRATE AND ACETAMINOPHEN 2 TABLET: 5; 325 TABLET ORAL at 08:33

## 2025-05-30 RX ADMIN — HYDROMORPHONE HYDROCHLORIDE 0.5 MG: 1 INJECTION, SOLUTION INTRAMUSCULAR; INTRAVENOUS; SUBCUTANEOUS at 22:08

## 2025-05-30 RX ADMIN — POLYETHYLENE GLYCOL 3350 17 G: 17 POWDER, FOR SOLUTION ORAL at 10:31

## 2025-05-30 RX ADMIN — AMLODIPINE BESYLATE 5 MG: 5 TABLET ORAL at 08:34

## 2025-05-30 RX ADMIN — ASPIRIN 81 MG: 81 TABLET, COATED ORAL at 08:34

## 2025-05-30 RX ADMIN — DOCUSATE SODIUM 50 MG AND SENNOSIDES 8.6 MG 2 TABLET: 8.6; 5 TABLET, FILM COATED ORAL at 20:55

## 2025-05-30 RX ADMIN — TRAZODONE HYDROCHLORIDE 100 MG: 50 TABLET ORAL at 20:55

## 2025-05-30 RX ADMIN — BISACODYL 10 MG: 10 SUPPOSITORY RECTAL at 18:45

## 2025-05-30 RX ADMIN — KETOROLAC TROMETHAMINE 30 MG: 30 INJECTION, SOLUTION INTRAMUSCULAR; INTRAVENOUS at 17:15

## 2025-05-30 RX ADMIN — TIMOLOL MALEATE 1 DROP: 5 SOLUTION/ DROPS OPHTHALMIC at 08:33

## 2025-05-30 NOTE — PROGRESS NOTES
"IM progress note      Aleena Armstrong  [id number]  1950     LOS: 0 days     Attending: Yonatan Galdamez MD    Primary Care Provider: Aniyah Goldberg MD      Chief Complaint/Reason for visit:  No chief complaint on file.      Subjective    Difficulty controlling pain when seen earlier today.   No f/c/n/vom/sob.       Objective        Visit Vitals  /61 (BP Location: Left arm, Patient Position: Lying)   Pulse 116   Temp 98.1 °F (36.7 °C) (Oral)   Resp 18   Ht 170.2 cm (67\")   Wt 64.4 kg (142 lb)   LMP  (LMP Unknown)   SpO2 97%   BMI 22.24 kg/m²     Temp (24hrs), Av °F (36.7 °C), Min:97.8 °F (36.6 °C), Max:98.2 °F (36.8 °C)      Intake/Output:    Intake/Output Summary (Last 24 hours) at 2025 1641  Last data filed at 2025 1216  Gross per 24 hour   Intake 600 ml   Output --   Net 600 ml        Physical Therapy:    Goal Outcome Evaluation:  Plan of Care Reviewed With: patient  Progress: no change  Outcome Evaluation: OT evaluation completed. The pt presents below baseline with RUE NWB/immobilization, elevated RUE pain, and minor balance deficits warranting continued IP OT services. The pt ambulated 250' and ascended/descended 2 steps with CGA, progressing to SBA during ambulation. Pt was educated on RUE precautions, ADL retraining, and sling management. Recommend a d/c home with assist once pain controlled.     Anticipated Discharge Disposition (OT): home with assist           Physical Exam:     General Appearance:    Alert, cooperative, in no acute distress   Head:    Normocephalic, without obvious abnormality, atraumatic    Lungs:     Normal effort, symmetric chest rise,  clear to      auscultation bilaterally              Heart:    Regular rhythm and normal rate, normal S1 and S2    Abdomen:     Normal bowel sounds, no masses, no organomegaly, soft        nontender, nondistended, no guarding, no rebound                tenderness   Extremities:   CDI splint/dressing RUE. Moves hand and " fingers.   PNB cath present.   No clubbing, cyanosis or edema.  No deformities.    Pulses:   Pulses palpable and equal bilaterally   Skin:   No bleeding, bruising or rash          Results Review:     I reviewed the patient's new clinical results.   Results from last 7 days   Lab Units 05/30/25  1455   WBC 10*3/mm3 3.91   HEMOGLOBIN g/dL 8.5*   HEMATOCRIT % 27.6*   PLATELETS 10*3/mm3 212     Results from last 7 days   Lab Units 05/30/25  1455 05/29/25  1814 05/29/25  1117   SODIUM mmol/L 137  --  137   POTASSIUM mmol/L 3.7 4.1 3.9   CHLORIDE mmol/L 102  --  101   CO2 mmol/L 27.0  --  27.0   BUN mg/dL 24.5*  --  22.8   CREATININE mg/dL 1.04*  --  1.00   CALCIUM mg/dL 8.6  --  9.1   GLUCOSE mg/dL 116*  --  99     I reviewed the patient's new imaging including images and reports.    All medications reviewed.   amLODIPine, 5 mg, Oral, Q24H  aspirin, 81 mg, Oral, Daily  flecainide, 50 mg, Oral, Q12H  FLUoxetine, 40 mg, Oral, Daily  pantoprazole, 40 mg, Oral, Q AM  senna-docusate sodium, 2 tablet, Oral, BID  sodium chloride, 10 mL, Intravenous, Q12H  timolol, 1 drop, Left Eye, BID  traZODone, 100 mg, Oral, Nightly      polyethylene glycol, 17 g, Daily PRN   And  bisacodyl, 5 mg, Daily PRN   And  bisacodyl, 10 mg, Daily PRN  HYDROcodone-acetaminophen, 2 tablet, Q4H PRN  HYDROmorphone, 0.5 mg, Q2H PRN   And  naloxone, 0.1 mg, Q5 Min PRN  labetalol, 10 mg, Q4H PRN  nitroglycerin, 0.4 mg, Q5 Min PRN  ondansetron ODT, 4 mg, Q6H PRN   Or  ondansetron, 4 mg, Q6H PRN  sodium chloride, 500 mL, TID PRN  sodium chloride, 10 mL, PRN  sodium chloride, 40 mL, PRN        Assessment & Plan       S/P ORIF (open reduction internal fixation) fracture, right olecranon.    Dyslipidemia    Essential hypertension    Atrial fib/flutter    Anxiety and depression    GERD    History of Emilie-en-Y gastric bypass    Olecranon fracture         Plan   1. PT/OT.  Right upper extremity restrictions per Dr. Galdamez  2. Pain control-prns, multimodal approach  including peripheral nerve block catheter infusion of ropivacaine             3. IS-encourage  4. DVT proph- mechanicals, resumed home aspirin.  5. Bowel regimen  6. Resume home medications as appropriate  7. Monitor post-op labs  8. Discharge planning , home when pain is better controlled. Hopefully by tomorrow.      - Hypertension:  Resume home medications as appropriate, formulary substitution when indicated.  Holding parameters.  PRN medications for elevated blood pressure.     -Depression and anxiety: Resume home regimen, Prozac.     - Hyperlipidemia: Resume home regimen at discharge.     - History of A-fib, prior ablation, status post watchman's.     Ivana Jay MD  05/30/25  16:41 EDT

## 2025-05-30 NOTE — CASE MANAGEMENT/SOCIAL WORK
Continued Stay Note  [facility]     Patient Name: Aleena Armstrong  MRN: [id number]  Today's Date: 5/30/2025    Admit Date: 5/29/2025    Plan: home   Discharge Plan       Row Name 05/30/25 1525       Plan    Plan home    Patient/Family in Agreement with Plan yes    Plan Comments Met with patient at the bedside to initiate discharge planning. Patient lives with her  in a house in [facility]. Patient is independent with ADLs and has a walker and cane at home, if needed. Patient denies any home health services or home oxygen use. Patient has [place] [employer] and prefers to fill scripts at [facility] in [place]. Patient's plan is home.  will transport. CM will continue to follow and assist with discharge planning as recommendations become available.    Final Discharge Disposition Code 01 - home or self-care                   Discharge Codes    No documentation.                       Barry Hough RN

## 2025-05-30 NOTE — THERAPY EVALUATION
Patient Name: Aleena Armstrong  : 1950    MRN: [id number]                              Today's Date: 2025       Admit Date: 2025    Visit Dx: No diagnosis found.  Patient Active Problem List   Diagnosis    WPW (Elodia-Parkinson-White syndrome)    Dyslipidemia    Essential hypertension    Atrial fib/flutter    Insomnia    Anxiety and depression    Vitamin D deficiency    Rheumatoid arthritis    Glaucoma of left eye    Anemia    Chronic pain    Fall    Iron deficiency anemia    GERD    History of Emilie-en-Y gastric bypass    Hypothyroidism    Psoriatic arthritis on Plaquenil    Long term current use of antiarrhythmic drug    Presence of Watchman left atrial appendage closure device    Olecranon fracture    S/P ORIF (open reduction internal fixation) fracture, right olecranon.     Past Medical History:   Diagnosis Date    A-fib     HAD ABLATION FOR THIS IN     Anemia     Anxiety and depression 2016    GERD (gastroesophageal reflux disease)     Glaucoma of left eye 2016    H/O migraine     LAST 3  YEARS AGO     History of MRSA infection     APPROX 8 YEARS, TREATED WITH ORAL ABX, NEGATIVE CULTURES FOLLOWING TX     History of transfusion 2023    [facility], GI Bleed, 4 units, no reactions    Hyperlipidemia     Hypertension     MEDS DC'D BY DR GOMEZ 2017    Insomnia 2016    Kidney disease     Peptic ulcer - s/p gastric bypass surgery (7-8 years ago) 2016    Rheumatoid arthritis 2016    Spinal headache     FOLLOWING SPINAL FOR CHILDBIRTH     Vitamin D deficiency 2016    Wears dentures     UPPER PLATE     Elodia-Parkinson-White (WPW) syndrome          Past Surgical History:   Procedure Laterality Date    ABLATION OF DYSRHYTHMIC FOCUS      ATRIAL APPENDAGE EXCLUSION LEFT WITH TRANSESOPHAGEAL ECHOCARDIOGRAM N/A 2024    Procedure: Atrial Appendage Occlusion;  Surgeon: Jesse Keen DO;  Location: [facility] INVASIVE LOCATION;  Service: Cardiology;  Laterality:  N/A;    BREAST BIOPSY  ~1990    CARDIAC CATHETERIZATION N/A 10/24/2016    Procedure: Left Heart Cath;  Surgeon: Rubens Morejon MD;  Location:  [facility] CATH INVASIVE LOCATION;  Service:     CARDIAC ELECTROPHYSIOLOGY PROCEDURE N/A 06/20/2017    Procedure: PVA;  Surgeon: Edvin Cook DO;  Location: [facility] EP INVASIVE LOCATION;  Service:     CHOLECYSTECTOMY  ~1990    COLONOSCOPY  2007    ENDOSCOPY N/A 12/18/2023    Procedure: ESOPHAGOGASTRODUODENOSCOPY;  Surgeon: Adeel Reyes MD;  Location: [facility] ENDOSCOPY;  Service: Gastroenterology;  Laterality: N/A;  epi 1mg diluted in 10mls. 1 ml given    GASTRIC BYPASS      GASTRIC BYPASS  2005    HAMMER TOE REPAIR      LEFT--GARO IN PLACE     HYSTERECTOMY  1993    Complete    ORIF HUMERUS FRACTURE Right 02/08/2019    Procedure: ORIF RIGHT PROXIMAL HUMERUS;  Surgeon: Yonatan Galdamez MD;  Location: [facility] OR;  Service: Orthopedics    ORIF HUMERUS FRACTURE Right 11/18/2021    Procedure: HUMERUS PROXIMAL OPEN REDUCTION INTERNAL FIXATION RIGHT WITH HARDWARE REMOVAL AND NAIL PLACEMENT;  Surgeon: Yonatan Galdamez MD;  Location:  [facility] OR;  Service: Orthopedics;  Laterality: Right;    SHOULDER ACROMIOPLASTY Right 2019    WRIST FRACTURE SURGERY Right 2014      General Information       Row Name 05/30/25 9778          OT Time and Intention    Document Type evaluation  -KF     Mode of Treatment occupational therapy;individual therapy  -KF       Row Name 05/30/25 8778          General Information    Patient Profile Reviewed yes  -KF     Prior Level of Function independent:;all household mobility;community mobility;bed mobility;ADL's  Independent prior, no AD. Pt has a quad cane and SPC, but wasn't using. x1 acute fall causing fx.  -KF     Existing Precautions/Restrictions fall;non-weight bearing;right;other (see comments)  s/p R ORIF of olecranon fx; NWB in splint with simple sling, nerve cath  -KF     Barriers to Rehab physical barrier  -KF       Row Name 05/30/25 2208           Living Environment    Current Living Arrangements home  -KF     People in Home spouse  -KF       Row Name 05/30/25 0830          Home Main Entrance    Number of Stairs, Main Entrance none  -KF       Row Name 05/30/25 0830          Stairs Within Home, Primary    Stairs, Within Home, Primary Pt has 2 steps down into family room and 2 steps up in primary bedroom.  -     Number of Stairs, Within Home, Primary two  -KF     Stair Railings, Within Home, Primary none  -KF       Row Name 05/30/25 0830          Cognition    Orientation Status (Cognition) oriented x 4  -KF       Row Name 05/30/25 0830          Safety Issues/Impairments Affecting Functional Mobility    Safety Issues Affecting Function (Mobility) insight into deficits/self-awareness;safety precaution awareness;safety precautions follow-through/compliance  -     Impairments Affecting Function (Mobility) balance;pain;range of motion (ROM);sensation/sensory awareness  -               User Key  (r) = Recorded By, (t) = Taken By, (c) = Cosigned By      Initials Name Provider Type    KF Aditi Burden OT Occupational Therapist                     Mobility/ADL's       Row Name 05/30/25 0832          Bed Mobility    Bed Mobility supine-sit  -     Supine-Sit [place] (Bed Mobility) standby assist;verbal cues  -     Assistive Device (Bed Mobility) bed rails;head of bed elevated  -     Comment, (Bed Mobility) Cues for nerve catheter management  -       Row Name 05/30/25 0832          Transfers    Transfers sit-stand transfer;stand-sit transfer  -       Row Name 05/30/25 0832          Sit-Stand Transfer    Sit-Stand [place] (Transfers) contact guard  -     Comment, (Sit-Stand Transfer) x1 from EOB  -       Row Name 05/30/25 0832          Stand-Sit Transfer    Stand-Sit [place] (Transfers) contact guard  -       Row Name 05/30/25 0832          Functional Mobility    Functional Mobility- Ind. Level contact guard assist;standby assist  -      Functional Mobility- Device other (see comments)  no AD  -KF     Functional Mobility-Distance (Feet) 250  -KF     Functional Mobility- Comment Pt ambulated 250' with CGA, progressing to SBA without an AD. Pt ascended/descended 2 steps with CGA. Pt passed mobility screen, PT evaluation not indicated.  -KF     Patient was able to Ambulate yes  -       Row Name 05/30/25 0832          Activities of Daily Living    BADL Assessment/Intervention bathing;upper body dressing;lower body dressing  -       Row Name 05/30/25 08          Mobility    Extremity Weight-bearing Status right upper extremity  -KF     Right Upper Extremity (Weight-bearing Status) non weight-bearing (NWB)   -       Row Name 05/30/25 08          Bathing Assessment/Intervention    Assistive Devices (Bathing) long-handled sponge  -     Comment, (Bathing) Pt provided and educated on use of a long handled sponge to assist in bathing, pt verbalized understanding. Pt educated on bathing precautions including keeping splint clean/dry and nerve catheter care including no showering while cath in place.  -       Row Name 05/30/25 08          Upper Body Dressing Assessment/Training    [place] Level (Upper Body Dressing) doff;don;other (see comments);minimum assist (75% patient effort)  sling  -KF     Position (Upper Body Dressing) edge of bed sitting  -     Comment, (Upper Body Dressing) Pt educated on sling management and proper fit, doffing/donning sling with [place]. Pt declined dressing this AM, however education of sequence/technique provided with good pt teachback.  -       Row Name 05/30/25 08          Lower Body Dressing Assessment/Training    [place] Level (Lower Body Dressing) doff;don;socks;dependent (less than 25% patient effort)  -KF     Position (Lower Body Dressing) edge of bed sitting  -KF               User Key  (r) = Recorded By, (t) = Taken By, (c) = Cosigned By      Initials Name Provider Type    MAGUE Burden  SONIA Pennington Occupational Therapist                   Obj/Interventions       Row Name 05/30/25 0836          Sensory Assessment (Somatosensory)    Sensory Assessment (Somatosensory) UE sensation intact  -     Sensory Assessment BLE neuropathy  -       Row Name 05/30/25 0836          Vision Assessment/Intervention    Visual Impairment/Limitations WFL  -[facility] Name 05/30/25 08          Range of Motion Comprehensive    General Range of Motion upper extremity range of motion deficits identified  -KF     Comment, General Range of Motion LUE WFL; R shoulder/digits WFL, R elbow NT  -       Row Name 05/30/25 08          Hand (Therapeutic Exercise)    Hand (Therapeutic Exercise) AROM (active range of motion)  -KF     Hand AROM/AAROM (Therapeutic Exercise) right;AROM (active range of motion);finger flexion;finger extension;10 repetitions  -       Row Name 05/30/25 08          Motor Skills    Therapeutic Exercise hand  -[facility] Name 05/30/25 [facility]          Balance    Balance Assessment sitting static balance;sitting dynamic balance;sit to stand dynamic balance;standing static balance;standing dynamic balance  -KF     Static Sitting Balance supervision  -     Dynamic Sitting Balance standby assist  -KF     Position, Sitting Balance unsupported;sitting edge of bed  -KF     Sit to Stand Dynamic Balance contact guard  -KF     Static Standing Balance contact guard  -KF     Dynamic Standing Balance contact guard  -KF     Position/Device Used, Standing Balance unsupported  -KF     Balance Interventions sitting;standing;sit to stand;supported;static;dynamic;occupation based/functional task  -KF     Comment, Balance No overt LOB or buckling  -               User Key  (r) = Recorded By, (t) = Taken By, (c) = Cosigned By      Initials Name Provider Type    KF Aditi Burden OT Occupational Therapist                   Goals/Plan       Row Name 05/30/25 0850          Transfer Goal 1 (OT)    Activity/Assistive  Device (Transfer Goal 1, OT) commode;bed-to-chair/chair-to-bed  -KF     [place] Level/Cues Needed (Transfer Goal 1, OT) supervision required  -KF     Time Frame (Transfer Goal 1, OT) long term goal (LTG);10 days  -KF     Progress/Outcome (Transfer Goal 1, OT) goal ongoing  -KF       Row Name 05/30/25 0813          Dressing Goal 1 (OT)    Activity/Device (Dressing Goal 1, OT) upper body dressing  -KF     [place]/Cues Needed (Dressing Goal 1, OT) standby assist  -KF     Time Frame (Dressing Goal 1, OT) short term goal (STG);5 days  -KF     Progress/Outcome (Dressing Goal 1, OT) goal ongoing  -KF       Row Name 05/30/25 0839          Grooming Goal 1 (OT)    Activity/Device (Grooming Goal 1, OT) hair care;oral care;wash face, hands  -KF     [place] (Grooming Goal 1, OT) supervision required  -KF     Time Frame (Grooming Goal 1, OT) long term goal (LTG);10 days  -KF     Strategies/Barriers (Grooming Goal 1, OT) standing sink side  -KF     Progress/Outcome (Grooming Goal 1, OT) goal ongoing  -KF       Row Name 05/30/25 0863          Therapy Assessment/Plan (OT)    Planned Therapy Interventions (OT) activity tolerance training;adaptive equipment training;BADL retraining;functional balance retraining;occupation/activity based interventions;patient/caregiver education/training;ROM/therapeutic exercise;strengthening exercise;transfer/mobility retraining  -KF               User Key  (r) = Recorded By, (t) = Taken By, (c) = Cosigned By      Initials Name Provider Type    KF Aditi Burden OT Occupational Therapist                   Clinical Impression       Row Name 05/30/25 0837          Pain Assessment    Pretreatment Pain Rating 9/10  -KF     Posttreatment Pain Rating 9/10  -KF     Pain Location elbow  -KF     Pain Side/Orientation right;posterior  -KF     Pain Management Interventions activity modification encouraged;exercise or physical activity utilized;positioning techniques utilized;nursing notified   -KF     Response to Pain Interventions no change per patient report  -       Row Name 05/30/25 0837          Plan of Care Review    Plan of Care Reviewed With patient  -KF     Progress no change  -KF     Outcome Evaluation OT evaluation completed. The pt presents below baseline with RUE NWB/immobilization, elevated RUE pain, and minor balance deficits warranting continued IP OT services. The pt ambulated 250' and ascended/descended 2 steps with CGA, progressing to SBA during ambulation. Pt was educated on RUE precautions, ADL retraining, and sling management. Recommend a d/c home with assist once pain controlled.  -       Row Name 05/30/25 08          Therapy Assessment/Plan (OT)    Patient/Family Therapy Goal Statement (OT) Return home  -KF     Rehab Potential (OT) good  -KF     Criteria for Skilled Therapeutic Interventions Met (OT) yes;skilled treatment is necessary  -KF     Therapy Frequency (OT) daily  -KF     Predicted Duration of Therapy Intervention (OT) 5 days  -       Row Name 05/30/25 08          Therapy Plan Review/Discharge Plan (OT)    Anticipated Discharge Disposition (OT) home with assist  -       Row Name 05/30/25 08          Vital Signs    Pre Systolic BP Rehab 114  -KF     Pre Treatment Diastolic BP 72  -KF     Pretreatment Heart Rate (beats/min) 107  -KF     Pre SpO2 (%) 97  -KF     O2 Delivery Pre Treatment room air  -KF     Pre Patient Position Supine  -KF     Intra Patient Position Standing  -KF     Post Patient Position Sitting  -       Row Name 05/30/25 08          Positioning and Restraints    Pre-Treatment Position in bed  -KF     Post Treatment Position chair  -KF     In Chair notified nsg;reclined;call light within reach;encouraged to call for assist;exit alarm on;waffle cushion;legs elevated;RUE elevated;with brace  -KF               User Key  (r) = Recorded By, (t) = Taken By, (c) = Cosigned By      Initials Name Provider Type    KF Aditi Burden, SONIA Occupational  Therapist                   Outcome Measures       Row Name 05/30/25 0839          How much help from another is currently needed...    Putting on and taking off regular lower body clothing? 2  -KF     Bathing (including washing, rinsing, and drying) 2  -KF     Toileting (which includes using toilet bed pan or urinal) 3  -KF     Putting on and taking off regular upper body clothing 3  -KF     Taking care of personal grooming (such as brushing teeth) 3  -KF     Eating meals 3  -KF     AM-PAC 6 Clicks Score (OT) 16  -KF       Row Name 05/30/25 0839          How much help from another person do you currently need...    Turning from your back to your side while in flat bed without using bedrails? 4  -KF     Moving from lying on back to sitting on the side of a flat bed without bedrails? 4  -KF     Moving to and from a bed to a chair (including a wheelchair)? 3  -KF     Standing up from a chair using your arms (e.g., wheelchair, bedside chair)? 3  -KF     Climbing 3-5 steps with a railing? 3  -KF     To walk in hospital room? 3  -KF     AM-PAC 6 Clicks Score (PT) 20  -KF     Highest Level of Mobility Goal Walk 10 Steps or More-6  -KF       Row Name 05/30/25 0839          Functional Assessment    Outcome Measure Options AM-PAC 6 Clicks Daily Activity (OT);AM-PAC 6 Clicks Basic Mobility (PT)  -KF               User Key  (r) = Recorded By, (t) = Taken By, (c) = Cosigned By      Initials Name Provider Type    Aditi Oglesby OT Occupational Therapist                    Occupational Therapy Education       Title: PT OT SLP Therapies (Done)       Topic: Occupational Therapy (Done)       Point: ADL training (Done)       Learning Progress Summary            Patient Acceptance, E,TB, VU,DU by  at 5/30/2025 0800                      Point: Home exercise program (Done)       Learning Progress Summary            Patient Acceptance, E,TB, VU,DU by  at 5/30/2025 0800                      Point: Precautions (Done)        Learning Progress Summary            Patient Acceptance, E,TB, VU,DU by  at 5/30/2025 0800                      Point: Body mechanics (Done)       Learning Progress Summary            Patient Acceptance, E,TB, VU,DU by  at 5/30/2025 0800                                      User Key       Initials Effective Dates Name Provider Type Discipline     08/09/23 -  Aditi Burden OT Occupational Therapist OT                  OT Recommendation and Plan  Planned Therapy Interventions (OT): activity tolerance training, adaptive equipment training, BADL retraining, functional balance retraining, occupation/activity based interventions, patient/caregiver education/training, ROM/therapeutic exercise, strengthening exercise, transfer/mobility retraining  Therapy Frequency (OT): daily  Plan of Care Review  Plan of Care Reviewed With: patient  Progress: no change  Outcome Evaluation: OT evaluation completed. The pt presents below baseline with RUE NWB/immobilization, elevated RUE pain, and minor balance deficits warranting continued IP OT services. The pt ambulated 250' and ascended/descended 2 steps with CGA, progressing to SBA during ambulation. Pt was educated on RUE precautions, ADL retraining, and sling management. Recommend a d/c home with assist once pain controlled.     Time Calculation:   Evaluation Complexity (OT)  Review Occupational Profile/Medical/Therapy History Complexity: brief/low complexity  Assessment, Occupational Performance/Identification of Deficit Complexity: 1-3 performance deficits  Clinical Decision Making Complexity (OT): problem focused assessment/low complexity  Overall Complexity of Evaluation (OT): low complexity     Time Calculation- OT       Row Name 05/30/25 0840             Time Calculation- OT    OT Start Time 0800  -KF      OT Received On 05/30/25  -      OT Goal Re-Cert Due Date 06/09/25  -         Timed Charges    63162 - OT Therapeutic Activity Minutes 5 -KF      99087 - OT Self  Care/Mgmt Minutes 8  -KF         Untimed Charges    OT Eval/Re-eval Minutes 32  -KF         Total Minutes    Timed Charges Total Minutes 13  -KF      Untimed Charges Total Minutes 32  -KF       Total Minutes 45  -KF                User Key  (r) = Recorded By, (t) = Taken By, (c) = Cosigned By      Initials Name Provider Type    KF Aditi Burden OT Occupational Therapist                  Therapy Charges for Today       Code Description Service Date Service Provider Modifiers Qty    [id number]  OT EVAL LOW COMPLEXITY 3 5/30/2025 Aditi Burden OT GO 1    [id number]  OT SELF CARE/MGMT/TRAIN EA 15 MIN 5/30/2025 Aditi Burden OT GO 1                 Aditi Burden OT  5/30/2025

## 2025-05-30 NOTE — PLAN OF CARE
Goal Outcome Evaluation:  Plan of Care Reviewed With: patient        Progress: improving  Outcome Evaluation: Pt w/ no c/o pain, just slight burning on the incision side. Voided to the bathroom w/ no issues. No acute events.

## 2025-05-30 NOTE — PLAN OF CARE
Goal Outcome Evaluation:  Plan of Care Reviewed With: patient        Progress: no change  Outcome Evaluation: OT evaluation completed. The pt presents below baseline with RUE NWB/immobilization, elevated RUE pain, and minor balance deficits warranting continued IP OT services. The pt ambulated 250' and ascended/descended 2 steps with CGA, progressing to SBA during ambulation. Pt was educated on RUE precautions, ADL retraining, and sling management. Recommend a d/c home with assist once pain controlled.    Anticipated Discharge Disposition (OT): home with assist

## 2025-05-30 NOTE — PROGRESS NOTES
"Orthopedic Daily Progress Note      CC: orif right olecranon fx    Pain poorly controlled  General: no fevers, chills  Abdomen: no nausea, vomiting, or diarrhea    No other complaints    Physical Exam:  I have reviewed the vital signs.  Temp:  [97.2 °F (36.2 °C)-98.4 °F (36.9 °C)] 97.8 °F (36.6 °C)  Heart Rate:  [] 104  Resp:  [14-18] 16  BP: ()/(51-90) 118/77    Objective:  Vital signs: (most recent): Blood pressure 118/77, pulse 104, temperature 97.8 °F (36.6 °C), temperature source Oral, resp. rate 16, height 170.2 cm (67\"), weight 64.4 kg (142 lb), SpO2 100%.              General Appearance:    Alert, cooperative, no distress  Extremities: No clubbing, cyanosis, or edema to lower extremities  Pulses:  2+ in distal surgical extremity  Skin: Dressing Clean/dry/intact      Results Review:    I have reviewed the labs, radiology results and diagnostic studies:        Results from last 7 days   Lab Units 05/29/25  1814 05/29/25  1117   SODIUM mmol/L  --  137   POTASSIUM mmol/L 4.1 3.9   CO2 mmol/L  --  27.0   CREATININE mg/dL  --  1.00   GLUCOSE mg/dL  --  99     Narrative & Impression   XR ELBOW 2 VW RIGHT     Date of Exam: 5/29/2025 2:03 PM EDT     Indication: orif olecranon  postop     Comparison: Right elbow x-ray 5/23/2025     Findings:  Interval surgical changes of ORIF of olecranon fracture with screw plate hardware. Alignment appears satisfactory. Unremarkable appearance of the hardware. There is expected postsurgical soft tissue swelling. Evaluation of the soft tissues is somewhat   limited owing to overlying splint. Redemonstration of partially imaged intramedullary alicia of the humerus.     IMPRESSION:  Impression:  Expected postoperative changes of screw plate ORIF of olecranon fracture.        Electronically Signed: Yonatan Randolph MD    5/29/2025 2:32 PM EDT    Workstation ID: [id number]         I have reviewed the medications.    Assessment/Problem List  POD# 1 Day Post-Op   S/p orif " olecranon    Plan  1) block team to eval block catheter given poor pain control  2) po pain meds  3) splint to remain clean and dry at all times until fu  4) elevate arm and hand as much as possible        Discharge Planning: I expect patient to be discharged to home-- when pain adequately controlled and cleared by OT.    Yonatan Galdamez MD  05/30/25  09:27 EDT

## 2025-05-31 PROCEDURE — A9270 NON-COVERED ITEM OR SERVICE: HCPCS | Performed by: ORTHOPAEDIC SURGERY

## 2025-05-31 PROCEDURE — A9270 NON-COVERED ITEM OR SERVICE: HCPCS | Performed by: INTERNAL MEDICINE

## 2025-05-31 PROCEDURE — 63710000001 AMLODIPINE 5 MG TABLET: Performed by: INTERNAL MEDICINE

## 2025-05-31 PROCEDURE — 63710000001 HYDROCODONE-ACETAMINOPHEN 10-325 MG TABLET: Performed by: INTERNAL MEDICINE

## 2025-05-31 PROCEDURE — 63710000001 ASPIRIN 81 MG TABLET DELAYED-RELEASE: Performed by: INTERNAL MEDICINE

## 2025-05-31 PROCEDURE — 63710000001 FLECAINIDE 50 MG TABLET: Performed by: INTERNAL MEDICINE

## 2025-05-31 PROCEDURE — 63710000001 SENNOSIDES-DOCUSATE 8.6-50 MG TABLET: Performed by: INTERNAL MEDICINE

## 2025-05-31 PROCEDURE — 63710000001 FLUOXETINE 20 MG CAPSULE: Performed by: INTERNAL MEDICINE

## 2025-05-31 PROCEDURE — 63710000001 PANTOPRAZOLE 40 MG TABLET DELAYED-RELEASE: Performed by: INTERNAL MEDICINE

## 2025-05-31 PROCEDURE — 63710000001 TIMOLOL 0.5 % SOLUTION 5 ML BOTTLE: Performed by: INTERNAL MEDICINE

## 2025-05-31 PROCEDURE — 63710000001 OXYCODONE 10 MG TABLET: Performed by: INTERNAL MEDICINE

## 2025-05-31 PROCEDURE — 97530 THERAPEUTIC ACTIVITIES: CPT

## 2025-05-31 PROCEDURE — 25010000002 HYDROMORPHONE PER 4 MG: Performed by: ORTHOPAEDIC SURGERY

## 2025-05-31 PROCEDURE — 63710000001 TRAZODONE 50 MG TABLET: Performed by: INTERNAL MEDICINE

## 2025-05-31 PROCEDURE — 63710000001 HYDROCODONE-ACETAMINOPHEN 5-325 MG TABLET: Performed by: ORTHOPAEDIC SURGERY

## 2025-05-31 RX ORDER — ACETAMINOPHEN 325 MG/1
650 TABLET ORAL EVERY 6 HOURS PRN
Status: DISCONTINUED | OUTPATIENT
Start: 2025-05-31 | End: 2025-05-31

## 2025-05-31 RX ORDER — OXYCODONE HYDROCHLORIDE 5 MG/1
5 TABLET ORAL EVERY 4 HOURS PRN
Refills: 0 | Status: DISCONTINUED | OUTPATIENT
Start: 2025-05-31 | End: 2025-05-31

## 2025-05-31 RX ORDER — HYDROCODONE BITARTRATE AND ACETAMINOPHEN 10; 325 MG/1; MG/1
1 TABLET ORAL EVERY 6 HOURS PRN
Refills: 0 | Status: DISCONTINUED | OUTPATIENT
Start: 2025-05-31 | End: 2025-06-01 | Stop reason: HOSPADM

## 2025-05-31 RX ORDER — OXYCODONE HYDROCHLORIDE 10 MG/1
10 TABLET ORAL EVERY 4 HOURS PRN
Refills: 0 | Status: DISCONTINUED | OUTPATIENT
Start: 2025-05-31 | End: 2025-05-31

## 2025-05-31 RX ORDER — HYDROCODONE BITARTRATE AND ACETAMINOPHEN 5; 325 MG/1; MG/1
1 TABLET ORAL EVERY 4 HOURS PRN
Refills: 0 | Status: DISCONTINUED | OUTPATIENT
Start: 2025-05-31 | End: 2025-06-01 | Stop reason: HOSPADM

## 2025-05-31 RX ADMIN — PANTOPRAZOLE SODIUM 40 MG: 40 TABLET, DELAYED RELEASE ORAL at 06:20

## 2025-05-31 RX ADMIN — Medication 10 ML: at 08:31

## 2025-05-31 RX ADMIN — TIMOLOL MALEATE 1 DROP: 5 SOLUTION/ DROPS OPHTHALMIC at 21:22

## 2025-05-31 RX ADMIN — DOCUSATE SODIUM 50 MG AND SENNOSIDES 8.6 MG 2 TABLET: 8.6; 5 TABLET, FILM COATED ORAL at 21:21

## 2025-05-31 RX ADMIN — TRAZODONE HYDROCHLORIDE 100 MG: 50 TABLET ORAL at 21:21

## 2025-05-31 RX ADMIN — FLECAINIDE ACETATE 50 MG: 50 TABLET ORAL at 21:21

## 2025-05-31 RX ADMIN — TIMOLOL MALEATE 1 DROP: 5 SOLUTION/ DROPS OPHTHALMIC at 12:25

## 2025-05-31 RX ADMIN — ASPIRIN 81 MG: 81 TABLET, COATED ORAL at 08:26

## 2025-05-31 RX ADMIN — DOCUSATE SODIUM 50 MG AND SENNOSIDES 8.6 MG 2 TABLET: 8.6; 5 TABLET, FILM COATED ORAL at 08:26

## 2025-05-31 RX ADMIN — FLECAINIDE ACETATE 50 MG: 50 TABLET ORAL at 08:26

## 2025-05-31 RX ADMIN — FLUOXETINE HYDROCHLORIDE 40 MG: 20 CAPSULE ORAL at 08:26

## 2025-05-31 RX ADMIN — HYDROCODONE BITARTRATE AND ACETAMINOPHEN 2 TABLET: 5; 325 TABLET ORAL at 08:26

## 2025-05-31 RX ADMIN — AMLODIPINE BESYLATE 5 MG: 5 TABLET ORAL at 08:26

## 2025-05-31 RX ADMIN — HYDROMORPHONE HYDROCHLORIDE 0.5 MG: 1 INJECTION, SOLUTION INTRAMUSCULAR; INTRAVENOUS; SUBCUTANEOUS at 21:22

## 2025-05-31 RX ADMIN — OXYCODONE HYDROCHLORIDE 10 MG: 10 TABLET ORAL at 10:23

## 2025-05-31 RX ADMIN — HYDROMORPHONE HYDROCHLORIDE 0.5 MG: 1 INJECTION, SOLUTION INTRAMUSCULAR; INTRAVENOUS; SUBCUTANEOUS at 13:40

## 2025-05-31 RX ADMIN — Medication 10 ML: at 21:22

## 2025-05-31 RX ADMIN — HYDROCODONE BITARTRATE AND ACETAMINOPHEN 2 TABLET: 5; 325 TABLET ORAL at 02:46

## 2025-05-31 RX ADMIN — HYDROCODONE BITARTRATE AND ACETAMINOPHEN 1 TABLET: 10; 325 TABLET ORAL at 16:16

## 2025-05-31 NOTE — PLAN OF CARE
Goal Outcome Evaluation:  Plan of Care Reviewed With: patient, spouse        Progress: improving  Outcome Evaluation: Pt's ADL independence limited d/t RUE NWB/ROM restriction, increased pain, and decreased functional endurance. Will continue to progress pt as tolerated per OT POC. Rec home w/ A at d/c.    Anticipated Discharge Disposition (OT): home with assist

## 2025-05-31 NOTE — PROGRESS NOTES
[facility]    Acute pain service Inpatient Progress Note    Patient Name: Aleena Armstrong  :  1950  MRN:  [id number]        Acute Pain  Service Inpatient Progress Note:    Analgesia:Good  LOC: alert and awake  Resp Status: room air  Cardiac: VS stable  Side Effects:None  Catheter Site:clean, dressing intact and dry  Cath type: peripheral nerve cath(InfuSystem)  Infusion rate: Ext/Pop: Basal: 1ml/hr, PIB: 5ml q 2 h, PCA: 5 ml q 30 min (1mL,5ml, 5ml InfuSystem Pump)  Dosing/Volume: ropivacaine 0.2%  Catheter Plan:Catheter to remain Insitu and Continue catheter infusion rate unchanged  Comments: The neuro assessment of the operative extremity includes the ability to do finger flexion and extension; includes the ability to do wrist flexion and extension, does not include the ability to do elbow flexion and extension.  The neuro exam of the patient does not include sensory function throughout the operative extremity.

## 2025-05-31 NOTE — PROGRESS NOTES
"IM progress note      Aleena Armstrong  [id number]  1950     LOS: 0 days     Attending: Yonatan Galdamez MD    Primary Care Provider: Aniyah Goldberg MD      Chief Complaint/Reason for visit:  No chief complaint on file.      Subjective   Still having difficulty controlling pain. Switched from Norco to Oxycodone to attempt better pain control. Denies nausea, SOB, CP.       Objective        Visit Vitals  /95   Pulse 111   Temp 98.3 °F (36.8 °C) (Oral)   Resp 18   Ht 170.2 cm (67\")   Wt 64.4 kg (142 lb)   LMP  (LMP Unknown)   SpO2 98%   BMI 22.24 kg/m²     Temp (24hrs), Av.3 °F (36.8 °C), Min:97.9 °F (36.6 °C), Max:98.7 °F (37.1 °C)      Intake/Output:    Intake/Output Summary (Last 24 hours) at 2025 1109  Last data filed at 2025 1000  Gross per 24 hour   Intake 640 ml   Output --   Net 640 ml        Occupational Therapy:  Plan of Care Reviewed With: patient  Progress: no change  Outcome Evaluation: OT evaluation completed. The pt presents below baseline with RUE NWB/immobilization, elevated RUE pain, and minor balance deficits warranting continued IP OT services. The pt ambulated 250' and ascended/descended 2 steps with CGA, progressing to SBA during ambulation. Pt was educated on RUE precautions, ADL retraining, and sling management. Recommend a d/c home with assist once pain controlled.     Anticipated Discharge Disposition (OT): home with assist  Physical Exam:     General Appearance:    Alert, cooperative, in no acute distress   Head:    Normocephalic, without obvious abnormality, atraumatic    Lungs:     Normal effort, symmetric chest rise,  clear to      auscultation bilaterally              Heart:    Regular rhythm and normal rate, normal S1 and S2    Abdomen:     Normal bowel sounds, no masses, no organomegaly, soft        nontender, nondistended, no guarding, no rebound                tenderness   Extremities:   CDI splint/dressing RUE. Moves hand and fingers.   PNB cath " present.   No clubbing, cyanosis or edema.  No deformities.    Pulses:   Pulses palpable and equal bilaterally   Skin:   No bleeding, bruising or rash          Results Review:     I reviewed the patient's new clinical results.   Results from last 7 days   Lab Units 05/30/25  1455   WBC 10*3/mm3 3.91   HEMOGLOBIN g/dL 8.5*   HEMATOCRIT % 27.6*   PLATELETS 10*3/mm3 212     Results from last 7 days   Lab Units 05/30/25  1455 05/29/25  1814 05/29/25  1117   SODIUM mmol/L 137  --  137   POTASSIUM mmol/L 3.7 4.1 3.9   CHLORIDE mmol/L 102  --  101   CO2 mmol/L 27.0  --  27.0   BUN mg/dL 24.5*  --  22.8   CREATININE mg/dL 1.04*  --  1.00   CALCIUM mg/dL 8.6  --  9.1   GLUCOSE mg/dL 116*  --  99     I reviewed the patient's new imaging including images and reports.    All medications reviewed.   amLODIPine, 5 mg, Oral, Q24H  aspirin, 81 mg, Oral, Daily  flecainide, 50 mg, Oral, Q12H  FLUoxetine, 40 mg, Oral, Daily  pantoprazole, 40 mg, Oral, Q AM  senna-docusate sodium, 2 tablet, Oral, BID  sodium chloride, 10 mL, Intravenous, Q12H  timolol, 1 drop, Left Eye, BID  traZODone, 100 mg, Oral, Nightly      acetaminophen, 650 mg, Q6H PRN  polyethylene glycol, 17 g, Daily PRN   And  bisacodyl, 5 mg, Daily PRN   And  bisacodyl, 10 mg, Daily PRN  HYDROmorphone, 0.5 mg, Q2H PRN   And  naloxone, 0.1 mg, Q5 Min PRN  ketorolac, 30 mg, Q6H PRN  labetalol, 10 mg, Q4H PRN  nitroglycerin, 0.4 mg, Q5 Min PRN  ondansetron ODT, 4 mg, Q6H PRN   Or  ondansetron, 4 mg, Q6H PRN  oxyCODONE, 5 mg, Q4H PRN   Or  oxyCODONE, 10 mg, Q4H PRN  sodium chloride, 500 mL, TID PRN  sodium chloride, 10 mL, PRN  sodium chloride, 40 mL, PRN        Assessment & Plan       S/P ORIF (open reduction internal fixation) fracture, right olecranon.    Dyslipidemia    Essential hypertension    Atrial fib/flutter    Anxiety and depression    GERD    History of Emilie-en-Y gastric bypass    Olecranon fracture         Plan   1. PT/OT.  Right upper extremity restrictions per   Rudolph  2. Pain control-prns, multimodal approach including peripheral nerve block catheter infusion of ropivacaine             3. IS-encourage  4. DVT proph- mechanicals, resumed home aspirin.  5. Bowel regimen  6. Resume home medications as appropriate  7. Monitor post-op labs  8. Discharge planning , home when pain is better controlled. Hopefully by tomorrow.      - Hypertension:  Resume home medications as appropriate, formulary substitution when indicated.  Holding parameters.  PRN medications for elevated blood pressure.     -Depression and anxiety: Resume home regimen, Prozac.     - Hyperlipidemia: Resume home regimen at discharge.     - History of A-fib, prior ablation, status post watchman's.     Courtney Benson, APRN  05/31/25  11:09 EDT

## 2025-05-31 NOTE — PLAN OF CARE
Goal Outcome Evaluation:  Plan of Care Reviewed With: patient        Progress: improving     Pt. Has had severe pain throughout the day. Switched norco to oxicodone, but this did not help patient so switched back to norco, and dilaudid given once. VSS, on room air and a-fib to NSR/tachy on the monitor. Pt. Worked with therapy this afternoon. Up with one to the bathroom, BM today.

## 2025-05-31 NOTE — THERAPY TREATMENT NOTE
Patient Name: Aleena Armstrong  : 1950    MRN: [id number]                              Today's Date: 2025       Admit Date: 2025    Visit Dx: No diagnosis found.  Patient Active Problem List   Diagnosis    WPW (Elodia-Parkinson-White syndrome)    Dyslipidemia    Essential hypertension    Atrial fib/flutter    Insomnia    Anxiety and depression    Vitamin D deficiency    Rheumatoid arthritis    Glaucoma of left eye    Anemia    Chronic pain    Fall    Iron deficiency anemia    GERD    History of Emilie-en-Y gastric bypass    Hypothyroidism    Psoriatic arthritis on Plaquenil    Long term current use of antiarrhythmic drug    Presence of Watchman left atrial appendage closure device    Olecranon fracture    S/P ORIF (open reduction internal fixation) fracture, right olecranon.     Past Medical History:   Diagnosis Date    A-fib     HAD ABLATION FOR THIS IN     Anemia     Anxiety and depression 2016    GERD (gastroesophageal reflux disease)     Glaucoma of left eye 2016    H/O migraine     LAST 3  YEARS AGO     History of MRSA infection     APPROX 8 YEARS, TREATED WITH ORAL ABX, NEGATIVE CULTURES FOLLOWING TX     History of transfusion 2023    [facility], GI Bleed, 4 units, no reactions    Hyperlipidemia     Hypertension     MEDS DC'D BY DR GOMEZ 2017    Insomnia 2016    Kidney disease     Peptic ulcer - s/p gastric bypass surgery (7-8 years ago) 2016    Rheumatoid arthritis 2016    Spinal headache     FOLLOWING SPINAL FOR CHILDBIRTH     Vitamin D deficiency 2016    Wears dentures     UPPER PLATE     Elodia-Parkinson-White (WPW) syndrome          Past Surgical History:   Procedure Laterality Date    ABLATION OF DYSRHYTHMIC FOCUS      ATRIAL APPENDAGE EXCLUSION LEFT WITH TRANSESOPHAGEAL ECHOCARDIOGRAM N/A 2024    Procedure: Atrial Appendage Occlusion;  Surgeon: Jesse Keen DO;  Location: [facility] INVASIVE LOCATION;  Service: Cardiology;  Laterality:  N/A;    BREAST BIOPSY  ~1990    CARDIAC CATHETERIZATION N/A 10/24/2016    Procedure: Left Heart Cath;  Surgeon: Rubens Morejon MD;  Location:  [facility] CATH INVASIVE LOCATION;  Service:     CARDIAC ELECTROPHYSIOLOGY PROCEDURE N/A 06/20/2017    Procedure: PVA;  Surgeon: Edvin Cook DO;  Location: [facility] INVASIVE LOCATION;  Service:     CHOLECYSTECTOMY  ~1990    COLONOSCOPY  2007    ENDOSCOPY N/A 12/18/2023    Procedure: ESOPHAGOGASTRODUODENOSCOPY;  Surgeon: Adeel Reyes MD;  Location: [facility] ENDOSCOPY;  Service: Gastroenterology;  Laterality: N/A;  epi 1mg diluted in 10mls. 1 ml given    GASTRIC BYPASS      GASTRIC BYPASS  2005    HAMMER TOE REPAIR      LEFT--GARO IN PLACE     HYSTERECTOMY  1993    Complete    ORIF HUMERUS FRACTURE Right 02/08/2019    Procedure: ORIF RIGHT PROXIMAL HUMERUS;  Surgeon: Yonatan Galdamez MD;  Location:  [facility] OR;  Service: Orthopedics    ORIF HUMERUS FRACTURE Right 11/18/2021    Procedure: HUMERUS PROXIMAL OPEN REDUCTION INTERNAL FIXATION RIGHT WITH HARDWARE REMOVAL AND NAIL PLACEMENT;  Surgeon: Yonatan Galdamez MD;  Location:  [facility] OR;  Service: Orthopedics;  Laterality: Right;    SHOULDER ACROMIOPLASTY Right 2019    WRIST FRACTURE SURGERY Right 2014      General Information       Row Name 05/31/25 1508          OT Time and Intention    Document Type therapy note (daily note)  -     Mode of Treatment occupational therapy;individual therapy  -       Row Name 05/31/25 1508          General Information    Patient Profile Reviewed yes  -     Existing Precautions/Restrictions fall;non-weight bearing;right;other (see comments)  s/p R ORIF of olecranon fx; NWB in splint with simple sling, infraclavicular nerve cath  -     Barriers to Rehab physical barrier  -       Row Name 05/31/25 1509          Cognition    Orientation Status (Cognition) oriented x 3  -       Row Name 05/31/25 1501          Safety Issues/Impairments Affecting Functional Mobility    Safety Issues  Affecting Function (Mobility) safety precaution awareness  -     Impairments Affecting Function (Mobility) balance;pain;range of motion (ROM);sensation/sensory awareness  -               User Key  (r) = Recorded By, (t) = Taken By, (c) = Cosigned By      Initials Name Provider Type     Farrah Nur, OT Occupational Therapist                     Mobility/ADL's       Row Name 05/31/25 1508          Bed Mobility    Bed Mobility supine-sit;sit-supine  -     Supine-Sit [place] (Bed Mobility) standby assist  -     Sit-Supine [place] (Bed Mobility) standby assist  -     Bed Mobility, Safety Issues decreased use of arms for pushing/pulling  -     Assistive Device (Bed Mobility) bed rails;head of bed elevated  -       Row Name 05/31/25 1508          Transfers    Transfers sit-stand transfer;stand-sit transfer  -       Row Name 05/31/25 1508          Sit-Stand Transfer    Sit-Stand [place] (Transfers) standby assist  -       Row Name 05/31/25 1508          Stand-Sit Transfer    Stand-Sit [place] (Transfers) standby assist  -[facility] Name 05/31/25 1508          Functional Mobility    Functional Mobility- Ind. Level standby assist  -     Functional Mobility-Distance (Feet) --  > HH distance in hallway  -       Row Name 05/31/25 1508          Activities of Daily Living    BADL Assessment/Intervention upper body dressing  -[facility] Name 05/31/25 1508          Mobility    Extremity Weight-bearing Status right upper extremity  -     Right Upper Extremity (Weight-bearing Status) non weight-bearing (NWB)  -[facility] Name 05/31/25 1508          Upper Body Dressing Assessment/Training    [place] Level (Upper Body Dressing) don;other (see comments);minimum assist (75% patient effort)  -     Position (Upper Body Dressing) edge of bed sitting  -     Comment, (Upper Body Dressing) hosp gown + RUE sling  -               User Key  (r) = Recorded By, (t) = Taken By, (c)  = Cosigned By      Initials Name Provider Type     Farrah Nur OT Occupational Therapist                   Obj/Interventions       Row Name 05/31/25 1509          Balance    Balance Assessment sitting static balance;sitting dynamic balance;sit to stand dynamic balance;standing static balance;standing dynamic balance  -     Static Sitting Balance independent  -     Dynamic Sitting Balance independent  -     Position, Sitting Balance unsupported;sitting edge of bed  -     Sit to Stand Dynamic Balance standby assist  -     Static Standing Balance standby assist  -     Dynamic Standing Balance standby assist  -     Position/Device Used, Standing Balance unsupported  -     Balance Interventions sitting;sit to stand;occupation based/functional task  -               User Key  (r) = Recorded By, (t) = Taken By, (c) = Cosigned By      Initials Name Provider Type     Farrah Nur OT Occupational Therapist                   Goals/Plan    No documentation.                  Clinical Impression       [facility] Name 05/31/25 1509          Pain Assessment    Pretreatment Pain Rating 6/10  -     Posttreatment Pain Rating 6/10  -     Pain Location elbow  -     Pain Side/Orientation right  -     Pain Management Interventions exercise or physical activity utilized;positioning techniques utilized;nursing notified  -     Response to Pain Interventions activity participation with tolerable pain  -       Row Name 05/31/25 1504          Plan of Care Review    Plan of Care Reviewed With patient;spouse  -     Progress improving  -     Outcome Evaluation Pt's ADL independence limited d/t RUE NWB/ROM restriction, increased pain, and decreased functional endurance. Will continue to progress pt as tolerated per OT POC. Rec home w/ A at d/c.  -       Row Name 05/31/25 1508          Therapy Assessment/Plan (OT)    Rehab Potential (OT) good  -     Criteria for Skilled Therapeutic Interventions Met  (OT) yes;skilled treatment is necessary  -     Therapy Frequency (OT) daily  -       Row Name 05/31/25 1509          Therapy Plan Review/Discharge Plan (OT)    Anticipated Discharge Disposition (OT) home with assist  -       Row Name 05/31/25 1509          Vital Signs    O2 Delivery Pre Treatment room air  -MC     O2 Delivery Intra Treatment room air  -     O2 Delivery Post Treatment room air  -MC     Pre Patient Position Supine  -MC     Intra Patient Position Standing  -     Post Patient Position Supine  -       Row Name 05/31/25 1509          Positioning and Restraints    Pre-Treatment Position in bed  -MC     Post Treatment Position bed  -MC     In Bed fowlers;call light within reach;encouraged to call for assist;exit alarm on;side rails up x3;with family/caregiver  -               User Key  (r) = Recorded By, (t) = Taken By, (c) = Cosigned By      Initials Name Provider Type    Farrah Baig, SONIA Occupational Therapist                   Outcome Measures       Row Name 05/31/25 1511          How much help from another is currently needed...    Putting on and taking off regular lower body clothing? 2  -MC     Bathing (including washing, rinsing, and drying) 2  -MC     Toileting (which includes using toilet bed pan or urinal) 3  -MC     Putting on and taking off regular upper body clothing 3  -MC     Taking care of personal grooming (such as brushing teeth) 3  -MC     Eating meals 3  -MC     AM-PAC 6 Clicks Score (OT) 16  -       Row Name 05/31/25 0845          How much help from another person do you currently need...    Turning from your back to your side while in flat bed without using bedrails? 4  -OD     Moving from lying on back to sitting on the side of a flat bed without bedrails? 4  -OD     Moving to and from a bed to a chair (including a wheelchair)? 3  -OD     Standing up from a chair using your arms (e.g., wheelchair, bedside chair)? 3  -OD     Climbing 3-5 steps with a railing? 3   -OD     To walk in hospital room? 3  -OD     AM-PAC 6 Clicks Score (PT) 20  -OD     Highest Level of Mobility Goal Walk 10 Steps or More-6  -OD       Row Name 05/31/25 1511          Functional Assessment    Outcome Measure Options AM-PAC 6 Clicks Daily Activity (OT)  -               User Key  (r) = Recorded By, (t) = Taken By, (c) = Cosigned By      Initials Name Provider Type    OD Lillie Grossman, RN Registered Nurse     Farrah Nur OT Occupational Therapist                    Occupational Therapy Education       Title: PT OT SLP Therapies (Done)       Topic: Occupational Therapy (Done)       Point: ADL training (Done)       Learning Progress Summary            Patient Acceptance, E, VU by  at 5/31/2025 1511    Acceptance, E,TB, VU,DU by  at 5/30/2025 0800                      Point: Home exercise program (Done)       Learning Progress Summary            Patient Acceptance, E,TB, VU,DU by  at 5/30/2025 0800                      Point: Precautions (Done)       Learning Progress Summary            Patient Acceptance, E, VU by  at 5/31/2025 1511    Acceptance, E,TB, VU,DU by  at 5/30/2025 0800                      Point: Body mechanics (Done)       Learning Progress Summary            Patient Acceptance, E, VU by  at 5/31/2025 1511    Acceptance, E,TB, VU,DU by  at 5/30/2025 0800                                      User Key       Initials Effective Dates Name Provider Type Discipline     10/14/22 -  Farrah Nur OT Occupational Therapist OT     08/09/23 -  Aditi Burden OT Occupational Therapist OT                  OT Recommendation and Plan  Therapy Frequency (OT): daily  Plan of Care Review  Plan of Care Reviewed With: patient, spouse  Progress: improving  Outcome Evaluation: Pt's ADL independence limited d/t RUE NWB/ROM restriction, increased pain, and decreased functional endurance. Will continue to progress pt as tolerated per OT POC. Rec home w/ A at d/c.     Time  Calculation:         Time Calculation- OT       Row Name 05/31/25 1511             Time Calculation- OT    OT Start Time 1433  -      OT Received On 05/31/25  -      OT Goal Re-Cert Due Date 06/09/25  -         Timed Charges    88733 - OT Therapeutic Activity Minutes 12  -      13432 - OT Self Care/Mgmt Minutes 2  -         Total Minutes    Timed Charges Total Minutes 14  -       Total Minutes 14  -MC                User Key  (r) = Recorded By, (t) = Taken By, (c) = Cosigned By      Initials Name Provider Type     Fararh Nur OT Occupational Therapist                  Therapy Charges for Today       Code Description Service Date Service Provider Modifiers Qty    20941251496 HC OT THERAPEUTIC ACT EA 15 MIN 5/31/2025 Farrah Nur OT GO 1                 Farrah Nur OT  5/31/2025

## 2025-05-31 NOTE — PLAN OF CARE
Goal Outcome Evaluation:  Plan of Care Reviewed With: patient        Progress: no change  Outcome Evaluation: Pt still w/ PRN pain meds, less frequent now, regimen seemed helpful. Will monitor. BM x2 s/p bowel regimen started. Independent w/ ambulation (still compliance to call when need to use the bathroom as per advised by staffs). Hopefully can go home today.

## 2025-06-01 VITALS
DIASTOLIC BLOOD PRESSURE: 90 MMHG | HEIGHT: 67 IN | RESPIRATION RATE: 18 BRPM | BODY MASS INDEX: 22.29 KG/M2 | HEART RATE: 106 BPM | TEMPERATURE: 98.3 F | WEIGHT: 142 LBS | OXYGEN SATURATION: 97 % | SYSTOLIC BLOOD PRESSURE: 136 MMHG

## 2025-06-01 PROCEDURE — A9270 NON-COVERED ITEM OR SERVICE: HCPCS | Performed by: INTERNAL MEDICINE

## 2025-06-01 PROCEDURE — 63710000001 HYDROCODONE-ACETAMINOPHEN 10-325 MG TABLET: Performed by: INTERNAL MEDICINE

## 2025-06-01 PROCEDURE — 63710000001 SENNOSIDES-DOCUSATE 8.6-50 MG TABLET: Performed by: INTERNAL MEDICINE

## 2025-06-01 PROCEDURE — 63710000001 FLUOXETINE 20 MG CAPSULE: Performed by: INTERNAL MEDICINE

## 2025-06-01 PROCEDURE — 63710000001 ASPIRIN 81 MG TABLET DELAYED-RELEASE: Performed by: INTERNAL MEDICINE

## 2025-06-01 PROCEDURE — 63710000001 FLECAINIDE 50 MG TABLET: Performed by: INTERNAL MEDICINE

## 2025-06-01 PROCEDURE — 63710000001 PANTOPRAZOLE 40 MG TABLET DELAYED-RELEASE: Performed by: INTERNAL MEDICINE

## 2025-06-01 RX ORDER — FLECAINIDE ACETATE 50 MG/1
100 TABLET ORAL EVERY 12 HOURS
Status: DISCONTINUED | OUTPATIENT
Start: 2025-06-01 | End: 2025-06-01 | Stop reason: HOSPADM

## 2025-06-01 RX ORDER — FLECAINIDE ACETATE 50 MG/1
100 TABLET ORAL EVERY 12 HOURS
Start: 2025-06-01

## 2025-06-01 RX ORDER — FLECAINIDE ACETATE 50 MG/1
50 TABLET ORAL ONCE
Status: COMPLETED | OUTPATIENT
Start: 2025-06-01 | End: 2025-06-01

## 2025-06-01 RX ORDER — HYDROCODONE BITARTRATE AND ACETAMINOPHEN 10; 325 MG/1; MG/1
1 TABLET ORAL EVERY 6 HOURS PRN
Qty: 25 TABLET | Refills: 0 | Status: SHIPPED | OUTPATIENT
Start: 2025-06-01 | End: 2025-06-10

## 2025-06-01 RX ADMIN — HYDROCODONE BITARTRATE AND ACETAMINOPHEN 1 TABLET: 10; 325 TABLET ORAL at 02:59

## 2025-06-01 RX ADMIN — FLUOXETINE HYDROCHLORIDE 40 MG: 20 CAPSULE ORAL at 09:43

## 2025-06-01 RX ADMIN — HYDROCODONE BITARTRATE AND ACETAMINOPHEN 1 TABLET: 10; 325 TABLET ORAL at 09:42

## 2025-06-01 RX ADMIN — DOCUSATE SODIUM 50 MG AND SENNOSIDES 8.6 MG 2 TABLET: 8.6; 5 TABLET, FILM COATED ORAL at 09:43

## 2025-06-01 RX ADMIN — HYDROCODONE BITARTRATE AND ACETAMINOPHEN 1 TABLET: 10; 325 TABLET ORAL at 14:27

## 2025-06-01 RX ADMIN — FLECAINIDE ACETATE 50 MG: 50 TABLET ORAL at 12:49

## 2025-06-01 RX ADMIN — Medication 10 ML: at 09:47

## 2025-06-01 RX ADMIN — TIMOLOL MALEATE 1 DROP: 5 SOLUTION/ DROPS OPHTHALMIC at 09:47

## 2025-06-01 RX ADMIN — PANTOPRAZOLE SODIUM 40 MG: 40 TABLET, DELAYED RELEASE ORAL at 06:46

## 2025-06-01 RX ADMIN — FLECAINIDE ACETATE 50 MG: 50 TABLET ORAL at 09:43

## 2025-06-01 RX ADMIN — ASPIRIN 81 MG: 81 TABLET, COATED ORAL at 09:42

## 2025-06-01 NOTE — DISCHARGE SUMMARY
Patient Name: Aleena Armstrong  MRN: [id number]  : 1950  DOS: 2025    Attending: Yonatan Galdamez MD    Primary Care Provider: Aniyah Goldberg MD    Date of Admission:.2025  9:43 AM    Date of Discharge:  2025    Discharge Diagnosis:   S/P ORIF (open reduction internal fixation) fracture, right olecranon.    Dyslipidemia    Essential hypertension    Atrial fib/flutter    Anxiety and depression    GERD    History of Emilie-en-Y gastric bypass    Olecranon fracture      Hospital Course    At admit:    Patient is a pleasant 74 y.o. female presented for scheduled surgery by Dr. Galdamez.     Patient had a fall leading to right displaced olecranial fracture.  She was seen by Dr. Galdamez and opted to proceed with surgery.     I saw preoperatively, reviewed with patient past medical history and home medications.     Noting that she was on chronic anticoagulation until she had a watchman's procedure.     Subsequent notes indicate she later underwent open duction internal fixation of right olecranon fracture, surgery was done under general anesthesia and a block.     She was admitted for further management.    After admit:  Patient was provided pain medications as needed for pain control, along with interscalene nerve block infusion of Ropivacaine    Adjustments were made to pain medications to optimize postop pain management.   Risks and benefits of opiate medications discussed with patient. SHAWNA report on chart was reviewed.    The patient was seen by OT and progressed well during her stay.  The patient used an IS for atelectasis prophylaxis and mechanicals for DVT prophylaxis.    Home medications were resumed as appropriate, and labs were monitored and remained fairly stable.     With the progress she has made, patient is ready for DC home today.      The patient will have an Infupump ( instructed on it during this admit)  Discussed with patient regarding plan and she shows understanding and  "agreement.        Procedures Performed  Procedure(s):  OPEN REDUCTION INTERNAL FIXATION OLECTANON FRACTURE- RIGHT       Pertinent Test Results:    I reviewed the patient's new clinical results.   Results from last 7 days   Lab Units 25  1455   WBC 10*3/mm3 3.91   HEMOGLOBIN g/dL 8.5*   HEMATOCRIT % 27.6*   PLATELETS 10*3/mm3 212     Results from last 7 days   Lab Units 25  1455 25  1814 25  1117   SODIUM mmol/L 137  --  137   POTASSIUM mmol/L 3.7 4.1 3.9   CHLORIDE mmol/L 102  --  101   CO2 mmol/L 27.0  --  27.0   BUN mg/dL 24.5*  --  22.8   CREATININE mg/dL 1.04*  --  1.00   CALCIUM mg/dL 8.6  --  9.1   GLUCOSE mg/dL 116*  --  99     I reviewed the patient's new imaging including images and reports.      Occupational Therapy  Farrah Nur, OT   Occupational Therapist  Occupational Therapy     Plan of Care      Signed     Date of Service: 25 1433  Creation Time: 25 1511     Signed         Goal Outcome Evaluation:  Plan of Care Reviewed With: patient, spouse  Progress: improving  Outcome Evaluation: Pt's ADL independence limited d/t RUE NWB/ROM restriction, increased pain, and decreased functional endurance. Will continue to progress pt as tolerated per OT POC. Rec home w/ A at d/c.     Anticipated Discharge Disposition (OT): home with assist                   Discharge Assessment:       Visit Vitals  /89   Pulse 106   Temp 98.5 °F (36.9 °C) (Oral)   Resp 18   Ht 170.2 cm (67\")   Wt 64.4 kg (142 lb)   LMP  (LMP Unknown)   SpO2 97%   BMI 22.24 kg/m²     Temp (24hrs), Av.3 °F (36.8 °C), Min:98 °F (36.7 °C), Max:98.5 °F (36.9 °C)      General Appearance:    Alert, cooperative, in no acute distress   Lungs:     Clear to auscultation,respirations regular, even and   unlabored    Heart:    Regular rhythm and normal rate, normal S1 and S2    Abdomen:     Normal bowel sounds, no masses, no organomegaly, soft        nontender, nondistended, no guarding, no rebound            "      tenderness   Extremities: Right UE in a sling, CDI  dressing . Interscalene nerve block cath present.  Distal pulses, cap refill,  intact. Movement and sensation of the hand are intact     Pulses:   Pulses palpable and equal bilaterally   Skin:   No bleeding, bruising or rash        Discharge Disposition: Home          Discharge Medications        New Medications        Instructions Start Date   HYDROcodone-acetaminophen  MG per tablet  Commonly known as: NORCO   1 tablet, Oral, Every 6 Hours PRN             Changes to Medications        Instructions Start Date   flecainide 50 MG tablet  Commonly known as: TAMBOCOR  What changed:   how much to take  additional instructions   100 mg, Oral, Every 12 Hours, Next dose tonight.             Continue These Medications        Instructions Start Date   amLODIPine 5 MG tablet  Commonly known as: NORVASC   5 mg, Oral, Every 24 Hours Scheduled      apixaban 5 MG tablet tablet  Commonly known as: ELIQUIS   5 mg, Oral, Every 12 Hours Scheduled, Start 1 week prior to ablation      aspirin 81 MG EC tablet   81 mg, Oral, Daily, Start on the morning of Watchman procedure      FLUoxetine 40 MG capsule  Commonly known as: PROzac   1 capsule, Daily      metoprolol succinate XL 25 MG 24 hr tablet  Commonly known as: TOPROL-XL   25 mg, Oral, Daily      naloxone 4 MG/0.1ML nasal spray  Commonly known as: NARCAN   Call 911. Don't prime. [place] in 1 nostril for overdose. Repeat in 2-3 minutes in other nostril if no or minimal breathing/responsiveness.      omeprazole 20 MG capsule  Commonly known as: priLOSEC   20 mg, Daily      ondansetron ODT 4 MG disintegrating tablet  Commonly known as: ZOFRAN-ODT   4 mg, Oral, Every 6 Hours PRN      pantoprazole 40 MG EC tablet  Commonly known as: PROTONIX   40 mg, Oral, 2 Times Daily Before Meals      Prolia 60 MG/ML solution prefilled syringe syringe  Generic drug: denosumab   60 mg      timolol 0.5 % ophthalmic solution  Commonly known as:  TIMOPTIC   1 drop, 2 Times Daily      traZODone 100 MG tablet  Commonly known as: DESYREL   100 mg, Nightly             Stop These Medications      amitriptyline 25 MG tablet  Commonly known as: ELAVIL     B-1 PO     folic acid 1 MG tablet  Commonly known as: FOLVITE     IRON CR PO     methotrexate 2.5 MG tablet     oxyCODONE 10 MG 12 hr tablet  Commonly known as: oxyCONTIN     pregabalin 50 MG capsule  Commonly known as: LYRICA     promethazine 25 MG tablet  Commonly known as: PHENERGAN     traMADol 50 MG tablet  Commonly known as: ULTRAM     vitamin D 1.25 MG (46889 UT) capsule capsule  Commonly known as: ERGOCALCIFEROL     vitamin D3 125 MCG (5000 UT) tablet tablet              Discharge Diet: Resume prior    Activity at Discharge:   NWB right upper extremity, restrictions per Dr. Galdamez     Future Appointments   Date Time Provider Department Center   6/30/2025  8:30 AM PAT 1 [facility]  [facility]   6/30/2025  9:45 AM [facility] CT 1  [facility]     Yonatan Galdamez MD per his orders       Ivana Jay MD  06/01/25  11:12 EDT

## 2025-06-01 NOTE — DISCHARGE INSTRUCTIONS
Please keep arm with dressing dry and in place until your follow-up appointment.  No weight bearing with your surgical extremity until released to do so by your surgeon.

## 2025-06-01 NOTE — PLAN OF CARE
Goal Outcome Evaluation:  Plan of Care Reviewed With: patient        Progress: improving  Outcome Evaluation: Pt w/ pain controlled under current regimen, no demand on IM pain med. Multiple BM today. Otherwise no acute event overnight.

## 2025-06-17 NOTE — NURSING NOTE
PRE-PVA ASSESSMENT  Aleena Armstrong 1950   [address] [place] [address]   [phone]        Referral Source: Aniyah Goldberg MD   Information obtained from: [x] Medical record review  [x] Patient report  Scheduled for: PVA on 7/10/2025 with Dr. Keen  Allergies   Allergen Reactions    Bactrim [Sulfamethoxazole-Trimethoprim] Other (See Comments)     MOUTH SORES    Lyrica [Pregabalin] GI Intolerance    Percocet [Oxycodone-Acetaminophen] Other (See Comments)     NIGHT TERRORS     Statins Myalgia     MYALGIA     Sulfa Antibiotics Other (See Comments)     MOUTH SORES AND RASH     Erythromycin Rash     RASH        AFib Specific History:  AFIBTYPE: paroxysmal    CHADS-VASc Risk Assessment               4 Total Score    1 Hypertension    2 Age >/= 75    1 Sex: Female    Criteria that do not apply:    CHF    DM    PRIOR STROKE/TIA/THROMBO    Vascular Disease    Age 65-74            Anticoagulation: Eliquis 5 mg BID, start date: 7/2/2025. H/o LAAO 2024 w/ JDA  Cardioversion x 2  Failed AAD(s): flecainide   Prior Ablation: 2017- Dr. Cook, PVA    Is Ms. Armstrong aware of her AFib? Yes   Onset: 2016    Exacerbations: none   Frequency: 3.3% burden   Alleviations: none     Duration: hours     Symptoms:   [x] Palpitations:    [] Chest Discomfort:    [] Dizziness:    [] Presyncope:    [] Lightheadedness:   [] Syncope:    [] Fatigue:    [] Other:     [] Short of Breath:     Last Echo(s):  [x] TTE Date:     Results for orders placed during the hospital encounter of 05/28/22    Adult Transthoracic Echo Complete w/ Color, Spectral and Contrast if necessary per protocol    Interpretation Summary  · Left ventricular ejection fraction appears to be 56 - 60%. Left ventricular systolic function is normal.  · Mild mitral valve regurgitation is present.  · Moderate tricuspid valve regurgitation is present.  · Estimated right ventricular systolic pressure from tricuspid regurgitation is moderately elevated (45-55 mmHg).     Past  medical History:     [] Diabetes   NO                    Hemoglobin A1C   Date Value Ref Range Status   05/29/2022 4.60 (L) 4.80 - 5.60 % Final   12/11/2016 5.50 4.80 - 5.60 % Final          [] HYPOthyroidism NO   [] HYPERthyroidism NO    TSH   Date Value Ref Range Status   02/23/2025 2.280 0.270 - 4.200 uIU/mL Final   01/19/2025 4.970 (H) 0.270 - 4.200 uIU/mL Final       [x] HTN        [x] Tx: amlodipine, metoprolol     [] Heart Failure  NO  [] CVA      NO                         [] TIA NO     [] CAD NO        [] MI  NO          [x] Dyslipidemia   [x] Statin indicated-lipophilic statin intolerant    [x] Ischemic Evaluation       [x] Heart Cath: 10/16  Cardiac catheterization Rubens Morejon MD, revealing nonobstructive plaque involving a large obtuse marginal and the apical LAD without evidence of hemodynamically significant CAD, acceptable FFR of the OM (0.95) and of apical LAD (0.83). LVEF of 60%       [] Sleep Apnea Suspected NO     [] Obesity NO      Other Pertinent PMH:     STOP the following medication(s) as indicated:    Flecainide Take last dose on:  7/6/25     START the following medication(s) as indicated:    Eliquis Take first dose on:  7/2/25     Summary of Patient Contact:    I spoke with Ms. Armstrong about her upcoming PVA.   She was well informed about the procedure from prior discussion with Dr. Keen and from reading the provided literature.  We discussed the procedure at length including risks, anesthesia, intra-op procedures, recovery, bedrest, normal post-procedure expectations, and success rates. Patient was advised to call with any medication changes, new or worsening symptoms, or changes to their medical plan of care. I answered a few remaining questions. Ms. Armstrong verbalized understanding and she is ready to proceed.

## 2025-06-30 ENCOUNTER — HOSPITAL ENCOUNTER (OUTPATIENT)
Dept: CT IMAGING | Facility: HOSPITAL | Age: 75
Discharge: HOME | End: 2025-06-30
Payer: MEDICARE

## 2025-06-30 ENCOUNTER — PRE-ADMISSION TESTING (OUTPATIENT)
Dept: PREADMISSION TESTING | Facility: HOSPITAL | Age: 75
End: 2025-06-30
Payer: MEDICARE

## 2025-06-30 DIAGNOSIS — I48.0 PAROXYSMAL ATRIAL FIBRILLATION: ICD-10-CM

## 2025-06-30 DIAGNOSIS — I48.19 ATRIAL FIBRILLATION, PERSISTENT: ICD-10-CM

## 2025-06-30 DIAGNOSIS — Z95.818 PRESENCE OF WATCHMAN LEFT ATRIAL APPENDAGE CLOSURE DEVICE: ICD-10-CM

## 2025-06-30 LAB
ANION GAP SERPL CALCULATED.3IONS-SCNC: 8.8 MMOL/L (ref 5–15)
BUN SERPL-MCNC: 18.4 MG/DL (ref 8–23)
BUN/CREAT SERPL: 17.9 (ref 7–25)
CALCIUM SPEC-SCNC: 8.7 MG/DL (ref 8.6–10.5)
CHLORIDE SERPL-SCNC: 104 MMOL/L (ref 98–107)
CO2 SERPL-SCNC: 24.2 MMOL/L (ref 22–29)
CREAT SERPL-MCNC: 1.03 MG/DL (ref 0.57–1)
DEPRECATED RDW RBC AUTO: 54 FL (ref 37–54)
EGFRCR SERPLBLD CKD-EPI 2021: 56.5 ML/MIN/1.73
ERYTHROCYTE [DISTWIDTH] IN BLOOD BY AUTOMATED COUNT: 14.1 % (ref 12.3–15.4)
GLUCOSE SERPL-MCNC: 88 MG/DL (ref 65–99)
HCT VFR BLD AUTO: 33.5 % (ref 34–46.6)
HGB BLD-MCNC: 10.2 G/DL (ref 12–15.9)
MCH RBC QN AUTO: 31.7 PG (ref 26.6–33)
MCHC RBC AUTO-ENTMCNC: 30.4 G/DL (ref 31.5–35.7)
MCV RBC AUTO: 104 FL (ref 79–97)
PLATELET # BLD AUTO: 189 10*3/MM3 (ref 140–450)
PMV BLD AUTO: 9.5 FL (ref 6–12)
POTASSIUM SERPL-SCNC: 4 MMOL/L (ref 3.5–5.2)
RBC # BLD AUTO: 3.22 10*6/MM3 (ref 3.77–5.28)
SODIUM SERPL-SCNC: 137 MMOL/L (ref 136–145)
WBC NRBC COR # BLD AUTO: 2.89 10*3/MM3 (ref 3.4–10.8)

## 2025-06-30 PROCEDURE — 85027 COMPLETE CBC AUTOMATED: CPT

## 2025-06-30 PROCEDURE — 80048 BASIC METABOLIC PNL TOTAL CA: CPT

## 2025-06-30 PROCEDURE — 25510000001 IOPAMIDOL PER 1 ML: Performed by: INTERNAL MEDICINE

## 2025-06-30 PROCEDURE — 71275 CT ANGIOGRAPHY CHEST: CPT

## 2025-06-30 PROCEDURE — 36415 COLL VENOUS BLD VENIPUNCTURE: CPT

## 2025-06-30 RX ORDER — IOPAMIDOL 612 MG/ML
100 INJECTION, SOLUTION INTRAVASCULAR
Status: DISCONTINUED | OUTPATIENT
Start: 2025-06-30 | End: 2025-07-01 | Stop reason: HOSPADM

## 2025-06-30 RX ORDER — IOPAMIDOL 755 MG/ML
100 INJECTION, SOLUTION INTRAVASCULAR
Status: COMPLETED | OUTPATIENT
Start: 2025-06-30 | End: 2025-06-30

## 2025-06-30 RX ORDER — ACETAMINOPHEN 500 MG
500 TABLET ORAL EVERY 6 HOURS PRN
COMMUNITY

## 2025-06-30 RX ADMIN — IOPAMIDOL 85 ML: 755 INJECTION, SOLUTION INTRAVENOUS at 10:13

## 2025-06-30 NOTE — DISCHARGE INSTRUCTIONS
Dear Patient,    Drink plenty of fluids the day before your procedure to ensure you are well hydrated, unless otherwise directed by your physician.    Do NOT eat after midnight the night before your procedure.     Take your medications as instructed by your doctor.    Following your procedure, be sure to drink plenty of fluids to continue flushing the kidneys if dye was utilized during your procedure (cardiac catheterization)    Benefits of hydrating after your procedure include:    -Improved hydration helps prevent potential harm to the kidneys by flushing the contrast/dye used during your procedure (if applicable)    -Lower post-procedure complications    -Improved patient comfort     Do NOT smoke after midnight the night before your procedure.    Glasses and jewelry may be worn, but dentures must be removed prior to your procedure.    Leave any items you consider valuable at home.      MORNING of your Procedure, please bring the following:     -Photo ID and insurance card(s)    -ALL medications in their ORIGINAL CONTAINERS or accurate medication list.    -Co-pay and/or deductible required by your insurance   -Copy of living will or power of  document (if not brought to Pre-Admission Testing department)   -CPAP mask and tubing, not your machine (if applicable)   -Relaxation aids (music, books, magazines)    Family members may wait in [facility] waiting area during procedure.    Please make arrangements for transportation home prior to discharge.

## 2025-07-09 ENCOUNTER — TELEPHONE (OUTPATIENT)
Dept: CARDIOLOGY | Facility: CLINIC | Age: 75
End: 2025-07-09
Payer: MEDICARE

## 2025-07-09 ENCOUNTER — ANESTHESIA EVENT (OUTPATIENT)
Dept: CARDIOLOGY | Facility: HOSPITAL | Age: 75
End: 2025-07-09
Payer: MEDICARE

## 2025-07-09 NOTE — TELEPHONE ENCOUNTER
Patient contacted to review upcoming PVA scheduled with Dr. Keen. Patient reports uninterrupted therapy with Eliquis. No changes to plan of care reported. All questions answered at this time. Patient encouraged to reach out with any questions or concerns post PVA. Patient verbalized understanding.

## 2025-07-10 ENCOUNTER — ANESTHESIA (OUTPATIENT)
Dept: CARDIOLOGY | Facility: HOSPITAL | Age: 75
End: 2025-07-10
Payer: MEDICARE

## 2025-07-10 ENCOUNTER — HOSPITAL ENCOUNTER (OUTPATIENT)
Facility: HOSPITAL | Age: 75
Discharge: HOME | End: 2025-07-10
Attending: INTERNAL MEDICINE | Admitting: INTERNAL MEDICINE
Payer: MEDICARE

## 2025-07-10 VITALS
WEIGHT: 133.2 LBS | SYSTOLIC BLOOD PRESSURE: 116 MMHG | HEART RATE: 68 BPM | OXYGEN SATURATION: 91 % | HEIGHT: 66 IN | TEMPERATURE: 98.6 F | BODY MASS INDEX: 21.41 KG/M2 | RESPIRATION RATE: 12 BRPM | DIASTOLIC BLOOD PRESSURE: 67 MMHG

## 2025-07-10 DIAGNOSIS — I48.0 PAROXYSMAL ATRIAL FIBRILLATION: ICD-10-CM

## 2025-07-10 DIAGNOSIS — I48.19 ATRIAL FIBRILLATION, PERSISTENT: ICD-10-CM

## 2025-07-10 DIAGNOSIS — Z95.818 PRESENCE OF WATCHMAN LEFT ATRIAL APPENDAGE CLOSURE DEVICE: ICD-10-CM

## 2025-07-10 LAB
GLUCOSE BLDC GLUCOMTR-MCNC: 102 MG/DL (ref 70–130)
GLUCOSE BLDC GLUCOMTR-MCNC: 77 MG/DL (ref 70–130)
POTASSIUM SERPL-SCNC: 4.2 MMOL/L (ref 3.5–5.2)

## 2025-07-10 PROCEDURE — 93623 PRGRMD STIMJ&PACG IV RX NFS: CPT | Performed by: INTERNAL MEDICINE

## 2025-07-10 PROCEDURE — 85347 COAGULATION TIME ACTIVATED: CPT

## 2025-07-10 PROCEDURE — 93655 ICAR CATH ABLTJ DSCRT ARRHYT: CPT | Performed by: INTERNAL MEDICINE

## 2025-07-10 PROCEDURE — 82948 REAGENT STRIP/BLOOD GLUCOSE: CPT

## 2025-07-10 PROCEDURE — 25010000002 PROPOFOL 10 MG/ML EMULSION

## 2025-07-10 PROCEDURE — 93656 COMPRE EP EVAL ABLTJ ATR FIB: CPT | Performed by: INTERNAL MEDICINE

## 2025-07-10 PROCEDURE — 0897T N-INVAS AUGMNT ARRHYT ALYS: CPT | Performed by: INTERNAL MEDICINE

## 2025-07-10 PROCEDURE — 93657 TX L/R ATRIAL FIB ADDL: CPT | Performed by: INTERNAL MEDICINE

## 2025-07-10 PROCEDURE — C1730 CATH, EP, 19 OR FEW ELECT: HCPCS | Performed by: INTERNAL MEDICINE

## 2025-07-10 PROCEDURE — 63710000001 ACETAMINOPHEN 325 MG TABLET

## 2025-07-10 PROCEDURE — 25010000002 LIDOCAINE PF 1% 1 % SOLUTION: Performed by: INTERNAL MEDICINE

## 2025-07-10 PROCEDURE — 93622 COMP EP EVAL L VENTR PAC&REC: CPT | Performed by: INTERNAL MEDICINE

## 2025-07-10 PROCEDURE — 84132 ASSAY OF SERUM POTASSIUM: CPT | Performed by: ANESTHESIOLOGY

## 2025-07-10 PROCEDURE — 25010000002 LIDOCAINE 1 % SOLUTION

## 2025-07-10 PROCEDURE — C1766 INTRO/SHEATH,STRBLE,NON-PEEL: HCPCS | Performed by: INTERNAL MEDICINE

## 2025-07-10 PROCEDURE — A9270 NON-COVERED ITEM OR SERVICE: HCPCS

## 2025-07-10 PROCEDURE — 25010000002 SUGAMMADEX 200 MG/2ML SOLUTION

## 2025-07-10 PROCEDURE — C1759 CATH, INTRA ECHOCARDIOGRAPHY: HCPCS | Performed by: INTERNAL MEDICINE

## 2025-07-10 PROCEDURE — 25010000002 FAMOTIDINE 10 MG/ML SOLUTION: Performed by: ANESTHESIOLOGY

## 2025-07-10 PROCEDURE — 93010 ELECTROCARDIOGRAM REPORT: CPT | Performed by: INTERNAL MEDICINE

## 2025-07-10 PROCEDURE — 25010000002 HEPARIN (PORCINE) PER 1000 UNITS: Performed by: INTERNAL MEDICINE

## 2025-07-10 PROCEDURE — 25810000003 SODIUM CHLORIDE 0.9 % SOLUTION

## 2025-07-10 PROCEDURE — 25010000002 PROTAMINE SULFATE PER 10 MG: Performed by: INTERNAL MEDICINE

## 2025-07-10 PROCEDURE — C1894 INTRO/SHEATH, NON-LASER: HCPCS | Performed by: INTERNAL MEDICINE

## 2025-07-10 PROCEDURE — 25010000002 DEXAMETHASONE PER 1 MG

## 2025-07-10 PROCEDURE — C1733 CATH, EP, OTHR THAN COOL-TIP: HCPCS | Performed by: INTERNAL MEDICINE

## 2025-07-10 PROCEDURE — 93005 ELECTROCARDIOGRAM TRACING: CPT | Performed by: INTERNAL MEDICINE

## 2025-07-10 PROCEDURE — C1732 CATH, EP, DIAG/ABL, 3D/VECT: HCPCS | Performed by: INTERNAL MEDICINE

## 2025-07-10 RX ORDER — PROPOFOL 10 MG/ML
VIAL (ML) INTRAVENOUS AS NEEDED
Status: DISCONTINUED | OUTPATIENT
Start: 2025-07-10 | End: 2025-07-10 | Stop reason: SURG

## 2025-07-10 RX ORDER — LIDOCAINE HYDROCHLORIDE 10 MG/ML
INJECTION, SOLUTION INFILTRATION; PERINEURAL AS NEEDED
Status: DISCONTINUED | OUTPATIENT
Start: 2025-07-10 | End: 2025-07-10 | Stop reason: SURG

## 2025-07-10 RX ORDER — FAMOTIDINE 10 MG/ML
20 INJECTION, SOLUTION INTRAVENOUS ONCE
Status: COMPLETED | OUTPATIENT
Start: 2025-07-10 | End: 2025-07-10

## 2025-07-10 RX ORDER — DEXAMETHASONE SODIUM PHOSPHATE 4 MG/ML
INJECTION, SOLUTION INTRA-ARTICULAR; INTRALESIONAL; INTRAMUSCULAR; INTRAVENOUS; SOFT TISSUE AS NEEDED
Status: DISCONTINUED | OUTPATIENT
Start: 2025-07-10 | End: 2025-07-10 | Stop reason: SURG

## 2025-07-10 RX ORDER — BUPIVACAINE HCL/0.9 % NACL/PF 0.125 %
PLASTIC BAG, INJECTION (ML) EPIDURAL AS NEEDED
Status: DISCONTINUED | OUTPATIENT
Start: 2025-07-10 | End: 2025-07-10 | Stop reason: SURG

## 2025-07-10 RX ORDER — ACETAMINOPHEN 325 MG/1
650 TABLET ORAL EVERY 4 HOURS PRN
Status: DISCONTINUED | OUTPATIENT
Start: 2025-07-10 | End: 2025-07-10 | Stop reason: HOSPADM

## 2025-07-10 RX ORDER — ROCURONIUM BROMIDE 10 MG/ML
INJECTION, SOLUTION INTRAVENOUS AS NEEDED
Status: DISCONTINUED | OUTPATIENT
Start: 2025-07-10 | End: 2025-07-10 | Stop reason: SURG

## 2025-07-10 RX ORDER — HEPARIN SODIUM 1000 [USP'U]/ML
INJECTION, SOLUTION INTRAVENOUS; SUBCUTANEOUS
Status: DISCONTINUED | OUTPATIENT
Start: 2025-07-10 | End: 2025-07-10 | Stop reason: HOSPADM

## 2025-07-10 RX ORDER — FAMOTIDINE 20 MG/1
20 TABLET, FILM COATED ORAL ONCE
Status: DISCONTINUED | OUTPATIENT
Start: 2025-07-10 | End: 2025-07-10 | Stop reason: HOSPADM

## 2025-07-10 RX ORDER — SODIUM CHLORIDE 0.9 % (FLUSH) 0.9 %
10 SYRINGE (ML) INJECTION AS NEEDED
Status: DISCONTINUED | OUTPATIENT
Start: 2025-07-10 | End: 2025-07-10 | Stop reason: HOSPADM

## 2025-07-10 RX ORDER — SODIUM CHLORIDE, SODIUM LACTATE, POTASSIUM CHLORIDE, CALCIUM CHLORIDE 600; 310; 30; 20 MG/100ML; MG/100ML; MG/100ML; MG/100ML
9 INJECTION, SOLUTION INTRAVENOUS CONTINUOUS
Status: DISCONTINUED | OUTPATIENT
Start: 2025-07-11 | End: 2025-07-10 | Stop reason: HOSPADM

## 2025-07-10 RX ORDER — MIDAZOLAM HYDROCHLORIDE 1 MG/ML
0.5 INJECTION, SOLUTION INTRAMUSCULAR; INTRAVENOUS
Status: DISCONTINUED | OUTPATIENT
Start: 2025-07-10 | End: 2025-07-10 | Stop reason: HOSPADM

## 2025-07-10 RX ORDER — PROTAMINE SULFATE 10 MG/ML
INJECTION, SOLUTION INTRAVENOUS
Status: DISCONTINUED | OUTPATIENT
Start: 2025-07-10 | End: 2025-07-10 | Stop reason: HOSPADM

## 2025-07-10 RX ORDER — ONDANSETRON 2 MG/ML
4 INJECTION INTRAMUSCULAR; INTRAVENOUS EVERY 6 HOURS PRN
Status: DISCONTINUED | OUTPATIENT
Start: 2025-07-10 | End: 2025-07-10 | Stop reason: HOSPADM

## 2025-07-10 RX ORDER — NITROGLYCERIN 0.4 MG/1
0.4 TABLET SUBLINGUAL
Status: DISCONTINUED | OUTPATIENT
Start: 2025-07-10 | End: 2025-07-10 | Stop reason: HOSPADM

## 2025-07-10 RX ORDER — OMEPRAZOLE 20 MG/1
20 CAPSULE, DELAYED RELEASE ORAL DAILY
COMMUNITY

## 2025-07-10 RX ORDER — SODIUM CHLORIDE 0.9 % (FLUSH) 0.9 %
10 SYRINGE (ML) INJECTION EVERY 12 HOURS SCHEDULED
Status: DISCONTINUED | OUTPATIENT
Start: 2025-07-10 | End: 2025-07-10 | Stop reason: HOSPADM

## 2025-07-10 RX ORDER — LIDOCAINE HYDROCHLORIDE 10 MG/ML
0.5 INJECTION, SOLUTION EPIDURAL; INFILTRATION; INTRACAUDAL; PERINEURAL ONCE AS NEEDED
Status: DISCONTINUED | OUTPATIENT
Start: 2025-07-10 | End: 2025-07-10 | Stop reason: HOSPADM

## 2025-07-10 RX ORDER — TRAMADOL HYDROCHLORIDE 50 MG/1
50 TABLET ORAL EVERY 6 HOURS PRN
COMMUNITY

## 2025-07-10 RX ORDER — LIDOCAINE HYDROCHLORIDE 10 MG/ML
INJECTION, SOLUTION EPIDURAL; INFILTRATION; INTRACAUDAL; PERINEURAL
Status: DISCONTINUED | OUTPATIENT
Start: 2025-07-10 | End: 2025-07-10 | Stop reason: HOSPADM

## 2025-07-10 RX ORDER — SODIUM CHLORIDE 9 MG/ML
INJECTION, SOLUTION INTRAVENOUS CONTINUOUS PRN
Status: DISCONTINUED | OUTPATIENT
Start: 2025-07-10 | End: 2025-07-10 | Stop reason: SURG

## 2025-07-10 RX ORDER — SODIUM CHLORIDE 9 MG/ML
40 INJECTION, SOLUTION INTRAVENOUS AS NEEDED
Status: DISCONTINUED | OUTPATIENT
Start: 2025-07-10 | End: 2025-07-10 | Stop reason: HOSPADM

## 2025-07-10 RX ORDER — HEPARIN SODIUM 10000 [USP'U]/100ML
INJECTION, SOLUTION INTRAVENOUS
Status: DISCONTINUED | OUTPATIENT
Start: 2025-07-10 | End: 2025-07-10 | Stop reason: HOSPADM

## 2025-07-10 RX ADMIN — PROPOFOL 120 MG: 10 INJECTION, EMULSION INTRAVENOUS at 08:35

## 2025-07-10 RX ADMIN — SODIUM CHLORIDE: 9 INJECTION, SOLUTION INTRAVENOUS at 08:24

## 2025-07-10 RX ADMIN — ROCURONIUM BROMIDE 50 MG: 10 INJECTION INTRAVENOUS at 08:35

## 2025-07-10 RX ADMIN — ROCURONIUM BROMIDE 10 MG: 10 INJECTION INTRAVENOUS at 10:16

## 2025-07-10 RX ADMIN — ROCURONIUM BROMIDE 20 MG: 10 INJECTION INTRAVENOUS at 09:27

## 2025-07-10 RX ADMIN — Medication 100 MCG: at 09:12

## 2025-07-10 RX ADMIN — SUGAMMADEX 200 MG: 100 INJECTION, SOLUTION INTRAVENOUS at 10:55

## 2025-07-10 RX ADMIN — FAMOTIDINE 20 MG: 10 INJECTION INTRAVENOUS at 07:51

## 2025-07-10 RX ADMIN — LIDOCAINE HYDROCHLORIDE 50 MG: 10 INJECTION, SOLUTION INFILTRATION; PERINEURAL at 08:35

## 2025-07-10 RX ADMIN — ACETAMINOPHEN 650 MG: 325 TABLET ORAL at 11:54

## 2025-07-10 RX ADMIN — DEXAMETHASONE SODIUM PHOSPHATE 4 MG: 4 INJECTION, SOLUTION INTRAMUSCULAR; INTRAVENOUS at 08:41

## 2025-07-10 RX ADMIN — Medication 200 MCG: at 09:11

## 2025-07-10 NOTE — H&P
Pre-cardiac Ablation History and Physical  [facility]      Patient:  Aleena Armstrong  :  1950  MRN: [id number]    PCP:  Aniyah Goldberg MD  PHONE:  [phone]    DATE: 7/10/2025  ID: Aleena Armstrong is a 74 y.o. female [facility] resident    CC: Palpitations    Patient Active Problem List    Diagnosis Date Noted    Olecranon fracture 2025    S/P ORIF (open reduction internal fixation) fracture, right olecranon. 2025    Presence of Watchman left atrial appendage closure device 2024    Long term current use of antiarrhythmic drug 2024    History of Emilie-en-Y gastric bypass 2023    Hypothyroidism 2023    Psoriatic arthritis on Plaquenil 2023    GERD 2022    Iron deficiency anemia 2021    Fall 2021    Anemia 2019    Chronic pain 2019    Atrial fib/flutter 2016     Note Last Updated: 2024     Atrial flutter, atypical in nature  Normal LVEF by catheterization, 10/24/2016.    Echocardiogram, 10/23/2016, revealing LVEF of 60%, mild MR, mild TR, LV diastolic dysfunction, LA enlarged (vol index 47.4) Dominic Wolff MD.   Chadsvasc=3  PVA 17  Left atrial appendage closure with 31 mm Watchman FLX device 2024      Insomnia 2016    Anxiety and depression 2016    Vitamin D deficiency 2016    Rheumatoid arthritis 2016    Glaucoma of left eye 2016    WPW (Elodia-Parkinson-White syndrome) 10/23/2016     Note Last Updated: 10/23/2016     Previous ablation  at [facility]-- data deficit      Dyslipidemia 10/23/2016    Essential hypertension 10/23/2016          BRIEF HPI:  Ms. Armstrong is a 73 y/o female with the above medical history who presents for pulmonary vein ablation.  She underwent Watchman left atrial appendage closure device implantation on 2024.  She is maintained on aspirin monotherapy although she was instructed to start Eliquis a week prior to ablation.   She was evaluated in the ER in January for atrial fibrillation with RVR in the setting of abnormal thyroid indices and underwent cardioversion.  She did well until May when she had a fall leading to displaced elbow fracture requiring admission to [facility].  She had some breakthrough atrial fibrillation with RVR during this admission and that was very symptomatic.  Our office was notified and she was set up for PVA.     Allergies:      Allergies   Allergen Reactions    Bactrim [Sulfamethoxazole-Trimethoprim] Other (See Comments)     MOUTH SORES    Lyrica [Pregabalin] GI Intolerance    Other GI Bleeding     Any kind of steroid    Percocet [Oxycodone-Acetaminophen] Other (See Comments)     NIGHT TERRORS     Statins Myalgia     MYALGIA     Sulfa Antibiotics Other (See Comments)     MOUTH SORES AND RASH     Erythromycin Rash     RASH        MEDICATIONS:  Current Outpatient Medications   Medication Instructions    acetaminophen (TYLENOL) 500 mg, Every 6 Hours PRN    amLODIPine (NORVASC) 5 mg, Oral, Every 24 Hours Scheduled    apixaban (ELIQUIS) 5 mg, Oral, Every 12 Hours Scheduled, Start 1 week prior to ablation    aspirin 81 mg, Oral, Daily, Start on the morning of Watchman procedure    flecainide (TAMBOCOR) 100 mg, Oral, Every 12 Hours, Next dose tonight.    FLUoxetine (PROzac) 40 MG capsule 1 capsule, Daily    metoprolol succinate XL (TOPROL-XL) 25 mg, Oral, Daily    multivitamin with minerals (MULTIVITAMIN ADULTS PO) 1 tablet, Daily    naloxone (NARCAN) 4 MG/0.1ML nasal spray Call 911. Don't prime. [place] in 1 nostril for overdose. Repeat in 2-3 minutes in other nostril if no or minimal breathing/responsiveness.    ondansetron ODT (ZOFRAN-ODT) 4 mg, Oral, Every 6 Hours PRN    pantoprazole (PROTONIX) 40 mg, Oral, 2 Times Daily Before Meals    Prolia 60 mg, Once    Thiamine HCl (VITAMIN B1 PO) Take  by mouth.    timolol (TIMOPTIC) 0.5 % ophthalmic solution 1 drop, 2 Times Daily    traZODone (DESYREL) 100 mg, Nightly  "      Past medical & surgical history, social and family history reviewed in the electronic medical record.    ROS: Pertinent positives listed in the HPI and problem list above. All others reviewed and negative.     Physical Exam:   Ht 167.6 cm (66\")   Wt 60.4 kg (133 lb 3.2 oz)   LMP  (LMP Unknown)   BMI 21.50 kg/m²     Constitutional:    Well-appearing 74 y.o. y/o adult in no acute distress        Heart:    Regular rhythm and bradycardic rate, no murmurs, rubs or gallops   Lungs:     Clear to auscultation bilaterally, no wheezes, rhales or rhonchi, nonlabored respirations       Extremities:   No gross deformities, no edema, clubbing, or cyanosis.    Pulses:    Neuro:  Psych:   Radial pulses palpable and equal bilaterally.  No gross focal deficits  Mood and behavior appropriate for situation       Labs and Diagnostic Data:  No radiology results for the last 7 days  Lab Results   Component Value Date    GLUCOSE 88 06/30/2025    BUN 18.4 06/30/2025    CREATININE 1.03 (H) 06/30/2025     06/30/2025    K 4.0 06/30/2025     06/30/2025    CALCIUM 8.7 06/30/2025    PROTEINTOT 6.0 02/23/2025    ALBUMIN 3.7 02/23/2025    ALT 22 02/23/2025    AST 31 02/23/2025    ALKPHOS 102 02/23/2025    BILITOT 0.4 02/23/2025    GLOB 2.3 02/23/2025    AGRATIO 1.6 02/23/2025    BCR 17.9 06/30/2025    ANIONGAP 8.8 06/30/2025    EGFR 56.5 (L) 06/30/2025     Lab Results   Component Value Date    WBC 2.89 (L) 06/30/2025    HGB 10.2 (L) 06/30/2025    HCT 33.5 (L) 06/30/2025    .0 (H) 06/30/2025     06/30/2025     Lab Results   Component Value Date    TSH 2.280 02/23/2025       Tele: Sinus bradycardia    IMPRESSION:  Ms. Armstrong is a 74-year-old female with persistent atrial fibrillation s/p PVA 2017 (Joyce) and history of Watchman left atrial appendage closure implantation who presents for redo PVA.  LD flecainide 4 days ago  Has been on Eliquis 1 week    PLAN:  Procedure to perform: Redo PVA. Risks, benefits and " alternatives to the procedure explained to the patient and he understands and wishes to proceed.     Electronically signed by Jesus Manuel Lorenz PA-C, 07/10/25, 7:53 AM EDT.

## 2025-07-10 NOTE — OP NOTE
Cardiac Electrophysiology Procedure Note           [facility]         CATHETER ABLATION FOR ATRIAL FIBRILLATION (PVI)    PROCEDURES PERFORMED:    Catheter ablation of persistent atrial fibrillation  Adjunctive ablation of the left atrial roof for persistent atrial fibrillation  Gems ablation of left atrial posterior wall for persistent atrial fibrillation  Adjunctive ablation of left atrial inferior wall for persistent atrial fibrillation  Catheter ablation of SVT #1 organized atrial tachycardia arising from the superior vena cava  Catheter ablation of SVT #2 typical right atrial flutter  Catheter ablation of SVT #3 perimitral flutter  The 5minutes 3D mapping system was used for additional and incremental mapping of tachycardia(s).  The [employer] 3D mapping system was used for additional and incremental mapping of tachycardia(s).  Please note that this is an FDA approved contact mapping system.  It was used in addition to the 3D mapping system noted above.  Full diagnostic EP study  Rhythmia 3D electroanatomic mapping  drug infusion / programmed pacing  Intracadiac Echocardiography  transeptal puncture  Left ventricular pacing and recording    PREPROCEDURAL DIAGNOSES:    1.  Persistent atrial fibrillation    2.  Atypical atrial tachycardia    POST PROCEDURE DIANGOSES:  As above.    INDICATION FOR PROCEDURE:  Briefly, Aleena Armstrong is a 74 y.o. year old female with a history of history of GI bleeding status post left atrial appendage closure with Watchman device performed by myself and also in 2017 persistent atrial fibrillation ablation procedure performed by a predecessor here at this institution she presented today for elective repeat cath ablation of the left atrium for recurrent persistent atrial fibrillation as well as atypical atrial tachycardia.    ANTICOAGULATION STRATEGY PRIOR TO AND POST PROCEDURE: DOAC.  The last dose of anticoagulant was confirmed to have been  "taken this morning.      PT/INR:  No results found for: \"LABPROT\", \"INR\"  PTT:  No results found for: \"APTT\"    CBC: No results found for: \"WBC\", \"RBC\", \"HGB\", \"HCT\", \"MCV\", \"RDW\", \"PLT\"  BMP:     Potassium   Date Value Ref Range Status   07/10/2025 4.2 3.5 - 5.2 mmol/L Final       Vital Signs: /74 (BP Location: Left arm, Patient Position: Lying)   Pulse 62   Temp 98.2 °F (36.8 °C) (Temporal)   Resp 18   Ht 167.6 cm (66\")   Wt 60.4 kg (133 lb 3.2 oz)   LMP  (LMP Unknown)   SpO2 100%   BMI 21.50 kg/m²      Admit Weight:  60.4 kg (133 lb 3.2 oz)  BMI: Body mass index is 21.5 kg/m².    PROCEDURE NARRATIVE:    The patient was able to give written informed consent after revisiting the key portions of the risk versus benefit profile of the procedure.  This discussion was framed by our lengthy conversations  (please see our detailed notes).  Patient verbalized strong understanding of this discussion and a strong desire to proceed with the procedure.  Please note that this detailed informed consent process utilized mutual and shared decision making process between all parties involved, principally the physician and patient, but also potentially with input from the patient's selected family and friends.    The patient was brought to the EP laboratory in the post absorptive state.  The patient was electively intubated for the procedure and given a general anesthetic by colleagues from anesthesia.  Please see the detailed anesthesia records.    The patient was then prepared and draped in a routing sterile fashion.  Seldinger access was obtained at the bilateral common femoral veins with 3 venipunctures.  J tip wires were advanced into the vascular space.  Short 11, 8 and a long  sheath were placed into the bilateral common femoral veins and the inferior vena cava / right atrium in an over the wire fashion.    The patient was anticoagulated with intravenous heparin (initial bolus and then continuous infusion) with " a goal ACT of between 350 and 400 seconds.    A phased array ICE catheter was placed into the right atrium and right ventricle.  This was used for transeptal puncture, monitoring of the pericardial space, monitoring of the ablation catheter position within the heart and monitoring of other cardiac structures such as the left atrial appendage (to document the absence of thrombus), pulmonary veins, esophagus, mitral valve annulus and other cardiac structures.    Sanches-septal puncture was performed after heparin administration with a combination of echocardiographic and fluoroscopic guidance.  This was performed with the long sheath, a BRK needle, and a SafeSept transeptal wire.  Catheters and sheaths were advanced safely into the left atrium.  A multielectrode electrophysiology catheter was used for pacing and recording in the left atrium, left atrial appendage, pulmonary veins and left ventricle at various times throughout the procedure.    We used the [employer] 3D electroanatomic mapping system in conjunction with these catheters to construct an electroanatomic shell of the left atrium, left atrial appendage, mitral valve annulus, interatrial septum and pulmonary veins.     The PHRQL 3D mapping system was used for additional and incremental mapping of tachycardia(s).  Please note that this is an FDA approved non-contact mapping system.  It was used in addition to the 3D mapping system noted above.    The [employer] 3D mapping system was used for additional and incremental mapping of tachycardia(s).  Please note that this is an FDA approved contact mapping system.  It was used in addition to the 3D mapping system noted above.    Extensive activation mapping was performed of both left and right atrial.  This demonstrated durable isolation of the left superior left inferior pulmonary veins but reconnection of the right superior and right inferior pulmonary veins.  There was patchy voltage abatement throughout the entirety of  the left atrium consistent with significant atrial myopathy    Left ventricular pacing and recording were performed by placing catheters safely and carefully across the mitral valve annulus to the left ventricle from the left atrium.  Adequate sensing and pacing thresholds were obtained from the left ventricle.  AV conduction ( antegrade ) as well as VA conduction ( retrograde ) were studied.  This was performed as a distinct addition to the diagnostic EP study.  Findings were conclusive for no accessory pathway and VA dissociation.    Catheter ablation was performed to achieve pulmonary vein isolation.  A wide antral circumferential ablation approach was used.  Additional lesions were given within the antral lesion set at the torey between superior and inferior veins to achieve total isolation, when and where necessary.      The patient remained in atrial fibrillation.  Adjunctive ablation was performed to achieve isolation of the left atrial roof connecting the left superior to the right superior pulmonary veins.    The patient remained in atrial fibrillation.  Additional adjunctive ablation was performed to achieve isolation of the left atrial inferior wall connecting the left inferior to the right inferior pulmonary veins.     The patient remained in atrial fibrillation.  Additional adjunctive ablation was performed to isolate the entire left atrial posterior wall.    Organized atrial tachycardia ensued.  This was mapped as a separate arrhythmia.  This is referred to as SVT #1.  The mechanism was distinct from atrial fibrillation.  The mechanism of this tachycardia was determined to be tachycardia arising from the superior vena cava.  We ablated this SVT in turn isolated the SVC.    We turned our attention to performing typical atrial flutter ablation.  Please note that this is a separate SVT with a distinct mechanism from the patients atrial fibrillation.  This is referred to as SVT #2.  Ablation was performed  along the cavo tricuspid isthmus beginning at the ventricular aspect in extended to the caval aspect of the cavo tricuspid isthmus.  We then demonstrated that there was bidirectional block with differential pacing maneuvers.    A third organized atrial tachycardia ensued at this point.  The cycle length was 360 ms initially.  Coronary sinus activation was concentric.  High density electroanatomic mapping of both the right atrium and left atrium was performed.  Entrainment mapping was also performed.  This consistent with a perimitral flutter circuit with counterclockwise activation.  Extensive ablation was performed targeting this of both the lateral mitral isthmus as well as an anterior mitral isthmus line.  Extensive ablation resulted in slowing of the tachycardia cycle length of 360 ms down to 480 ms however we are not able to terminate this tachycardia.  This point we have delivered 100 applications.  The tachycardia was terminated with atrial burst pacing.  Please note that this tachycardia was initially very easily induced but was quite difficult to reinitiate ultimately.    Adenosine 12 mg was administered intravenously following ablation to test for other atrial and or ventricular arrhythmias.   Programmed stimulation was performed in an attempt to induce other and additional arrhythmias.  No arrhythmias were induced during administration and washout.    The ICE catheter revealed that there was no pericardial effusion.    Catheters and sheaths were then removed from the body.    Hemostasis was achieved with Vascade closure devices.  Bedrest 2 hours.  Anticipate home later today or tomorrow.      The patient was extubated in routine fashion and transferred to recovery in stable condition.    COMPLICATIONS: None    EBL: minimal    KEY PROCEDURAL FINDINGS:  Extensive right atrial and left atrial myopathy with significant left atrial enlargement.  Durable isolation of the left-sided pulmonary veins with  reconnection of right-sided pulmonary veins from the patient's index procedure in 2017.  Left atrial appendage closure device in situ implanted by myself previously.  Pulmonary vein isolation with additional ablation of left atrial roof left atrial inferior wall left atrial posterior wall as well as ablation of the superior vena cava typical right atrial flutter and marylin mitral flutter limitedly with pace termination of mitral flutter as outlined above.    POST PROCEDURAL PLAN:    Report was called the the recovery nurse responsible for the patients care.  Uninterrupted anticoagulation for not less than 90 days unless specially instructed otherwise by myself or another member of our EP physician team.  Please note that the patient and the patient’s family have been extensively counseled about this critical requirement and have agreed to comply.  Anticipate discharge this day.  Medications were reconciled, and key changes in medications include: Stop flecainide and stop metoprolol  The patient will be seen at our office per routine follow up.      Jesse Keen DO, FACC, [facility]  Cardiac Electrophysiologist  [facility] / [facility]

## 2025-07-10 NOTE — ANESTHESIA PROCEDURE NOTES
Airway  Reason: elective    Date/Time: 7/10/2025 8:38 AM  Airway not difficult    General Information and Staff    Patient location during procedure: OR  CRNA/CAA: Katherine Youngblood CRNA    Indications and Patient Condition  Indications for airway management: airway protection    Preoxygenated: yes  MILS not maintained throughout    Mask difficulty assessment: 1 - vent by mask    Final Airway Details    Final airway type: endotracheal airway      Successful airway: ETT  Cuffed: yes   Successful intubation technique: direct laryngoscopy  Endotracheal tube insertion site: oral  Blade: Calles  Blade size: 2  ETT size (mm): 7.0  Cormack-Lehane Classification: grade I - full view of glottis  Placement verified by: chest auscultation and capnometry   Measured from: lips  ETT/EBT  to lips (cm): 20  Number of attempts at approach: 1  Assessment: lips, teeth, and gum same as pre-op and atraumatic intubation    Additional Comments  Negative epigastric sounds, Breath sound equal bilaterally with symmetric chest rise and fall

## 2025-07-10 NOTE — ANESTHESIA POSTPROCEDURE EVALUATION
Patient: Aleena Armstrong    Procedure Summary       Date: 07/10/25 Room / Location: [facility]/EP LAB [facility] /  [facility] EP INVASIVE LOCATION    Anesthesia Start: 0819 Anesthesia Stop: 1112    Procedure: Redo PVA, Rythmia, [place] PFA, start Eliquis 1 week prior Diagnosis:       Presence of Watchman left atrial appendage closure device      Paroxysmal atrial fibrillation      (AF)    Providers: Jesse Keen DO Provider: Natan Mac MD    Anesthesia Type: general ASA Status: 3            Anesthesia Type: general    Vitals  Vitals Value Taken Time   /79 07/10/25 11:12   Temp 98.4 °F (36.9 °C) 07/10/25 11:12   Pulse 66 07/10/25 11:12   Resp 16 07/10/25 11:12   SpO2 100 % 07/10/25 11:12           Post Anesthesia Care and Evaluation    Patient location during evaluation: PACU  Patient participation: complete - patient participated  Level of consciousness: awake and alert  Pain score: 0  Pain management: adequate    Airway patency: patent  Anesthetic complications: No anesthetic complications  PONV Status: none  Cardiovascular status: hemodynamically stable and acceptable  Respiratory status: nonlabored ventilation, acceptable and nasal cannula  Hydration status: acceptable

## 2025-07-10 NOTE — ANESTHESIA PREPROCEDURE EVALUATION
Anesthesia Evaluation     Patient summary reviewed and Nursing notes reviewed   history of anesthetic complications (sp HA):   NPO Solid Status: > 8 hours  NPO Liquid Status: > 2 hours           Airway   Mallampati: I  TM distance: >3 FB  Neck ROM: full  No difficulty expected  Dental    (+) edentulous, upper dentures and partials    Pulmonary    (-) asthma, shortness of breath, recent URI, sleep apnea, not a smoker  Cardiovascular     ECG reviewed    (+) hypertension, dysrhythmias (WPW syndrome abalted and  PVA) Atrial Fib, hyperlipidemia  (-) past MI, angina, cardiac stents    ROS comment: DEEPAK  2024 EF56 - 60%.LA cav dilated-  well positioned Watchman FLX LAAO device moderate marylin-prosthetic leak - maximal diameter measured at 2.8 mm.    [facility] 2016  Nonobstructive plaque disease involving the large OM and the apical LAD without evidence of hemodynamically significant CAD ·EF 60%.      Neuro/Psych  (+) headaches, psychiatric history  (-) seizures, CVA  GI/Hepatic/Renal/Endo    (+) obesity (sp G sleeve), GERD well controlled, PUD, GI bleeding (EGD 2023) , renal disease (creat  normal)-, thyroid problem hypothyroidism  (-) morbid obesity, diabetes    Musculoskeletal     Abdominal    Substance History      OB/GYN          Other   arthritis (psoriatic rheumatoid on plaquenil), blood dyscrasia anemia,         Phys Exam Other: Mac 3 G1V               Anesthesia Plan    ASA 3     general     (Clearsight )  intravenous induction     Anesthetic plan, risks, benefits, and alternatives have been provided, discussed and informed consent has been obtained with: patient.    Plan discussed with CRNA.      CODE STATUS:

## 2025-07-11 ENCOUNTER — CALL CENTER PROGRAMS (OUTPATIENT)
Dept: CALL CENTER | Facility: HOSPITAL | Age: 75
End: 2025-07-11
Payer: MEDICARE

## 2025-07-11 LAB
ACT BLD: 279 SECONDS (ref 82–152)
ACT BLD: 319 SECONDS (ref 82–152)
ACT BLD: 435 SECONDS (ref 82–152)

## 2025-07-11 NOTE — OUTREACH NOTE
PCI/Device Survey      Flowsheet Row Responses   Facility patient discharged from? [place]   Procedure date 07/10/25   Procedure (if device, specify in description) Ablation   Performing MD Dr. Jesse Keen   Attempt successful? Yes   Call start time 1041   Call end time 1047   Is the patient taking prescribed medications: ASA, Apixaban   Nursing intervention Reminded to continue to take prescribed medications, Nurse provided patient education   Does the patient have any of the following symptoms related to the cath/surgical site? --  [Right femoral site remains clean/dry pt reports.]   Nursing intervention Patient education provided   Does the patient have an appointment scheduled with the cardiologist? Yes   If the patient is a current smoker, are they able to teach back resources for cessation? Not a smoker   Did the patient feel prepared to go home on the same day as the procedure? Yes   Is the patient satisfied with the same day discharge process? 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

## 2025-07-14 LAB
QT INTERVAL: 470 MS
QTC INTERVAL: 492 MS

## 2025-08-12 ENCOUNTER — OFFICE VISIT (OUTPATIENT)
Dept: CARDIOLOGY | Facility: HOSPITAL | Age: 75
End: 2025-08-12
Payer: MEDICARE

## 2025-08-12 VITALS
WEIGHT: 137 LBS | BODY MASS INDEX: 22.02 KG/M2 | OXYGEN SATURATION: 98 % | DIASTOLIC BLOOD PRESSURE: 70 MMHG | HEIGHT: 66 IN | SYSTOLIC BLOOD PRESSURE: 150 MMHG | HEART RATE: 68 BPM

## 2025-08-12 DIAGNOSIS — I48.19 ATRIAL FIBRILLATION, PERSISTENT: Primary | ICD-10-CM

## 2025-08-12 DIAGNOSIS — Z95.818 PRESENCE OF WATCHMAN LEFT ATRIAL APPENDAGE CLOSURE DEVICE: ICD-10-CM

## (undated) DEVICE — BIT DRL 3FLUT QC CALIB/100MM 3.2X330MM

## (undated) DEVICE — DRSNG SURESITE123 4X4.8IN

## (undated) DEVICE — 3M™ IOBAN™ 2 ANTIMICROBIAL INCISE DRAPE 6651EZ: Brand: IOBAN™ 2

## (undated) DEVICE — 450 ML BOTTLE OF 0.05% CHLORHEXIDINE GLUCONATE IN 99.95% STERILE WATER FOR IRRIGATION, USP AND APPLICATOR.: Brand: IRRISEPT ANTIMICROBIAL WOUND LAVAGE

## (undated) DEVICE — GW DIAG .032

## (undated) DEVICE — SYR LUERLOK 50ML

## (undated) DEVICE — CORTICAL SCREW, Ø3.5MM X L30MM
Type: IMPLANTABLE DEVICE | Site: HUMERUS | Status: NON-FUNCTIONAL
Brand: CORTICAL SCREW, 3.5MM
Removed: 2019-02-08

## (undated) DEVICE — 3M™ STERI-DRAPE™ U-DRAPE 1015: Brand: STERI-DRAPE™

## (undated) DEVICE — DRSNG SURG AQUACEL AG 9X25CM

## (undated) DEVICE — GLV SURG SENSICARE PI MIC PF SZ7.5 LF STRL

## (undated) DEVICE — ST EXT IV SMARTSITE 2VLV SP M LL 5ML IV1

## (undated) DEVICE — Device

## (undated) DEVICE — ST EXT IV SMARTSITE PINCH/CLMP 5ML 46CM

## (undated) DEVICE — ADULT, W/LG. BACK PAD, RADIOTRANSPARENT ELEMENT AND LEAD WIRE COMPATIBLE W/: Brand: DEFIBRILLATION ELECTRODES

## (undated) DEVICE — ANTIBACTERIAL UNDYED BRAIDED (POLYGLACTIN 910), SYNTHETIC ABSORBABLE SUTURE: Brand: COATED VICRYL

## (undated) DEVICE — ST INF PRI SMRTSTE 20DRP 2VLV 24ML 117

## (undated) DEVICE — PK MAJ SHLDR SPLT 10

## (undated) DEVICE — PINNACLE INTRODUCER SHEATH: Brand: PINNACLE

## (undated) DEVICE — SCRUBIN SCRUB BRUSH DRY STER: Brand: MEDLINE INDUSTRIES, INC.

## (undated) DEVICE — ST EXT IV SMRTSTE 2VLV FIX M LL 6ML 41

## (undated) DEVICE — TUBING, SUCTION, 1/4" X 10', STRAIGHT: Brand: MEDLINE

## (undated) DEVICE — DECANT BG O JET

## (undated) DEVICE — BIT DRL HOLLOW 10MM

## (undated) DEVICE — BIT DRL 3FLUT QC NDL PT 3.2X145MM

## (undated) DEVICE — PULSED FIELD ABLATION CATHETER: Brand: FARAWAVE™ NAV

## (undated) DEVICE — DECANTER: Brand: UNBRANDED

## (undated) DEVICE — KT MANIFLD EP

## (undated) DEVICE — ELECTRD RETRN/GRND MEGADYNE SGL/PLT W/CORD 9FT DISP

## (undated) DEVICE — ISO/ALC 70PCT 16OZ

## (undated) DEVICE — CVR HNDL LT SURG ACCSSRY BLU STRL

## (undated) DEVICE — CATH SHEATH GUIDE SWARTZ 6FR

## (undated) DEVICE — Device: Brand: MEDEX

## (undated) DEVICE — ADULT, W/LG. BACK PAD, RADIOTRANSPARENT ELEMENT AND LEAD WIRE: Brand: DEFIBRILLATION ELECTRODES

## (undated) DEVICE — Device: Brand: THERMOCOOL SMARTTOUCH

## (undated) DEVICE — LIMB HOLDER, WRIST/ANKLE: Brand: DEROYAL

## (undated) DEVICE — GLV SURG TRIUMPH ORTHO W/ALOE PF LTX 7.5 STRL

## (undated) DEVICE — DRAPE,TOP,102X53,STERILE: Brand: MEDLINE

## (undated) DEVICE — SAFELINER SUCTION CANISTER 1000CC: Brand: DEROYAL

## (undated) DEVICE — 1 X VERSACROSS CONNECT TRANSSEPTAL DILATOR;  1 X VERSACROSS RF WIRE (INCLUDING 1 X CONNECTOR CABLE (SINGLE USE)): Brand: VERSACROSS CONNECT ACCESS SOLUTION FOR FARADRIVE

## (undated) DEVICE — LP VESL MAXI 2.5X1MM BLU 2PK

## (undated) DEVICE — Device: Brand: PENTARAY NAV

## (undated) DEVICE — 4-PORT MANIFOLD: Brand: NEPTUNE 2

## (undated) DEVICE — PATIENT RETURN ELECTRODE, SINGLE-USE, CONTACT QUALITY MONITORING, ADULT, WITH 9FT CORD, FOR PATIENTS WEIGING OVER 33LBS. (15KG): Brand: MEGADYNE

## (undated) DEVICE — BIT DRL 3FLUT QC CALIB/100MM 3.8X270MM

## (undated) DEVICE — CONTRL CONTRST CHMBRD W/TBG72IN REUS

## (undated) DEVICE — SET PRIMARY GRVTY 10DP MALE LL 104IN

## (undated) DEVICE — PRESSURE MONITORING SET: Brand: TRUWAVE

## (undated) DEVICE — Device: Brand: REFERENCE PATCH CARTO 3

## (undated) DEVICE — INTENDED TO BE USED TO OCCLUDE, RETRACT AND IDENTIFY ARTERIES, VEINS, TENDONS AND NERVES IN SURGICAL PROCEDURES: Brand: STERION®  VESSEL LOOP

## (undated) DEVICE — SYR LUERLOK 30CC

## (undated) DEVICE — LOCATION REFERENCE PATCH KIT: Brand: RHYTHMIA™ MAPPING SYSTEM

## (undated) DEVICE — INTRO SHEATH FAST/CATH LG/LUM 11F .038IN 12CM

## (undated) DEVICE — BNDG ELAS CO-FLEX SLF ADHR 4IN5YD LF STRL

## (undated) DEVICE — GLV SURG SENSICARE PI ORTHO SZ7.5 LF STRL

## (undated) DEVICE — SUT VIC 2/0 CT2 27IN J269H

## (undated) DEVICE — DOME MONITORING W BONDED STPCK BIOTRANS2

## (undated) DEVICE — SOL IRR H2O BTL 1000ML STRL

## (undated) DEVICE — STEERABLE SHEATH CLEAR: Brand: FARADRIVE™

## (undated) DEVICE — LEX ELECTRO PHYSIOLOGY: Brand: MEDLINE INDUSTRIES, INC.

## (undated) DEVICE — THE CARR-LOCKE INJECTION NEEDLE IS A SINGLE USE, DISPOSABLE, FLEXIBLE SHEATH INJECTION NEEDLE USED FOR THE INJECTION OF VARIOUS TYPES OF MEDIA THROUGH FLEXIBLE ENDOSCOPES.

## (undated) DEVICE — SOL NACL 0.9PCT 1000ML

## (undated) DEVICE — UNDERGLV SURG BIOGEL INDICAT PI SZ8 BLU

## (undated) DEVICE — FIRST STEP BEDSIDE ADD WATER KIT - RESEALABLE STAND-UP POUCH, ENDOSCOPIC CLEANING PAD - 1 POUCH: Brand: FIRST STEP BEDSIDE ADD WATER KIT - RESEALABLE STAND-UP POUCH, ENDOSCOPIC CLEANIN

## (undated) DEVICE — COUNTERSINK QC W/STOP 4.5MM

## (undated) DEVICE — INTRO ACCSR BLNT TP

## (undated) DEVICE — CATH ULTRASND ECHOCARDIOGRAPHY ACUNAV

## (undated) DEVICE — SWABSTK SKINPREP PVPI PRE/SAT 8IN STRL

## (undated) DEVICE — SOLIDIFIER LIQ PREMISORB 1500CC

## (undated) DEVICE — ADAPT CLN LUM OLYMP AIR/H20

## (undated) DEVICE — INTRO SHEATH ENGAGE W/50 GW .038 8F12

## (undated) DEVICE — Device: Brand: WEBSTER CS

## (undated) DEVICE — LUBE JELLY FOIL PACK 1.4 OZ: Brand: MEDLINE INDUSTRIES, INC.

## (undated) DEVICE — PUMP PAIN AUTOFUSER AUTO SELCT NOBOLUS 1TO14ML/HR 550ML DISP

## (undated) DEVICE — ST EXT IV LG BORE NDLESS FLTR LL 17IN

## (undated) DEVICE — INTRO SHEATH ENGAGE W/50 GW .038 7F12

## (undated) DEVICE — TBG PENCL TELESCP MEGADYNE SMOKE EVAC 10FT

## (undated) DEVICE — C-ARM: Brand: UNBRANDED

## (undated) DEVICE — ADULT NASAL CO2 SAMPLING WITH O2 DELIVERY CANNULA FOR CAPNOFLEX MODULE: Brand: VITAL SIGNS™

## (undated) DEVICE — ARM SLING: Brand: DEROYAL

## (undated) DEVICE — INTENT TO BE USED WITH SUTURE MATERIAL FOR TISSUE CLOSURE: Brand: RICHARD-ALLAN® NEEDLE 1/2 CIRCLE TAPER

## (undated) DEVICE — SYS CLS VASC/VENI VASCADE MVP 6TO12F

## (undated) DEVICE — 2.7MM DRILL: Brand: 2.7MM DRILL

## (undated) DEVICE — INTENDED FOR TISSUE SEPARATION, AND OTHER PROCEDURES THAT REQUIRE A SHARP SURGICAL BLADE TO PUNCTURE OR CUT.: Brand: BARD-PARKER ® CARBON RIB-BACK BLADES

## (undated) DEVICE — KT ORCA ORCAPOD DISP STRL

## (undated) DEVICE — T-MAX DISPOSABLE FACE MASK 8 PER BOX

## (undated) DEVICE — 1010 S-DRAPE TOWEL DRAPE 10/BX: Brand: STERI-DRAPE™

## (undated) DEVICE — ACCESS SHEATH WITH DILATOR: Brand: WATCHMAN FXD CURVE™ ACCESS SYSTEM

## (undated) DEVICE — STERILE PVP: Brand: MEDLINE INDUSTRIES, INC.

## (undated) DEVICE — KWIRE TROC/TP 2.5X285MM NS
Type: IMPLANTABLE DEVICE | Site: HUMERUS | Status: NON-FUNCTIONAL
Removed: 2021-11-18

## (undated) DEVICE — THE BITE BLOCK MAXI, LATEX FREE STRAP IS USED TO PROTECT THE ENDOSCOPE INSERTION TUBE FROM BEING BITTEN BY THE PATIENT.

## (undated) DEVICE — HYBRID CO2 TUBING/CAP SET FOR OLYMPUS® SCOPES & CO2 SOURCE: Brand: ERBE

## (undated) DEVICE — CATH ULTRASND ECHO ACUNAV FOR ACUSON 8F 90CM

## (undated) DEVICE — SOL ANTISEP SCRB HIBICLENS CHG4PCT 4OZ

## (undated) DEVICE — CATHETER CONNECTION CABLE: Brand: FARASTAR™

## (undated) DEVICE — INTENDED FOR TISSUE SEPARATION, AND OTHER PROCEDURES THAT REQUIRE A SHARP SURGICAL BLADE TO PUNCTURE OR CUT.: Brand: BARD-PARKER ® SAFETYLOCK CARBON RIB-BACK BLADES

## (undated) DEVICE — Device: Brand: SOUNDSTAR

## (undated) DEVICE — KT DISP ARRHYTHMIA VMAP 91200008 DISP

## (undated) DEVICE — INTRO SHEATH FASTCATH 8.5F60CM

## (undated) DEVICE — SYS TRNSEP ACC BRK ACROSS A/ 71CM

## (undated) DEVICE — GOWN,NON-REINFORCED,SIRUS,SET IN SLV,XL: Brand: MEDLINE

## (undated) DEVICE — 2.5MM DRILL: Brand: 2.5MM DRILL

## (undated) DEVICE — BLANKT WARM LOWR/BDY 100X120CM

## (undated) DEVICE — CATH DIAG EXPO .052 PIG145 6F 110CM

## (undated) DEVICE — Device: Brand: SMARTABLATE

## (undated) DEVICE — INTRO SWARTZ TRNSEP LAMP90 8.5F 63CM

## (undated) DEVICE — INTRO SHEATH ENGAGE W/50 GW .038 9F12

## (undated) DEVICE — SUT ETHLN 3/0 PC5 18IN 1893G

## (undated) DEVICE — CONTN GRAD MEAS TRIANG 32OZ BLK

## (undated) DEVICE — HIGH RESOLUTION MAPPING CATHETER: Brand: INTELLAMAP ORION™

## (undated) DEVICE — DRSNG SURG AQUACEL AG 9X15CM

## (undated) DEVICE — ELECTRD BLD EZ CLN STD 2.5IN

## (undated) DEVICE — 1 X VERSACROSS CONNECT TRANSSEPTAL DILATOR (INCLUDING 1 X J-TIP MECHANICAL GUIDEWIRE); 1 X VERSACROSS RF WIRE (INCLUDING 1 X CONNECTOR CABLE (SINGLE USE)); 1 X DISPERSIVE ELECTRODE: Brand: VERSACROSS CONNECT LAAC ACCESS SOLUTION

## (undated) DEVICE — GLV SURG SENSICARE ORTHO PF LF 7.5 STRL